# Patient Record
Sex: MALE | Race: WHITE | NOT HISPANIC OR LATINO | URBAN - METROPOLITAN AREA
[De-identification: names, ages, dates, MRNs, and addresses within clinical notes are randomized per-mention and may not be internally consistent; named-entity substitution may affect disease eponyms.]

---

## 2017-04-26 ENCOUNTER — INPATIENT (INPATIENT)
Facility: HOSPITAL | Age: 67
LOS: 6 days | Discharge: HOME HEALTH | End: 2017-05-03
Attending: HOSPITALIST | Admitting: INTERNAL MEDICINE

## 2017-06-20 ENCOUNTER — OUTPATIENT (OUTPATIENT)
Dept: OUTPATIENT SERVICES | Facility: HOSPITAL | Age: 67
LOS: 1 days | Discharge: HOME | End: 2017-06-20

## 2017-06-20 DIAGNOSIS — M54.5 LOW BACK PAIN: ICD-10-CM

## 2017-06-20 DIAGNOSIS — M53.9 DORSOPATHY, UNSPECIFIED: ICD-10-CM

## 2017-06-20 DIAGNOSIS — I10 ESSENTIAL (PRIMARY) HYPERTENSION: ICD-10-CM

## 2017-06-20 DIAGNOSIS — E11.9 TYPE 2 DIABETES MELLITUS WITHOUT COMPLICATIONS: ICD-10-CM

## 2017-06-20 DIAGNOSIS — G51.0 BELL'S PALSY: ICD-10-CM

## 2017-06-28 DIAGNOSIS — Z94.1 HEART TRANSPLANT STATUS: ICD-10-CM

## 2017-06-28 DIAGNOSIS — I25.10 ATHEROSCLEROTIC HEART DISEASE OF NATIVE CORONARY ARTERY WITHOUT ANGINA PECTORIS: ICD-10-CM

## 2017-06-28 DIAGNOSIS — Z79.899 OTHER LONG TERM (CURRENT) DRUG THERAPY: ICD-10-CM

## 2017-07-07 DIAGNOSIS — Z95.1 PRESENCE OF AORTOCORONARY BYPASS GRAFT: ICD-10-CM

## 2017-07-07 DIAGNOSIS — Z95.812 PRESENCE OF FULLY IMPLANTABLE ARTIFICIAL HEART: ICD-10-CM

## 2017-07-07 DIAGNOSIS — E11.22 TYPE 2 DIABETES MELLITUS WITH DIABETIC CHRONIC KIDNEY DISEASE: ICD-10-CM

## 2017-07-07 DIAGNOSIS — M10.9 GOUT, UNSPECIFIED: ICD-10-CM

## 2017-07-07 DIAGNOSIS — Z79.899 OTHER LONG TERM (CURRENT) DRUG THERAPY: ICD-10-CM

## 2017-07-07 DIAGNOSIS — I13.0 HYPERTENSIVE HEART AND CHRONIC KIDNEY DISEASE WITH HEART FAILURE AND STAGE 1 THROUGH STAGE 4 CHRONIC KIDNEY DISEASE, OR UNSPECIFIED CHRONIC KIDNEY DISEASE: ICD-10-CM

## 2017-07-07 DIAGNOSIS — Z87.891 PERSONAL HISTORY OF NICOTINE DEPENDENCE: ICD-10-CM

## 2017-07-07 DIAGNOSIS — E86.0 DEHYDRATION: ICD-10-CM

## 2017-07-07 DIAGNOSIS — N39.0 URINARY TRACT INFECTION, SITE NOT SPECIFIED: ICD-10-CM

## 2017-07-07 DIAGNOSIS — N40.1 BENIGN PROSTATIC HYPERPLASIA WITH LOWER URINARY TRACT SYMPTOMS: ICD-10-CM

## 2017-07-07 DIAGNOSIS — E78.5 HYPERLIPIDEMIA, UNSPECIFIED: ICD-10-CM

## 2017-07-07 DIAGNOSIS — M79.1 MYALGIA: ICD-10-CM

## 2017-07-07 DIAGNOSIS — I42.9 CARDIOMYOPATHY, UNSPECIFIED: ICD-10-CM

## 2017-07-07 DIAGNOSIS — E13.10 OTHER SPECIFIED DIABETES MELLITUS WITH KETOACIDOSIS WITHOUT COMA: ICD-10-CM

## 2017-07-07 DIAGNOSIS — Z79.4 LONG TERM (CURRENT) USE OF INSULIN: ICD-10-CM

## 2017-07-07 DIAGNOSIS — I50.22 CHRONIC SYSTOLIC (CONGESTIVE) HEART FAILURE: ICD-10-CM

## 2017-07-07 DIAGNOSIS — I69.354 HEMIPLEGIA AND HEMIPARESIS FOLLOWING CEREBRAL INFARCTION AFFECTING LEFT NON-DOMINANT SIDE: ICD-10-CM

## 2017-07-07 DIAGNOSIS — R33.8 OTHER RETENTION OF URINE: ICD-10-CM

## 2017-07-07 DIAGNOSIS — R41.82 ALTERED MENTAL STATUS, UNSPECIFIED: ICD-10-CM

## 2017-07-07 DIAGNOSIS — I25.10 ATHEROSCLEROTIC HEART DISEASE OF NATIVE CORONARY ARTERY WITHOUT ANGINA PECTORIS: ICD-10-CM

## 2017-07-07 DIAGNOSIS — B96.89 OTHER SPECIFIED BACTERIAL AGENTS AS THE CAUSE OF DISEASES CLASSIFIED ELSEWHERE: ICD-10-CM

## 2017-07-07 DIAGNOSIS — N17.9 ACUTE KIDNEY FAILURE, UNSPECIFIED: ICD-10-CM

## 2017-07-07 DIAGNOSIS — M1A.9XX0 CHRONIC GOUT, UNSPECIFIED, WITHOUT TOPHUS (TOPHI): ICD-10-CM

## 2017-07-07 DIAGNOSIS — N18.3 CHRONIC KIDNEY DISEASE, STAGE 3 (MODERATE): ICD-10-CM

## 2017-07-07 DIAGNOSIS — E83.42 HYPOMAGNESEMIA: ICD-10-CM

## 2017-07-07 DIAGNOSIS — R10.9 UNSPECIFIED ABDOMINAL PAIN: ICD-10-CM

## 2018-05-04 PROBLEM — Z00.00 ENCOUNTER FOR PREVENTIVE HEALTH EXAMINATION: Status: ACTIVE | Noted: 2018-05-04

## 2018-05-18 ENCOUNTER — APPOINTMENT (OUTPATIENT)
Dept: VASCULAR SURGERY | Facility: CLINIC | Age: 68
End: 2018-05-18
Payer: COMMERCIAL

## 2018-05-18 VITALS
BODY MASS INDEX: 31.67 KG/M2 | SYSTOLIC BLOOD PRESSURE: 130 MMHG | HEIGHT: 68 IN | WEIGHT: 209 LBS | DIASTOLIC BLOOD PRESSURE: 78 MMHG

## 2018-05-18 DIAGNOSIS — E11.9 TYPE 2 DIABETES MELLITUS W/OUT COMPLICATIONS: ICD-10-CM

## 2018-05-18 DIAGNOSIS — Z86.79 PERSONAL HISTORY OF OTHER DISEASES OF THE CIRCULATORY SYSTEM: ICD-10-CM

## 2018-05-18 DIAGNOSIS — M10.9 GOUT, UNSPECIFIED: ICD-10-CM

## 2018-05-18 DIAGNOSIS — Z94.1 HEART TRANSPLANT STATUS: ICD-10-CM

## 2018-05-18 DIAGNOSIS — I77.1 STRICTURE OF ARTERY: ICD-10-CM

## 2018-05-18 DIAGNOSIS — I10 ESSENTIAL (PRIMARY) HYPERTENSION: ICD-10-CM

## 2018-05-18 DIAGNOSIS — R42 DIZZINESS AND GIDDINESS: ICD-10-CM

## 2018-05-18 DIAGNOSIS — E78.00 PURE HYPERCHOLESTEROLEMIA, UNSPECIFIED: ICD-10-CM

## 2018-05-18 PROCEDURE — 99203 OFFICE O/P NEW LOW 30 MIN: CPT

## 2018-05-18 PROCEDURE — 93880 EXTRACRANIAL BILAT STUDY: CPT

## 2018-05-18 RX ORDER — DOCUSATE SODIUM 100 MG/1
100 CAPSULE ORAL
Refills: 0 | Status: ACTIVE | COMMUNITY

## 2018-05-18 RX ORDER — SPIRONOLACTONE 25 MG/1
25 TABLET ORAL
Refills: 0 | Status: ACTIVE | COMMUNITY

## 2018-05-18 RX ORDER — MULTIVIT-MIN/IRON/FOLIC ACID/K 18-600-40
CAPSULE ORAL
Refills: 0 | Status: ACTIVE | COMMUNITY

## 2018-05-18 RX ORDER — HUMAN INSULIN 100 [IU]/ML
(70-30) 100 INJECTION, SUSPENSION SUBCUTANEOUS
Refills: 0 | Status: ACTIVE | COMMUNITY

## 2018-05-18 RX ORDER — CALCIUM CARBONATE 260MG(650)
500 TABLET,CHEWABLE ORAL
Refills: 0 | Status: ACTIVE | COMMUNITY

## 2018-05-18 RX ORDER — ALLOPURINOL 100 MG/1
100 TABLET ORAL
Refills: 0 | Status: ACTIVE | COMMUNITY

## 2018-05-18 RX ORDER — CALCIUM CARBONATE/VITAMIN D3 500MG-5MCG
500-5 TABLET ORAL
Refills: 0 | Status: ACTIVE | COMMUNITY

## 2018-05-18 RX ORDER — VITAMIN B COMPLEX
CAPSULE ORAL
Refills: 0 | Status: ACTIVE | COMMUNITY

## 2018-05-18 RX ORDER — AMLODIPINE BESYLATE 5 MG/1
5 TABLET ORAL
Refills: 0 | Status: ACTIVE | COMMUNITY

## 2018-05-18 RX ORDER — OXYCODONE AND ACETAMINOPHEN 5; 325 MG/1; MG/1
5-325 TABLET ORAL
Refills: 0 | Status: ACTIVE | COMMUNITY

## 2018-05-18 RX ORDER — COLD-HOT PACK
EACH MISCELLANEOUS
Refills: 0 | Status: ACTIVE | COMMUNITY

## 2018-05-18 RX ORDER — QUININE SULFATE 324 MG/1
324 CAPSULE ORAL
Refills: 0 | Status: ACTIVE | COMMUNITY

## 2018-09-10 ENCOUNTER — INPATIENT (INPATIENT)
Facility: HOSPITAL | Age: 68
LOS: 10 days | Discharge: HOME | End: 2018-09-21
Attending: INTERNAL MEDICINE | Admitting: INTERNAL MEDICINE

## 2018-09-10 VITALS
RESPIRATION RATE: 18 BRPM | SYSTOLIC BLOOD PRESSURE: 175 MMHG | HEIGHT: 66 IN | TEMPERATURE: 98 F | OXYGEN SATURATION: 98 % | HEART RATE: 132 BPM | WEIGHT: 220.02 LBS | DIASTOLIC BLOOD PRESSURE: 115 MMHG

## 2018-09-10 DIAGNOSIS — Z94.1 HEART TRANSPLANT STATUS: Chronic | ICD-10-CM

## 2018-09-10 LAB
ALBUMIN SERPL ELPH-MCNC: 4.8 G/DL — SIGNIFICANT CHANGE UP (ref 3.5–5.2)
ALP SERPL-CCNC: 162 U/L — HIGH (ref 30–115)
ALT FLD-CCNC: 11 U/L — SIGNIFICANT CHANGE UP (ref 0–41)
ANION GAP SERPL CALC-SCNC: 27 MMOL/L — HIGH (ref 7–14)
APPEARANCE UR: CLEAR — SIGNIFICANT CHANGE UP
APTT BLD: 24 SEC — LOW (ref 27–39.2)
AST SERPL-CCNC: 17 U/L — SIGNIFICANT CHANGE UP (ref 0–41)
BACTERIA # UR AUTO: ABNORMAL
BASE EXCESS BLDV CALC-SCNC: -9.8 MMOL/L — LOW (ref -2–2)
BASOPHILS # BLD AUTO: 0.04 K/UL — SIGNIFICANT CHANGE UP (ref 0–0.2)
BASOPHILS NFR BLD AUTO: 0.4 % — SIGNIFICANT CHANGE UP (ref 0–1)
BILIRUB DIRECT SERPL-MCNC: <0.2 MG/DL — SIGNIFICANT CHANGE UP (ref 0–0.2)
BILIRUB INDIRECT FLD-MCNC: >0.2 MG/DL — SIGNIFICANT CHANGE UP (ref 0.2–1.2)
BILIRUB SERPL-MCNC: 0.4 MG/DL — SIGNIFICANT CHANGE UP (ref 0.2–1.2)
BILIRUB UR-MCNC: NEGATIVE — SIGNIFICANT CHANGE UP
BUN SERPL-MCNC: 39 MG/DL — HIGH (ref 10–20)
CA-I SERPL-SCNC: 1.29 MMOL/L — SIGNIFICANT CHANGE UP (ref 1.12–1.3)
CALCIUM SERPL-MCNC: 9.9 MG/DL — SIGNIFICANT CHANGE UP (ref 8.5–10.1)
CHLORIDE SERPL-SCNC: 99 MMOL/L — SIGNIFICANT CHANGE UP (ref 98–110)
CK SERPL-CCNC: 113 U/L — SIGNIFICANT CHANGE UP (ref 0–225)
CO2 SERPL-SCNC: 15 MMOL/L — LOW (ref 17–32)
COLOR SPEC: YELLOW — SIGNIFICANT CHANGE UP
CREAT SERPL-MCNC: 2.1 MG/DL — HIGH (ref 0.7–1.5)
DIFF PNL FLD: ABNORMAL
EOSINOPHIL # BLD AUTO: 0 K/UL — SIGNIFICANT CHANGE UP (ref 0–0.7)
EOSINOPHIL NFR BLD AUTO: 0 % — SIGNIFICANT CHANGE UP (ref 0–8)
EPI CELLS # UR: ABNORMAL /HPF
GAS PNL BLDV: 137 MMOL/L — SIGNIFICANT CHANGE UP (ref 136–145)
GAS PNL BLDV: SIGNIFICANT CHANGE UP
GLUCOSE SERPL-MCNC: 712 MG/DL — CRITICAL HIGH (ref 70–99)
GLUCOSE UR QL: 500 MG/DL
HCO3 BLDV-SCNC: 18 MMOL/L — LOW (ref 22–29)
HCT VFR BLD CALC: 50.3 % — SIGNIFICANT CHANGE UP (ref 42–52)
HCT VFR BLDA CALC: 51.5 % — HIGH (ref 34–44)
HGB BLD CALC-MCNC: 16.8 G/DL — SIGNIFICANT CHANGE UP (ref 14–18)
HGB BLD-MCNC: 16 G/DL — SIGNIFICANT CHANGE UP (ref 14–18)
HOROWITZ INDEX BLDV+IHG-RTO: 21 — SIGNIFICANT CHANGE UP
IMM GRANULOCYTES NFR BLD AUTO: 0.8 % — HIGH (ref 0.1–0.3)
INR BLD: 1.02 RATIO — SIGNIFICANT CHANGE UP (ref 0.65–1.3)
KETONES UR-MCNC: 80 — SIGNIFICANT CHANGE UP
LACTATE BLDV-MCNC: 2.4 MMOL/L — HIGH (ref 0.5–1.6)
LACTATE SERPL-SCNC: 2.5 MMOL/L — HIGH (ref 0.5–2.2)
LEUKOCYTE ESTERASE UR-ACNC: NEGATIVE — SIGNIFICANT CHANGE UP
LIDOCAIN IGE QN: 29 U/L — SIGNIFICANT CHANGE UP (ref 7–60)
LYMPHOCYTES # BLD AUTO: 0.54 K/UL — LOW (ref 1.2–3.4)
LYMPHOCYTES # BLD AUTO: 5.4 % — LOW (ref 20.5–51.1)
MCHC RBC-ENTMCNC: 26.7 PG — LOW (ref 27–31)
MCHC RBC-ENTMCNC: 31.8 G/DL — LOW (ref 32–37)
MCV RBC AUTO: 84 FL — SIGNIFICANT CHANGE UP (ref 80–94)
MONOCYTES # BLD AUTO: 0.26 K/UL — SIGNIFICANT CHANGE UP (ref 0.1–0.6)
MONOCYTES NFR BLD AUTO: 2.6 % — SIGNIFICANT CHANGE UP (ref 1.7–9.3)
NEUTROPHILS # BLD AUTO: 9.07 K/UL — HIGH (ref 1.4–6.5)
NEUTROPHILS NFR BLD AUTO: 90.8 % — HIGH (ref 42.2–75.2)
NITRITE UR-MCNC: NEGATIVE — SIGNIFICANT CHANGE UP
NRBC # BLD: 0 /100 WBCS — SIGNIFICANT CHANGE UP (ref 0–0)
PCO2 BLDV: 44 MMHG — SIGNIFICANT CHANGE UP (ref 41–51)
PH BLDV: 7.22 — LOW (ref 7.26–7.43)
PH UR: 5.5 — SIGNIFICANT CHANGE UP (ref 5–8)
PLATELET # BLD AUTO: 135 K/UL — SIGNIFICANT CHANGE UP (ref 130–400)
PO2 BLDV: 22 MMHG — SIGNIFICANT CHANGE UP (ref 20–40)
POTASSIUM BLDV-SCNC: 6.1 MMOL/L — HIGH (ref 3.3–5.6)
POTASSIUM SERPL-MCNC: 4.8 MMOL/L — SIGNIFICANT CHANGE UP (ref 3.5–5)
POTASSIUM SERPL-SCNC: 4.8 MMOL/L — SIGNIFICANT CHANGE UP (ref 3.5–5)
PROT SERPL-MCNC: 7.9 G/DL — SIGNIFICANT CHANGE UP (ref 6–8)
PROT UR-MCNC: 100
PROTHROM AB SERPL-ACNC: 11 SEC — SIGNIFICANT CHANGE UP (ref 9.95–12.87)
RBC # BLD: 5.99 M/UL — SIGNIFICANT CHANGE UP (ref 4.7–6.1)
RBC # FLD: 14.5 % — SIGNIFICANT CHANGE UP (ref 11.5–14.5)
RBC CASTS # UR COMP ASSIST: SIGNIFICANT CHANGE UP /HPF
SAO2 % BLDV: 33 % — SIGNIFICANT CHANGE UP
SODIUM SERPL-SCNC: 141 MMOL/L — SIGNIFICANT CHANGE UP (ref 135–146)
SP GR SPEC: 1.01 — SIGNIFICANT CHANGE UP (ref 1.01–1.03)
TROPONIN T SERPL-MCNC: <0.01 NG/ML — SIGNIFICANT CHANGE UP
UROBILINOGEN FLD QL: 0.2 — SIGNIFICANT CHANGE UP (ref 0.2–0.2)
WBC # BLD: 9.99 K/UL — SIGNIFICANT CHANGE UP (ref 4.8–10.8)
WBC # FLD AUTO: 9.99 K/UL — SIGNIFICANT CHANGE UP (ref 4.8–10.8)
WBC UR QL: ABNORMAL /HPF

## 2018-09-10 RX ORDER — CEFEPIME 1 G/1
1000 INJECTION, POWDER, FOR SOLUTION INTRAMUSCULAR; INTRAVENOUS ONCE
Qty: 0 | Refills: 0 | Status: COMPLETED | OUTPATIENT
Start: 2018-09-10 | End: 2018-09-10

## 2018-09-10 RX ORDER — CIPROFLOXACIN LACTATE 400MG/40ML
400 VIAL (ML) INTRAVENOUS ONCE
Qty: 0 | Refills: 0 | Status: COMPLETED | OUTPATIENT
Start: 2018-09-10 | End: 2018-09-10

## 2018-09-10 RX ORDER — INSULIN HUMAN 100 [IU]/ML
10 INJECTION, SOLUTION SUBCUTANEOUS ONCE
Qty: 0 | Refills: 0 | Status: COMPLETED | OUTPATIENT
Start: 2018-09-10 | End: 2018-09-10

## 2018-09-10 RX ORDER — SODIUM CHLORIDE 9 MG/ML
3 INJECTION INTRAMUSCULAR; INTRAVENOUS; SUBCUTANEOUS ONCE
Qty: 0 | Refills: 0 | Status: COMPLETED | OUTPATIENT
Start: 2018-09-10 | End: 2018-09-10

## 2018-09-10 RX ORDER — SODIUM CHLORIDE 9 MG/ML
1000 INJECTION, SOLUTION INTRAVENOUS ONCE
Qty: 0 | Refills: 0 | Status: COMPLETED | OUTPATIENT
Start: 2018-09-10 | End: 2018-09-10

## 2018-09-10 RX ORDER — INSULIN HUMAN 100 [IU]/ML
10 INJECTION, SOLUTION SUBCUTANEOUS
Qty: 100 | Refills: 0 | Status: DISCONTINUED | OUTPATIENT
Start: 2018-09-10 | End: 2018-09-11

## 2018-09-10 RX ORDER — SODIUM CHLORIDE 9 MG/ML
1000 INJECTION INTRAMUSCULAR; INTRAVENOUS; SUBCUTANEOUS ONCE
Qty: 0 | Refills: 0 | Status: COMPLETED | OUTPATIENT
Start: 2018-09-10 | End: 2018-09-10

## 2018-09-10 RX ORDER — INSULIN HUMAN 100 [IU]/ML
10 INJECTION, SOLUTION SUBCUTANEOUS ONCE
Qty: 0 | Refills: 0 | Status: DISCONTINUED | OUTPATIENT
Start: 2018-09-10 | End: 2018-09-10

## 2018-09-10 RX ORDER — LABETALOL HCL 100 MG
10 TABLET ORAL ONCE
Qty: 0 | Refills: 0 | Status: COMPLETED | OUTPATIENT
Start: 2018-09-10 | End: 2018-09-10

## 2018-09-10 RX ORDER — INSULIN HUMAN 100 [IU]/ML
0.1 INJECTION, SOLUTION SUBCUTANEOUS
Qty: 100 | Refills: 0 | Status: DISCONTINUED | OUTPATIENT
Start: 2018-09-10 | End: 2018-09-11

## 2018-09-10 RX ORDER — ACETAMINOPHEN 500 MG
975 TABLET ORAL ONCE
Qty: 0 | Refills: 0 | Status: COMPLETED | OUTPATIENT
Start: 2018-09-10 | End: 2018-09-10

## 2018-09-10 RX ADMIN — Medication 10 MILLIGRAM(S): at 22:49

## 2018-09-10 RX ADMIN — SODIUM CHLORIDE 1000 MILLILITER(S): 9 INJECTION, SOLUTION INTRAVENOUS at 23:37

## 2018-09-10 RX ADMIN — INSULIN HUMAN 0.1 UNIT(S)/HR: 100 INJECTION, SOLUTION SUBCUTANEOUS at 23:39

## 2018-09-10 RX ADMIN — INSULIN HUMAN 10 UNIT(S): 100 INJECTION, SOLUTION SUBCUTANEOUS at 20:30

## 2018-09-10 RX ADMIN — SODIUM CHLORIDE 1000 MILLILITER(S): 9 INJECTION INTRAMUSCULAR; INTRAVENOUS; SUBCUTANEOUS at 19:38

## 2018-09-10 RX ADMIN — SODIUM CHLORIDE 3 MILLILITER(S): 9 INJECTION INTRAMUSCULAR; INTRAVENOUS; SUBCUTANEOUS at 19:38

## 2018-09-10 RX ADMIN — INSULIN HUMAN 10 UNIT(S): 100 INJECTION, SOLUTION SUBCUTANEOUS at 22:43

## 2018-09-10 RX ADMIN — Medication 200 MILLIGRAM(S): at 19:38

## 2018-09-10 RX ADMIN — Medication 10 MILLIGRAM(S): at 20:39

## 2018-09-10 RX ADMIN — CEFEPIME 100 MILLIGRAM(S): 1 INJECTION, POWDER, FOR SOLUTION INTRAMUSCULAR; INTRAVENOUS at 19:38

## 2018-09-10 RX ADMIN — Medication 975 MILLIGRAM(S): at 19:38

## 2018-09-10 NOTE — ED PROVIDER NOTE - CARE PLAN
Principal Discharge DX:	DKA (diabetic ketoacidoses)  Secondary Diagnosis:	Sepsis  Secondary Diagnosis:	ICH (intracerebral hemorrhage)

## 2018-09-10 NOTE — ED PROVIDER NOTE - MEDICAL DECISION MAKING DETAILS
68yM pmhx  heart transplant in [facility], after MI  - on tacrolimus, Mycophenlyate, spironolactone lasix, IDDM, HTN CKD ( Dr dhaliwal), p/w increased FS and confusion.  Per wife at bedside  she saw patient  before she went to work , he was ok,  when she came home at 5:!5  he was sitting on recliner, slumped over,  she checked his FS and it  was " too hgih",  Fmaily mentiosn  he is on an abx she does not know name of  for UTI  given by urologist Dr Braga last week - Pt denies any chest pain sob cough  -  pt has been having  abdominal pain for > 1 month - referred to  [facility]  had  CT  which ws normal - and MRI that they do not have result of yet. There was no fall or trauma.   confusion was described by wife as  grabbing at things that weren't there. PE: alert perrla head atraumatic  neck supple  mm dry cvs tachycardic resp cta abd soft mild diffuse tenderness,  neuro alert oriented to person place - does not know time.   5/5 strength all extremities. rectal temp, 101,4 -  sepsis protocol initiated 68yM pmhx  heart transplant in [facility], after MI 10 years ago   - on tacrolimus, Mycophenlyate, spironolactone lasix, IDDM, HTN CKD ( Dr dhaliwal), p/w increased FS and confusion.  Per wife at bedside  she saw patient  before she went to work , he was ok,  when she came home at 5:!5  he was sitting on recliner, slumped over,  she checked his FS and it  was " too hgih",  Fmaily mentiosn  he is on an abx she does not know name of  for UTI  given by urologist Dr Braga last week - Pt denies any chest pain sob cough  -  pt has been having  abdominal pain for > 1 month - referred to  [facility]  had  CT  which ws normal - and MRI that they do not have result of yet. There was no fall or trauma.   confusion was described by wife as  grabbing at things that weren't there. PE: alert perrla head atraumatic  neck supple  mm dry cvs tachycardic resp cta abd soft mild diffuse tenderness,  neuro alert oriented to person place - does not know time.   5/5 strength all extremities. rectal temp, 101,4 -  sepsis protocol initiated

## 2018-09-10 NOTE — ED ADULT NURSE NOTE - NSIMPLEMENTINTERV_GEN_ALL_ED
Implemented All Fall with Harm Risk Interventions:  [place] to call system. Call bell, personal items and telephone within reach. Instruct patient to call for assistance. Room bathroom lighting operational. Non-slip footwear when patient is off stretcher. Physically safe environment: no spills, clutter or unnecessary equipment. Stretcher in lowest position, wheels locked, appropriate side rails in place. Provide visual cue, wrist band, yellow gown, etc. Monitor gait and stability. Monitor for mental status changes and reorient to person, place, and time. Review medications for side effects contributing to fall risk. Reinforce activity limits and safety measures with patient and family. Provide visual clues: red socks.

## 2018-09-10 NOTE — ED PROVIDER NOTE - PROGRESS NOTE DETAILS
Sepsis - will hold 30cc/kg bolus as patient is cardiac transplant patient on multiple diuretics - 1 liter bolus going now original BMop was hemolyzed -   2nd redrawn -   pt  had receved  to IV boluses of insulin at that  time-   2md  bmp results  pt with AG  a,d  low hco3 -   will start insulin infusion  for DKA

## 2018-09-10 NOTE — ED PROVIDER NOTE - PMH
Benign prostatic hyperplasia with urinary frequency    Bilateral hearing loss, unspecified hearing loss type    Chronic kidney disease, unspecified CKD stage    Diabetes    Heart transplant recipient    High cholesterol    HTN (hypertension)    Urinary tract infection without hematuria, site unspecified

## 2018-09-10 NOTE — ED ADULT TRIAGE NOTE - CHIEF COMPLAINT QUOTE
patient's wife states when he she got home, "he was slumped over sleeping , and his sugar was too high" patient states in the morning patient was at baseline alert and oriented.

## 2018-09-10 NOTE — ED PROVIDER NOTE - OBJECTIVE STATEMENT
68 year old male past medical history of cardiac transplant 10 years ago and Diabetes brought in by ambulance for AMS and high blood sugar as per wife they were unable to get number at home. Pt was sitting in recliner as per wife slummped over. patient with also abd pain for the last month seen by francheska who did CT and MRI both normal. spouse denies trauma.

## 2018-09-11 LAB
ACETONE SERPL-MCNC: ABNORMAL
ALBUMIN SERPL ELPH-MCNC: 4 G/DL — SIGNIFICANT CHANGE UP (ref 3.5–5.2)
ALBUMIN SERPL ELPH-MCNC: 4.6 G/DL — SIGNIFICANT CHANGE UP (ref 3.5–5.2)
ALP SERPL-CCNC: 117 U/L — HIGH (ref 30–115)
ALP SERPL-CCNC: 141 U/L — HIGH (ref 30–115)
ALT FLD-CCNC: 10 U/L — SIGNIFICANT CHANGE UP (ref 0–41)
ALT FLD-CCNC: 8 U/L — SIGNIFICANT CHANGE UP (ref 0–41)
ANION GAP SERPL CALC-SCNC: 13 MMOL/L — SIGNIFICANT CHANGE UP (ref 7–14)
ANION GAP SERPL CALC-SCNC: 15 MMOL/L — HIGH (ref 7–14)
ANION GAP SERPL CALC-SCNC: 18 MMOL/L — HIGH (ref 7–14)
ANION GAP SERPL CALC-SCNC: 19 MMOL/L — HIGH (ref 7–14)
AST SERPL-CCNC: 17 U/L — SIGNIFICANT CHANGE UP (ref 0–41)
AST SERPL-CCNC: 19 U/L — SIGNIFICANT CHANGE UP (ref 0–41)
BASOPHILS # BLD AUTO: 0.04 K/UL — SIGNIFICANT CHANGE UP (ref 0–0.2)
BASOPHILS NFR BLD AUTO: 0.3 % — SIGNIFICANT CHANGE UP (ref 0–1)
BILIRUB SERPL-MCNC: 0.5 MG/DL — SIGNIFICANT CHANGE UP (ref 0.2–1.2)
BILIRUB SERPL-MCNC: 0.7 MG/DL — SIGNIFICANT CHANGE UP (ref 0.2–1.2)
BUN SERPL-MCNC: 23 MG/DL — HIGH (ref 10–20)
BUN SERPL-MCNC: 32 MG/DL — HIGH (ref 10–20)
BUN SERPL-MCNC: 35 MG/DL — HIGH (ref 10–20)
BUN SERPL-MCNC: 37 MG/DL — HIGH (ref 10–20)
CALCIUM SERPL-MCNC: 10 MG/DL — SIGNIFICANT CHANGE UP (ref 8.5–10.1)
CALCIUM SERPL-MCNC: 10.4 MG/DL — HIGH (ref 8.5–10.1)
CALCIUM SERPL-MCNC: 6.3 MG/DL — LOW (ref 8.5–10.1)
CALCIUM SERPL-MCNC: 9.9 MG/DL — SIGNIFICANT CHANGE UP (ref 8.5–10.1)
CHLORIDE SERPL-SCNC: 105 MMOL/L — SIGNIFICANT CHANGE UP (ref 98–110)
CHLORIDE SERPL-SCNC: 107 MMOL/L — SIGNIFICANT CHANGE UP (ref 98–110)
CHLORIDE SERPL-SCNC: 107 MMOL/L — SIGNIFICANT CHANGE UP (ref 98–110)
CHLORIDE SERPL-SCNC: 68 MMOL/L — LOW (ref 98–110)
CHLORIDE UR-SCNC: <20 — SIGNIFICANT CHANGE UP
CHOLEST SERPL-MCNC: 143 MG/DL — SIGNIFICANT CHANGE UP (ref 100–200)
CO2 SERPL-SCNC: 16 MMOL/L — LOW (ref 17–32)
CO2 SERPL-SCNC: 21 MMOL/L — SIGNIFICANT CHANGE UP (ref 17–32)
CO2 SERPL-SCNC: 23 MMOL/L — SIGNIFICANT CHANGE UP (ref 17–32)
CO2 SERPL-SCNC: 25 MMOL/L — SIGNIFICANT CHANGE UP (ref 17–32)
CREAT SERPL-MCNC: 1.4 MG/DL — SIGNIFICANT CHANGE UP (ref 0.7–1.5)
CREAT SERPL-MCNC: 1.8 MG/DL — HIGH (ref 0.7–1.5)
CREAT SERPL-MCNC: 2 MG/DL — HIGH (ref 0.7–1.5)
CREAT SERPL-MCNC: 2.1 MG/DL — HIGH (ref 0.7–1.5)
EOSINOPHIL # BLD AUTO: 0.01 K/UL — SIGNIFICANT CHANGE UP (ref 0–0.7)
EOSINOPHIL NFR BLD AUTO: 0.1 % — SIGNIFICANT CHANGE UP (ref 0–8)
ESTIMATED AVERAGE GLUCOSE: 326 MG/DL — HIGH (ref 68–114)
ESTIMATED AVERAGE GLUCOSE: 338 MG/DL — HIGH (ref 68–114)
GLUCOSE BLDC GLUCOMTR-MCNC: 105 MG/DL — HIGH (ref 70–99)
GLUCOSE BLDC GLUCOMTR-MCNC: 111 MG/DL — HIGH (ref 70–99)
GLUCOSE BLDC GLUCOMTR-MCNC: 121 MG/DL — HIGH (ref 70–99)
GLUCOSE BLDC GLUCOMTR-MCNC: 141 MG/DL — HIGH (ref 70–99)
GLUCOSE BLDC GLUCOMTR-MCNC: 141 MG/DL — HIGH (ref 70–99)
GLUCOSE BLDC GLUCOMTR-MCNC: 147 MG/DL — HIGH (ref 70–99)
GLUCOSE BLDC GLUCOMTR-MCNC: 156 MG/DL — HIGH (ref 70–99)
GLUCOSE BLDC GLUCOMTR-MCNC: 162 MG/DL — HIGH (ref 70–99)
GLUCOSE BLDC GLUCOMTR-MCNC: 163 MG/DL — HIGH (ref 70–99)
GLUCOSE BLDC GLUCOMTR-MCNC: 164 MG/DL — HIGH (ref 70–99)
GLUCOSE BLDC GLUCOMTR-MCNC: 177 MG/DL — HIGH (ref 70–99)
GLUCOSE BLDC GLUCOMTR-MCNC: 178 MG/DL — HIGH (ref 70–99)
GLUCOSE BLDC GLUCOMTR-MCNC: 180 MG/DL — HIGH (ref 70–99)
GLUCOSE BLDC GLUCOMTR-MCNC: 191 MG/DL — HIGH (ref 70–99)
GLUCOSE BLDC GLUCOMTR-MCNC: 198 MG/DL — HIGH (ref 70–99)
GLUCOSE BLDC GLUCOMTR-MCNC: 199 MG/DL — HIGH (ref 70–99)
GLUCOSE BLDC GLUCOMTR-MCNC: 217 MG/DL — HIGH (ref 70–99)
GLUCOSE BLDC GLUCOMTR-MCNC: 297 MG/DL — HIGH (ref 70–99)
GLUCOSE BLDC GLUCOMTR-MCNC: 90 MG/DL — SIGNIFICANT CHANGE UP (ref 70–99)
GLUCOSE SERPL-MCNC: 1278 MG/DL — SIGNIFICANT CHANGE UP (ref 70–99)
GLUCOSE SERPL-MCNC: 139 MG/DL — HIGH (ref 70–99)
GLUCOSE SERPL-MCNC: 189 MG/DL — HIGH (ref 70–99)
GLUCOSE SERPL-MCNC: 401 MG/DL — HIGH (ref 70–99)
HBA1C BLD-MCNC: 13 % — HIGH (ref 4–5.6)
HBA1C BLD-MCNC: 13.4 % — HIGH (ref 4–5.6)
HCT VFR BLD CALC: 41.8 % — LOW (ref 42–52)
HCT VFR BLD CALC: 46 % — SIGNIFICANT CHANGE UP (ref 42–52)
HDLC SERPL-MCNC: 24 MG/DL — LOW
HGB BLD-MCNC: 13.9 G/DL — LOW (ref 14–18)
HGB BLD-MCNC: 15.3 G/DL — SIGNIFICANT CHANGE UP (ref 14–18)
IMM GRANULOCYTES NFR BLD AUTO: 0.7 % — HIGH (ref 0.1–0.3)
LIPID PNL WITH DIRECT LDL SERPL: 101 MG/DL — SIGNIFICANT CHANGE UP (ref 4–129)
LYMPHOCYTES # BLD AUTO: 1.25 K/UL — SIGNIFICANT CHANGE UP (ref 1.2–3.4)
LYMPHOCYTES # BLD AUTO: 9.4 % — LOW (ref 20.5–51.1)
MAGNESIUM SERPL-MCNC: 1.5 MG/DL — LOW (ref 1.8–2.4)
MAGNESIUM SERPL-MCNC: 1.9 MG/DL — SIGNIFICANT CHANGE UP (ref 1.8–2.4)
MAGNESIUM SERPL-MCNC: 2.1 MG/DL — SIGNIFICANT CHANGE UP (ref 1.8–2.4)
MCHC RBC-ENTMCNC: 26.3 PG — LOW (ref 27–31)
MCHC RBC-ENTMCNC: 26.7 PG — LOW (ref 27–31)
MCHC RBC-ENTMCNC: 33.3 G/DL — SIGNIFICANT CHANGE UP (ref 32–37)
MCHC RBC-ENTMCNC: 33.3 G/DL — SIGNIFICANT CHANGE UP (ref 32–37)
MCV RBC AUTO: 79.2 FL — LOW (ref 80–94)
MCV RBC AUTO: 80.1 FL — SIGNIFICANT CHANGE UP (ref 80–94)
MONOCYTES # BLD AUTO: 1.26 K/UL — HIGH (ref 0.1–0.6)
MONOCYTES NFR BLD AUTO: 9.4 % — HIGH (ref 1.7–9.3)
NEUTROPHILS # BLD AUTO: 10.71 K/UL — HIGH (ref 1.4–6.5)
NEUTROPHILS NFR BLD AUTO: 80.1 % — HIGH (ref 42.2–75.2)
NRBC # BLD: 0 /100 WBCS — SIGNIFICANT CHANGE UP (ref 0–0)
NRBC # BLD: 0 /100 WBCS — SIGNIFICANT CHANGE UP (ref 0–0)
PHOSPHATE SERPL-MCNC: 1.5 MG/DL — LOW (ref 2.1–4.9)
PHOSPHATE SERPL-MCNC: 1.8 MG/DL — LOW (ref 2.1–4.9)
PLATELET # BLD AUTO: 126 K/UL — LOW (ref 130–400)
PLATELET # BLD AUTO: 145 K/UL — SIGNIFICANT CHANGE UP (ref 130–400)
POTASSIUM SERPL-MCNC: 2.2 MMOL/L — CRITICAL LOW (ref 3.5–5)
POTASSIUM SERPL-MCNC: 3.8 MMOL/L — SIGNIFICANT CHANGE UP (ref 3.5–5)
POTASSIUM SERPL-MCNC: 4 MMOL/L — SIGNIFICANT CHANGE UP (ref 3.5–5)
POTASSIUM SERPL-MCNC: 4.1 MMOL/L — SIGNIFICANT CHANGE UP (ref 3.5–5)
POTASSIUM SERPL-SCNC: 2.2 MMOL/L — CRITICAL LOW (ref 3.5–5)
POTASSIUM SERPL-SCNC: 3.8 MMOL/L — SIGNIFICANT CHANGE UP (ref 3.5–5)
POTASSIUM SERPL-SCNC: 4 MMOL/L — SIGNIFICANT CHANGE UP (ref 3.5–5)
POTASSIUM SERPL-SCNC: 4.1 MMOL/L — SIGNIFICANT CHANGE UP (ref 3.5–5)
POTASSIUM UR-SCNC: 35 MMOL/L — SIGNIFICANT CHANGE UP
PROT SERPL-MCNC: 6.8 G/DL — SIGNIFICANT CHANGE UP (ref 6–8)
PROT SERPL-MCNC: 7.3 G/DL — SIGNIFICANT CHANGE UP (ref 6–8)
RBC # BLD: 5.28 M/UL — SIGNIFICANT CHANGE UP (ref 4.7–6.1)
RBC # BLD: 5.74 M/UL — SIGNIFICANT CHANGE UP (ref 4.7–6.1)
RBC # FLD: 13.7 % — SIGNIFICANT CHANGE UP (ref 11.5–14.5)
RBC # FLD: 13.8 % — SIGNIFICANT CHANGE UP (ref 11.5–14.5)
SODIUM SERPL-SCNC: 145 MMOL/L — SIGNIFICANT CHANGE UP (ref 135–146)
SODIUM SERPL-SCNC: 147 MMOL/L — HIGH (ref 135–146)
SODIUM SERPL-SCNC: 148 MMOL/L — HIGH (ref 135–146)
SODIUM SERPL-SCNC: 97 MMOL/L — CRITICAL LOW (ref 135–146)
SODIUM UR-SCNC: 28 MMOL/L — SIGNIFICANT CHANGE UP
TOTAL CHOLESTEROL/HDL RATIO MEASUREMENT: 6 RATIO — HIGH (ref 4–5.5)
TRIGL SERPL-MCNC: 137 MG/DL — SIGNIFICANT CHANGE UP (ref 10–149)
TROPONIN T SERPL-MCNC: <0.01 NG/ML — SIGNIFICANT CHANGE UP
WBC # BLD: 10.3 K/UL — SIGNIFICANT CHANGE UP (ref 4.8–10.8)
WBC # BLD: 13.36 K/UL — HIGH (ref 4.8–10.8)
WBC # FLD AUTO: 10.3 K/UL — SIGNIFICANT CHANGE UP (ref 4.8–10.8)
WBC # FLD AUTO: 13.36 K/UL — HIGH (ref 4.8–10.8)

## 2018-09-11 RX ORDER — SODIUM CHLORIDE 9 MG/ML
1000 INJECTION, SOLUTION INTRAVENOUS
Qty: 0 | Refills: 0 | Status: DISCONTINUED | OUTPATIENT
Start: 2018-09-11 | End: 2018-09-11

## 2018-09-11 RX ORDER — SODIUM,POTASSIUM PHOSPHATES 278-250MG
1 POWDER IN PACKET (EA) ORAL
Qty: 0 | Refills: 0 | Status: DISCONTINUED | OUTPATIENT
Start: 2018-09-11 | End: 2018-09-11

## 2018-09-11 RX ORDER — SPIRONOLACTONE 25 MG/1
1 TABLET, FILM COATED ORAL
Qty: 0 | Refills: 0 | COMMUNITY

## 2018-09-11 RX ORDER — INSULIN HUMAN 100 [IU]/ML
3 INJECTION, SOLUTION SUBCUTANEOUS
Qty: 100 | Refills: 0 | Status: DISCONTINUED | OUTPATIENT
Start: 2018-09-11 | End: 2018-09-11

## 2018-09-11 RX ORDER — ALLOPURINOL 300 MG
100 TABLET ORAL
Qty: 0 | Refills: 0 | Status: DISCONTINUED | OUTPATIENT
Start: 2018-09-11 | End: 2018-09-21

## 2018-09-11 RX ORDER — INSULIN HUMAN 100 [IU]/ML
8 INJECTION, SOLUTION SUBCUTANEOUS
Qty: 100 | Refills: 0 | Status: DISCONTINUED | OUTPATIENT
Start: 2018-09-11 | End: 2018-09-11

## 2018-09-11 RX ORDER — SODIUM CHLORIDE 9 MG/ML
1000 INJECTION, SOLUTION INTRAVENOUS ONCE
Qty: 0 | Refills: 0 | Status: DISCONTINUED | OUTPATIENT
Start: 2018-09-11 | End: 2018-09-11

## 2018-09-11 RX ORDER — MYCOPHENOLATE MOFETIL 250 MG/1
3 CAPSULE ORAL
Qty: 0 | Refills: 0 | COMMUNITY

## 2018-09-11 RX ORDER — INSULIN HUMAN 100 [IU]/ML
5 INJECTION, SOLUTION SUBCUTANEOUS
Qty: 100 | Refills: 0 | Status: DISCONTINUED | OUTPATIENT
Start: 2018-09-11 | End: 2018-09-11

## 2018-09-11 RX ORDER — ACETAMINOPHEN 500 MG
650 TABLET ORAL ONCE
Qty: 0 | Refills: 0 | Status: COMPLETED | OUTPATIENT
Start: 2018-09-11 | End: 2018-09-11

## 2018-09-11 RX ORDER — MYCOPHENOLATE MOFETIL 250 MG/1
1500 CAPSULE ORAL
Qty: 0 | Refills: 0 | Status: DISCONTINUED | OUTPATIENT
Start: 2018-09-11 | End: 2018-09-21

## 2018-09-11 RX ORDER — AMLODIPINE BESYLATE 2.5 MG/1
5 TABLET ORAL DAILY
Qty: 0 | Refills: 0 | Status: DISCONTINUED | OUTPATIENT
Start: 2018-09-11 | End: 2018-09-12

## 2018-09-11 RX ORDER — POTASSIUM CHLORIDE 20 MEQ
20 PACKET (EA) ORAL
Qty: 0 | Refills: 0 | Status: DISCONTINUED | OUTPATIENT
Start: 2018-09-11 | End: 2018-09-11

## 2018-09-11 RX ORDER — MAGNESIUM SULFATE 500 MG/ML
1 VIAL (ML) INJECTION ONCE
Qty: 0 | Refills: 0 | Status: COMPLETED | OUTPATIENT
Start: 2018-09-11 | End: 2018-09-12

## 2018-09-11 RX ORDER — TACROLIMUS 5 MG/1
1 CAPSULE ORAL EVERY 12 HOURS
Qty: 0 | Refills: 0 | Status: DISCONTINUED | OUTPATIENT
Start: 2018-09-11 | End: 2018-09-21

## 2018-09-11 RX ORDER — SODIUM,POTASSIUM PHOSPHATES 278-250MG
1 POWDER IN PACKET (EA) ORAL
Qty: 0 | Refills: 0 | Status: COMPLETED | OUTPATIENT
Start: 2018-09-11 | End: 2018-09-12

## 2018-09-11 RX ORDER — CEFEPIME 1 G/1
1000 INJECTION, POWDER, FOR SOLUTION INTRAMUSCULAR; INTRAVENOUS EVERY 12 HOURS
Qty: 0 | Refills: 0 | Status: DISCONTINUED | OUTPATIENT
Start: 2018-09-11 | End: 2018-09-18

## 2018-09-11 RX ORDER — INSULIN HUMAN 100 [IU]/ML
4 INJECTION, SOLUTION SUBCUTANEOUS
Qty: 100 | Refills: 0 | Status: DISCONTINUED | OUTPATIENT
Start: 2018-09-11 | End: 2018-09-12

## 2018-09-11 RX ORDER — INFLUENZA VIRUS VACCINE 15; 15; 15; 15 UG/.5ML; UG/.5ML; UG/.5ML; UG/.5ML
0.5 SUSPENSION INTRAMUSCULAR ONCE
Qty: 0 | Refills: 0 | Status: DISCONTINUED | OUTPATIENT
Start: 2018-09-11 | End: 2018-09-11

## 2018-09-11 RX ORDER — SODIUM CHLORIDE 9 MG/ML
1000 INJECTION, SOLUTION INTRAVENOUS
Qty: 0 | Refills: 0 | Status: DISCONTINUED | OUTPATIENT
Start: 2018-09-11 | End: 2018-09-12

## 2018-09-11 RX ORDER — HYDRALAZINE HCL 50 MG
25 TABLET ORAL ONCE
Qty: 0 | Refills: 0 | Status: COMPLETED | OUTPATIENT
Start: 2018-09-11 | End: 2018-09-11

## 2018-09-11 RX ADMIN — Medication 100 MILLIGRAM(S): at 07:51

## 2018-09-11 RX ADMIN — CEFEPIME 100 MILLIGRAM(S): 1 INJECTION, POWDER, FOR SOLUTION INTRAMUSCULAR; INTRAVENOUS at 17:19

## 2018-09-11 RX ADMIN — Medication 1 PACKET(S): at 12:06

## 2018-09-11 RX ADMIN — TACROLIMUS 1 MILLIGRAM(S): 5 CAPSULE ORAL at 17:19

## 2018-09-11 RX ADMIN — INSULIN HUMAN 8 UNIT(S)/HR: 100 INJECTION, SOLUTION SUBCUTANEOUS at 03:27

## 2018-09-11 RX ADMIN — SODIUM CHLORIDE 125 MILLILITER(S): 9 INJECTION, SOLUTION INTRAVENOUS at 03:31

## 2018-09-11 RX ADMIN — SODIUM CHLORIDE 125 MILLILITER(S): 9 INJECTION, SOLUTION INTRAVENOUS at 18:40

## 2018-09-11 RX ADMIN — Medication 975 MILLIGRAM(S): at 02:56

## 2018-09-11 RX ADMIN — CEFEPIME 100 MILLIGRAM(S): 1 INJECTION, POWDER, FOR SOLUTION INTRAMUSCULAR; INTRAVENOUS at 06:58

## 2018-09-11 RX ADMIN — Medication 650 MILLIGRAM(S): at 23:10

## 2018-09-11 RX ADMIN — SODIUM CHLORIDE 125 MILLILITER(S): 9 INJECTION, SOLUTION INTRAVENOUS at 04:19

## 2018-09-11 RX ADMIN — Medication 1 PACKET(S): at 16:34

## 2018-09-11 RX ADMIN — MYCOPHENOLATE MOFETIL 1500 MILLIGRAM(S): 250 CAPSULE ORAL at 17:21

## 2018-09-11 RX ADMIN — AMLODIPINE BESYLATE 5 MILLIGRAM(S): 2.5 TABLET ORAL at 06:58

## 2018-09-11 RX ADMIN — SODIUM CHLORIDE 125 MILLILITER(S): 9 INJECTION, SOLUTION INTRAVENOUS at 20:00

## 2018-09-11 RX ADMIN — Medication 650 MILLIGRAM(S): at 22:40

## 2018-09-11 RX ADMIN — Medication 100 MILLIGRAM(S): at 17:19

## 2018-09-11 RX ADMIN — MYCOPHENOLATE MOFETIL 1500 MILLIGRAM(S): 250 CAPSULE ORAL at 06:58

## 2018-09-11 RX ADMIN — Medication 100 MILLIGRAM(S): at 06:58

## 2018-09-11 RX ADMIN — Medication 25 MILLIGRAM(S): at 22:40

## 2018-09-11 RX ADMIN — INSULIN HUMAN 8 UNIT(S)/HR: 100 INJECTION, SOLUTION SUBCUTANEOUS at 04:17

## 2018-09-11 RX ADMIN — TACROLIMUS 1 MILLIGRAM(S): 5 CAPSULE ORAL at 06:58

## 2018-09-11 RX ADMIN — SODIUM CHLORIDE 250 MILLILITER(S): 9 INJECTION, SOLUTION INTRAVENOUS at 12:23

## 2018-09-11 RX ADMIN — SODIUM CHLORIDE 125 MILLILITER(S): 9 INJECTION, SOLUTION INTRAVENOUS at 06:59

## 2018-09-11 RX ADMIN — INSULIN HUMAN 4 UNIT(S)/HR: 100 INJECTION, SOLUTION SUBCUTANEOUS at 20:00

## 2018-09-11 RX ADMIN — INSULIN HUMAN 0.1 UNIT(S)/HR: 100 INJECTION, SOLUTION SUBCUTANEOUS at 00:05

## 2018-09-11 RX ADMIN — INSULIN HUMAN 10 UNIT(S)/HR: 100 INJECTION, SOLUTION SUBCUTANEOUS at 00:05

## 2018-09-11 NOTE — CONSULT NOTE ADULT - SUBJECTIVE AND OBJECTIVE BOX
Neurology Consultation note    Name  JULISA CONKLIN    HPI:  History obtained from patient's wife at bedside and from ER MD. Patient was well on 9/10/2018 am when his wife left him home at 7 am and gave him his breakfast. She called him from work later in the afternoon but no answer. Around 5:30 in the evening when she came home , she found him very confused and he was trying to catch things that were not there. No visible signs of trauma. She called ambulance. Patient was found to have high BP and very high blood glucose in ER. He was treated with IV Labetolol push , x 2 in ER. Also received IV Fluids , 2 litres and insulin bolus twice prior to getting insulin drip in ER. He was found to have a subcentimeter bleed in left cerebellum on CT Brain. Patient was being admitted to [facility] but there is no bed and will be admitted here in ICU. (11 Sep 2018 07:32)      NEURO:  PATIENT IS A 69 YO MAN S/P CARDIAC TRANSPLANT, dm, bph, htn, dld, ckd ADMITTED FOR AMS  PATIENT WAS FOUND TO HAVE SMALL CEREBELLAR BLEED ON ADM  PLAN WAS TO [facility] BUT NO ICU BEDS AVAILABLE SO WAS KEPT IN [facility] ICU  REPEAT CTH DONE THIS AM READS AS ? AVM AS FINDING WAS PRESENT ON PAST CT HOWEVER THERE IS SURROUNDING EDEMA AND ? RECENT BLEED  PATIENT IS DISORIENTED BUT STABLE  PATIENT HYPERTENSIVE ON ADM, NOW BETTER CONTROLLED  PATIENT NOT ON ANTIPLATELET OR A/C          Vital Signs Last 24 Hrs  T(C): 36.7 (11 Sep 2018 07:01), Max: 38.4 (10 Sep 2018 19:30)  T(F): 98 (11 Sep 2018 07:01), Max: 101.2 (10 Sep 2018 19:30)  HR: 93 (11 Sep 2018 08:50) (93 - 132)  BP: 144/87 (11 Sep 2018 08:50) (122/66 - 237/123)  BP(mean): 110 (11 Sep 2018 08:50) (88 - 119)  RR: 18 (11 Sep 2018 08:50) (16 - 25)  SpO2: 97% (11 Sep 2018 08:50) (94% - 98%)    Neurological Exam:   Mental status: Awake, alert and oriented x TO PERSON, PLACE AND SEPT 11, 2010.   Naming, repetition and comprehension intact.  Attention/concentration intact.  No dysarthria, no aphasia.  STATES HE IS HERE FOR HIS UNCONTROLLED DIABETES  Cranial nerves: Pupils equally round and reactive to light, visual fields full, no nystagmus, extraocular muscles intact, V1 through V3 intact bilaterally and symmetric, face symmetric, hearing intact to finger rub, palate elevation symmetric, tongue was midline.  Motor:  MRC grading 5/5 b/l UE/LE.   strength 5/5.  Normal tone and bulk.  No abnormal movements.    Sensation: Intact to light touch  Coordination: No dysmetria on finger-to-nose and heel-to-shin.  No dysdiadokinesia.  Reflexes: depressed x 4    Medications  allopurinol 100 milliGRAM(s) Oral two times a day  amLODIPine   Tablet 5 milliGRAM(s) Oral daily  cefepime   IVPB 1000 milliGRAM(s) IV Intermittent every 12 hours  dextrose 5%. 1000 milliLiter(s) IV Continuous <Continuous>  insulin Infusion 3 Unit(s)/Hr IV Continuous <Continuous>  insulin Infusion 5 Unit(s)/Hr IV Continuous <Continuous>  insulin Infusion 0.1 Unit(s)/Hr IV Continuous <Continuous>  mycophenolate mofetil 1500 milliGRAM(s) Oral two times a day  potassium acid phosphate/sodium acid phosphate tablet (K-PHOS No. 2) 1 Tablet(s) Oral four times a day with meals  pregabalin 100 milliGRAM(s) Oral two times a day  tacrolimus 1 milliGRAM(s) Oral every 12 hours      Lab  09-11    148<H>  |  107  |  35<H>  ----------------------------<  189<H>  3.8   |  23  |  2.0<H>    Ca    10.4<H>      11 Sep 2018 05:48  Phos  1.8     09-11  Mg     2.1     09-11    TPro  7.3  /  Alb  4.6  /  TBili  0.5  /  DBili  x   /  AST  17  /  ALT  10  /  AlkPhos  141<H>  09-11                          15.3   13.36 )-----------( 145      ( 11 Sep 2018 05:48 )             46.0     LIVER FUNCTIONS - ( 11 Sep 2018 05:48 )  Alb: 4.6 g/dL / Pro: 7.3 g/dL / ALK PHOS: 141 U/L / ALT: 10 U/L / AST: 17 U/L / GGT: x             2.1        Radiology  CT Head No Cont:   EXAM:  CT BRAIN            PROCEDURE DATE:  09/11/2018            INTERPRETATION:  Clinical History / Reason for exam: Intracranial   hemorrhage. Cerebellar bleed.    Technique: Multiple contiguous axial CT images of the head were obtained   from the base of the skull to the vertex without administration of   intravenous contrast.    Comparison: Noncontrast CT head dated 9/10/2018, 2/10/2015 and 6/11/2008.       Findings: The ventricles, basal cisterns and sulcal pattern are unchanged   when compared to previous study and again noted to be slightly prominent   consistent with parenchymal volume loss.     There is again noted a small 0.9 x 0.5 hyperdensity within the left   cerebellum with minimal surrounding edema which may represent a small   vascular malformation such as a cavernous venous malformation which may   have bled recently. This hyperdensity was seen on prior noncontrast CT   scan dated 2/10/2015. Follow-up MRI of the brain with and without   contrast may be helpful for further evaluation if clinically indicated   for confirmation.    There are again noted scattered patchy and punctate foci of hypodensities   in the bilateral frontal and parietal periventricular and subcortical   white matter which are nonspecific and without mass effect most likely   consistent with chronic small vessel ischemic changes in a patient of   this stated age. There is no acute mass effect or midline shift. No   extra-axial fluid collections are identified.    Vascular calcifications are again noted in the bilateral carotid and   vertebral arteries consistent with atherosclerotic changes.    The bones of the calvarium are intact. The paranasal sinuses, bilateral   mastoid complexes and globes and orbits are grossly unchanged.    Beam hardening artifact is noted overlying the brain stem and posterior   fossa which is inherent to CT in this location.      IMPRESSION:     1.  Stable 0.9 x 0.5 cm hyperdensity in the left cerebellum, now with   minimal surrounding edema may represent avascular malformation such as a   cavernoma which may have recently bled. Correlation with MRI of the brain   with without contrast may be helpful for further evaluation and   confirmation.    2.  Periventricular and subcortical white matter chronic small vessel   ischemic changes.    3.  No acute mass effect midline shift.        Spoke with GEMMA STANFORD MD on 9/11/2018 at 9:31 AM with read back.        PROCEDURE DATE:  09/11/2018            INTERPRETATION:  CLINICAL HISTORY/REASON FOR EXAM: Altered mental status.   UTI. Hypertension.    TECHNIQUE: Contiguous axial CT images of the head were obtained from the   base of the skull to the vertex without IV contrast. Coronal and sagittal   reformats were obtained.    COMPARISON: CT head February 10, 2015.    FINDINGS:    The cortical sulci and ventricular system are appropriate for patient's   stated age.     Left cerebellar hyperdensity measuring 0.9 x 0.4 cm (series 2, image 9)   without surrounding edema. No midline shift.    Gray-white differentiation is maintained. Patchy areas of hypodensity in   the periventricular and subcortical white matter, likely consistent with   chronic microvascular ischemic disease.     The calvarium is intact. There is no soft tissue swelling.     The visualized paranasal sinuses and mastoid air cells are well aerated.    IMPRESSION:    1.  Left cerebellar hyperdensity measuring 0.9 x 0.4 cm, new since prior   and likely represents a hemorrhagic focus and less likely calcifications.   Follow-up CT is recommended.  2.  Chronic microvascular ischemic changes.    Dr. Juan Jose Marks discussed preliminary findings with COLEMAN FELICIANO on   9/11/2018 12:40 AM with readback.            Assessment: 67 yo man with the aforementioned hx adm with AMS, hypertension and hyperglycemia  SMALL AREA OF ICH SEEN IN THE LEFT CEREBELLUM  REPEAT  CTH IS STABLE    Plan:  CONTINUE SBP <140  MRI BRAIN WWO GADO IF NO CONTRAINDICATIONS TO ELUCIDATE IF THERE IS UNDERLYING LESION/VASC MALFORMATION  TRANSFERRING TO [place]  NEUROSURGERY AND NEUROLOGY [place] MADE AWARE  PLEASE CALL WITH ANY QUESTIONS Neurology Consultation note    Name  JULISA CONKLIN    HPI:  History obtained from patient's wife at bedside and from ER MD. Patient was well on 9/10/2018 am when his wife left him home at 7 am and gave him his breakfast. She called him from work later in the afternoon but no answer. Around 5:30 in the evening when she came home , she found him very confused and he was trying to catch things that were not there. No visible signs of trauma. She called ambulance. Patient was found to have high BP and very high blood glucose in ER. He was treated with IV Labetolol push , x 2 in ER. Also received IV Fluids , 2 litres and insulin bolus twice prior to getting insulin drip in ER. He was found to have a subcentimeter bleed in left cerebellum on CT Brain. Patient was being admitted to [facility] but there is no bed and will be admitted here in ICU. (11 Sep 2018 07:32)      NEURO:  PATIENT IS A 69 YO MAN S/P CARDIAC TRANSPLANT, dm, bph, htn, dld, ckd ADMITTED FOR AMS  PATIENT WAS FOUND TO HAVE SMALL CEREBELLAR BLEED ON ADM  PLAN WAS TO [facility] BUT NO ICU BEDS AVAILABLE SO WAS KEPT IN [facility] ICU  REPEAT CTH DONE THIS AM READS AS ? AVM AS FINDING WAS PRESENT ON PAST CT HOWEVER THERE IS SURROUNDING EDEMA AND ? RECENT BLEED  PATIENT IS DISORIENTED BUT STABLE  PATIENT HYPERTENSIVE ON ADM, NOW BETTER CONTROLLED  PATIENT NOT ON ANTIPLATELET OR A/C          Vital Signs Last 24 Hrs  T(C): 36.7 (11 Sep 2018 07:01), Max: 38.4 (10 Sep 2018 19:30)  T(F): 98 (11 Sep 2018 07:01), Max: 101.2 (10 Sep 2018 19:30)  HR: 93 (11 Sep 2018 08:50) (93 - 132)  BP: 144/87 (11 Sep 2018 08:50) (122/66 - 237/123)  BP(mean): 110 (11 Sep 2018 08:50) (88 - 119)  RR: 18 (11 Sep 2018 08:50) (16 - 25)  SpO2: 97% (11 Sep 2018 08:50) (94% - 98%)    Neurological Exam:   Mental status: Awake, alert and oriented x TO PERSON, PLACE AND SEPT 11, 2010.   Naming, repetition and comprehension intact.  Attention/concentration intact.  No dysarthria, no aphasia.  STATES HE IS HERE FOR HIS UNCONTROLLED DIABETES  Cranial nerves: Pupils equally round and reactive to light, visual fields full, no nystagmus, extraocular muscles intact, V1 through V3 intact bilaterally and symmetric, face symmetric, hearing intact to finger rub, palate elevation symmetric, tongue was midline.  Motor:  MRC grading 5/5 b/l UE/LE.   strength 5/5.  Normal tone and bulk.  No abnormal movements.    Sensation: Intact to light touch  Coordination: No dysmetria on finger-to-nose and heel-to-shin.  No dysdiadokinesia.  Reflexes: depressed x 4    Medications  allopurinol 100 milliGRAM(s) Oral two times a day  amLODIPine   Tablet 5 milliGRAM(s) Oral daily  cefepime   IVPB 1000 milliGRAM(s) IV Intermittent every 12 hours  dextrose 5%. 1000 milliLiter(s) IV Continuous <Continuous>  insulin Infusion 3 Unit(s)/Hr IV Continuous <Continuous>  insulin Infusion 5 Unit(s)/Hr IV Continuous <Continuous>  insulin Infusion 0.1 Unit(s)/Hr IV Continuous <Continuous>  mycophenolate mofetil 1500 milliGRAM(s) Oral two times a day  potassium acid phosphate/sodium acid phosphate tablet (K-PHOS No. 2) 1 Tablet(s) Oral four times a day with meals  pregabalin 100 milliGRAM(s) Oral two times a day  tacrolimus 1 milliGRAM(s) Oral every 12 hours      Lab  09-11    148<H>  |  107  |  35<H>  ----------------------------<  189<H>  3.8   |  23  |  2.0<H>    Ca    10.4<H>      11 Sep 2018 05:48  Phos  1.8     09-11  Mg     2.1     09-11    TPro  7.3  /  Alb  4.6  /  TBili  0.5  /  DBili  x   /  AST  17  /  ALT  10  /  AlkPhos  141<H>  09-11                          15.3   13.36 )-----------( 145      ( 11 Sep 2018 05:48 )             46.0     LIVER FUNCTIONS - ( 11 Sep 2018 05:48 )  Alb: 4.6 g/dL / Pro: 7.3 g/dL / ALK PHOS: 141 U/L / ALT: 10 U/L / AST: 17 U/L / GGT: x             2.1        Radiology  CT Head No Cont:   EXAM:  CT BRAIN            PROCEDURE DATE:  09/11/2018            INTERPRETATION:  Clinical History / Reason for exam: Intracranial   hemorrhage. Cerebellar bleed.    Technique: Multiple contiguous axial CT images of the head were obtained   from the base of the skull to the vertex without administration of   intravenous contrast.    Comparison: Noncontrast CT head dated 9/10/2018, 2/10/2015 and 6/11/2008.       Findings: The ventricles, basal cisterns and sulcal pattern are unchanged   when compared to previous study and again noted to be slightly prominent   consistent with parenchymal volume loss.     There is again noted a small 0.9 x 0.5 hyperdensity within the left   cerebellum with minimal surrounding edema which may represent a small   vascular malformation such as a cavernous venous malformation which may   have bled recently. This hyperdensity was seen on prior noncontrast CT   scan dated 2/10/2015. Follow-up MRI of the brain with and without   contrast may be helpful for further evaluation if clinically indicated   for confirmation.    There are again noted scattered patchy and punctate foci of hypodensities   in the bilateral frontal and parietal periventricular and subcortical   white matter which are nonspecific and without mass effect most likely   consistent with chronic small vessel ischemic changes in a patient of   this stated age. There is no acute mass effect or midline shift. No   extra-axial fluid collections are identified.    Vascular calcifications are again noted in the bilateral carotid and   vertebral arteries consistent with atherosclerotic changes.    The bones of the calvarium are intact. The paranasal sinuses, bilateral   mastoid complexes and globes and orbits are grossly unchanged.    Beam hardening artifact is noted overlying the brain stem and posterior   fossa which is inherent to CT in this location.      IMPRESSION:     1.  Stable 0.9 x 0.5 cm hyperdensity in the left cerebellum, now with   minimal surrounding edema may represent avascular malformation such as a   cavernoma which may have recently bled. Correlation with MRI of the brain   with without contrast may be helpful for further evaluation and   confirmation.    2.  Periventricular and subcortical white matter chronic small vessel   ischemic changes.    3.  No acute mass effect midline shift.        Spoke with GEMMA STANFORD MD on 9/11/2018 at 9:31 AM with read back.        PROCEDURE DATE:  09/11/2018            INTERPRETATION:  CLINICAL HISTORY/REASON FOR EXAM: Altered mental status.   UTI. Hypertension.    TECHNIQUE: Contiguous axial CT images of the head were obtained from the   base of the skull to the vertex without IV contrast. Coronal and sagittal   reformats were obtained.    COMPARISON: CT head February 10, 2015.    FINDINGS:    The cortical sulci and ventricular system are appropriate for patient's   stated age.     Left cerebellar hyperdensity measuring 0.9 x 0.4 cm (series 2, image 9)   without surrounding edema. No midline shift.    Gray-white differentiation is maintained. Patchy areas of hypodensity in   the periventricular and subcortical white matter, likely consistent with   chronic microvascular ischemic disease.     The calvarium is intact. There is no soft tissue swelling.     The visualized paranasal sinuses and mastoid air cells are well aerated.    IMPRESSION:    1.  Left cerebellar hyperdensity measuring 0.9 x 0.4 cm, new since prior   and likely represents a hemorrhagic focus and less likely calcifications.   Follow-up CT is recommended.  2.  Chronic microvascular ischemic changes.    Dr. Juan Jose Marks discussed preliminary findings with COLEMAN FELICIANO on   9/11/2018 12:40 AM with readback.            Assessment: 69 yo man with the aforementioned hx adm with AMS, hypertension and hyperglycemia  SMALL AREA OF ICH SEEN IN THE LEFT CEREBELLUM  REPEAT  CTH IS STABLE    Plan:  CONTINUE SBP <140  MRI BRAIN WWO GADO IF NO CONTRAINDICATIONS TO ELUCIDATE IF THERE IS UNDERLYING LESION/VASC MALFORMATION  TRANSFERRING TO [place]  NEUROSURGERY AND NEUROLOGY [place] MADE AWARE  STROKE TEAM/DR [facility] MADE AWARE AND WILL REVIEW FILMS WITH NEUROSURGERY  PLEASE CALL WITH ANY QUESTIONS

## 2018-09-11 NOTE — CONSULT NOTE ADULT - SUBJECTIVE AND OBJECTIVE BOX
Patient is a 68y old  Male who presents with a chief complaint of altered mental condition.    HPI: History obtained from patient's wife at bedside and from ER MD. Patient was well on 9/10/2018 am when his wife left him home at 7 am and gave him his breakfast. She called him from work later in the afternoon but no answer. Around 5:30 in the evening when she came home , she found him very confused and he was trying to catch things that were not there. No visible signs of trauma. She called ambulance. Patient was found to have high BP and very high blood glucose in ER. He was treated with IV Labetolol push , x 2 in ER. Also received IV Fluids , 2 litres and insulin bolus twice prior to getting insulin drip in ER. He was found to have a subcentimeter bleed in left cerebellum on CT Brain. Patient was being admitted to [facility] but there is no bed and will be admitted here in ICU.        PAST MEDICAL & SURGICAL HISTORY:  Diabetes , for more than 20 years.  High cholesterol  HTN (hypertension)  Heart transplant recipient- 10 years ago.  H/O heart transplant    Personal history-  .  2. Ex smoker- quit 28 years ago.  3. Does not work for 10 years.        MEDICATIONS  (STANDING):  insulin Infusion 0.1 Unit(s)/Hr (0.1 mL/Hr) IV Continuous <Continuous>  insulin Infusion 10 Unit(s)/Hr (10 mL/Hr) IV Continuous <Continuous>  Patient is on Lantus insulin BID at home- 50 units each time.  Also takes Humalog prior to each meal.  Multiple medications, at home.  Allergies    codeine (Unknown)    Intolerances      Vital Signs Last 24 Hrs  T(C): 37.4 (11 Sep 2018 01:40), Max: 38.4 (10 Sep 2018 19:30)  T(F): 99.4 (11 Sep 2018 01:40), Max: 101.2 (10 Sep 2018 19:30)  HR: 103 (11 Sep 2018 02:55) (103 - 132)  BP: 129/87 (11 Sep 2018 02:55) (129/87 - 237/123)  BP(mean): --  RR: 18 (11 Sep 2018 02:55) (16 - 20)  SpO2: 96% (11 Sep 2018 02:55) (96% - 98%)  PHYSICAL EXAM:      Constitutional: fair.    Eyes: no icterus.    ENMT: dry tongue.     Neck: no mass. moves neck without any problem.    Breasts:    Back:    Respiratory: air entry normal. no wheezing or crepitations.    Cardiovascular: regular. no murmur.    Gastrointestinal: 1 plus obesity. non tender.    Genitourinary:    Rectal: deferred.    Extremities: no rash . no leg edema.    Vascular: no venous stasis.    Neurological: awake. speech normal. moves all extremities.  Gives correct age but confused to certain questions.  Pupils normal size and reacting.    Skin: no rash.    Lymph Nodes: not enlarged.    Musculoskeletal:    Psychiatric:          147<H>  |  107  |  37<H>  ----------------------------<  401<H>  4.1   |  21  |  2.1<H>    Ca    10.0      11 Sep 2018 01:50    TPro  7.9  /  Alb  4.8  /  TBili  0.4  /  DBili  <0.2  /  AST  17  /  ALT  11  /  AlkPhos  162<H>  09-10    Urinalysis Basic - ( 10 Sep 2018 19:30 )    Color: Yellow / Appearance: Clear / S.015 / pH: x  Gluc: x / Ketone: 80  / Bili: Negative / Urobili: 0.2   Blood: x / Protein: 100 / Nitrite: Negative   Leuk Esterase: Negative / RBC: 1-2 /HPF / WBC 26-50 /HPF   Sq Epi: x / Non Sq Epi: Occasional /HPF / Bacteria: Few      CBC Full  -  ( 10 Sep 2018 19:28 )  WBC Count : 9.99 K/uL  Hemoglobin : 16.0 g/dL  Hematocrit : 50.3 %  Platelet Count - Automated : 135 K/uL  Mean Cell Volume : 84.0 fL  Mean Cell Hemoglobin : 26.7 pg  Mean Cell Hemoglobin Concentration : 31.8 g/dL  Auto Neutrophil # : 9.07 K/uL  Auto Lymphocyte # : 0.54 K/uL  Auto Monocyte # : 0.26 K/uL  Auto Eosinophil # : 0.00 K/uL  Auto Basophil # : 0.04 K/uL  Auto Neutrophil % : 90.8 %  Auto Lymphocyte % : 5.4 %  Auto Monocyte % : 2.6 %  Auto Eosinophil % : 0.0 %  Auto Basophil % : 0.4 %    PT/INR - ( 10 Sep 2018 19:28 )   PT: 11.00 sec;   INR: 1.02 ratio         PTT - ( 10 Sep 2018 19:28 )  PTT:24.0 sec      EKG sinus tachycardia above 110 / mt.  minor ST depressions in V3 to V6.    Radiology: CXRAY- CARDIOMEGALY. Sternal sutures.  CT Brain- 0.9 cms left cerebellum hyperdense area compatible with a small bleed.

## 2018-09-11 NOTE — H&P ADULT - NSHPPHYSICALEXAM_GEN_ALL_CORE
ICU Vital Signs Last 24 Hrs  T(C): 36.7 (11 Sep 2018 07:01), Max: 38.4 (10 Sep 2018 19:30)  T(F): 98 (11 Sep 2018 07:01), Max: 101.2 (10 Sep 2018 19:30)  HR: 95 (11 Sep 2018 06:00) (95 - 132)  BP: 139/85 (11 Sep 2018 06:00) (122/66 - 237/123)  BP(mean): 107 (11 Sep 2018 06:00) (88 - 119)  ABP: --  ABP(mean): --  RR: 20 (11 Sep 2018 07:01) (16 - 25)  SpO2: 97% (11 Sep 2018 06:00) (94% - 98%)      PHYSICAL EXAM:  GENERAL: NAD, well-developed  HEAD:  Atraumatic, Normocephalic  EYES: EOMI, PERRLA, conjunctiva and sclera clear  NECK: Supple, No JVD  CHEST/LUNG: Clear to auscultation bilaterally; No wheeze  HEART: Regular rate and rhythm; No murmurs, rubs, or gallops  ABDOMEN: Soft, Nontender, Nondistended; Bowel sounds present  EXTREMITIES:  2+ Peripheral Pulses, No clubbing, cyanosis, or edema  PSYCH: AAOx3  NEUROLOGY: non-focal  SKIN: No rashes or lesions

## 2018-09-11 NOTE — H&P ADULT - ATTENDING COMMENTS
Pt was seen and examined at bedside independently, pt feels much better, wife by the bedside. I agree with above assessment and plan, will arrange transfer to [place] for close monitoring and neurosurgery evaluation

## 2018-09-11 NOTE — CONSULT NOTE ADULT - ASSESSMENT
1. DKA .  2. DM type 2 , on insulin.  3. ARF due to fluid depletion.  4. Left Cerebellar bleed , subcentimeter.  5. UTI.  6. H/O Heart transplant, 10 years ago.  7. HTN, uncontrolled on admission. Presently controlled.      Recommendations-  1. Since the [facility] has no bed and patient has DKA , he will be admitted at this ICU at present.  2. IV 1/2 NS at 200 ml / hr at this point.  3. Continue on Insulin drip and will decrease to 8 units / hr since he has ARF.  4. Will continue on insulin drip and adjust dose as needed, with FS Glucose every hour.  5. Once the FS Glucose is around 200 , will switch fluid to D5W and continue insulin drip until anion gap is normal.  6. BMP in 2 hours and in 4 hours.  7. IV Cefepime for UTI.  8. Neuro check up Q 1 hour.  9. Neuro consult.  10. Patient will be transferred to [facility] once he has a bed available and will be seen by neuro surgical team.

## 2018-09-11 NOTE — H&P ADULT - HISTORY OF PRESENT ILLNESS
History obtained from patient's wife at bedside and from ER MD. Patient was well on 9/10/2018 am when his wife left him home at 7 am and gave him his breakfast. She called him from work later in the afternoon but no answer. Around 5:30 in the evening when she came home , she found him very confused and he was trying to catch things that were not there. No visible signs of trauma. She called ambulance. Patient was found to have high BP and very high blood glucose in ER. He was treated with IV Labetolol push , x 2 in ER. Also received IV Fluids , 2 litres and insulin bolus twice prior to getting insulin drip in ER. He was found to have a subcentimeter bleed in left cerebellum on CT Brain. Patient was being admitted to [facility] but there is no bed and will be admitted here in ICU. 67 y/o male with a hx of Cardiac Transplant x 10 years ago due to MI x 2 and low EF on immunosuppressant , DM dx in his 60's,  HTN, DLD p/w acute confusion x 1 day    History obtained from ED note overnight. Patient was well on 9/10/2018 am when his wife left him home at 7 am and gave him his breakfast. She called him from work later in the afternoon but no answer. Around 5:30 in the evening when she came home , she found him very confused and he was trying to catch things that were not there. No visible signs of trauma. Family called EMS.     As per pt, he was afebrile at home, no cough/fevers/chills. He states he is in the hospital for DKA and he has had it before. He denied any acute complaints at this time     Upon presentation the ED he was hypertensive 173/115 s/p labetolol IV x 2, Glucose >600 with positive serum and urine ketones

## 2018-09-11 NOTE — CONSULT NOTE ADULT - SUBJECTIVE AND OBJECTIVE BOX
Patient is a 68y old  Male who presents with a chief complaint of change of mental status, weakness (11 Sep 2018 04:38)      HPI:  History obtained from patient's wife at bedside and from ER MD. Patient was well on 9/10/2018 am when his wife left him home at 7 am and gave him his breakfast. She called him from work later in the afternoon but no answer. Around 5:30 in the evening when she came home , she found him very confused and he was trying to catch things that were not there. No visible signs of trauma. She called ambulance. Patient was found to have high BP and very high blood glucose in ER. He was treated with IV Labetolol push , x 2 in ER. Also received IV Fluids , 2 litres and insulin bolus twice prior to getting insulin drip in ER. He was found to have a subcentimeter bleed in left cerebellum on CT Brain. Patient was being admitted to [facility] but there is no bed and will be admitted here in ICU. (11 Sep 2018 07:32)  in er also was in DKA , ketone in urine , AG 24   started on insulin drip     PAST MEDICAL & SURGICAL HISTORY:  Urinary tract infection without hematuria, site unspecified  Chronic kidney disease, unspecified CKD stage  Bilateral hearing loss, unspecified hearing loss type  Benign prostatic hyperplasia with urinary frequency  Diabetes  High cholesterol  HTN (hypertension)  Heart transplant recipient  H/O heart transplant    Allergies    codeine (Unknown)    Intolerances      Family history : no cardiovscular family history   Home Medications:  allopurinol 100 mg oral tablet: 1 tab(s) orally 2 times a day (11 Sep 2018 02:58)  amLODIPine 5 mg oral tablet: 1 tab(s) orally once a day (11 Sep 2018 02:58)  aspirin 81 mg oral tablet: 1 tab(s) orally once a day (11 Sep 2018 02:58)  doxazosin 8 mg oral tablet: 1 tab(s) orally once a day (11 Sep 2018 02:58)  furosemide 40 mg oral tablet: 1 tab(s) orally once a day (11 Sep 2018 02:58)  ketorolac 0.5% ophthalmic solution: 1 drop(s) to each affected eye 4 times a day (11 Sep 2018 02:58)  Lantus 100 units/mL subcutaneous solution:  (11 Sep 2018 02:58)  Lyrica 100 mg oral capsule: 1 cap(s) orally 2 times a day (11 Sep 2018 02:58)  metaxalone 800 mg oral tablet: 1 tab(s) orally 3 times a day (11 Sep 2018 02:58)  mycophenolate mofetil 500 mg oral tablet:  (11 Sep 2018 02:58)  niacin 500 mg oral tablet:  (11 Sep 2018 02:58)  NovoLOG Mix 70/30 subcutaneous suspension:  (11 Sep 2018 02:58)  Oysco 500 with D:  (11 Sep 2018 02:58)  pantoprazole 40 mg oral delayed release tablet: 1 tab(s) orally once a day (11 Sep 2018 02:58)  potassium chloride 10 mEq oral capsule, extended release: 1 cap(s) orally 2 times a day (11 Sep 2018 02:58)  pravastatin 40 mg oral tablet: 1 tab(s) orally once a day (11 Sep 2018 02:58)  quiNINE 324 mg oral capsule: 2 cap(s) orally every 8 hours (11 Sep 2018 02:58)  spironolactone 25 mg oral tablet: 1 tab(s) orally once a day (11 Sep 2018 02:58)  tacrolimus 1 mg oral capsule: cap(s) orally every 12 hours (11 Sep 2018 02:58)  terbinafine 250 mg oral tablet: 1 tab(s) orally once a day (11 Sep 2018 02:58)    Occupation:  Alochol: Denied  Smoking: ex  Drug Use: Denied  Marital Status:         ROS: as in HPI; All other systems reviewed are negative    ICU Vital Signs Last 24 Hrs  T(C): 36.7 (11 Sep 2018 07:01), Max: 38.4 (10 Sep 2018 19:30)  T(F): 98 (11 Sep 2018 07:01), Max: 101.2 (10 Sep 2018 19:30)  HR: 93 (11 Sep 2018 07:00) (93 - 132)  BP: 157/91 (11 Sep 2018 07:00) (122/66 - 237/123)  BP(mean): 118 (11 Sep 2018 07:00) (88 - 119)  ABP: --  ABP(mean): --  RR: 20 (11 Sep 2018 07:01) (16 - 25)  SpO2: 98% (11 Sep 2018 07:00) (94% - 98%)        Physical Examination:    General: No acute distress.  Alert, oriented, x2     HEENT: Pupils equal, reactive to light.  Symmetric.    PULM: Clear to auscultation bilaterally, no significant sputum production    CVS: Regular rate and rhythm, no murmurs, rubs, or gallops    ABD: Soft, nondistended, nontender, normoactive bowel sounds, no masses    EXT: No edema, nontender, no clubbing     SKIN: Warm and well perfused, no rashes noted.    Neurology : no motor or sensory deficit     Musculoskeletal : move all extremity     Lymphatic system: no Palpable node               I&O's Detail    10 Sep 2018 07:01  -  11 Sep 2018 07:00  --------------------------------------------------------  IN:    insulin Infusion: 21 mL    insulin Infusion: 3 mL    IV PiggyBack: 50 mL    sodium chloride 0.45%: 375 mL  Total IN: 449 mL    OUT:  Total OUT: 0 mL    Total NET: 449 mL      11 Sep 2018 07:01  -  11 Sep 2018 09:09  --------------------------------------------------------  IN:    dextrose 5%.: 125 mL  Total IN: 125 mL    OUT:  Total OUT: 0 mL    Total NET: 125 mL            LABS:                        15.3   13.36 )-----------( 145      ( 11 Sep 2018 05:48 )             46.0     11 Sep 2018 05:48    148    |  107    |  35     ----------------------------<  189    3.8     |  23     |  2.0      Ca    10.4       11 Sep 2018 05:48  Phos  1.8       11 Sep 2018 05:48  Mg     2.1       11 Sep 2018 05:48    TPro  7.3    /  Alb  4.6    /  TBili  0.5    /  DBili  x      /  AST  17     /  ALT  10     /  AlkPhos  141    11 Sep 2018 05:48  Amylase x     lipase x          CARDIAC MARKERS ( 10 Sep 2018 22:20 )  x     / x     / 113 U/L / x     / x      CARDIAC MARKERS ( 10 Sep 2018 19:28 )  x     / <0.01 ng/mL / x     / x     / x          CAPILLARY BLOOD GLUCOSE  400 (11 Sep 2018 01:40)      POCT Blood Glucose.: 156 mg/dL (11 Sep 2018 08:53)    PT/INR - ( 10 Sep 2018 19:28 )   PT: 11.00 sec;   INR: 1.02 ratio         PTT - ( 10 Sep 2018 19:28 )  PTT:24.0 sec  Urinalysis Basic - ( 10 Sep 2018 19:30 )    Color: Yellow / Appearance: Clear / S.015 / pH: x  Gluc: x / Ketone: 80  / Bili: Negative / Urobili: 0.2   Blood: x / Protein: 100 / Nitrite: Negative   Leuk Esterase: Negative / RBC: 1-2 /HPF / WBC 26-50 /HPF   Sq Epi: x / Non Sq Epi: Occasional /HPF / Bacteria: Few      Culture    Lactate, Blood: 2.5 mmol/L (09-10-18 @ 19:28)      MEDICATIONS  (STANDING):  allopurinol 100 milliGRAM(s) Oral two times a day  amLODIPine   Tablet 5 milliGRAM(s) Oral daily  cefepime   IVPB 1000 milliGRAM(s) IV Intermittent every 12 hours  dextrose 5%. 1000 milliLiter(s) (125 mL/Hr) IV Continuous <Continuous>  insulin Infusion 3 Unit(s)/Hr (3 mL/Hr) IV Continuous <Continuous>  insulin Infusion 5 Unit(s)/Hr (5 mL/Hr) IV Continuous <Continuous>  insulin Infusion 0.1 Unit(s)/Hr (0.1 mL/Hr) IV Continuous <Continuous>  mycophenolate mofetil 1500 milliGRAM(s) Oral two times a day  potassium acid phosphate/sodium acid phosphate tablet (K-PHOS No. 2) 1 Tablet(s) Oral four times a day with meals  pregabalin 100 milliGRAM(s) Oral two times a day  tacrolimus 1 milliGRAM(s) Oral every 12 hours    MEDICATIONS  (PRN):        RADIOLOGY: ***     CXR:  TLC:  OG:  ET tube:          ECHO:

## 2018-09-11 NOTE — CONSULT NOTE ADULT - SUBJECTIVE AND OBJECTIVE BOX
HPI:  67 y/o male with a hx of Cardiac Transplant x 10 years ago due to MI x 2 and low EF on immunosuppressant , DM dx in his 60's,  HTN, DLD p/w acute confusion x 1 day    History obtained from ED note overnight. Patient was well on 9/10/2018 am when his wife left him home at 7 am and gave him his breakfast. She called him from work later in the afternoon but no answer. Around 5:30 in the evening when she came home , she found him very confused and he was trying to catch things that were not there. No visible signs of trauma. Family called EMS.     As per pt, he was afebrile at home, no cough/fevers/chills. He states he is in the hospital for DKA and he has had it before. He denied any acute complaints at this time     Upon presentation the ED he was hypertensive 173/115 s/p labetolol IV x 2, Glucose >600 with positive serum and urine ketones (11 Sep 2018 07:32)    PTN  REFERRED TO ACUTE  REHAB  FOR  EVAL AND  TX   PAST MEDICAL & SURGICAL HISTORY:  Urinary tract infection without hematuria, site unspecified  Chronic kidney disease, unspecified CKD stage  Bilateral hearing loss, unspecified hearing loss type  Benign prostatic hyperplasia with urinary frequency  Diabetes  High cholesterol  HTN (hypertension)  Heart transplant recipient  H/O heart transplant      Hospital Course:    TODAY'S SUBJECTIVE & REVIEW OF SYMPTOMS:     Constitutional WNL   Cardio WNL   Resp WNL   GI WNL  Heme WNL  Endo WNL  Skin WNL  MSK WNL  Neuro WNL  Cognitive WNL  Psych WNL      MEDICATIONS  (STANDING):  allopurinol 100 milliGRAM(s) Oral two times a day  amLODIPine   Tablet 5 milliGRAM(s) Oral daily  cefepime   IVPB 1000 milliGRAM(s) IV Intermittent every 12 hours  dextrose 5%. 1000 milliLiter(s) (125 mL/Hr) IV Continuous <Continuous>  insulin Infusion 3 Unit(s)/Hr (3 mL/Hr) IV Continuous <Continuous>  insulin Infusion 5 Unit(s)/Hr (5 mL/Hr) IV Continuous <Continuous>  insulin Infusion 0.1 Unit(s)/Hr (0.1 mL/Hr) IV Continuous <Continuous>  lactated ringers 1000 milliLiter(s) (250 mL/Hr) IV Continuous <Continuous>  mycophenolate mofetil 1500 milliGRAM(s) Oral two times a day  potassium phosphate / sodium phosphate powder 1 Packet(s) Oral three times a day before meals  pregabalin 100 milliGRAM(s) Oral two times a day  tacrolimus 1 milliGRAM(s) Oral every 12 hours    MEDICATIONS  (PRN):      FAMILY HISTORY:  No pertinent family history in first degree relatives      Allergies    codeine (Unknown)    Intolerances        SOCIAL HISTORY:    [  ] Etoh  [  ] Smoking  [  ] Substance abuse     Home Environment:  [  ] Home Alone  [ x ] Lives with Family  [  ] Home Health Aid    Dwelling:  [  ] Apartment  [ x ] Private House  [  ] Adult Home  [  ] Skilled Nursing Facility      [  ] Short Term  [  ] Long Term  [ x] Stairs       Elevator [  ]    FUNCTIONAL STATUS PTA: (Check all that apply)  Ambulation: [  x ]Independent    [  ] Dependent     [  ] Non-Ambulatory  Assistive Device: [  ] SA Cane  [  ]  Q Cane  [  ] Walker  [  ]  Wheelchair  ADL : [ x ] Independent  [  ]  Dependent       Vital Signs Last 24 Hrs  T(C): 35.6 (11 Sep 2018 11:00), Max: 38.4 (10 Sep 2018 19:30)  T(F): 96 (11 Sep 2018 11:00), Max: 101.2 (10 Sep 2018 19:30)  HR: 93 (11 Sep 2018 08:50) (93 - 132)  BP: 144/87 (11 Sep 2018 08:50) (122/66 - 237/123)  BP(mean): 110 (11 Sep 2018 08:50) (88 - 119)  RR: 18 (11 Sep 2018 11:00) (16 - 25)  SpO2: 97% (11 Sep 2018 08:50) (94% - 98%)      PHYSICAL EXAM: Alert & Oriented X3  GENERAL: NAD, well-groomed, well-developed  HEAD:  Atraumatic, Normocephalic  EYES: EOMI, PERRLA, conjunctiva and sclera clear  NECK: Supple, No JVD, Normal thyroid  CHEST/LUNG: Clear to percussion bilaterally; No rales, rhonchi, wheezing, or rubs  HEART: Regular rate and rhythm; No murmurs, rubs, or gallops  ABDOMEN: Soft, Nontender, Nondistended; Bowel sounds present  EXTREMITIES:  2+ Peripheral Pulses, No clubbing, cyanosis, or edema    NERVOUS SYSTEM:  Cranial Nerves 2-12 intact [ x ] Abnormal  [  ]  ROM: WFL all extremities [  ]  Abnormal [x  ]  Motor Strength: WFL all extremities  [  ]  Abnormal [x  ]  Sensation: intact to light touch [  ] Abnormal [x  ]  Reflexes: Symmetric [x  ]  Abnormal [  ]    FUNCTIONAL STATUS:  Bed Mobility: Independent [  ]  Supervision [  ]  Needs Assistance [ x ]  N/A [  ]  Transfers: Independent [  ]  Supervision [  ]  Needs Assistance [x  ]  N/A [  ]   Ambulation: Independent [  ]  Supervision [  ]  Needs Assistance [ x ]  N/A [  ]  ADL: Independent [ x] Requires Assistance [  ] N/A [  ]  see  PT IE NOTES    LABS:                        15.3   13.36 )-----------( 145      ( 11 Sep 2018 05:48 )             46.0         97<LL>  |  68<L>  |  23<H>  ----------------------------<  1278  2.2<LL>   |  16<L>  |  1.4    Ca    6.3<L>      11 Sep 2018 11:00  Phos  1.5       Mg     1.5         TPro  7.3  /  Alb  4.6  /  TBili  0.5  /  DBili  x   /  AST  17  /  ALT  10  /  AlkPhos  141<H>      PT/INR - ( 10 Sep 2018 19:28 )   PT: 11.00 sec;   INR: 1.02 ratio         PTT - ( 10 Sep 2018 19:28 )  PTT:24.0 sec  Urinalysis Basic - ( 10 Sep 2018 19:30 )    Color: Yellow / Appearance: Clear / S.015 / pH: x  Gluc: x / Ketone: 80  / Bili: Negative / Urobili: 0.2   Blood: x / Protein: 100 / Nitrite: Negative   Leuk Esterase: Negative / RBC: 1-2 /HPF / WBC 26-50 /HPF   Sq Epi: x / Non Sq Epi: Occasional /HPF / Bacteria: Few        RADIOLOGY & ADDITIONAL STUDIES:< from: CT Head No Cont (18 @ 09:10) >  1.  Stable 0.9 x 0.5 cm hyperdensity in the left cerebellum, now with   minimal surrounding edema may represent avascular malformation such as a   cavernoma which may have recently bled. Correlation with MRI of the brain   with without contrast may be helpful for further evaluation and   confirmation.    2.  Periventricular and subcortical white matter chronic small vessel   ischemic changes.    3.  No acute mass effect midline shift.      < end of copied text >      Assesment:

## 2018-09-11 NOTE — CONSULT NOTE ADULT - ASSESSMENT
IMPRESSION:   Alter mental status secondary to   cerebellar bleed   and DKA   WANDY on CKD   UTI ??    PLAN:    CNS: stat ct scan done now get result   neurosurgery consult   case discussed with neurology will transfer patient to [facility] ICU or CCU for monitoring     HEENT: nick    PULMONARY: keep pox > 92 %     CARDIOVASCULAR: keep is =OS     GI: GI prophylaxis.  NPO for now     RENAL: place wise , renal US , renal consult , urine lyes   IV fluid continue for now     INFECTIOUS DISEASE: on cefepime     HEMATOLOGICAL:  DVT prophylaxis.    ENDOCRINE:  Follow up FS.  Insulin drip follow up lytes and AG Q 4 hrs     MUSCULOSKELETAL:   case discussed with ICU [facility] team       CRITICAL CARE TIME SPENT: ***

## 2018-09-11 NOTE — CONSULT NOTE ADULT - ASSESSMENT
IMPRESSION: Rehab of 69 y/o  m rehab  for  debility      PRECAUTIONS: [ xxx ] Cardiac  [  ] Respiratory  [  ] Seizures [  ] Contact Isolation  [  ] Droplet Isolation  [  fall] Other    Weight Bearing Status:     RECOMMENDATION:    Out of Bed to Chair     DVT/Decubiti Prophylaxis    REHAB PLAN:     [ x] Bedside P/T 3-5 times a week   [   ]   Bedside O/T  2-3 times a week             [   ] No Rehab Therapy Indicated                   [   ]  Speech Therapy   Conditioning/ROM                                    ADL  Bed Mobility                                               Conditioning/ROM  Transfers                                                     Bed Mobility  Sitting /Standing Balance                         Transfers                                        Gait Training                                               Sitting/Standing Balance  Stair Training [   ]Applicable                    Home equipment Eval                                                                        Splinting  [   ] Only      GOALS:   ADL   [ x ]   Independent                    Transfers  [  x ] Independent                          Ambulation  [  x ] Independent     [  x  ] With device                            [   ]  CG                                                         [   ]  CG                                                                  [   ] CG                            [    ] Min A                                                   [   ] Min A                                                              [   ] Min  A          DISCHARGE PLAN:   [   ]  Good candidate for Intensive Rehabilitation/Hospital based-4A [facility]                                             Will tolerate 3hrs Intensive Rehab Daily                                       [  xx  ]  Short Term Rehab in Skilled Nursing Facility                                       [    ]  Home with Outpatient or  services                                         [    ]  Possible Candidate for Intensive Hospital based Rehab

## 2018-09-11 NOTE — H&P ADULT - NSHPLABSRESULTS_GEN_ALL_CORE
T(F): 98  HR: 95  BP: 139/85  RR: 20  SpO2: 97%                          15.3   13.36 )-----------( 145      ( 11 Sep 2018 05:48 )             46.0           148<H>  |  107  |  35<H>  ----------------------------<  189<H>  3.8   |  23  |  2.0<H>    Ca    10.4<H>      11 Sep 2018 05:48  Phos  1.8       Mg     2.1         TPro  7.3  /  Alb  4.6  /  TBili  0.5  /  DBili  x   /  AST  17  /  ALT  10  /  AlkPhos  141<H>        LIVER FUNCTIONS - ( 11 Sep 2018 05:48 )  Alb: 4.6 g/dL / Pro: 7.3 g/dL / ALK PHOS: 141 U/L / ALT: 10 U/L / AST: 17 U/L / GGT: x             PT/INR - ( 10 Sep 2018 19:28 )   PT: 11.00 sec;   INR: 1.02 ratio         PTT - ( 10 Sep 2018 19:28 )  PTT:24.0 sec    CARDIAC MARKERS ( 10 Sep 2018 22:20 )  x     / x     / 113 U/L / x     / x      CARDIAC MARKERS ( 10 Sep 2018 19:28 )  x     / <0.01 ng/mL / x     / x     / x            Urinalysis Basic - ( 10 Sep 2018 19:30 )    Color: Yellow / Appearance: Clear / S.015 / pH: x  Gluc: x / Ketone: 80  / Bili: Negative / Urobili: 0.2   Blood: x / Protein: 100 / Nitrite: Negative   Leuk Esterase: Negative / RBC: 1-2 /HPF / WBC 26-50 /HPF   Sq Epi: x / Non Sq Epi: Occasional /HPF / Bacteria: Few    CT chest    IMPRESSION:    No evidence of acute abdominopelvic pathology on this noncontrast scan.    CTH  1.  Left cerebellar hyperdensity measuring 0.9 x 0.4 cm, new since prior   and likely represents a hemorrhagic focus and less likely calcifications.   Follow-up CT is recommended.  2.  Chronic microvascular ischemic changes. T(F): 98  HR: 95  BP: 139/85  RR: 20  SpO2: 97%                          15.3   13.36 )-----------( 145      ( 11 Sep 2018 05:48 )             46.0           148<H>  |  107  |  35<H>  ----------------------------<  189<H>  3.8   |  23  |  2.0<H>    Ca    10.4<H>      11 Sep 2018 05:48  Phos  1.8       Mg     2.1         TPro  7.3  /  Alb  4.6  /  TBili  0.5  /  DBili  x   /  AST  17  /  ALT  10  /  AlkPhos  141<H>        LIVER FUNCTIONS - ( 11 Sep 2018 05:48 )  Alb: 4.6 g/dL / Pro: 7.3 g/dL / ALK PHOS: 141 U/L / ALT: 10 U/L / AST: 17 U/L / GGT: x             PT/INR - ( 10 Sep 2018 19:28 )   PT: 11.00 sec;   INR: 1.02 ratio         PTT - ( 10 Sep 2018 19:28 )  PTT:24.0 sec    CARDIAC MARKERS ( 10 Sep 2018 22:20 )  x     / x     / 113 U/L / x     / x      CARDIAC MARKERS ( 10 Sep 2018 19:28 )  x     / <0.01 ng/mL / x     / x     / x            Urinalysis Basic - ( 10 Sep 2018 19:30 )    Color: Yellow / Appearance: Clear / S.015 / pH: x  Gluc: x / Ketone: 80  / Bili: Negative / Urobili: 0.2   Blood: x / Protein: 100 / Nitrite: Negative   Leuk Esterase: Negative / RBC: 1-2 /HPF / WBC 26-50 /HPF   Sq Epi: x / Non Sq Epi: Occasional /HPF / Bacteria: Few    CT chest      IMPRESSION:    No evidence of acute abdominopelvic pathology on this noncontrast scan.    CTH  1.  Left cerebellar hyperdensity measuring 0.9 x 0.4 cm, new since prior   and likely represents a hemorrhagic focus and less likely calcifications.   Follow-up CT is recommended.  2.  Chronic microvascular ischemic changes.      IMPRESSION:     1.  Stable 0.9 x 0.5 cm hyperdensity in the left cerebellum, now with   minimal surrounding edema may represent a vascular malformation such as a   cavernoma which may have recently bled. Correlation with MRI of the brain   with without contrast may be helpful for further evaluation and   confirmation.    2.  Periventricular and subcortical white matter chronic small vessel   ischemic changes.    3.  No acute mass effect midline shift. T(F): 98  HR: 95  BP: 139/85  RR: 20  SpO2: 97%                          13.9   10.30 )-----------( 126      ( 11 Sep 2018 14:04 )             41.8       145  |  105  |  32<H>  ----------------------------<  139<H>  4.0   |  25  |  1.8<H>    Ca    9.9      11 Sep 2018 14:04  Phos  1.5       Mg     1.9         TPro  6.8  /  Alb  4.0  /  TBili  0.7  /  DBili  x   /  AST  19  /  ALT  8   /  AlkPhos  117<H>                            15.3   13.36 )-----------( 145      ( 11 Sep 2018 05:48 )             46.0           148<H>  |  107  |  35<H>  ----------------------------<  189<H>  3.8   |  23  |  2.0<H>    Ca    10.4<H>      11 Sep 2018 05:48  Phos  1.8       Mg     2.1         TPro  7.3  /  Alb  4.6  /  TBili  0.5  /  DBili  x   /  AST  17  /  ALT  10  /  AlkPhos  141<H>  11      LIVER FUNCTIONS - ( 11 Sep 2018 05:48 )  Alb: 4.6 g/dL / Pro: 7.3 g/dL / ALK PHOS: 141 U/L / ALT: 10 U/L / AST: 17 U/L / GGT: x             PT/INR - ( 10 Sep 2018 19:28 )   PT: 11.00 sec;   INR: 1.02 ratio         PTT - ( 10 Sep 2018 19:28 )  PTT:24.0 sec    CARDIAC MARKERS ( 10 Sep 2018 22:20 )  x     / x     / 113 U/L / x     / x      CARDIAC MARKERS ( 10 Sep 2018 19:28 )  x     / <0.01 ng/mL / x     / x     / x            Urinalysis Basic - ( 10 Sep 2018 19:30 )    Color: Yellow / Appearance: Clear / S.015 / pH: x  Gluc: x / Ketone: 80  / Bili: Negative / Urobili: 0.2   Blood: x / Protein: 100 / Nitrite: Negative   Leuk Esterase: Negative / RBC: 1-2 /HPF / WBC 26-50 /HPF   Sq Epi: x / Non Sq Epi: Occasional /HPF / Bacteria: Few    CT chest      IMPRESSION:    No evidence of acute abdominopelvic pathology on this noncontrast scan.    CTH  1.  Left cerebellar hyperdensity measuring 0.9 x 0.4 cm, new since prior   and likely represents a hemorrhagic focus and less likely calcifications.   Follow-up CT is recommended.  2.  Chronic microvascular ischemic changes.      IMPRESSION:     1.  Stable 0.9 x 0.5 cm hyperdensity in the left cerebellum, now with   minimal surrounding edema may represent a vascular malformation such as a   cavernoma which may have recently bled. Correlation with MRI of the brain   with without contrast may be helpful for further evaluation and   confirmation.    2.  Periventricular and subcortical white matter chronic small vessel   ischemic changes.    3.  No acute mass effect midline shift.

## 2018-09-11 NOTE — PATIENT PROFILE ADULT. - ABILITY TO HEAR (WITH HEARING AID OR HEARING APPLIANCE IF NORMALLY USED):
"needs new hearing aids" "was suppose to get new ones yesterday, but wasn't feeling well"/Mildly to Moderately Impaired: difficulty hearing in some environments or speaker may need to increase volume or speak distinctly

## 2018-09-11 NOTE — CHART NOTE - NSCHARTNOTEFT_GEN_A_CORE
- f/u prograf level  - check BMP stat done at [facility]  - Ignore BMP @ [facility] from 11am  - If AG closed ,start feeds  - NO MRI ordered given WANDY  - f/u Renal consult  - f/u Neurosurgery consult  - signed out Dr. Hadley @ 3pm      67 y/o male with a hx of Cardiac Transplant x 10 years ago due to MI x 2 and low EF on immunosuppressant , DM dx in his 60's,  HTN, DLD p/w acute confusion x 1 day    #Left cerebellar hemorrhage  - measuring 0.9 x 0.4 cm and rpt CTH shows  0.9 x 0.5 cm hyperdensity in the left cerebellum which may represent a cavernoma with recent bleed.   - Neurology recs noted  - Maintain SBP <140  - MRI wwo NITESH recommended to elucidate the malformation- Given WANDY cannot perform now, Renal Consult pending   - Neuro sx consult @ [place] [facility]. Neuro Intervention aware pt coming to [place]   - Neuro checks Q1H    # DKA ? underlying infection  - AG- 27, Glucose 712, lactate- 2.5, pH appox. 7.25   - corrected Na- 149 for hyperglycemia   - c/w Insulin drip 4U  - Net 750cc positive  - Will given LR x 1 bolus. s/p 2L in ED   - c/w D5+ 1/2NS @ 125cc  - AG- 18, CO2- 23   - rpt labs 11am  - No source of infection identified, f/u blood, ucx   - CXR, CT A+P negative for infection   - c/w Cefepime for now until cultures are negative     # Hypernatremia  - free water deficit- 2.9 L  - Encourage free water    # WANDY on CKD likely prerenal   - Cr. 1.7 2017  - rpt labs   - Nephrology consult pending  - Renal u/s pending  - Post void 120 and pre void 160     # HTN  - 173/115 on presentation   - Improved   - Maintain SBP <140    # Hypophosphatemia  -repleted    # hx of cardiac transplant  - c/w immunosuppressants  - Prograf level pending

## 2018-09-11 NOTE — H&P ADULT - ASSESSMENT
69 y/o male with a hx of Cardiac Transplant x 10 years ago on immunosuppressant , DM, HTN, DLD p/w acute confusion x 1 day    #Left cerebellar hemorrhage  - measuring 0.9 x 0.4 cm  - [place]  - Neurology consult   - Neuro sx consult   - rpt CTH 24 hrs  - Neuro checks Q1H  - PT consult    # DKA  - AG- 27, Glucose 712, lactate- 2.5, pH appox. 7.25   - corrected Na- 149 for hyperglycemia   - c/w Insulin drip 3U  - c/w D5+ 1/2NS @ 125cc  - AG- 18, CO2- 23   - rpt labs 11am    # Hypernatremia  - free water deficit- 2.9 L  - Encourage free water    # WANDY on CKD likely prerenal   - Cr. 1.7 2017  - rpt labs     # HTN  - 173/115 on presentation   - Improved     # Hypophosphatemia  -repleted    # hx of cardiac transplant  - c/w immunosuppresants  - Prograf level pending      # DVT/GI- contraindicated/not indicated     NPO for now     Full code 69 y/o male with a hx of Cardiac Transplant x 10 years ago due to MI x 2 and low EF on immunosuppressant , DM dx in his 60's,  HTN, DLD p/w acute confusion x 1 day    #Left cerebellar hemorrhage  - measuring 0.9 x 0.4 cm and rpt CTH shows  0.9 x 0.5 cm hyperdensity in the left cerebellum which may represent a cavernoma with recent bleed.   - Neurology recs noted  - Maintain SBP <140  - MRI wwo NITESH recommended to elucidate the malformation- Given WANDY cannot perform now, Renal Consult pending   - Neuro sx consult @ [place] [facility]. Neuro Intervention aware pt coming to [place]   - Neuro checks Q1H    # DKA ? underlying infection  - AG- 27, Glucose 712, lactate- 2.5, pH appox. 7.25   - corrected Na- 149 for hyperglycemia   - c/w Insulin drip 4U  - Net 750cc positive  - Will given LR x 1 bolus. s/p 2L in ED   - c/w D5+ 1/2NS @ 125cc  - AG- 18, CO2- 23   - rpt labs 11am  - No source of infection identified, f/u blood, ucx   - CXR, CT A+P negative for infection   - c/w Cefepime for now until cultures are negative     # Hypernatremia  - free water deficit- 2.9 L  - Encourage free water    # WANDY on CKD likely prerenal   - Cr. 1.7 2017  - rpt labs   - Nephrology consult pending  - Renal u/s pending  - Post void 120 and pre void 160     # HTN  - 173/115 on presentation   - Improved   - Maintain SBP <140    # Hypophosphatemia  -repleted    # hx of cardiac transplant  - c/w immunosuppressants  - Prograf level pending    # DVT/GI- contraindicated/not indicated     NPO for now     Full code

## 2018-09-11 NOTE — PATIENT PROFILE ADULT. - REASON FOR ADMISSION
DISCHARGE ORDER IS IN PLACE. GAVE PATIENT INSTRUCTIONS ON WHEN AND WHERE TO FOLLOW UP, 
INFORMED HER THAT SHE HAS PRESCRIPTIONS. EDUCATED HER ON SIGNS AND SYMPTOMS OF INFECTION, 
DIABETIC FOOT CARE, AND WHEN TO SEEK EMERGENCY MEDICAL ATTENTION. SHE VERBALIZED 
UNDERSTANDING. PATIENT HAS NO SIGNS AND SYMPTOMS OF ACUTE DISTRESS AT THIS TIME. REMOVED IV 
FROM SITE AT THIS TIME. CATHETER INTACT. PATIENT HAS ALL HER BELONGINGS WITH HER. TAKING A 
WALKER THAT WAS BROUGHT TO HER, AND WOUND CARE SUPPLIES THAT WE PROVIDED. WILL WALK OUT WITH 
PATIENT. change of mental status, weakness

## 2018-09-12 LAB
ANION GAP SERPL CALC-SCNC: 15 MMOL/L — HIGH (ref 7–14)
ANION GAP SERPL CALC-SCNC: 17 MMOL/L — HIGH (ref 7–14)
BASOPHILS # BLD AUTO: 0.04 K/UL — SIGNIFICANT CHANGE UP (ref 0–0.2)
BASOPHILS NFR BLD AUTO: 0.5 % — SIGNIFICANT CHANGE UP (ref 0–1)
BUN SERPL-MCNC: 16 MG/DL — SIGNIFICANT CHANGE UP (ref 10–20)
BUN SERPL-MCNC: 17 MG/DL — SIGNIFICANT CHANGE UP (ref 10–20)
BUN SERPL-MCNC: 21 MG/DL — HIGH (ref 10–20)
BUN SERPL-MCNC: 24 MG/DL — HIGH (ref 10–20)
CALCIUM SERPL-MCNC: 8.3 MG/DL — LOW (ref 8.5–10.1)
CALCIUM SERPL-MCNC: 8.6 MG/DL — SIGNIFICANT CHANGE UP (ref 8.5–10.1)
CALCIUM SERPL-MCNC: 8.9 MG/DL — SIGNIFICANT CHANGE UP (ref 8.5–10.1)
CALCIUM SERPL-MCNC: 9.1 MG/DL — SIGNIFICANT CHANGE UP (ref 8.5–10.1)
CHLORIDE SERPL-SCNC: 100 MMOL/L — SIGNIFICANT CHANGE UP (ref 98–110)
CHLORIDE SERPL-SCNC: 100 MMOL/L — SIGNIFICANT CHANGE UP (ref 98–110)
CHLORIDE SERPL-SCNC: 101 MMOL/L — SIGNIFICANT CHANGE UP (ref 98–110)
CHLORIDE SERPL-SCNC: 101 MMOL/L — SIGNIFICANT CHANGE UP (ref 98–110)
CO2 SERPL-SCNC: 18 MMOL/L — SIGNIFICANT CHANGE UP (ref 17–32)
CO2 SERPL-SCNC: 20 MMOL/L — SIGNIFICANT CHANGE UP (ref 17–32)
CO2 SERPL-SCNC: 22 MMOL/L — SIGNIFICANT CHANGE UP (ref 17–32)
CO2 SERPL-SCNC: 23 MMOL/L — SIGNIFICANT CHANGE UP (ref 17–32)
CREAT SERPL-MCNC: 1.2 MG/DL — SIGNIFICANT CHANGE UP (ref 0.7–1.5)
CREAT SERPL-MCNC: 1.2 MG/DL — SIGNIFICANT CHANGE UP (ref 0.7–1.5)
CREAT SERPL-MCNC: 1.3 MG/DL — SIGNIFICANT CHANGE UP (ref 0.7–1.5)
CREAT SERPL-MCNC: 1.5 MG/DL — SIGNIFICANT CHANGE UP (ref 0.7–1.5)
EOSINOPHIL # BLD AUTO: 0.07 K/UL — SIGNIFICANT CHANGE UP (ref 0–0.7)
EOSINOPHIL NFR BLD AUTO: 0.9 % — SIGNIFICANT CHANGE UP (ref 0–8)
GLUCOSE BLDC GLUCOMTR-MCNC: 105 MG/DL — HIGH (ref 70–99)
GLUCOSE BLDC GLUCOMTR-MCNC: 124 MG/DL — HIGH (ref 70–99)
GLUCOSE BLDC GLUCOMTR-MCNC: 128 MG/DL — HIGH (ref 70–99)
GLUCOSE BLDC GLUCOMTR-MCNC: 128 MG/DL — HIGH (ref 70–99)
GLUCOSE BLDC GLUCOMTR-MCNC: 130 MG/DL — HIGH (ref 70–99)
GLUCOSE BLDC GLUCOMTR-MCNC: 141 MG/DL — HIGH (ref 70–99)
GLUCOSE BLDC GLUCOMTR-MCNC: 152 MG/DL — HIGH (ref 70–99)
GLUCOSE BLDC GLUCOMTR-MCNC: 153 MG/DL — HIGH (ref 70–99)
GLUCOSE BLDC GLUCOMTR-MCNC: 153 MG/DL — HIGH (ref 70–99)
GLUCOSE BLDC GLUCOMTR-MCNC: 219 MG/DL — HIGH (ref 70–99)
GLUCOSE BLDC GLUCOMTR-MCNC: 222 MG/DL — HIGH (ref 70–99)
GLUCOSE BLDC GLUCOMTR-MCNC: 236 MG/DL — HIGH (ref 70–99)
GLUCOSE BLDC GLUCOMTR-MCNC: 70 MG/DL — SIGNIFICANT CHANGE UP (ref 70–99)
GLUCOSE BLDC GLUCOMTR-MCNC: 71 MG/DL — SIGNIFICANT CHANGE UP (ref 70–99)
GLUCOSE BLDC GLUCOMTR-MCNC: 72 MG/DL — SIGNIFICANT CHANGE UP (ref 70–99)
GLUCOSE BLDC GLUCOMTR-MCNC: 74 MG/DL — SIGNIFICANT CHANGE UP (ref 70–99)
GLUCOSE BLDC GLUCOMTR-MCNC: 93 MG/DL — SIGNIFICANT CHANGE UP (ref 70–99)
GLUCOSE BLDC GLUCOMTR-MCNC: 97 MG/DL — SIGNIFICANT CHANGE UP (ref 70–99)
GLUCOSE SERPL-MCNC: 103 MG/DL — HIGH (ref 70–99)
GLUCOSE SERPL-MCNC: 200 MG/DL — HIGH (ref 70–99)
GLUCOSE SERPL-MCNC: 207 MG/DL — HIGH (ref 70–99)
GLUCOSE SERPL-MCNC: 98 MG/DL — SIGNIFICANT CHANGE UP (ref 70–99)
HCT VFR BLD CALC: 41 % — LOW (ref 42–52)
HGB BLD-MCNC: 13.7 G/DL — LOW (ref 14–18)
IMM GRANULOCYTES NFR BLD AUTO: 0.6 % — HIGH (ref 0.1–0.3)
LYMPHOCYTES # BLD AUTO: 1.32 K/UL — SIGNIFICANT CHANGE UP (ref 1.2–3.4)
LYMPHOCYTES # BLD AUTO: 16.5 % — LOW (ref 20.5–51.1)
MAGNESIUM SERPL-MCNC: 1.9 MG/DL — SIGNIFICANT CHANGE UP (ref 1.8–2.4)
MCHC RBC-ENTMCNC: 26.7 PG — LOW (ref 27–31)
MCHC RBC-ENTMCNC: 33.4 G/DL — SIGNIFICANT CHANGE UP (ref 32–37)
MCV RBC AUTO: 79.8 FL — LOW (ref 80–94)
MONOCYTES # BLD AUTO: 0.66 K/UL — HIGH (ref 0.1–0.6)
MONOCYTES NFR BLD AUTO: 8.3 % — SIGNIFICANT CHANGE UP (ref 1.7–9.3)
NEUTROPHILS # BLD AUTO: 5.86 K/UL — SIGNIFICANT CHANGE UP (ref 1.4–6.5)
NEUTROPHILS NFR BLD AUTO: 73.2 % — SIGNIFICANT CHANGE UP (ref 42.2–75.2)
PHOSPHATE SERPL-MCNC: 2.3 MG/DL — SIGNIFICANT CHANGE UP (ref 2.1–4.9)
PLATELET # BLD AUTO: 108 K/UL — LOW (ref 130–400)
POTASSIUM SERPL-MCNC: 3.2 MMOL/L — LOW (ref 3.5–5)
POTASSIUM SERPL-MCNC: 3.4 MMOL/L — LOW (ref 3.5–5)
POTASSIUM SERPL-MCNC: 4.4 MMOL/L — SIGNIFICANT CHANGE UP (ref 3.5–5)
POTASSIUM SERPL-MCNC: 4.4 MMOL/L — SIGNIFICANT CHANGE UP (ref 3.5–5)
POTASSIUM SERPL-SCNC: 3.2 MMOL/L — LOW (ref 3.5–5)
POTASSIUM SERPL-SCNC: 3.4 MMOL/L — LOW (ref 3.5–5)
POTASSIUM SERPL-SCNC: 4.4 MMOL/L — SIGNIFICANT CHANGE UP (ref 3.5–5)
POTASSIUM SERPL-SCNC: 4.4 MMOL/L — SIGNIFICANT CHANGE UP (ref 3.5–5)
RBC # BLD: 5.14 M/UL — SIGNIFICANT CHANGE UP (ref 4.7–6.1)
RBC # FLD: 14.4 % — SIGNIFICANT CHANGE UP (ref 11.5–14.5)
SODIUM SERPL-SCNC: 136 MMOL/L — SIGNIFICANT CHANGE UP (ref 135–146)
SODIUM SERPL-SCNC: 137 MMOL/L — SIGNIFICANT CHANGE UP (ref 135–146)
SODIUM SERPL-SCNC: 137 MMOL/L — SIGNIFICANT CHANGE UP (ref 135–146)
SODIUM SERPL-SCNC: 141 MMOL/L — SIGNIFICANT CHANGE UP (ref 135–146)
TACROLIMUS SERPL-MCNC: 7.3 NG/ML — SIGNIFICANT CHANGE UP
WBC # BLD: 8 K/UL — SIGNIFICANT CHANGE UP (ref 4.8–10.8)
WBC # FLD AUTO: 8 K/UL — SIGNIFICANT CHANGE UP (ref 4.8–10.8)

## 2018-09-12 RX ORDER — INSULIN HUMAN 100 [IU]/ML
3 INJECTION, SOLUTION SUBCUTANEOUS
Qty: 100 | Refills: 0 | Status: DISCONTINUED | OUTPATIENT
Start: 2018-09-12 | End: 2018-09-19

## 2018-09-12 RX ORDER — LABETALOL HCL 100 MG
100 TABLET ORAL
Qty: 0 | Refills: 0 | Status: DISCONTINUED | OUTPATIENT
Start: 2018-09-12 | End: 2018-09-21

## 2018-09-12 RX ORDER — DEXTROSE 50 % IN WATER 50 %
25 SYRINGE (ML) INTRAVENOUS ONCE
Qty: 0 | Refills: 0 | Status: DISCONTINUED | OUTPATIENT
Start: 2018-09-12 | End: 2018-09-21

## 2018-09-12 RX ORDER — SODIUM CHLORIDE 9 MG/ML
1000 INJECTION, SOLUTION INTRAVENOUS
Qty: 0 | Refills: 0 | Status: DISCONTINUED | OUTPATIENT
Start: 2018-09-12 | End: 2018-09-12

## 2018-09-12 RX ORDER — GLUCAGON INJECTION, SOLUTION 0.5 MG/.1ML
1 INJECTION, SOLUTION SUBCUTANEOUS ONCE
Qty: 0 | Refills: 0 | Status: DISCONTINUED | OUTPATIENT
Start: 2018-09-12 | End: 2018-09-21

## 2018-09-12 RX ORDER — DEXTROSE 50 % IN WATER 50 %
15 SYRINGE (ML) INTRAVENOUS ONCE
Qty: 0 | Refills: 0 | Status: DISCONTINUED | OUTPATIENT
Start: 2018-09-12 | End: 2018-09-21

## 2018-09-12 RX ORDER — INSULIN HUMAN 100 [IU]/ML
5 INJECTION, SOLUTION SUBCUTANEOUS
Qty: 100 | Refills: 0 | Status: DISCONTINUED | OUTPATIENT
Start: 2018-09-12 | End: 2018-09-12

## 2018-09-12 RX ORDER — INSULIN HUMAN 100 [IU]/ML
2 INJECTION, SOLUTION SUBCUTANEOUS
Qty: 100 | Refills: 0 | Status: DISCONTINUED | OUTPATIENT
Start: 2018-09-12 | End: 2018-09-13

## 2018-09-12 RX ORDER — ACETAMINOPHEN 500 MG
325 TABLET ORAL EVERY 4 HOURS
Qty: 0 | Refills: 0 | Status: DISCONTINUED | OUTPATIENT
Start: 2018-09-12 | End: 2018-09-12

## 2018-09-12 RX ORDER — SODIUM CHLORIDE 9 MG/ML
1000 INJECTION, SOLUTION INTRAVENOUS
Qty: 0 | Refills: 0 | Status: DISCONTINUED | OUTPATIENT
Start: 2018-09-12 | End: 2018-09-21

## 2018-09-12 RX ORDER — INSULIN GLARGINE 100 [IU]/ML
21 INJECTION, SOLUTION SUBCUTANEOUS AT BEDTIME
Qty: 0 | Refills: 0 | Status: DISCONTINUED | OUTPATIENT
Start: 2018-09-13 | End: 2018-09-13

## 2018-09-12 RX ORDER — ACETAMINOPHEN 500 MG
650 TABLET ORAL EVERY 8 HOURS
Qty: 0 | Refills: 0 | Status: DISCONTINUED | OUTPATIENT
Start: 2018-09-12 | End: 2018-09-12

## 2018-09-12 RX ORDER — POTASSIUM CHLORIDE 20 MEQ
20 PACKET (EA) ORAL ONCE
Qty: 0 | Refills: 0 | Status: COMPLETED | OUTPATIENT
Start: 2018-09-12 | End: 2018-09-12

## 2018-09-12 RX ORDER — DEXTROSE 50 % IN WATER 50 %
12.5 SYRINGE (ML) INTRAVENOUS ONCE
Qty: 0 | Refills: 0 | Status: DISCONTINUED | OUTPATIENT
Start: 2018-09-12 | End: 2018-09-21

## 2018-09-12 RX ORDER — INSULIN LISPRO 100/ML
VIAL (ML) SUBCUTANEOUS
Qty: 0 | Refills: 0 | Status: DISCONTINUED | OUTPATIENT
Start: 2018-09-12 | End: 2018-09-21

## 2018-09-12 RX ORDER — INSULIN LISPRO 100/ML
7 VIAL (ML) SUBCUTANEOUS
Qty: 0 | Refills: 0 | Status: DISCONTINUED | OUTPATIENT
Start: 2018-09-12 | End: 2018-09-13

## 2018-09-12 RX ORDER — INSULIN GLARGINE 100 [IU]/ML
21 INJECTION, SOLUTION SUBCUTANEOUS ONCE
Qty: 0 | Refills: 0 | Status: COMPLETED | OUTPATIENT
Start: 2018-09-12 | End: 2018-09-12

## 2018-09-12 RX ORDER — HYDRALAZINE HCL 50 MG
25 TABLET ORAL ONCE
Qty: 0 | Refills: 0 | Status: COMPLETED | OUTPATIENT
Start: 2018-09-12 | End: 2018-09-12

## 2018-09-12 RX ORDER — AMLODIPINE BESYLATE 2.5 MG/1
10 TABLET ORAL DAILY
Qty: 0 | Refills: 0 | Status: DISCONTINUED | OUTPATIENT
Start: 2018-09-12 | End: 2018-09-13

## 2018-09-12 RX ORDER — ACETAMINOPHEN 500 MG
650 TABLET ORAL EVERY 6 HOURS
Qty: 0 | Refills: 0 | Status: DISCONTINUED | OUTPATIENT
Start: 2018-09-12 | End: 2018-09-21

## 2018-09-12 RX ORDER — POTASSIUM CHLORIDE 20 MEQ
40 PACKET (EA) ORAL ONCE
Qty: 0 | Refills: 0 | Status: COMPLETED | OUTPATIENT
Start: 2018-09-12 | End: 2018-09-12

## 2018-09-12 RX ADMIN — CEFEPIME 100 MILLIGRAM(S): 1 INJECTION, POWDER, FOR SOLUTION INTRAMUSCULAR; INTRAVENOUS at 17:14

## 2018-09-12 RX ADMIN — CEFEPIME 100 MILLIGRAM(S): 1 INJECTION, POWDER, FOR SOLUTION INTRAMUSCULAR; INTRAVENOUS at 06:28

## 2018-09-12 RX ADMIN — Medication 7 UNIT(S): at 16:57

## 2018-09-12 RX ADMIN — INSULIN GLARGINE 21 UNIT(S): 100 INJECTION, SOLUTION SUBCUTANEOUS at 16:47

## 2018-09-12 RX ADMIN — Medication 100 MILLIGRAM(S): at 17:16

## 2018-09-12 RX ADMIN — TACROLIMUS 1 MILLIGRAM(S): 5 CAPSULE ORAL at 06:55

## 2018-09-12 RX ADMIN — Medication 650 MILLIGRAM(S): at 07:01

## 2018-09-12 RX ADMIN — Medication 650 MILLIGRAM(S): at 14:09

## 2018-09-12 RX ADMIN — Medication 650 MILLIGRAM(S): at 07:45

## 2018-09-12 RX ADMIN — Medication 40 MILLIEQUIVALENT(S): at 17:01

## 2018-09-12 RX ADMIN — Medication 100 MILLIGRAM(S): at 07:56

## 2018-09-12 RX ADMIN — Medication 650 MILLIGRAM(S): at 14:39

## 2018-09-12 RX ADMIN — Medication 100 GRAM(S): at 00:52

## 2018-09-12 RX ADMIN — Medication 1: at 16:56

## 2018-09-12 RX ADMIN — TACROLIMUS 1 MILLIGRAM(S): 5 CAPSULE ORAL at 17:15

## 2018-09-12 RX ADMIN — Medication 50 MILLIEQUIVALENT(S): at 14:46

## 2018-09-12 RX ADMIN — Medication 100 MILLIGRAM(S): at 06:55

## 2018-09-12 RX ADMIN — Medication 100 MILLIGRAM(S): at 17:15

## 2018-09-12 RX ADMIN — MYCOPHENOLATE MOFETIL 1500 MILLIGRAM(S): 250 CAPSULE ORAL at 17:16

## 2018-09-12 RX ADMIN — Medication 25 MILLIGRAM(S): at 11:59

## 2018-09-12 RX ADMIN — MYCOPHENOLATE MOFETIL 1500 MILLIGRAM(S): 250 CAPSULE ORAL at 06:55

## 2018-09-12 RX ADMIN — AMLODIPINE BESYLATE 10 MILLIGRAM(S): 2.5 TABLET ORAL at 05:18

## 2018-09-12 NOTE — PROGRESS NOTE ADULT - SUBJECTIVE AND OBJECTIVE BOX
JULISA CONKLIN  68y  Male      SUBJECTIVE:    c/o headache  Progress Note:      REVIEW OF SYSTEMS:    T(C): 36.8 (09-12-18 @ 08:00), Max: 36.8 (09-12-18 @ 08:00)      TPro  6.8  /  Alb  4.0  /  TBili  0.7  /  DBili  x   /  AST  19  /  ALT  8   /  AlkPhos  117<H>  09-11  HR: 88 (09-12-18 @ 08:00) (80 - 90)  BP: 149/103 (09-12-18 @ 08:00) (98/60 - 185/105)  RR: 23 (09-12-18 @ 08:00) (14 - 34)  SpO2: 99% (09-12-18 @ 08:00) (95% - 100%)  Wt(kg): --Vital Signs Last 24 Hrs  T(C): 36.8 (12 Sep 2018 08:00), Max: 36.8 (12 Sep 2018 08:00)  T(F): 98.3 (12 Sep 2018 08:00), Max: 98.3 (12 Sep 2018 08:00)  HR: 88 (12 Sep 2018 08:00) (80 - 90)  BP: 149/103 (12 Sep 2018 08:00) (98/60 - 185/105)  BP(mean): 120 (12 Sep 2018 08:00) (74 - 148)  RR: 23 (12 Sep 2018 08:00) (14 - 34)  SpO2: 99% (12 Sep 2018 08:00) (95% - 100%)    PHYSICAL EXAM:  lungs clear  cor reg nl S1 S2  ext-no calf tenderness  neuro alert.oriented  no focal signs  LABS:                        13.7   8.00  )-----------( 108      ( 12 Sep 2018 04:57 )             41.0     09-12    141  |  101  |  21<H>  ----------------------------<  103<H>  3.4<L>   |  23  |  1.3    Ca    9.1      12 Sep 2018 04:57  Phos  2.3     09-12  Mg     1.9     09-12    TPro  6.8  /  Alb  4.0  /  TBili  0.7  /  DBili  x   /  AST  19  /  ALT  8   /  AlkPhos  117<H>  09-11    RADIOLOGY:  < from: CT Head No Cont (09.12.18 @ 07:32) >  EXAM:  CT BRAIN            PROCEDURE DATE:  09/12/2018            INTERPRETATION:  Clinical History / Reason for exam: Stroke. New onset   headache and changes in vision.    TECHNIQUE: Contiguous axial CT images were obtained from the base of the   skull to the vertex without administration of intravenous contrast.   Coronal and sagittal reformatted images were constructed.    COMPARISON:CT head 9/11/2018, 2/10/2015. MRI brain 2/10/2015.      FINDINGS:    There is mild prominence of the ventricles and sulci consistent with   cortical volume loss.     Decrease in size of left cerebellar hyperdensity now measuring 5 x 4 mm   (series 3; image #9). A follow-up MRI of the brain with and without   contrast is recommended for further evaluation.    Gray-white matter differentiation is grossly well preserved. No evidence   of acute intracranial hemorrhage, mass effect or midline shift.     Moderate patchy nonspecific white matter hypodensities consistent with   chronic microvascular ischemic changes.     Carotid and vertebral artery atherosclerotic calcifications.    No depressed skull fracture. The visualized paranasal sinuses and   mastoids are well aerated.    Beam hardening artifact is noted overlying the brain stem and posterior   fossa which is inherent to CT in this location.      IMPRESSION:    Since 9/11/2018:    1.  Decrease in size of left cerebellar hyperdensity. Follow-up MRI brain   with and without contrast may be helpful for further evaluation.    2.  No evidence of new acute intracranial hemorrhage or mass effect.    3. Chronic microvascular ischemic changes.              IDALIA    < end of copied text >      IMPRESSION:  left cerebellar density,possible bleed now smaller  dka resolved  ckd  s/p heart transplant  hypokalemia  htn  dld  bph        PLAN:  analgesics  correct potassium  mri without contrast can be done  monitor sugar  I spent 30 mins. examining/evaluating patient  and coordinating care

## 2018-09-12 NOTE — PROGRESS NOTE ADULT - ASSESSMENT
69 y/o male with a hx of Cardiac Transplant x 10 years ago due to MI x 2 and low EF on immunosuppressant , DM dx in his 60's,  HTN, DLD p/w acute confusion x 1 day    #Left cerebellar hemorrhage  - measuring 0.9 x 0.4 cm and rpt CTH shows  0.9 x 0.5 cm hyperdensity in the left cerebellum which may represent a cavernoma with recent bleed.   - CTA head and neck  -MRI w/wo C   - Maintain SBP <160  -f/u with neurology  -NSGY does not want to intervene  - Neuro checks Q4h    # DKA  - AG closed at 15  -will d/c insulin 2u drip  -subq insulin lantus 15units   -d/c IVF    #UTI  -+ u/a  -f/u urine cx  -on cefepime    # WANDY on CKD likely prerenal   -resolved    # HTN  - maintain SBP<160  -start labetalol 100 BID      # hx of cardiac transplant  - c/w immunosuppressants  - Prograf level pending    DVT ppx: SCD    Diet: mechanical soft dysphagia 2     Full code 67 y/o male with a hx of Cardiac Transplant x 10 years ago due to MI x 2 and low EF on immunosuppressant , DM dx in his 60's,  HTN, DLD p/w acute confusion x 1 day    #Left cerebellar hemorrhage  - measuring 0.9 x 0.4 cm and rpt CTH shows  0.9 x 0.5 cm hyperdensity in the left cerebellum which may represent a cavernoma with recent bleed.   -MRI w/wo C; will f/u with neuro regarding imaging studies    - Maintain SBP <160  -NSGY does not want to intervene  - Neuro checks Q4h    # DKA  - AG closed at 15  -will d/c insulin 2u drip  -subq insulin lantus 15units   -d/c IVF    #UTI  -+ u/a  -f/u urine cx  -on cefepime    # WANDY on CKD likely prerenal   -resolved    # HTN  - maintain SBP<160  -start labetalol 100 BID      # hx of cardiac transplant  - c/w immunosuppressants  - Prograf level pending    DVT ppx: SCD    Diet: mechanical soft dysphagia 2     Full code

## 2018-09-12 NOTE — PROGRESS NOTE ADULT - SUBJECTIVE AND OBJECTIVE BOX
SUBJECTIVE:    Patient is a 68y old Male who presents with a chief complaint of Cerebellar bleed, DKA (12 Sep 2018 13:42)    Currently admitted to medicine with the primary diagnosis of DKA (diabetic ketoacidoses)     Today is hospital day 1d. Worsening dizziness. Transferred from [facility] for [facility]    PAST MEDICAL & SURGICAL HISTORY  Urinary tract infection without hematuria, site unspecified  Chronic kidney disease, unspecified CKD stage  Bilateral hearing loss, unspecified hearing loss type  Benign prostatic hyperplasia with urinary frequency  Diabetes  High cholesterol  HTN (hypertension)  Heart transplant recipient  H/O heart transplant    SOCIAL HISTORY:  Negative for smoking/alcohol/drug use.     ALLERGIES:  codeine (Unknown)    MEDICATIONS:  STANDING MEDICATIONS  allopurinol 100 milliGRAM(s) Oral two times a day  amLODIPine   Tablet 10 milliGRAM(s) Oral daily  cefepime   IVPB 1000 milliGRAM(s) IV Intermittent every 12 hours  dextrose 5%. 1000 milliLiter(s) IV Continuous <Continuous>  dextrose 50% Injectable 12.5 Gram(s) IV Push once  dextrose 50% Injectable 25 Gram(s) IV Push once  dextrose 50% Injectable 25 Gram(s) IV Push once  insulin glargine Injectable (LANTUS) 21 Unit(s) SubCutaneous once  insulin Infusion 3 Unit(s)/Hr IV Continuous <Continuous>  insulin Infusion 2 Unit(s)/Hr IV Continuous <Continuous>  insulin lispro (HumaLOG) corrective regimen sliding scale   SubCutaneous three times a day before meals  insulin lispro Injectable (HumaLOG) 7 Unit(s) SubCutaneous three times a day before meals  labetalol 100 milliGRAM(s) Oral two times a day  mycophenolate mofetil 1500 milliGRAM(s) Oral two times a day  potassium chloride   Powder 40 milliEquivalent(s) Oral once  potassium phosphate / sodium phosphate powder 1 Packet(s) Oral three times a day before meals  pregabalin 100 milliGRAM(s) Oral two times a day  tacrolimus 1 milliGRAM(s) Oral every 12 hours    PRN MEDICATIONS  acetaminophen   Tablet .. 650 milliGRAM(s) Oral every 6 hours PRN  dextrose 40% Gel 15 Gram(s) Oral once PRN  glucagon  Injectable 1 milliGRAM(s) IntraMuscular once PRN    VITALS:   T(F): 98.3  HR: 84  BP: 136/73  RR: 25  SpO2: 100%    LABS:                        13.7   8.00  )-----------( 108      ( 12 Sep 2018 04:57 )             41.0     09-12    137  |  100  |  17  ----------------------------<  98  3.2<L>   |  22  |  1.2    Ca    8.6      12 Sep 2018 13:04  Phos  2.3     -  Mg     1.9     -12    TPro  6.8  /  Alb  4.0  /  TBili  0.7  /  DBili  x   /  AST  19  /  ALT  8   /  AlkPhos  117<H>      PT/INR - ( 10 Sep 2018 19:28 )   PT: 11.00 sec;   INR: 1.02 ratio         PTT - ( 10 Sep 2018 19:28 )  PTT:24.0 sec  Urinalysis Basic - ( 10 Sep 2018 19:30 )    Color: Yellow / Appearance: Clear / S.015 / pH: x  Gluc: x / Ketone: 80  / Bili: Negative / Urobili: 0.2   Blood: x / Protein: 100 / Nitrite: Negative   Leuk Esterase: Negative / RBC: 1-2 /HPF / WBC 26-50 /HPF   Sq Epi: x / Non Sq Epi: Occasional /HPF / Bacteria: Few            Culture - Urine (collected 10 Sep 2018 19:30)  Source: .Urine Clean Catch (Midstream)  Preliminary Report (11 Sep 2018 22:53):    50,000 - 99,000 CFU/mL Gram Negative Rods    Culture - Blood (collected 10 Sep 2018 19:25)  Source: .Blood Blood-Peripheral  Preliminary Report (12 Sep 2018 03:01):    No growth to date.    Culture - Blood (collected 10 Sep 2018 19:25)  Source: .Blood Blood-Peripheral  Preliminary Report (12 Sep 2018 03:01):    No growth to date.      CARDIAC MARKERS ( 11 Sep 2018 11:57 )  x     / <0.01 ng/mL / x     / x     / x      CARDIAC MARKERS ( 10 Sep 2018 22:20 )  x     / x     / 113 U/L / x     / x      CARDIAC MARKERS ( 10 Sep 2018 19:28 )  x     / <0.01 ng/mL / x     / x     / x          RADIOLOGY:      EXAM:  CT BRAIN            PROCEDURE DATE:  2018            INTERPRETATION:  Clinical History / Reason for exam: Stroke. New onset   headache and changes in vision.    TECHNIQUE: Contiguous axial CT images were obtained from the base of the   skull to the vertex without administration of intravenous contrast.   Coronal and sagittal reformatted images were constructed.    COMPARISON:CT head 2018, 2/10/2015. MRI brain 2/10/2015.      FINDINGS:    There is mild prominence of the ventricles and sulci consistent with   cortical volume loss.     Decrease in size of left cerebellar hyperdensity now measuring 5 x 4 mm   (series 3; image #9). A follow-up MRI of the brain with and without   contrast is recommended for further evaluation.    Gray-white matter differentiation is grossly well preserved. No evidence   of acute intracranial hemorrhage, mass effect or midline shift.     Moderate patchy nonspecific white matter hypodensities consistent with   chronic microvascular ischemic changes.     Carotid and vertebral artery atherosclerotic calcifications.    No depressed skull fracture. The visualized paranasal sinuses and   mastoids are well aerated.    Beam hardening artifact is noted overlying the brain stem and posterior   fossa which is inherent to CT in this location.      IMPRESSION:    Since 2018:    1.  Decrease in size of left cerebellar hyperdensity. Follow-up MRI brain   with and without contrast may be helpful for further evaluation.    2.  No evidence of new acute intracranial hemorrhage or mass effect.    3. Chronic microvascular ischemic changes.              LEOBARDO MEJIA M.D., RESIDENT RADIOLOGIST  This document has been electronically signed.  MONICA BAXTER M.D., ATTENDING RADIOLOGIST  This documenthas been electronically signed. Sep 12 2018  9:35AM              PHYSICAL EXAM:  GEN: No acute distress  LUNGS: Clear to auscultation bilaterally   HEART: S1/S2 present. RRR.   ABD: Soft, non-tender, non-distended. Bowel sounds present  EXT: NC/NC/NE/2+PP/BAEZA/Skin Intact.   NEURO: AAOX3; no focal neuro deficits     Intravenous access:   NG tube:   Rouse cathter: Indwelling Urethral Catheter:     Connect To:  Straight Drainage/Riley    Indication:  Urinary Retention / Obstruction (18 @ 09:12) (not performed) [active]  Indwelling Urethral Catheter:     Connect To:  Straight Drainage/Riley    Indication:  Urinary Retention / Obstruction (18 @ 17:10) (not performed) [active]  Indwelling Urethral Catheter:     Connect To:  Straight Drainage/Gravity    Indication:  Urine Output Monitoring in Critically Ill (18 @ 12:25) (not performed) [active] SUBJECTIVE:    Patient is a 68y old Male who presents with a chief complaint of Cerebellar bleed, DKA (12 Sep 2018 13:42)    Currently admitted to medicine with the primary diagnosis of DKA (diabetic ketoacidoses)     Today is hospital day 1d. Worsening dizziness. Transferred from [facility] for [facility].    PAST MEDICAL & SURGICAL HISTORY  Urinary tract infection without hematuria, site unspecified  Chronic kidney disease, unspecified CKD stage  Bilateral hearing loss, unspecified hearing loss type  Benign prostatic hyperplasia with urinary frequency  Diabetes  High cholesterol  HTN (hypertension)  Heart transplant recipient  H/O heart transplant    SOCIAL HISTORY:  Negative for smoking/alcohol/drug use.     ALLERGIES:  codeine (Unknown)    MEDICATIONS:  STANDING MEDICATIONS  allopurinol 100 milliGRAM(s) Oral two times a day  amLODIPine   Tablet 10 milliGRAM(s) Oral daily  cefepime   IVPB 1000 milliGRAM(s) IV Intermittent every 12 hours  dextrose 5%. 1000 milliLiter(s) IV Continuous <Continuous>  dextrose 50% Injectable 12.5 Gram(s) IV Push once  dextrose 50% Injectable 25 Gram(s) IV Push once  dextrose 50% Injectable 25 Gram(s) IV Push once  insulin glargine Injectable (LANTUS) 21 Unit(s) SubCutaneous once  insulin Infusion 3 Unit(s)/Hr IV Continuous <Continuous>  insulin Infusion 2 Unit(s)/Hr IV Continuous <Continuous>  insulin lispro (HumaLOG) corrective regimen sliding scale   SubCutaneous three times a day before meals  insulin lispro Injectable (HumaLOG) 7 Unit(s) SubCutaneous three times a day before meals  labetalol 100 milliGRAM(s) Oral two times a day  mycophenolate mofetil 1500 milliGRAM(s) Oral two times a day  potassium chloride   Powder 40 milliEquivalent(s) Oral once  potassium phosphate / sodium phosphate powder 1 Packet(s) Oral three times a day before meals  pregabalin 100 milliGRAM(s) Oral two times a day  tacrolimus 1 milliGRAM(s) Oral every 12 hours    PRN MEDICATIONS  acetaminophen   Tablet .. 650 milliGRAM(s) Oral every 6 hours PRN  dextrose 40% Gel 15 Gram(s) Oral once PRN  glucagon  Injectable 1 milliGRAM(s) IntraMuscular once PRN    VITALS:   T(F): 98.3  HR: 84  BP: 136/73  RR: 25  SpO2: 100%    LABS:                        13.7   8.00  )-----------( 108      ( 12 Sep 2018 04:57 )             41.0     09-12    137  |  100  |  17  ----------------------------<  98  3.2<L>   |  22  |  1.2    Ca    8.6      12 Sep 2018 13:04  Phos  2.3     -  Mg     1.9     -12    TPro  6.8  /  Alb  4.0  /  TBili  0.7  /  DBili  x   /  AST  19  /  ALT  8   /  AlkPhos  117<H>      PT/INR - ( 10 Sep 2018 19:28 )   PT: 11.00 sec;   INR: 1.02 ratio         PTT - ( 10 Sep 2018 19:28 )  PTT:24.0 sec  Urinalysis Basic - ( 10 Sep 2018 19:30 )    Color: Yellow / Appearance: Clear / S.015 / pH: x  Gluc: x / Ketone: 80  / Bili: Negative / Urobili: 0.2   Blood: x / Protein: 100 / Nitrite: Negative   Leuk Esterase: Negative / RBC: 1-2 /HPF / WBC 26-50 /HPF   Sq Epi: x / Non Sq Epi: Occasional /HPF / Bacteria: Few            Culture - Urine (collected 10 Sep 2018 19:30)  Source: .Urine Clean Catch (Midstream)  Preliminary Report (11 Sep 2018 22:53):    50,000 - 99,000 CFU/mL Gram Negative Rods    Culture - Blood (collected 10 Sep 2018 19:25)  Source: .Blood Blood-Peripheral  Preliminary Report (12 Sep 2018 03:01):    No growth to date.    Culture - Blood (collected 10 Sep 2018 19:25)  Source: .Blood Blood-Peripheral  Preliminary Report (12 Sep 2018 03:01):    No growth to date.      CARDIAC MARKERS ( 11 Sep 2018 11:57 )  x     / <0.01 ng/mL / x     / x     / x      CARDIAC MARKERS ( 10 Sep 2018 22:20 )  x     / x     / 113 U/L / x     / x      CARDIAC MARKERS ( 10 Sep 2018 19:28 )  x     / <0.01 ng/mL / x     / x     / x          RADIOLOGY:      EXAM:  CT BRAIN            PROCEDURE DATE:  2018            INTERPRETATION:  Clinical History / Reason for exam: Stroke. New onset   headache and changes in vision.    TECHNIQUE: Contiguous axial CT images were obtained from the base of the   skull to the vertex without administration of intravenous contrast.   Coronal and sagittal reformatted images were constructed.    COMPARISON:CT head 2018, 2/10/2015. MRI brain 2/10/2015.      FINDINGS:    There is mild prominence of the ventricles and sulci consistent with   cortical volume loss.     Decrease in size of left cerebellar hyperdensity now measuring 5 x 4 mm   (series 3; image #9). A follow-up MRI of the brain with and without   contrast is recommended for further evaluation.    Gray-white matter differentiation is grossly well preserved. No evidence   of acute intracranial hemorrhage, mass effect or midline shift.     Moderate patchy nonspecific white matter hypodensities consistent with   chronic microvascular ischemic changes.     Carotid and vertebral artery atherosclerotic calcifications.    No depressed skull fracture. The visualized paranasal sinuses and   mastoids are well aerated.    Beam hardening artifact is noted overlying the brain stem and posterior   fossa which is inherent to CT in this location.      IMPRESSION:    Since 2018:    1.  Decrease in size of left cerebellar hyperdensity. Follow-up MRI brain   with and without contrast may be helpful for further evaluation.    2.  No evidence of new acute intracranial hemorrhage or mass effect.    3. Chronic microvascular ischemic changes.              LEOBARDO MEJIA M.D., RESIDENT RADIOLOGIST  This document has been electronically signed.  MONICA BAXTER M.D., ATTENDING RADIOLOGIST  This documenthas been electronically signed. Sep 12 2018  9:35AM              PHYSICAL EXAM:  GEN: No acute distress  LUNGS: Clear to auscultation bilaterally   HEART: S1/S2 present. RRR.   ABD: Soft, non-tender, non-distended. Bowel sounds present  EXT: NC/NC/NE/2+PP/BAEZA/Skin Intact.   NEURO: AAOX3; no focal neuro deficits     Intravenous access:   NG tube:   Rouse cathter: Indwelling Urethral Catheter:     Connect To:  Straight Drainage/Constantia    Indication:  Urinary Retention / Obstruction (18 @ 09:12) (not performed) [active]  Indwelling Urethral Catheter:     Connect To:  Straight Drainage/Constantia    Indication:  Urinary Retention / Obstruction (18 @ 17:10) (not performed) [active]  Indwelling Urethral Catheter:     Connect To:  Straight Drainage/Gravity    Indication:  Urine Output Monitoring in Critically Ill (18 @ 12:25) (not performed) [active]

## 2018-09-12 NOTE — PROGRESS NOTE ADULT - SUBJECTIVE AND OBJECTIVE BOX
68yMale    HPI:  68 year old male with a hx of Cardiac Transplant x 10 years ago due to MI x 2 and low EF on immunosuppressant, DM,HTN, DLD p/w acute confusion x 1 day.    He was found to have a left subcentimeter cerebellar hemorrhage. O n radiology report it is suggested there may be surrounding edema suggestive of possible underlying vascular malformation. He is currently cared for in the ICU with a mild posterior headache but no nuchal rigidity. His exam is non focal.    Urinary tract infection without hematuria, site unspecified  Chronic kidney disease, unspecified CKD stage  Bilateral hearing loss, unspecified hearing loss type  Benign prostatic hyperplasia with urinary frequency  Diabetes  High cholesterol  HTN (hypertension)  Heart transplant recipient      Tests:  < from: CT Head No Cont (18 @ 07:32) >  Since 2018:    1.  Decrease in size of left cerebellar hyperdensity. Follow-up MRI brain   with and without contrast may be helpful for further evaluation.    2.  No evidence of new acute intracranial hemorrhage or mass effect.    3. Chronic microvascular ischemic changes.      allopurinol 100 milliGRAM(s) Oral two times a day  amLODIPine   Tablet 10 milliGRAM(s) Oral daily  cefepime   IVPB 1000 milliGRAM(s) IV Intermittent every 12 hours  dextrose 5% + sodium chloride 0.9%. 1000 milliLiter(s) IV Continuous <Continuous>  insulin Infusion 3 Unit(s)/Hr IV Continuous <Continuous>  insulin Infusion 2 Unit(s)/Hr IV Continuous <Continuous>  labetalol 100 milliGRAM(s) Oral two times a day  mycophenolate mofetil 1500 milliGRAM(s) Oral two times a day  potassium phosphate / sodium phosphate powder 1 Packet(s) Oral three times a day before meals  pregabalin 100 milliGRAM(s) Oral two times a day  tacrolimus 1 milliGRAM(s) Oral every 12 hours      T(F): 98.3 (18 @ 08:00), Max: 98.3 (18 @ 08:00)  HR: 88 (18 @ 08:00) (80 - 90)  BP: 149/103 (18 @ 08:00) (98/60 - 185/105)  RR: 23 (18 @ 08:00) (14 - 34)  SpO2: 99% (09-12-18 @ 08:00) (95% - 100%)                        13.7   8.00  )-----------( 108      ( 12 Sep 2018 04:57 )             41.0         141  |  101  |  21<H>  ----------------------------<  103<H>  3.4<L>   |  23  |  1.3    Ca    9.1      12 Sep 2018 04:57  Phos  2.3       Mg     1.9         TPro  6.8  /  Alb  4.0  /  TBili  0.7  /  DBili  x   /  AST  19  /  ALT  8   /  AlkPhos  117<H>      PT/INR - ( 10 Sep 2018 19:28 )   PT: 11.00 sec;   INR: 1.02 ratio         PTT - ( 10 Sep 2018 19:28 )  PTT:24.0 sec  Urinalysis Basic - ( 10 Sep 2018 19:30 )    Color: Yellow / Appearance: Clear / S.015 / pH: x  Gluc: x / Ketone: 80  / Bili: Negative / Urobili: 0.2   Blood: x / Protein: 100 / Nitrite: Negative   Leuk Esterase: Negative / RBC: 1-2 /HPF / WBC 26-50 /HPF   Sq Epi: x / Non Sq Epi: Occasional /HPF / Bacteria: Few      LIVER FUNCTIONS - ( 11 Sep 2018 14:04 )  Alb: 4.0 g/dL / Pro: 6.8 g/dL / ALK PHOS: 117 U/L / ALT: 8 U/L / AST: 19 U/L / GGT: x             Neuro Exam:  Awake alert oriented to self place time.  PEARLA EOMI no gaze no visual cut.  No aphasia no dysarthria no facial palsy  No sensory deficit, no neglect  No drift of bilateral upper and lower extremities.    NIHSS:0    GCS:  15  ICHs:   0    Impression:  68 year old male with a hx of Cardiac Transplant x 10 years ago due to MI x 2 and low EF on immunosuppressant, DM,HTN, DLD p/w acute confusion x 1 day.    He was found to have a left subcentimeter cerebellar hemorrhage. O n radiology report it is suggested there may be surrounding edema suggestive of possible underlying vascular malformation. He is currently cared for in the ICU with a mild posterior headache but no nuchal rigidity. His exam is non focal. CTA of the head and neck is warranted to better characterize the possible underlying malformation.    Suggestions:  CTA head and neck.  Management as per MICU.     We spent more than 45 minutes to review the chart, gather necessary information, evaluate the patient and discuss the case with primary team and counseling the patient and his family to their satisfaction. Total visit time more than 60min.  Maritza De Los Santos MD.  [phone] 68yMale    HPI:  68 year old male with a hx of Cardiac Transplant x 10 years ago due to MI x 2 and low EF on immunosuppressant, DM,HTN, DLD p/w acute confusion x 1 day. Amongs his confusion work up CTH was performed which revealed a subcentimeter hyperdensity in the left cerebellar area. On radiology report it is suggested there may be surrounding edema suggestive of possible underlying vascular malformation. He is currently cared for in the ICU with a mild posterior headache but no nuchal rigidity, photo/phonophobia or nausea/vomitting. Today he is AAAOX3 and denies any history or current neurological problem with the exception of a mild non-throbbing headache on the left side of entire head.    Urinary tract infection without hematuria, site unspecified  Chronic kidney disease, unspecified CKD stage  Bilateral hearing loss, unspecified hearing loss type  Benign prostatic hyperplasia with urinary frequency  Diabetes  High cholesterol  HTN (hypertension)  Heart transplant recipient      Tests:  < from: CT Head No Cont (18 @ 07:32) >  Since 2018:    1.  Decrease in size of left cerebellar hyperdensity. Follow-up MRI brain   with and without contrast may be helpful for further evaluation.    2.  No evidence of new acute intracranial hemorrhage or mass effect.    3. Chronic microvascular ischemic changes.      allopurinol 100 milliGRAM(s) Oral two times a day  amLODIPine   Tablet 10 milliGRAM(s) Oral daily  cefepime   IVPB 1000 milliGRAM(s) IV Intermittent every 12 hours  dextrose 5% + sodium chloride 0.9%. 1000 milliLiter(s) IV Continuous <Continuous>  insulin Infusion 3 Unit(s)/Hr IV Continuous <Continuous>  insulin Infusion 2 Unit(s)/Hr IV Continuous <Continuous>  labetalol 100 milliGRAM(s) Oral two times a day  mycophenolate mofetil 1500 milliGRAM(s) Oral two times a day  potassium phosphate / sodium phosphate powder 1 Packet(s) Oral three times a day before meals  pregabalin 100 milliGRAM(s) Oral two times a day  tacrolimus 1 milliGRAM(s) Oral every 12 hours      T(F): 98.3 (18 @ 08:00), Max: 98.3 (18 @ 08:00)  HR: 88 (18 @ 08:00) (80 - 90)  BP: 149/103 (18 @ 08:00) (98/60 - 185/105)  RR: 23 (18 @ 08:00) (14 - 34)  SpO2: 99% (18 @ 08:00) (95% - 100%)                        13.7   8.00  )-----------( 108      ( 12 Sep 2018 04:57 )             41.0         141  |  101  |  21<H>  ----------------------------<  103<H>  3.4<L>   |  23  |  1.3    Ca    9.1      12 Sep 2018 04:57  Phos  2.3       Mg     1.9         TPro  6.8  /  Alb  4.0  /  TBili  0.7  /  DBili  x   /  AST  19  /  ALT  8   /  AlkPhos  117<H>      PT/INR - ( 10 Sep 2018 19:28 )   PT: 11.00 sec;   INR: 1.02 ratio         PTT - ( 10 Sep 2018 19:28 )  PTT:24.0 sec  Urinalysis Basic - ( 10 Sep 2018 19:30 )    Color: Yellow / Appearance: Clear / S.015 / pH: x  Gluc: x / Ketone: 80  / Bili: Negative / Urobili: 0.2   Blood: x / Protein: 100 / Nitrite: Negative   Leuk Esterase: Negative / RBC: 1-2 /HPF / WBC 26-50 /HPF   Sq Epi: x / Non Sq Epi: Occasional /HPF / Bacteria: Few      LIVER FUNCTIONS - ( 11 Sep 2018 14:04 )  Alb: 4.0 g/dL / Pro: 6.8 g/dL / ALK PHOS: 117 U/L / ALT: 8 U/L / AST: 19 U/L / GGT: x             Neuro Exam:  Awake alert oriented to self place time.  PEARLA EOMI no gaze no visual cut.  No aphasia no dysarthria no facial palsy  No sensory deficit, no neglect  No drift of bilateral upper and lower extremities.    NIHSS:0    GCS:  15  ICHs:   0    Impression:  68 year old male with a hx of Cardiac Transplant x 10 years ago due to MI x 2 and low EF on immunosuppressant, DM,HTN, DLD p/w acute confusion x 1 day. Amongs his confusion work up CTH was performed which revealed a subcentimeter hyperdensity in the left cerebellar area. On radiology report it is suggested there may be surrounding edema suggestive of possible underlying vascular malformation. He is currently cared for in the ICU with a mild posterior headache but no nuchal rigidity, photo/phonophobia or nausea/vomitting. Today he is AAAOX3 and denies any history or current neurological problem with the exception of a mild non-throbbing headache on the left side of entire head.    Suggestions:  CTA head and neck.  Brain MRI w/ and w/o contrast.  Avoid hypertension  Management as per MICU.     We spent more than 45 minutes to review the chart, gather necessary information, evaluate the patient and discuss the case with primary team and counseling the patient and his family to their satisfaction. Total visit time more than 60min.  Maritza De Los Santos MD.  [phone]

## 2018-09-12 NOTE — CONSULT NOTE ADULT - SUBJECTIVE AND OBJECTIVE BOX
HISTORY OF PRESENT ILLNESS:     67 y/o male with a hx of Cardiac Transplant x 10 years ago due to MI x 2 and low EF on immunosuppressant , DM dx in his 60's,  HTN, DLD p/w acute confusion x 1 day    History obtained from ED note overnight. Patient was well on 9/10/2018 am when his wife left him home at 7 am and gave him his breakfast. She called him from work later in the afternoon but no answer. Around 5:30 in the evening when she came home , she found him very confused and he was trying to catch things that were not there. No visible signs of trauma. Family called EMS.     As per pt, he was afebrile at home, no cough/fevers/chills. He states he is in the hospital for DKA and he has had it before. Upon presentation the ED he was hypertensive 173/115 s/p labetolol IV x 2, Glucose >600 with positive serum and urine ketones.    Pt c/o intermittent HA's as well as episodes of seeing "blood vessels" in his eyes. Pt found to have small ? cerebellar hemorrohage on Head CT. Transferred [place] for Neurosurgery eval.     PAST MEDICAL & SURGICAL HISTORY:  Urinary tract infection without hematuria, site unspecified  Chronic kidney disease, unspecified CKD stage  Bilateral hearing loss, unspecified hearing loss type  Benign prostatic hyperplasia with urinary frequency  Diabetes  High cholesterol  HTN (hypertension)  Heart transplant recipient  H/O heart transplant    FAMILY HISTORY:  No pertinent family history in first degree relatives    Allergies    codeine (Unknown)    MEDICATIONS:  Antibiotics:  cefepime   IVPB 1000 milliGRAM(s) IV Intermittent every 12 hours    Neuro:  acetaminophen   Tablet .. 650 milliGRAM(s) Oral every 8 hours PRN  pregabalin 100 milliGRAM(s) Oral two times a day    Anticoagulation:    OTHER:  allopurinol 100 milliGRAM(s) Oral two times a day  amLODIPine   Tablet 10 milliGRAM(s) Oral daily  hydrALAZINE 25 milliGRAM(s) Oral once  insulin Infusion 3 Unit(s)/Hr IV Continuous <Continuous>  mycophenolate mofetil 1500 milliGRAM(s) Oral two times a day  tacrolimus 1 milliGRAM(s) Oral every 12 hours    IVF:  dextrose 5%. 1000 milliLiter(s) IV Continuous <Continuous>  potassium phosphate / sodium phosphate powder 1 Packet(s) Oral three times a day before meals      Vital Signs Last 24 Hrs  T(C): 36.5 (12 Sep 2018 04:00), Max: 36.5 (12 Sep 2018 00:00)  T(F): 97.7 (12 Sep 2018 04:00), Max: 97.7 (12 Sep 2018 00:00)  HR: 88 (12 Sep 2018 07:00) (80 - 90)  BP: 165/93 (12 Sep 2018 07:00) (98/60 - 185/105)  BP(mean): 122 (12 Sep 2018 07:00) (74 - 148)  RR: 27 (12 Sep 2018 07:00) (14 - 34)  SpO2: 98% (12 Sep 2018 07:00) (94% - 100%)    PHYSICAL EXAM:  A&Ox3  PERRL  EOMI - difficulty with exam  No droop  Tongue midline  No drift  Finger to nose intact  BAEZA - good strength  Follows commands      LABS:                        13.7   8.00  )-----------( 108      ( 12 Sep 2018 04:57 )             41.0     09-12    141  |  101  |  21<H>  ----------------------------<  103<H>  3.4<L>   |  23  |  1.3    Ca    9.1      12 Sep 2018 04:57  Phos  2.3     12  Mg     1.9     12    TPro  6.8  /  Alb  4.0  /  TBili  0.7  /  DBili  x   /  AST  19  /  ALT  8   /  AlkPhos  117<H>  09-11    PT/INR - ( 10 Sep 2018 19:28 )   PT: 11.00 sec;   INR: 1.02 ratio         PTT - ( 10 Sep 2018 19:28 )  PTT:24.0 sec  Urinalysis Basic - ( 10 Sep 2018 19:30 )    Color: Yellow / Appearance: Clear / S.015 / pH: x  Gluc: x / Ketone: 80  / Bili: Negative / Urobili: 0.2   Blood: x / Protein: 100 / Nitrite: Negative   Leuk Esterase: Negative / RBC: 1-2 /HPF / WBC 26-50 /HPF   Sq Epi: x / Non Sq Epi: Occasional /HPF / Bacteria: Few      CULTURES:  Culture Results:   50,000 - 99,000 CFU/mL Gram Negative Rods (09-10 @ 19:30)  Culture Results:   No growth to date. (09-10 @ 19:25)      RADIOLOGY & ADDITIONAL STUDIES:  < from: CT Head No Cont (18 @ 09:10) >  1.  Stable 0.9 x 0.5 cm hyperdensity in the left cerebellum, now with   minimal surrounding edema may represent avascular malformation such as a   cavernoma which may have recently bled. Correlation with MRI of the brain   with without contrast may be helpful for further evaluation and   confirmation.    2.  Periventricular and subcortical white matter chronic small vessel   ischemic changes.    3.  No acute mass effect midline shift.      < end of copied text >    Assessment:  As above    Plan:  Repeat HCT  Neuro checks

## 2018-09-12 NOTE — CONSULT NOTE ADULT - ATTENDING COMMENTS
As above.  Patient will need MRI to rule out vascular malformation.  Otherwise stable and may be transferred to floor.

## 2018-09-12 NOTE — CONSULT NOTE ADULT - SUBJECTIVE AND OBJECTIVE BOX
Neurology Consult      HPI:  69 y/o male with a hx of Cardiac Transplant x 10 years ago due to MI x 2 and low EF on immunosuppressant , DM dx in his 60's,  HTN, DLD p/w acute confusion x 1 day, admitted for cerebellar bleed, DKA.     History obtained from ED note overnight. Patient was well on 9/10/2018 am when his wife left him home at 7 am and gave him his breakfast. She called him from work later in the afternoon but no answer. Around 5:30 in the evening when she came home , she found him very confused and he was trying to catch things that were not there. No visible signs of trauma. Family called EMS.   As per pt, he was afebrile at home, no cough/fevers/chills. He states he is in the hospital for DKA and he has had it before. He denied any acute complaints at this time   Upon presentation the ED he was hypertensive 173/115 s/p labetolol IV x 2, Glucose >600 with positive serum and urine ketones (11 Sep 2018 07:32)    Neuro: Pt was alert and awake, examined at bedside. Pt reports new onset Lt-sided headache, intermittent LT eye pain/blurry vision. Otherwise, no acute changes overnight. Pt does not know why he came to the hospital but knows he had a CT which shows a bleed.     PAST MEDICAL & SURGICAL HISTORY:  Urinary tract infection without hematuria, site unspecified  Chronic kidney disease, unspecified CKD stage  Bilateral hearing loss, unspecified hearing loss type  Benign prostatic hyperplasia with urinary frequency  Diabetes  High cholesterol  HTN (hypertension)  Heart transplant recipient  H/O heart transplant      FAMILY HISTORY:  No pertinent family history in first degree relatives      Social History: (-) x 3    Allergies    codeine (Unknown)    Intolerances        MEDICATIONS  (STANDING):  allopurinol 100 milliGRAM(s) Oral two times a day  amLODIPine   Tablet 10 milliGRAM(s) Oral daily  cefepime   IVPB 1000 milliGRAM(s) IV Intermittent every 12 hours  dextrose 5% + sodium chloride 0.9%. 1000 milliLiter(s) (100 mL/Hr) IV Continuous <Continuous>  insulin Infusion 3 Unit(s)/Hr (3 mL/Hr) IV Continuous <Continuous>  insulin Infusion 2 Unit(s)/Hr (2 mL/Hr) IV Continuous <Continuous>  labetalol 100 milliGRAM(s) Oral two times a day  mycophenolate mofetil 1500 milliGRAM(s) Oral two times a day  potassium phosphate / sodium phosphate powder 1 Packet(s) Oral three times a day before meals  pregabalin 100 milliGRAM(s) Oral two times a day  tacrolimus 1 milliGRAM(s) Oral every 12 hours    MEDICATIONS  (PRN):  acetaminophen   Tablet .. 650 milliGRAM(s) Oral every 8 hours PRN Moderate Pain (4 - 6)  acetaminophen   Tablet .. 325 milliGRAM(s) Oral every 4 hours PRN Temp greater or equal to 38C (100.4F), Moderate Pain (4 - 6)      Review of systems:    Constitutional: as per HPI  Eyes: (+) Lt eye pain.   HENT:  (+) Lt-sided headache. No difficulty hearing; No sinus or throat pain  Neck: No pain or stiffness  Respiratory: No cough, wheezing, chills or hemoptysis  Cardiovascular: No chest pain, palpitations, shortness of breath, dyspnea on exertion  Gastrointestinal: No abdominal pain, nausea, vomiting or hematemesis; No diarrhea or constipation.   Genitourinary: No dysuria, frequency, hematuria or incontinence  Neurological: As per HPI  Skin: No rashes or lesions   Endocrine: No heat or cold intolerance; No hair loss  Musculoskeletal: No joint pain or swelling  Psychiatric: No depression, anxiety, mood swings  Heme/Lymph: No easy bruising or bleeding gums    Vital Signs Last 24 Hrs  T(C): 36.8 (12 Sep 2018 08:00), Max: 36.8 (12 Sep 2018 08:00)  T(F): 98.3 (12 Sep 2018 08:00), Max: 98.3 (12 Sep 2018 08:00)  HR: 88 (12 Sep 2018 08:00) (80 - 90)  BP: 149/103 (12 Sep 2018 08:00) (98/60 - 185/105)  BP(mean): 120 (12 Sep 2018 08:00) (74 - 148)  RR: 23 (12 Sep 2018 08:00) (14 - 34)  SpO2: 99% (12 Sep 2018 08:00) (95% - 100%)    Examination:  General:  Appearance is consistent with chronologic age.  No abnormal facies.  Gross skin survey within normal limits.    Cognitive/Language:  The patient is AAOx3 to person, place, and time.  Recent and remote memory intact.  Language with normal repetition, comprehension and naming.  Nondysarthric.  Pt does not know why he initially came to the hospital and says it may have to do with his diabetes.   Eyes: intact VA, VFF.  EOMI w/ mild horizontal and vertical nystagmus. No reported double vision.  PERRL.  No ptosis/weakness of eyelid closure.    Face: Decreased facial sensation on Left face (V1-V3). Facial sensation on right side (V1-V3) intact. No facial asymmetry.  Ears/Nose/Throat:  Hearing grossly intact b/l.  Palate elevates midline.  Tongue and uvula midline.   Motor examination:   Normal tone, bulk and range of motion.  No tenderness, twitching, tremors or involuntary movements.  Formal Muscle Strength Testing: (MRC grade R/L) 5/5 UE; 5/5 LE.  No observable drift.  Reflexes:   1+ b/l patella and Achilles. 2+ b/l  biceps, triceps, brachioradialis.  Plantar response downgoing b/l.  Jaw jerk, Karen, clonus absent.  Sensory examination:  Decreased sensation to vibration in LLE. Intact to light touch, pain, temperature and proprioception in all extremities.   Cerebellum:   FTN/HKS intact with normal KRISTA in all limbs.  No dysmetria or dysdiadokinesia.      Labs:   CBC Full  -  ( 12 Sep 2018 04:57 )  WBC Count : 8.00 K/uL  Hemoglobin : 13.7 g/dL  Hematocrit : 41.0 %  Platelet Count - Automated : 108 K/uL  Mean Cell Volume : 79.8 fL  Mean Cell Hemoglobin : 26.7 pg  Mean Cell Hemoglobin Concentration : 33.4 g/dL  Auto Neutrophil # : 5.86 K/uL  Auto Lymphocyte # : 1.32 K/uL  Auto Monocyte # : 0.66 K/uL  Auto Eosinophil # : 0.07 K/uL  Auto Basophil # : 0.04 K/uL  Auto Neutrophil % : 73.2 %  Auto Lymphocyte % : 16.5 %  Auto Monocyte % : 8.3 %  Auto Eosinophil % : 0.9 %  Auto Basophil % : 0.5 %        141  |  101  |  21<H>  ----------------------------<  103<H>  3.4<L>   |  23  |  1.3    Ca    9.1      12 Sep 2018 04:57  Phos  2.3     09-  Mg     1.9     -    TPro  6.8  /  Alb  4.0  /  TBili  0.7  /  DBili  x   /  AST  19  /  ALT  8   /  AlkPhos  117<H>      LIVER FUNCTIONS - ( 11 Sep 2018 14:04 )  Alb: 4.0 g/dL / Pro: 6.8 g/dL / ALK PHOS: 117 U/L / ALT: 8 U/L / AST: 19 U/L / GGT: x           PT/INR - ( 10 Sep 2018 19:28 )   PT: 11.00 sec;   INR: 1.02 ratio         PTT - ( 10 Sep 2018 19:28 )  PTT:24.0 sec  Urinalysis Basic - ( 10 Sep 2018 19:30 )    Color: Yellow / Appearance: Clear / S.015 / pH: x  Gluc: x / Ketone: 80  / Bili: Negative / Urobili: 0.2   Blood: x / Protein: 100 / Nitrite: Negative   Leuk Esterase: Negative / RBC: 1-2 /HPF / WBC 26-50 /HPF   Sq Epi: x / Non Sq Epi: Occasional /HPF / Bacteria: Few      Neuroimaging:  < from: CT Head No Cont (18 @ 07:32) >    EXAM:  CT BRAIN          PROCEDURE DATE:  2018      INTERPRETATION:  Clinical History / Reason for exam: Stroke. New onset   headache and changes in vision.    TECHNIQUE: Contiguous axial CT images were obtained from the base of the   skull to the vertex without administration of intravenous contrast.   Coronal and sagittal reformatted images were constructed.    COMPARISON:CT head 2018, 2/10/2015. MRI brain 2/10/2015.      FINDINGS:    There is mild prominence of the ventricles and sulci consistent with   cortical volume loss.     Decrease in size of left cerebellar hyperdensity now measuring 5 x 4 mm   (series 3; image #9). A follow-up MRI of the brain with and without   contrast is recommended for further evaluation.    Gray-white matter differentiation is grossly well preserved. No evidence   of acute intracranial hemorrhage, mass effect or midline shift.     Moderate patchy nonspecific white matter hypodensities consistent with   chronic microvascular ischemic changes.     Carotid and vertebral artery atherosclerotic calcifications.    No depressed skull fracture. The visualized paranasal sinuses and   mastoids are well aerated.    Beam hardening artifact is noted overlying the brain stem and posterior   fossa which is inherent to CT in this location.      IMPRESSION:    Since 2018:    1.  Decrease in size of left cerebellar hyperdensity. Follow-up MRI brain   with and without contrast may be helpful for further evaluation.    2.  No evidence of new acute intracranial hemorrhage or mass effect.    3. Chronic microvascular ischemic changes.    < end of copied text > Neurology progress note    Neuro: Pt was alert and awake, examined at bedside. Pt reports new onset Lt-sided headache, intermittent LT eye pain/blurry vision. Otherwise, no acute changes overnight. Pt does not know why he came to the hospital but knows he had a CT which shows a bleed.     PAST MEDICAL & SURGICAL HISTORY:  Urinary tract infection without hematuria, site unspecified  Chronic kidney disease, unspecified CKD stage  Bilateral hearing loss, unspecified hearing loss type  Benign prostatic hyperplasia with urinary frequency  Diabetes  High cholesterol  HTN (hypertension)  Heart transplant recipient  H/O heart transplant    FAMILY HISTORY:  No pertinent family history in first degree relatives    Social History: (-) x 3    Allergies    codeine (Unknown)    Intolerances    MEDICATIONS  (STANDING):  allopurinol 100 milliGRAM(s) Oral two times a day  amLODIPine   Tablet 10 milliGRAM(s) Oral daily  cefepime   IVPB 1000 milliGRAM(s) IV Intermittent every 12 hours  dextrose 5% + sodium chloride 0.9%. 1000 milliLiter(s) (100 mL/Hr) IV Continuous <Continuous>  insulin Infusion 3 Unit(s)/Hr (3 mL/Hr) IV Continuous <Continuous>  insulin Infusion 2 Unit(s)/Hr (2 mL/Hr) IV Continuous <Continuous>  labetalol 100 milliGRAM(s) Oral two times a day  mycophenolate mofetil 1500 milliGRAM(s) Oral two times a day  potassium phosphate / sodium phosphate powder 1 Packet(s) Oral three times a day before meals  pregabalin 100 milliGRAM(s) Oral two times a day  tacrolimus 1 milliGRAM(s) Oral every 12 hours    MEDICATIONS  (PRN):  acetaminophen   Tablet .. 650 milliGRAM(s) Oral every 8 hours PRN Moderate Pain (4 - 6)  acetaminophen   Tablet .. 325 milliGRAM(s) Oral every 4 hours PRN Temp greater or equal to 38C (100.4F), Moderate Pain (4 - 6)    Review of systems:    Constitutional: as per HPI  Eyes: (+) Lt eye pain.   HENT:  (+) Lt-sided headache. No difficulty hearing; No sinus or throat pain  Neck: No pain or stiffness  Respiratory: No cough, wheezing, chills or hemoptysis  Cardiovascular: No chest pain, palpitations, shortness of breath, dyspnea on exertion  Gastrointestinal: No abdominal pain, nausea, vomiting or hematemesis; No diarrhea or constipation.   Genitourinary: No dysuria, frequency, hematuria or incontinence  Neurological: As per HPI  Skin: No rashes or lesions   Endocrine: No heat or cold intolerance; No hair loss  Musculoskeletal: No joint pain or swelling  Psychiatric: No depression, anxiety, mood swings  Heme/Lymph: No easy bruising or bleeding gums    Vital Signs Last 24 Hrs  T(C): 36.8 (12 Sep 2018 08:00), Max: 36.8 (12 Sep 2018 08:00)  T(F): 98.3 (12 Sep 2018 08:00), Max: 98.3 (12 Sep 2018 08:00)  HR: 88 (12 Sep 2018 08:00) (80 - 90)  BP: 149/103 (12 Sep 2018 08:00) (98/60 - 185/105)  BP(mean): 120 (12 Sep 2018 08:00) (74 - 148)  RR: 23 (12 Sep 2018 08:00) (14 - 34)  SpO2: 99% (12 Sep 2018 08:00) (95% - 100%)    Examination:  General:  Appearance is consistent with chronologic age.  No abnormal facies.  Gross skin survey within normal limits.    Cognitive/Language:  The patient is AAOx3 to person, place, and time.  Recent and remote memory intact.  Language with normal repetition, comprehension and naming.  Nondysarthric.  Pt does not know why he initially came to the hospital and says it may have to do with his diabetes.   Eyes: intact VA, VFF.  EOMI w/ mild horizontal and vertical nystagmus. No reported double vision.  PERRL.  No ptosis/weakness of eyelid closure.    Face: Decreased facial sensation on Left face (V1-V3). Facial sensation on right side (V1-V3) intact. No facial asymmetry.  Ears/Nose/Throat:  Hearing grossly intact b/l.  Palate elevates midline.  Tongue and uvula midline.   Motor examination:   Normal tone, bulk and range of motion.  No tenderness, twitching, tremors or involuntary movements.  Formal Muscle Strength Testing: (MRC grade R/L) 5/5 UE; 5/5 LE.  No observable drift.  Reflexes:   1+ b/l patella and Achilles. 2+ b/l  biceps, triceps, brachioradialis.  Plantar response downgoing b/l.  Jaw jerk, Karen, clonus absent.  Sensory examination:  Decreased sensation to vibration in LLE. Intact to light touch, pain, temperature and proprioception in all extremities.   Cerebellum:   FTN/HKS intact with normal KRISTA in all limbs.  No dysmetria or dysdiadokinesia.      Labs:   CBC Full  -  ( 12 Sep 2018 04:57 )  WBC Count : 8.00 K/uL  Hemoglobin : 13.7 g/dL  Hematocrit : 41.0 %  Platelet Count - Automated : 108 K/uL  Mean Cell Volume : 79.8 fL  Mean Cell Hemoglobin : 26.7 pg  Mean Cell Hemoglobin Concentration : 33.4 g/dL  Auto Neutrophil # : 5.86 K/uL  Auto Lymphocyte # : 1.32 K/uL  Auto Monocyte # : 0.66 K/uL  Auto Eosinophil # : 0.07 K/uL  Auto Basophil # : 0.04 K/uL  Auto Neutrophil % : 73.2 %  Auto Lymphocyte % : 16.5 %  Auto Monocyte % : 8.3 %  Auto Eosinophil % : 0.9 %  Auto Basophil % : 0.5 %        141  |  101  |  21<H>  ----------------------------<  103<H>  3.4<L>   |  23  |  1.3    Ca    9.1      12 Sep 2018 04:57  Phos  2.3       Mg     1.9         TPro  6.8  /  Alb  4.0  /  TBili  0.7  /  DBili  x   /  AST  19  /  ALT  8   /  AlkPhos  117<H>      LIVER FUNCTIONS - ( 11 Sep 2018 14:04 )  Alb: 4.0 g/dL / Pro: 6.8 g/dL / ALK PHOS: 117 U/L / ALT: 8 U/L / AST: 19 U/L / GGT: x           PT/INR - ( 10 Sep 2018 19:28 )   PT: 11.00 sec;   INR: 1.02 ratio         PTT - ( 10 Sep 2018 19:28 )  PTT:24.0 sec  Urinalysis Basic - ( 10 Sep 2018 19:30 )    Color: Yellow / Appearance: Clear / S.015 / pH: x  Gluc: x / Ketone: 80  / Bili: Negative / Urobili: 0.2   Blood: x / Protein: 100 / Nitrite: Negative   Leuk Esterase: Negative / RBC: 1-2 /HPF / WBC 26-50 /HPF   Sq Epi: x / Non Sq Epi: Occasional /HPF / Bacteria: Few      Neuroimaging:  < from: CT Head No Cont (18 @ 07:32) >    EXAM:  CT BRAIN          PROCEDURE DATE:  2018      INTERPRETATION:  Clinical History / Reason for exam: Stroke. New onset   headache and changes in vision.    TECHNIQUE: Contiguous axial CT images were obtained from the base of the   skull to the vertex without administration of intravenous contrast.   Coronal and sagittal reformatted images were constructed.    COMPARISON:CT head 2018, 2/10/2015. MRI brain 2/10/2015.      FINDINGS:    There is mild prominence of the ventricles and sulci consistent with   cortical volume loss.     Decrease in size of left cerebellar hyperdensity now measuring 5 x 4 mm   (series 3; image #9). A follow-up MRI of the brain with and without   contrast is recommended for further evaluation.    Gray-white matter differentiation is grossly well preserved. No evidence   of acute intracranial hemorrhage, mass effect or midline shift.     Moderate patchy nonspecific white matter hypodensities consistent with   chronic microvascular ischemic changes.     Carotid and vertebral artery atherosclerotic calcifications.    No depressed skull fracture. The visualized paranasal sinuses and   mastoids are well aerated.    Beam hardening artifact is noted overlying the brain stem and posterior   fossa which is inherent to CT in this location.      IMPRESSION:    Since 2018:    1.  Decrease in size of left cerebellar hyperdensity. Follow-up MRI brain   with and without contrast may be helpful for further evaluation.    2.  No evidence of new acute intracranial hemorrhage or mass effect.    3. Chronic microvascular ischemic changes.    < end of copied text >

## 2018-09-12 NOTE — CHART NOTE - NSCHARTNOTEFT_GEN_A_CORE
Called by RN overnight that patient was experiencing headache. Went to evaluate patient and he had a new onset 3/10 headache behind the left eye that was throbbing in nature. Headache was relieved with Tylenol. Patient's headache returned several hours later, and was again relieved with Tylenol.   This AM, patient started to complain of blurry vision in his left eye. He states this started suddenly and he describes it as "seeing the veins in his eye." No pain on movement of eye.   Discussed with my senior Dr. Chen and neurology NP, Romeo and ordered a STAT CT head.

## 2018-09-12 NOTE — CONSULT NOTE ADULT - ASSESSMENT
Assessment:   67 y/o male with a hx of Cardiac Transplant x 10 years ago due to MI x 2 and low EF on immunosuppressant, DM dx in his 60's,  HTN, DLD p/w acute confusion x 1 day, admitted for cerebellar bleed, DKA. Head CT on 9/11 shows Lt cerebellar hyperdensity, decreased in size on repeat CT 9/12.  Pt is not on anticoags/antiplatelets. Pt does not show cerebellar focal neurological deficits on physical exam.     Plan:   -MRI to rule out cavernoma/vascular malformation   -BP <160   -Neuro checks q4h 69 y/o male with a hx of Cardiac Transplant x 10 years ago due to MI x 2 and low EF on immunosuppressant, DM dx in his 60's,  HTN, DLD p/w acute confusion x 1 day, admitted for cerebellar bleed, DKA. Head CT on 9/11 shows Lt cerebellar hyperdensity, decreased in size on repeat CT 9/12.  Pt is not on anticoags/antiplatelets. Pt does not show cerebellar focal neurological deficits on physical exam.     Plan:   -MRI Brain w/w/o brennan to rule out cavernoma/vascular malformation   -Keep SBP <160   -Neuro checks q4h   -neurosurgery f/u

## 2018-09-12 NOTE — PROGRESS NOTE ADULT - SUBJECTIVE AND OBJECTIVE BOX
Patient is a 68y old  Male who presents with a chief complaint of Cerebellar bleed, DKA (12 Sep 2018 08:53)      OVER NIGHT EVENTS:  Transferred from [facility] for neurosurgery.   Worsening dizziness in AM;     PHYSICAL EXAM    ICU Vital Signs Last 24 Hrs  T(C): 36.8 (12 Sep 2018 08:00), Max: 36.8 (12 Sep 2018 08:00)  T(F): 98.3 (12 Sep 2018 08:00), Max: 98.3 (12 Sep 2018 08:00)  HR: 88 (12 Sep 2018 08:00) (80 - 90)  BP: 149/103 (12 Sep 2018 08:00) (98/60 - 185/105)  BP(mean): 120 (12 Sep 2018 08:00) (74 - 148)  RR: 23 (12 Sep 2018 08:00) (14 - 34)  SpO2: 99% (12 Sep 2018 08:00) (94% - 100%)    I&O's Detail    11 Sep 2018 07:01  -  12 Sep 2018 07:00  --------------------------------------------------------  IN:    dextrose 5%.: 2375 mL    insulin Infusion: 28 mL    insulin Infusion: 39 mL    insulin Infusion: 28 mL    IV PiggyBack: 200 mL    lactated ringers: 1000 mL    Oral Fluid: 80 mL  Total IN: 3750 mL    OUT:    Indwelling Catheter - Urethral: 815 mL    Voided: 170 mL  Total OUT: 985 mL    Total NET: 2765 mL      12 Sep 2018 07:01  -  12 Sep 2018 09:29  --------------------------------------------------------  IN:  Total IN: 0 mL    OUT:    Indwelling Catheter - Urethral: 70 mL  Total OUT: 70 mL    Total NET: -70 mL      General: Comfortable in bed  HEENT:  On NC; PERRLA  Lymph node: No palpable LN             Lungs: CTA  Cardiovascular: RRR, S1S2  Abdomen: BS+ve; soft non tender  Extremities: No LE edema  Skin:  No evident Rash  Neurological:  AAOx3; No focal deficit      LABS:                          13.7   8.00  )-----------( 108      ( 12 Sep 2018 04:57 )             41.0        141  |  101  |  21<H>  ----------------------------<  103<H>  3.4<L>   |  23  |  1.3    Ca    9.1      12 Sep 2018 04:57  Phos  2.3       Mg     1.9         AG 17    TPro  6.8  /  Alb  4.0  /  TBili  0.7  /  DBili  x   /  AST  19  /  ALT  8   /  AlkPhos  117<H>      PT/INR - ( 10 Sep 2018 19:28 )   PT: 11.00 sec;   INR: 1.02 ratio         PTT - ( 10 Sep 2018 19:28 )  PTT:24.0 sec                                           Urinalysis Basic - ( 10 Sep 2018 19:30 )  Color: Yellow / Appearance: Clear / S.015 / pH: x  Gluc: x / Ketone: 80  / Bili: Negative / Urobili: 0.2   Blood: x / Protein: 100 / Nitrite: Negative   Leuk Esterase: Negative / RBC: 1-2 /HPF / WBC 26-50 /HPF   Sq Epi: x / Non Sq Epi: Occasional /HPF / Bacteria: Few    CARDIAC MARKERS ( 11 Sep 2018 11:57 )  x     / <0.01 ng/mL / x     / x     / x      CARDIAC MARKERS ( 10 Sep 2018 22:20 )  x     / x     / 113 U/L / x     / x      CARDIAC MARKERS ( 10 Sep 2018 19:28 )  x     / <0.01 ng/mL / x     / x     / x        LIVER FUNCTIONS - ( 11 Sep 2018 14:04 )  Alb: 4.0 g/dL / Pro: 6.8 g/dL / ALK PHOS: 117 U/L / ALT: 8 U/L / AST: 19 U/L / GGT: x                        Lactate (09-10-18 @ 19:28): 2.5<H>  Lactate (09-10-18 @ 19:16): 2.4<H>    Culture - Urine (collected 10 Sep 2018 19:30)  Source: .Urine Clean Catch (Midstream)  Preliminary Report (11 Sep 2018 22:53):    50,000 - 99,000 CFU/mL Gram Negative Rods    Culture - Blood (collected 10 Sep 2018 19:25)  Source: .Blood Blood-Peripheral  Preliminary Report (12 Sep 2018 03:01):    No growth to date.    Culture - Blood (collected 10 Sep 2018 19:25)  Source: .Blood Blood-Peripheral  Preliminary Report (12 Sep 2018 03:01):    No growth to date.    MEDICATIONS  (STANDING):  allopurinol 100 milliGRAM(s) Oral two times a day  amLODIPine   Tablet 10 milliGRAM(s) Oral daily  cefepime   IVPB 1000 milliGRAM(s) IV Intermittent every 12 hours  dextrose 5%. 1000 milliLiter(s) (125 mL/Hr) IV Continuous <Continuous>  hydrALAZINE 25 milliGRAM(s) Oral once  insulin Infusion 3 Unit(s)/Hr (3 mL/Hr) IV Continuous <Continuous>  mycophenolate mofetil 1500 milliGRAM(s) Oral two times a day  potassium phosphate / sodium phosphate powder 1 Packet(s) Oral three times a day before meals  pregabalin 100 milliGRAM(s) Oral two times a day  tacrolimus 1 milliGRAM(s) Oral every 12 hours    MEDICATIONS  (PRN):  acetaminophen   Tablet .. 650 milliGRAM(s) Oral every 8 hours PRN Moderate Pain (4 - 6)    Radiology:  < from: CT Head No Cont (18 @ 09:10) >  IMPRESSION:     1.  Stable 0.9 x 0.5 cm hyperdensity in the left cerebellum, now with   minimal surrounding edema may represent avascular malformation such as a   cavernoma which may have recently bled. Correlation with MRI of the brain   with without contrast may be helpful for further evaluation and   confirmation.    2.  Periventricular and subcortical white matter chronic small vessel   ischemic changes.    3.  No acute mass effect midline shift.      < end of copied text >

## 2018-09-12 NOTE — PROGRESS NOTE ADULT - ASSESSMENT
IMPRESSION:  Alter mental status secondary to cerebellar bleed   DKA   WANDY on CKD   UTI   HO cardiac transplant     PLAN:    CNS: FU CT head read; neurosurgery consult; FU with neurology; neuro checks    HEENT: Oral care    PULMONARY: HOB at 30 degrees;     CARDIOVASCULAR: I=O;  D5 NS at 100 cc/hr;     GI: GI prophylaxis.  NPO for now until neurosurgery eval;     RENAL: FU lytes;     INFECTIOUS DISEASE: on cefepime; FU urine culture    HEMATOLOGICAL:  DVT prophylaxis--> SCD    ENDOCRINE:  Follow up FS. Keep Insulin drip for now;    MICU monitoring for now IMPRESSION:  Altered mental status secondary to cerebellar bleed   DKA   WANDY on CKD   UTI   HO cardiac transplant     PLAN:    CNS: FU CT head read; neurosurgery consult; FU with neurology; neuro checks    HEENT: Oral care    PULMONARY: HOB at 30 degrees;     CARDIOVASCULAR: I=O;  D5 NS at 100 cc/hr;     GI: GI prophylaxis.  NPO for now until neurosurgery eval;     RENAL: FU lytes;     INFECTIOUS DISEASE: on cefepime; FU urine culture    HEMATOLOGICAL:  DVT prophylaxis--> SCD    ENDOCRINE:  Follow up FS. Keep Insulin drip for now;    MICU monitoring for now

## 2018-09-13 LAB
-  CEFTAZIDIME: SIGNIFICANT CHANGE UP
-  LEVOFLOXACIN: SIGNIFICANT CHANGE UP
-  MEROPENEM: SIGNIFICANT CHANGE UP
-  TRIMETHOPRIM/SULFAMETHOXAZOLE: SIGNIFICANT CHANGE UP
ALBUMIN SERPL ELPH-MCNC: 3.5 G/DL — SIGNIFICANT CHANGE UP (ref 3.5–5.2)
ALP SERPL-CCNC: 119 U/L — HIGH (ref 30–115)
ALT FLD-CCNC: 7 U/L — SIGNIFICANT CHANGE UP (ref 0–41)
ANION GAP SERPL CALC-SCNC: 17 MMOL/L — HIGH (ref 7–14)
AST SERPL-CCNC: 15 U/L — SIGNIFICANT CHANGE UP (ref 0–41)
BASOPHILS # BLD AUTO: 0.03 K/UL — SIGNIFICANT CHANGE UP (ref 0–0.2)
BASOPHILS NFR BLD AUTO: 0.5 % — SIGNIFICANT CHANGE UP (ref 0–1)
BILIRUB SERPL-MCNC: 0.5 MG/DL — SIGNIFICANT CHANGE UP (ref 0.2–1.2)
BUN SERPL-MCNC: 16 MG/DL — SIGNIFICANT CHANGE UP (ref 10–20)
CALCIUM SERPL-MCNC: 8.8 MG/DL — SIGNIFICANT CHANGE UP (ref 8.5–10.1)
CHLORIDE SERPL-SCNC: 104 MMOL/L — SIGNIFICANT CHANGE UP (ref 98–110)
CO2 SERPL-SCNC: 17 MMOL/L — SIGNIFICANT CHANGE UP (ref 17–32)
CREAT SERPL-MCNC: 1.2 MG/DL — SIGNIFICANT CHANGE UP (ref 0.7–1.5)
CULTURE RESULTS: SIGNIFICANT CHANGE UP
EOSINOPHIL # BLD AUTO: 0.04 K/UL — SIGNIFICANT CHANGE UP (ref 0–0.7)
EOSINOPHIL NFR BLD AUTO: 0.7 % — SIGNIFICANT CHANGE UP (ref 0–8)
GLUCOSE BLDC GLUCOMTR-MCNC: 237 MG/DL — HIGH (ref 70–99)
GLUCOSE BLDC GLUCOMTR-MCNC: 249 MG/DL — HIGH (ref 70–99)
GLUCOSE BLDC GLUCOMTR-MCNC: 261 MG/DL — HIGH (ref 70–99)
GLUCOSE BLDC GLUCOMTR-MCNC: 344 MG/DL — HIGH (ref 70–99)
GLUCOSE BLDC GLUCOMTR-MCNC: 379 MG/DL — HIGH (ref 70–99)
GLUCOSE BLDC GLUCOMTR-MCNC: 392 MG/DL — HIGH (ref 70–99)
GLUCOSE BLDC GLUCOMTR-MCNC: 422 MG/DL — HIGH (ref 70–99)
GLUCOSE BLDC GLUCOMTR-MCNC: 477 MG/DL — CRITICAL HIGH (ref 70–99)
GLUCOSE SERPL-MCNC: 296 MG/DL — HIGH (ref 70–99)
HCT VFR BLD CALC: 42.5 % — SIGNIFICANT CHANGE UP (ref 42–52)
HGB BLD-MCNC: 13.8 G/DL — LOW (ref 14–18)
IMM GRANULOCYTES NFR BLD AUTO: 1.2 % — HIGH (ref 0.1–0.3)
LYMPHOCYTES # BLD AUTO: 0.98 K/UL — LOW (ref 1.2–3.4)
LYMPHOCYTES # BLD AUTO: 16.6 % — LOW (ref 20.5–51.1)
MAGNESIUM SERPL-MCNC: 1.6 MG/DL — LOW (ref 1.8–2.4)
MCHC RBC-ENTMCNC: 26.7 PG — LOW (ref 27–31)
MCHC RBC-ENTMCNC: 32.5 G/DL — SIGNIFICANT CHANGE UP (ref 32–37)
MCV RBC AUTO: 82.2 FL — SIGNIFICANT CHANGE UP (ref 80–94)
METHOD TYPE: SIGNIFICANT CHANGE UP
MONOCYTES # BLD AUTO: 0.47 K/UL — SIGNIFICANT CHANGE UP (ref 0.1–0.6)
MONOCYTES NFR BLD AUTO: 7.9 % — SIGNIFICANT CHANGE UP (ref 1.7–9.3)
NEUTROPHILS # BLD AUTO: 4.33 K/UL — SIGNIFICANT CHANGE UP (ref 1.4–6.5)
NEUTROPHILS NFR BLD AUTO: 73.1 % — SIGNIFICANT CHANGE UP (ref 42.2–75.2)
ORGANISM # SPEC MICROSCOPIC CNT: SIGNIFICANT CHANGE UP
ORGANISM # SPEC MICROSCOPIC CNT: SIGNIFICANT CHANGE UP
PHOSPHATE SERPL-MCNC: 2.5 MG/DL — SIGNIFICANT CHANGE UP (ref 2.1–4.9)
PLATELET # BLD AUTO: 87 K/UL — LOW (ref 130–400)
POTASSIUM SERPL-MCNC: 5.3 MMOL/L — HIGH (ref 3.5–5)
POTASSIUM SERPL-SCNC: 5.3 MMOL/L — HIGH (ref 3.5–5)
PROT SERPL-MCNC: 6.2 G/DL — SIGNIFICANT CHANGE UP (ref 6–8)
RBC # BLD: 5.17 M/UL — SIGNIFICANT CHANGE UP (ref 4.7–6.1)
RBC # FLD: 14.5 % — SIGNIFICANT CHANGE UP (ref 11.5–14.5)
SODIUM SERPL-SCNC: 138 MMOL/L — SIGNIFICANT CHANGE UP (ref 135–146)
SPECIMEN SOURCE: SIGNIFICANT CHANGE UP
WBC # BLD: 5.92 K/UL — SIGNIFICANT CHANGE UP (ref 4.8–10.8)
WBC # FLD AUTO: 5.92 K/UL — SIGNIFICANT CHANGE UP (ref 4.8–10.8)

## 2018-09-13 RX ORDER — HYDRALAZINE HCL 50 MG
25 TABLET ORAL ONCE
Qty: 0 | Refills: 0 | Status: COMPLETED | OUTPATIENT
Start: 2018-09-13 | End: 2018-09-13

## 2018-09-13 RX ORDER — INSULIN LISPRO 100/ML
6 VIAL (ML) SUBCUTANEOUS ONCE
Qty: 0 | Refills: 0 | Status: COMPLETED | OUTPATIENT
Start: 2018-09-13 | End: 2018-09-13

## 2018-09-13 RX ORDER — INSULIN LISPRO 100/ML
12 VIAL (ML) SUBCUTANEOUS ONCE
Qty: 0 | Refills: 0 | Status: COMPLETED | OUTPATIENT
Start: 2018-09-13 | End: 2018-09-13

## 2018-09-13 RX ORDER — INSULIN HUMAN 100 [IU]/ML
7 INJECTION, SOLUTION SUBCUTANEOUS ONCE
Qty: 0 | Refills: 0 | Status: COMPLETED | OUTPATIENT
Start: 2018-09-13 | End: 2018-09-13

## 2018-09-13 RX ORDER — INSULIN GLARGINE 100 [IU]/ML
45 INJECTION, SOLUTION SUBCUTANEOUS AT BEDTIME
Qty: 0 | Refills: 0 | Status: DISCONTINUED | OUTPATIENT
Start: 2018-09-13 | End: 2018-09-14

## 2018-09-13 RX ORDER — INSULIN GLARGINE 100 [IU]/ML
50 INJECTION, SOLUTION SUBCUTANEOUS AT BEDTIME
Qty: 0 | Refills: 0 | Status: DISCONTINUED | OUTPATIENT
Start: 2018-09-13 | End: 2018-09-13

## 2018-09-13 RX ORDER — AMLODIPINE BESYLATE 2.5 MG/1
10 TABLET ORAL DAILY
Qty: 0 | Refills: 0 | Status: DISCONTINUED | OUTPATIENT
Start: 2018-09-13 | End: 2018-09-21

## 2018-09-13 RX ORDER — HYDRALAZINE HCL 50 MG
25 TABLET ORAL ONCE
Qty: 0 | Refills: 0 | Status: DISCONTINUED | OUTPATIENT
Start: 2018-09-13 | End: 2018-09-13

## 2018-09-13 RX ORDER — MAGNESIUM SULFATE 500 MG/ML
2 VIAL (ML) INJECTION ONCE
Qty: 0 | Refills: 0 | Status: COMPLETED | OUTPATIENT
Start: 2018-09-13 | End: 2018-09-13

## 2018-09-13 RX ORDER — INSULIN LISPRO 100/ML
15 VIAL (ML) SUBCUTANEOUS
Qty: 0 | Refills: 0 | Status: DISCONTINUED | OUTPATIENT
Start: 2018-09-13 | End: 2018-09-14

## 2018-09-13 RX ORDER — INSULIN LISPRO 100/ML
8 VIAL (ML) SUBCUTANEOUS
Qty: 0 | Refills: 0 | Status: DISCONTINUED | OUTPATIENT
Start: 2018-09-13 | End: 2018-09-13

## 2018-09-13 RX ORDER — INSULIN GLARGINE 100 [IU]/ML
24 INJECTION, SOLUTION SUBCUTANEOUS AT BEDTIME
Qty: 0 | Refills: 0 | Status: DISCONTINUED | OUTPATIENT
Start: 2018-09-13 | End: 2018-09-13

## 2018-09-13 RX ADMIN — CEFEPIME 100 MILLIGRAM(S): 1 INJECTION, POWDER, FOR SOLUTION INTRAMUSCULAR; INTRAVENOUS at 18:37

## 2018-09-13 RX ADMIN — Medication 100 MILLIGRAM(S): at 18:37

## 2018-09-13 RX ADMIN — Medication 100 MILLIGRAM(S): at 06:18

## 2018-09-13 RX ADMIN — CEFEPIME 100 MILLIGRAM(S): 1 INJECTION, POWDER, FOR SOLUTION INTRAMUSCULAR; INTRAVENOUS at 06:17

## 2018-09-13 RX ADMIN — Medication 6 UNIT(S): at 15:38

## 2018-09-13 RX ADMIN — Medication 30 MILLILITER(S): at 08:56

## 2018-09-13 RX ADMIN — INSULIN HUMAN 7 UNIT(S): 100 INJECTION, SOLUTION SUBCUTANEOUS at 17:02

## 2018-09-13 RX ADMIN — TACROLIMUS 1 MILLIGRAM(S): 5 CAPSULE ORAL at 18:37

## 2018-09-13 RX ADMIN — MYCOPHENOLATE MOFETIL 1500 MILLIGRAM(S): 250 CAPSULE ORAL at 18:41

## 2018-09-13 RX ADMIN — Medication 25 MILLIGRAM(S): at 02:28

## 2018-09-13 RX ADMIN — Medication 12 UNIT(S): at 12:46

## 2018-09-13 RX ADMIN — Medication 25 MILLIGRAM(S): at 05:21

## 2018-09-13 RX ADMIN — Medication 650 MILLIGRAM(S): at 08:34

## 2018-09-13 RX ADMIN — Medication 8 UNIT(S): at 15:38

## 2018-09-13 RX ADMIN — AMLODIPINE BESYLATE 10 MILLIGRAM(S): 2.5 TABLET ORAL at 06:18

## 2018-09-13 RX ADMIN — Medication 50 GRAM(S): at 08:07

## 2018-09-13 RX ADMIN — Medication 25 GRAM(S): at 10:00

## 2018-09-13 RX ADMIN — MYCOPHENOLATE MOFETIL 1500 MILLIGRAM(S): 250 CAPSULE ORAL at 06:19

## 2018-09-13 RX ADMIN — Medication 30 MILLILITER(S): at 18:41

## 2018-09-13 RX ADMIN — TACROLIMUS 1 MILLIGRAM(S): 5 CAPSULE ORAL at 06:17

## 2018-09-13 RX ADMIN — Medication 650 MILLIGRAM(S): at 08:04

## 2018-09-13 NOTE — PROGRESS NOTE ADULT - SUBJECTIVE AND OBJECTIVE BOX
09-13-18 @ 11:10    JULISA CONKLIN  68y  Male  Seen in , sitting OOB to chair. No c/o.     INTERVAL EVENTS: None    MEDICATIONS  (STANDING):  allopurinol 100 milliGRAM(s) Oral two times a day  amLODIPine   Tablet 10 milliGRAM(s) Oral daily  cefepime   IVPB 1000 milliGRAM(s) IV Intermittent every 12 hours  dextrose 5%. 1000 milliLiter(s) (50 mL/Hr) IV Continuous <Continuous>  dextrose 50% Injectable 12.5 Gram(s) IV Push once  dextrose 50% Injectable 25 Gram(s) IV Push once  dextrose 50% Injectable 25 Gram(s) IV Push once  insulin glargine Injectable (LANTUS) 45 Unit(s) SubCutaneous at bedtime  insulin Infusion 3 Unit(s)/Hr (3 mL/Hr) IV Continuous <Continuous>  insulin lispro (HumaLOG) corrective regimen sliding scale   SubCutaneous three times a day before meals  insulin lispro Injectable (HumaLOG) 15 Unit(s) SubCutaneous three times a day before meals  labetalol 100 milliGRAM(s) Oral two times a day  mycophenolate mofetil 1500 milliGRAM(s) Oral two times a day  pregabalin 100 milliGRAM(s) Oral two times a day  tacrolimus 1 milliGRAM(s) Oral every 12 hours    MEDICATIONS  (PRN):  acetaminophen   Tablet .. 650 milliGRAM(s) Oral every 6 hours PRN Temp greater or equal to 38C (100.4F), Moderate Pain (4 - 6)  aluminum hydroxide/magnesium hydroxide/simethicone Suspension 30 milliLiter(s) Oral every 4 hours PRN Dyspepsia  dextrose 40% Gel 15 Gram(s) Oral once PRN Blood Glucose LESS THAN 70 milliGRAM(s)/deciliter  glucagon  Injectable 1 milliGRAM(s) IntraMuscular once PRN Glucose LESS THAN 70 milligrams/deciliter      T(C): 36.9 (09-13-18 @ 08:08), Max: 36.9 (09-13-18 @ 08:08)  HR: 96 (09-13-18 @ 11:08) (82 - 98)  BP: 126/84 (09-13-18 @ 10:08) (114/69 - 164/94)  RR: 26 (09-13-18 @ 11:08) (13 - 33)  SpO2: 98% (09-13-18 @ 11:08) (97% - 100%)  Wt(kg): --Vital Signs Last 24 Hrs  T(C): 36.9 (13 Sep 2018 08:08), Max: 36.9 (13 Sep 2018 08:08)  T(F): 98.4 (13 Sep 2018 08:08), Max: 98.4 (13 Sep 2018 08:08)  HR: 96 (13 Sep 2018 11:08) (82 - 98)  BP: 126/84 (13 Sep 2018 10:08) (114/69 - 164/94)  BP(mean): 101 (13 Sep 2018 10:08) (94 - 132)  RR: 26 (13 Sep 2018 11:08) (13 - 33)  SpO2: 98% (13 Sep 2018 11:08) (97% - 100%)    PHYSICAL EXAM:  GENERAL: NAD, alert, communicates legibly. Feels well.   NECK: supple  CHEST/LUNG: Good AE, no adv sounds. Healed scar  HEART: S1S2, reg  ABDOMEN: obese, benign  EXTREMITIES: stasis changes. Palp pedal pulses                          13.8   5.92  )-----------( 87       ( 13 Sep 2018 04:34 )             42.5     09-13    138  |  104  |  16  ----------------------------<  296<H>  5.3<H>   |  17  |  1.2    Ca    8.8      13 Sep 2018 04:34  Phos  2.5     09-13  Mg     1.6     09-13    TPro  6.2  /  Alb  3.5  /  TBili  0.5  /  DBili  x   /  AST  15  /  ALT  7   /  AlkPhos  119<H>  09-13      RADIOLOGY & ADDITIONAL TESTS:    CT head w/o C :  IMPRESSION:    Since 9/11/2018:    1.  Decrease in size of left cerebellar hyperdensity. Follow-up MRI brain   with and without contrast may be helpful for further evaluation.    2.  No evidence of new acute intracranial hemorrhage or mass effect.    3. Chronic microvascular ischemic changes.      LEOBARDO MEJIA M.D., RESIDENT RADIOLOGIST    Renal Sono :  Impression:    No hydronephrosis.    Left renal peripheral calcifications, largest measuring 1.3 cm.    Prostatomegaly.    Pre void volume of the urinary bladder is 164 cc.  The post void residual   volume is 122 cc.    Urinary bladder 1.6 cm diverticulum.        NIA SIM M.D., ATTENDING RADIOLOGIST  Thisdocument has been electronically signed. Sep 11 2018  1:22PM            ASSESSMENT / PLAN  :    Left cerebellar bleed : Rpt CT smaller. No new lesion. Clinically better. Cont as adv by neuro. REhab as needed. Will avoid any contrast study.   DKA resolved  CKD : known. Cr improving, suggesting prerenal. Cont to observe.   s/p heart transplant : has been stable. He is known to me prior to transplant. Does f/u w/ Dr. Finn.   Hypokalemia : improved now.   HTN : STable, cont rx.   DLD ; Cont statin.   BPH : Sono proved also. Will have voiding trial, since had Rouse.   Ins requiring DM : Cont. ins. as per Endo.

## 2018-09-13 NOTE — PROGRESS NOTE ADULT - SUBJECTIVE AND OBJECTIVE BOX
Patient is a 68y old  Male who presents with a chief complaint of Cerebellar bleed, DKA (12 Sep 2018 16:24)        Over Night Events:  Doing well.  NO HA.  Tolerating PO.  No events overnight          ROS:  See HPI    PHYSICAL EXAM    ICU Vital Signs Last 24 Hrs  T(C): 36.9 (13 Sep 2018 08:08), Max: 36.9 (13 Sep 2018 08:08)  T(F): 98.4 (13 Sep 2018 08:08), Max: 98.4 (13 Sep 2018 08:08)  HR: 96 (13 Sep 2018 08:08) (80 - 96)  BP: 151/94 (13 Sep 2018 08:08) (108/64 - 166/89)  BP(mean): 132 (13 Sep 2018 08:08) (73 - 132)  ABP: --  ABP(mean): --  RR: 33 (13 Sep 2018 08:08) (13 - 33)  SpO2: 99% (13 Sep 2018 08:08) (97% - 100%)      General: In NAD   HEENT: MARYCARMEN             Lymphatic system: No cervical LN   Lungs: Bilateral BS  Cardiovascular: Regular   Gastrointestinal: Soft, Positive BS  Extremities: No clubbing.  Moves extremities.  Full Range of motion   Skin: Warm, intact  Neurological: No motor or sensory deficit       09-12-18 @ 07:01  -  09-13-18 @ 07:00  --------------------------------------------------------  IN:    dextrose 5% + sodium chloride 0.9%: 600 mL    dextrose 5%.: 250 mL    insulin Infusion: 17 mL    IV PiggyBack: 100 mL    Oral Fluid: 240 mL  Total IN: 1207 mL    OUT:    Indwelling Catheter - Urethral: 1460 mL  Total OUT: 1460 mL    Total NET: -253 mL      09-13-18 @ 07:01  -  09-13-18 @ 09:10  --------------------------------------------------------  IN:    Oral Fluid: 240 mL  Total IN: 240 mL    OUT:    Indwelling Catheter - Urethral: 200 mL  Total OUT: 200 mL    Total NET: 40 mL          LABS:                            13.8   5.92  )-----------( 87       ( 13 Sep 2018 04:34 )             42.5                                               09-13    138  |  104  |  16  ----------------------------<  296<H>  5.3<H>   |  17  |  1.2    Ca    8.8      13 Sep 2018 04:34  Phos  2.5     09-13  Mg     1.6     09-13    TPro  6.2  /  Alb  3.5  /  TBili  0.5  /  DBili  x   /  AST  15  /  ALT  7   /  AlkPhos  119<H>  09-13                                                 CARDIAC MARKERS ( 11 Sep 2018 11:57 )  x     / <0.01 ng/mL / x     / x     / x                                                LIVER FUNCTIONS - ( 13 Sep 2018 04:34 )  Alb: 3.5 g/dL / Pro: 6.2 g/dL / ALK PHOS: 119 U/L / ALT: 7 U/L / AST: 15 U/L / GGT: x                                                  Culture - Urine (collected 10 Sep 2018 19:30)  Source: .Urine Clean Catch (Midstream)  Preliminary Report (11 Sep 2018 22:53):    50,000 - 99,000 CFU/mL Gram Negative Rods    Culture - Blood (collected 10 Sep 2018 19:25)  Source: .Blood Blood-Peripheral  Preliminary Report (12 Sep 2018 03:01):    No growth to date.    Culture - Blood (collected 10 Sep 2018 19:25)  Source: .Blood Blood-Peripheral  Preliminary Report (12 Sep 2018 03:01):    No growth to date.                                                                                           MEDICATIONS  (STANDING):  allopurinol 100 milliGRAM(s) Oral two times a day  amLODIPine   Tablet 10 milliGRAM(s) Oral daily  cefepime   IVPB 1000 milliGRAM(s) IV Intermittent every 12 hours  dextrose 5%. 1000 milliLiter(s) (50 mL/Hr) IV Continuous <Continuous>  dextrose 50% Injectable 12.5 Gram(s) IV Push once  dextrose 50% Injectable 25 Gram(s) IV Push once  dextrose 50% Injectable 25 Gram(s) IV Push once  hydrALAZINE 25 milliGRAM(s) Oral once  insulin glargine Injectable (LANTUS) 21 Unit(s) SubCutaneous at bedtime  insulin Infusion 3 Unit(s)/Hr (3 mL/Hr) IV Continuous <Continuous>  insulin Infusion 2 Unit(s)/Hr (2 mL/Hr) IV Continuous <Continuous>  insulin lispro (HumaLOG) corrective regimen sliding scale   SubCutaneous three times a day before meals  insulin lispro Injectable (HumaLOG) 7 Unit(s) SubCutaneous three times a day before meals  labetalol 100 milliGRAM(s) Oral two times a day  mycophenolate mofetil 1500 milliGRAM(s) Oral two times a day  pregabalin 100 milliGRAM(s) Oral two times a day  tacrolimus 1 milliGRAM(s) Oral every 12 hours    MEDICATIONS  (PRN):  acetaminophen   Tablet .. 650 milliGRAM(s) Oral every 6 hours PRN Temp greater or equal to 38C (100.4F), Moderate Pain (4 - 6)  aluminum hydroxide/magnesium hydroxide/simethicone Suspension 30 milliLiter(s) Oral every 4 hours PRN Dyspepsia  dextrose 40% Gel 15 Gram(s) Oral once PRN Blood Glucose LESS THAN 70 milliGRAM(s)/deciliter  glucagon  Injectable 1 milliGRAM(s) IntraMuscular once PRN Glucose LESS THAN 70 milligrams/deciliter      Xrays:                    No Infiltrate                                                                  ECHO

## 2018-09-13 NOTE — PROGRESS NOTE ADULT - SUBJECTIVE AND OBJECTIVE BOX
RUBIN JULISA    HPI:  68 year old male with a hx of Cardiac Transplant x 10 years ago due to MI x 2 and low EF on immunosuppressant, DM,HTN, DLD p/w acute confusion x 1 day. During workup for confusion a CTH was performed which revealed a subcentimeter hyperdensity in the left cerebellar area. On radiology report it is suggested there may be surrounding edema suggestive of possible underlying vascular malformation. He is currently cared for in the ICU with a mild posterior headache but no nuchal rigidity, photo/phonophobia or nausea/vomiting. Today he is awake and oriented and denies neurological complaints.    Urinary tract infection without hematuria, site unspecified  Chronic kidney disease, unspecified CKD stage  Bilateral hearing loss, unspecified hearing loss type  Benign prostatic hyperplasia with urinary frequency  Diabetes  High cholesterol  HTN (hypertension)  Heart transplant recipient      Tests:  < from: CT Head No Cont (09.12.18 @ 07:32) >  Since 9/11/2018:    1.  Decrease in size of left cerebellar hyperdensity. Follow-up MRI brain   with and without contrast may be helpful for further evaluation.    2.  No evidence of new acute intracranial hemorrhage or mass effect.    3. Chronic microvascular ischemic changes.      Home Medications:  allopurinol 100 mg oral tablet: 1 tab(s) orally 2 times a day (11 Sep 2018 02:58)  amLODIPine 5 mg oral tablet: 1 tab(s) orally once a day (11 Sep 2018 02:58)  aspirin 81 mg oral tablet: 1 tab(s) orally once a day (11 Sep 2018 02:58)  doxazosin 8 mg oral tablet: 1 tab(s) orally once a day (11 Sep 2018 02:58)  furosemide 40 mg oral tablet: 1 tab(s) orally once a day (11 Sep 2018 02:58)  ketorolac 0.5% ophthalmic solution: 1 drop(s) to each affected eye 4 times a day (11 Sep 2018 02:58)  Lantus 100 units/mL subcutaneous solution:  (11 Sep 2018 02:58)  Lyrica 100 mg oral capsule: 1 cap(s) orally 2 times a day (11 Sep 2018 02:58)  metaxalone 800 mg oral tablet: 1 tab(s) orally 3 times a day (11 Sep 2018 02:58)  mycophenolate mofetil 500 mg oral tablet:  (11 Sep 2018 02:58)  niacin 500 mg oral tablet:  (11 Sep 2018 02:58)  NovoLOG Mix 70/30 subcutaneous suspension:  (11 Sep 2018 02:58)  Oysco 500 with D:  (11 Sep 2018 02:58)  pantoprazole 40 mg oral delayed release tablet: 1 tab(s) orally once a day (11 Sep 2018 02:58)  potassium chloride 10 mEq oral capsule, extended release: 1 cap(s) orally 2 times a day (11 Sep 2018 02:58)  pravastatin 40 mg oral tablet: 1 tab(s) orally once a day (11 Sep 2018 02:58)  quiNINE 324 mg oral capsule: 2 cap(s) orally every 8 hours (11 Sep 2018 02:58)  spironolactone 25 mg oral tablet: 1 tab(s) orally once a day (11 Sep 2018 02:58)  tacrolimus 1 mg oral capsule: cap(s) orally every 12 hours (11 Sep 2018 02:58)  terbinafine 250 mg oral tablet: 1 tab(s) orally once a day (11 Sep 2018 02:58)      MEDICATIONS  (STANDING):  allopurinol 100 milliGRAM(s) Oral two times a day  amLODIPine   Tablet 10 milliGRAM(s) Oral daily  cefepime   IVPB 1000 milliGRAM(s) IV Intermittent every 12 hours  dextrose 5%. 1000 milliLiter(s) (50 mL/Hr) IV Continuous <Continuous>  dextrose 50% Injectable 12.5 Gram(s) IV Push once  dextrose 50% Injectable 25 Gram(s) IV Push once  dextrose 50% Injectable 25 Gram(s) IV Push once  insulin glargine Injectable (LANTUS) 24 Unit(s) SubCutaneous at bedtime  insulin Infusion 3 Unit(s)/Hr (3 mL/Hr) IV Continuous <Continuous>  insulin lispro (HumaLOG) corrective regimen sliding scale   SubCutaneous three times a day before meals  insulin lispro Injectable (HumaLOG) 8 Unit(s) SubCutaneous three times a day before meals  labetalol 100 milliGRAM(s) Oral two times a day  magnesium sulfate  IVPB 2 Gram(s) IV Intermittent once  mycophenolate mofetil 1500 milliGRAM(s) Oral two times a day  pregabalin 100 milliGRAM(s) Oral two times a day  tacrolimus 1 milliGRAM(s) Oral every 12 hours      PT/OT/Speech/Rehab/S&Swr: pending    Exam:    Vital Signs Last 24 Hrs  T(C): 36.9 (13 Sep 2018 08:08), Max: 36.9 (13 Sep 2018 08:08)  T(F): 98.4 (13 Sep 2018 08:08), Max: 98.4 (13 Sep 2018 08:08)  HR: 96 (13 Sep 2018 08:08) (80 - 96)  BP: 151/94 (13 Sep 2018 08:08) (108/64 - 166/89)  BP(mean): 132 (13 Sep 2018 08:08) (73 - 132)  RR: 33 (13 Sep 2018 08:08) (13 - 33)  SpO2: 99% (13 Sep 2018 08:08) (97% - 100%)    NIHSS      Awake alert oriented to self place time.  PEARLA EOMI no gaze no visual cut.  No aphasia no dysarthria no facial palsy  No sensory deficit, no neglect  No drift of bilateral upper and lower extremities.    NIHSS:0    GCS:  15  ICHs:   0    Impression:  68 year old male with a hx of Cardiac Transplant x 10 years ago due to MI x 2 and low EF on immunosuppressant, DM,HTN, DLD p/w acute confusion x 1 day. Amongs his confusion work up CTH was performed which revealed a subcentimeter hyperdensity in the left cerebellar area. On radiology report it is suggested there may be surrounding edema suggestive of possible underlying vascular malformation. He is currently cared for in the ICU with resolution of previous mild posterior headache and without nuchal rigidity, photo/phonophobia or nausea/vomitting. He is waiting for MRI brain and cerebrovascular studies.      Suggestions:  CTA head and neck. or MRA head and neck with contrast.  Brain MRI w/ and w/o contrast.  Avoid hypertension.  Management as per MICU.     We spent more than 45 minutes to review the chart, gather necessary information, evaluate the patient and discuss the case with primary team and counseling the patient and his family to their satisfaction. Total visit time more than 60min.  Maritza De Los Santos MD.  [phone]

## 2018-09-13 NOTE — PROGRESS NOTE ADULT - SUBJECTIVE AND OBJECTIVE BOX
SUBJECTIVE:    Patient is a 68y old Male who presents with a chief complaint of Cerebellar bleed, DKA (13 Sep 2018 10:13)    Currently admitted to medicine with the primary diagnosis of DKA (diabetic ketoacidoses)     Today is hospital day 2d. Still complains of headache and intermittent blurry vision.     PAST MEDICAL & SURGICAL HISTORY  Urinary tract infection without hematuria, site unspecified  Chronic kidney disease, unspecified CKD stage  Bilateral hearing loss, unspecified hearing loss type  Benign prostatic hyperplasia with urinary frequency  Diabetes  High cholesterol  HTN (hypertension)  Heart transplant recipient  H/O heart transplant    SOCIAL HISTORY:  Negative for smoking/alcohol/drug use.     ALLERGIES:  codeine (Unknown)    MEDICATIONS:  STANDING MEDICATIONS  allopurinol 100 milliGRAM(s) Oral two times a day  amLODIPine   Tablet 10 milliGRAM(s) Oral daily  cefepime   IVPB 1000 milliGRAM(s) IV Intermittent every 12 hours  dextrose 5%. 1000 milliLiter(s) IV Continuous <Continuous>  dextrose 50% Injectable 12.5 Gram(s) IV Push once  dextrose 50% Injectable 25 Gram(s) IV Push once  dextrose 50% Injectable 25 Gram(s) IV Push once  insulin glargine Injectable (LANTUS) 24 Unit(s) SubCutaneous at bedtime  insulin Infusion 3 Unit(s)/Hr IV Continuous <Continuous>  insulin lispro (HumaLOG) corrective regimen sliding scale   SubCutaneous three times a day before meals  insulin lispro Injectable (HumaLOG) 8 Unit(s) SubCutaneous three times a day before meals  labetalol 100 milliGRAM(s) Oral two times a day  magnesium sulfate  IVPB 2 Gram(s) IV Intermittent once  mycophenolate mofetil 1500 milliGRAM(s) Oral two times a day  pregabalin 100 milliGRAM(s) Oral two times a day  tacrolimus 1 milliGRAM(s) Oral every 12 hours    PRN MEDICATIONS  acetaminophen   Tablet .. 650 milliGRAM(s) Oral every 6 hours PRN  aluminum hydroxide/magnesium hydroxide/simethicone Suspension 30 milliLiter(s) Oral every 4 hours PRN  dextrose 40% Gel 15 Gram(s) Oral once PRN  glucagon  Injectable 1 milliGRAM(s) IntraMuscular once PRN    VITALS:   T(F): 98.4  HR: 96  BP: 126/84  RR: 26  SpO2: 98%    LABS:                        13.8   5.92  )-----------( 87       ( 13 Sep 2018 04:34 )             42.5     09-13    138  |  104  |  16  ----------------------------<  296<H>  5.3<H>   |  17  |  1.2    Ca    8.8      13 Sep 2018 04:34  Phos  2.5     09-13  Mg     1.6     09-13    TPro  6.2  /  Alb  3.5  /  TBili  0.5  /  DBili  x   /  AST  15  /  ALT  7   /  AlkPhos  119<H>  09-13              Culture - Urine (collected 10 Sep 2018 19:30)  Source: .Urine Clean Catch (Midstream)  Preliminary Report (11 Sep 2018 22:53):    50,000 - 99,000 CFU/mL Gram Negative Rods    Culture - Blood (collected 10 Sep 2018 19:25)  Source: .Blood Blood-Peripheral  Preliminary Report (12 Sep 2018 03:01):    No growth to date.    Culture - Blood (collected 10 Sep 2018 19:25)  Source: .Blood Blood-Peripheral  Preliminary Report (12 Sep 2018 03:01):    No growth to date.          RADIOLOGY:    EXAM:  CT BRAIN            PROCEDURE DATE:  09/12/2018            INTERPRETATION:  Clinical History / Reason for exam: Stroke. New onset   headache and changes in vision.    TECHNIQUE: Contiguous axial CT images were obtained from the base of the   skull to the vertex without administration of intravenous contrast.   Coronal and sagittal reformatted images were constructed.    COMPARISON:CT head 9/11/2018, 2/10/2015. MRI brain 2/10/2015.      FINDINGS:    There is mild prominence of the ventricles and sulci consistent with   cortical volume loss.     Decrease in size of left cerebellar hyperdensity now measuring 5 x 4 mm   (series 3; image #9). A follow-up MRI of the brain with and without   contrast is recommended for further evaluation.    Gray-white matter differentiation is grossly well preserved. No evidence   of acute intracranial hemorrhage, mass effect or midline shift.     Moderate patchy nonspecific white matter hypodensities consistent with   chronic microvascular ischemic changes.     Carotid and vertebral artery atherosclerotic calcifications.    No depressed skull fracture. The visualized paranasal sinuses and   mastoids are well aerated.    Beam hardening artifact is noted overlying the brain stem and posterior   fossa which is inherent to CT in this location.      IMPRESSION:    Since 9/11/2018:    1.  Decrease in size of left cerebellar hyperdensity. Follow-up MRI brain   with and without contrast may be helpful for further evaluation.    2.  No evidence of new acute intracranial hemorrhage or mass effect.    3. Chronic microvascular ischemic changes.        EXAM:  US KIDNEYS AND BLADDER            PROCEDURE DATE:  09/11/2018            INTERPRETATION:  Clinical History / Reason for exam: Acute kidney injury.    Technique: A retroperitoneal ultrasound was performed.    Comparison: 4/28/17.    Findings:    The right kidney measures 11.5 x 6.8 x 7.0 cm (length by transverse by AP   diameters).  The left kidney measures 9.5 x 6.1 x 5.8 cm.      Right mid pole cyst measures 2.4 x 2.0 x 2.1 cm.    Left renal peripheral calcifications, largest measuring 1.3 cm.    There is no solid renal mass, hydronephrosis, or perinephric fluid   collection.    There is no urinary bladder wall thickening or mass. Right ureteral jet   is visualized. Nonspecific nonvisualization of left ureteral jet. Urinary   bladder 1.6 cm diverticulum.    Pre void volume of the urinary bladder is 164 cc.  The post void residual   volume is 122 cc.    Prostate measures 7.2 x 5.6 x 6.0 cm, volume 127 cc.    Impression:    No hydronephrosis.    Left renal peripheral calcifications, largest measuring 1.3 cm.    Prostatomegaly.    Pre void volume of the urinary bladder is 164 cc.  The post void residual   volume is 122 cc.    Urinary bladder 1.6 cm diverticulum.                  NIA SIM M.D., ATTENDING RADIOLOGIST  Thisdocument has been electronically signed. Sep 11 2018  1:22PM          EXAM:  XR CHEST PORTABLE ROUTINE 1V            PROCEDURE DATE:  09/13/2018            INTERPRETATION:  Clinical History / Reason for exam: Cough    Comparison : Chest radiograph 9/11/2018.    Technique/Positioning: Single image.    Findings:    Support devices: None.    Cardiac/mediastinum/hilum: Indeterminate    Lung parenchyma/Pleura: Within normal limits.    Skeleton/soft tissues: Unremarkable.    Impression:      No radiographic evidence of acute cardiopulmonary disease.                      POLLY MUHAMMAD M.D., ATTENDING RADIOLOGIST  This document has been electronically signed. Sep 13 2018  9:24AM                    PHYSICAL EXAM:  GEN: No acute distress  LUNGS: Clear to auscultation bilaterally   HEART: S1/S2 present. RRR.   ABD: Soft, non-tender, non-distended. Bowel sounds present  EXT: NC/NC/NE/2+PP/BAEZA/Skin Intact.   NEURO: AAOX3; no focal neuro deficits    Intravenous access:   NG tube:   [place] cathter: Indwelling Urethral Catheter:     Connect To:  Straight Drainage/[place]    Indication:  Urinary Retention / Obstruction (09-11-18 @ 09:12) (not performed) [active]  Indwelling Urethral Catheter:     Connect To:  Straight Drainage/[place]    Indication:  Urinary Retention / Obstruction (09-11-18 @ 17:10) (not performed) [active]  Indwelling Urethral Catheter:     Connect To:  Straight Drainage/[place]    Indication:  Urine Output Monitoring in Critically Ill (09-12-18 @ 12:25) (not performed) [active]

## 2018-09-13 NOTE — CHART NOTE - NSCHARTNOTEFT_GEN_A_CORE
67 y/o male with a hx of Cardiac Transplant x 10 years ago (done at [facility]) due to MI x 2 and low EF on immunosuppressant , DM dx in his 60's,  HTN, DLD p/w acute confusion x 1 day.  Patient was well on 9/10/2018 am when his wife left him home at 7 am and gave him his breakfast. She called him from work later in the afternoon but no answer. Around 5:30 in the evening when she came home , she found him very confused and he was trying to catch things that were not there. No visible signs of trauma. Family called EMS.   As per pt, he was afebrile at home, no cough/fevers/chills. He states he is in the hospital for DKA and he has had it before. He denied any acute complaints at this time.  Upon presentation the ED he was hypertensive 173/115 s/p labetalol IV x 2, Glucose >600 with positive serum and urine ketones. CT head revealed 0.9 x 0.5 cm hyperdensity in the left cerebellum which may represent a cavernoma with recent bleed.     Patient was admitted to MICU for DKA and intracranial bleed. DKA was managed with insulin drip and patient is now transitioned to subq insulin. NSGY does not recommend surgical intervention. Neurology recommends CTA head and neck w/ C and MRI w/w/o C. Due to patient's low GFR (60), CTA 67 y/o male with a hx of Cardiac Transplant x 10 years ago (done at [facility]) due to MI x 2 and low EF on immunosuppressant , DM dx in his 60's,  HTN, DLD p/w acute confusion x 1 day.  Patient was well on 9/10/2018 am when his wife left him home at 7 am and gave him his breakfast. She called him from work later in the afternoon but no answer. Around 5:30 in the evening when she came home , she found him very confused and he was trying to catch things that were not there. No visible signs of trauma. Family called EMS.   As per pt, he was afebrile at home, no cough/fevers/chills. He states he is in the hospital for DKA and he has had it before. He denied any acute complaints at this time.  Upon presentation the ED he was hypertensive 173/115 s/p labetalol IV x 2, Glucose >600 with positive serum and urine ketones. CT head revealed 0.9 x 0.5 cm hyperdensity in the left cerebellum which may represent a cavernoma with recent bleed. U/A was positive for gram neg rods (follow up sensitivities ). Patient is on cefepime 1g BID.     Patient was admitted to MICU for DKA and intracranial bleed. DKA was managed with insulin drip and patient is now transitioned to subq insulin. NSGY does not recommend surgical intervention. Neurology recommends CTA head and neck w/ C and MRI w/w/o C. Due to patient's low GFR , CTA can be done with kidney fcn improves.      Plan:    #Left cerebellar hemorrhage  - measuring 0.9 x 0.4 cm and rpt CTH shows  0.9 x 0.5 cm hyperdensity in the left cerebellum which may represent a cavernoma with recent bleed.   - repeat CT head shows decrease in size of bleed  -MRI w/wo C - f/u  - can obtain CTA when kidney function improves  - f/u with neuro  - Maintain SBP <160  -NSGY does not want to intervene  - Neuro checks Q4h  -admit to stroke unit.  -PT eval    # DKA - resolving  - AG at 17 this morning  -subq insulin lantus 21 units    #UTI  -+ u/a  -f/u urine cx sensitivities   -c/w cefepime 1gBID    # WANDY on CKD likely prerenal   -resolved    # HTN  - maintain SBP<160  -c/w labetalol 100 BID      # hx of cardiac transplant  - c/w immunosuppressants  - Prograf level - f/u    DVT ppx: SCD    Diet: mechanical soft dysphagia 2     Full code   Activity: OOB to chair 67 y/o male with a hx of Cardiac Transplant x 10 years ago (done at [facility]) due to MI x 2 and low EF on immunosuppressant , DM dx in his 60's,  HTN, DLD p/w acute confusion x 1 day.  Patient was well on 9/10/2018 am when his wife left him home at 7 am and gave him his breakfast. She called him from work later in the afternoon but no answer. Around 5:30 in the evening when she came home , she found him very confused and he was trying to catch things that were not there. No visible signs of trauma. Family called EMS.   As per pt, he was afebrile at home, no cough/fevers/chills. He states he is in the hospital for DKA and he has had it before. He denied any acute complaints at this time.  Upon presentation the ED he was hypertensive 173/115 s/p labetalol IV x 2, Glucose >600 with positive serum and urine ketones. CT head revealed 0.9 x 0.5 cm hyperdensity in the left cerebellum which may represent a cavernoma with recent bleed. U/A was positive for gram neg rods (follow up sensitivities ). Patient is on cefepime 1g BID.     Patient was admitted to MICU for DKA and intracranial bleed. DKA was managed with insulin drip and patient is now transitioned to subq insulin. NSGY does not recommend surgical intervention. Neurology recommends CTA head and neck w/ C and MRI w/w/o C. Due to patient's low GFR , CTA can be done with kidney fcn improves. No focal neurological deficits.       Plan:    #Left cerebellar hemorrhage  - measuring 0.9 x 0.4 cm and rpt CTH shows  0.9 x 0.5 cm hyperdensity in the left cerebellum which may represent a cavernoma with recent bleed.   - repeat CT head shows decrease in size of bleed  -MRI w/wo C - f/u  - can obtain CTA when kidney function improves  - f/u with neuro  - Maintain SBP <160  -NSGY does not want to intervene  - Neuro checks Q4h  -admit to stroke unit.  -PT eval    # DKA - resolving  - AG at 17 this morning  -subq insulin lantus 21 units    #UTI  -+ u/a  -f/u urine cx sensitivities   -c/w cefepime 1gBID    # WANDY on CKD likely prerenal   -resolved    # HTN  - maintain SBP<160  -c/w labetalol 100 BID      # hx of cardiac transplant  - c/w immunosuppressants  - Prograf level - f/u    DVT ppx: SCD    Diet: mechanical soft dysphagia 2     Full code   Activity: OOB to chair 69 y/o male with a hx of Cardiac Transplant x 10 years ago (done at [facility]) due to MI x 2 and low EF on immunosuppressant , DM dx in his 60's,  HTN, DLD p/w acute confusion x 1 day.  Patient was well on 9/10/2018 am when his wife left him home at 7 am and gave him his breakfast. She called him from work later in the afternoon but no answer. Around 5:30 in the evening when she came home , she found him very confused and he was trying to catch things that were not there. No visible signs of trauma. Family called EMS.   As per pt, he was afebrile at home, no cough/fevers/chills. He states he is in the hospital for DKA and he has had it before. He denied any acute complaints at this time.  Upon presentation the ED he was hypertensive 173/115 s/p labetalol IV x 2, Glucose >600 with positive serum and urine ketones. CT head revealed 0.9 x 0.5 cm hyperdensity in the left cerebellum which may represent a cavernoma with recent bleed. U/A was positive for gram neg rods (follow up sensitivities ). Patient is on cefepime 1g BID.     Patient was admitted to MICU for DKA and intracranial bleed. DKA was managed with insulin drip and patient is now transitioned to subq insulin. NSGY does not recommend surgical intervention. Neurology recommends CTA head and neck w/ C and MRI w/w/o C. Due to patient's low GFR , CTA can be done with kidney fcn improves. No focal neurological deficits.       Plan:    #Left cerebellar hemorrhage  - measuring 0.9 x 0.4 cm and rpt CTH shows  0.9 x 0.5 cm hyperdensity in the left cerebellum which may represent a cavernoma with recent bleed.   - repeat CT head shows decrease in size of bleed  -MRI w/wo C - f/u  - can obtain CTA when kidney function improves  - f/u with neuro  - Maintain SBP <160  -NSGY does not want to intervene  - Neuro checks Q4h  -admit to stroke unit.  -PT eval    # DKA - resolving  - AG at 17 this morning  -subq insulin lantus 21 units    #UTI  -+ u/a  -f/u urine cx sensitivities   -c/w cefepime 1gBID    # WANDY on CKD likely prerenal   -resolved    # HTN  - maintain SBP<160  -c/w labetalol 100 BID      # hx of cardiac transplant  - c/w immunosuppressants  - Prograf level - f/u  - may call [facility] to update patient's status    DVT ppx: SCD    Diet: mechanical soft dysphagia 2     Full code   Activity: OOB to chair 67 y/o male with a hx of Cardiac Transplant x 10 years ago (done at [facility] [facility]) due to MI x 2 and low EF on immunosuppressant , DM dx in his 60's,  HTN, DLD p/w acute confusion x 1 day.  Patient was well on 9/10/2018 am when his wife left him home at 7 am and gave him his breakfast. She called him from work later in the afternoon but no answer. Around 5:30 in the evening when she came home , she found him very confused and he was trying to catch things that were not there. No visible signs of trauma. Family called EMS.   As per pt, he was afebrile at home, no cough/fevers/chills.     Upon presentation the ED he was hypertensive 173/115 s/p labetalol IV x 2, Glucose >600 with positive serum and urine ketones. CT head revealed 0.9 x 0.5 cm hyperdensity in the left cerebellum which may represent a cavernoma with recent bleed. U/A was positive for gram neg rods (follow up sensitivities ). Patient is on cefepime 1g BID.     Patient was admitted to MICU for DKA and intracranial bleed. DKA was managed with insulin drip and patient is now transitioned to subq insulin. NSGY does not recommend surgical intervention. Neurology recommends CTA head and neck w/ C and MRI w/w/o C. Due to patient's low GFR , CTA can be done with kidney fcn improves. No focal neurological deficits.       Plan:    #Left cerebellar hemorrhage  - measuring 0.9 x 0.4 cm and rpt CTH shows  0.9 x 0.5 cm hyperdensity in the left cerebellum which may represent a cavernoma with recent bleed.   - repeat CT head shows decrease in size of bleed  -MRI w/wo C - f/u  - can obtain CTA when kidney function improves  - f/u with neuro  - Maintain SBP <160  -NSGY does not want to intervene  - Neuro checks Q4h  -admit to stroke unit.  -PT eval    # DKA - resolving  - AG at 17 this morning  -subq insulin lantus 45 units; humalog - 15u  -monitor AG    #UTI  -+ u/a  -f/u urine cx sensitivities   -c/w cefepime 1gBID    # WANDY on CKD likely prerenal   -resolved    # HTN  - maintain SBP<160  -c/w labetalol 100 BID      # hx of cardiac transplant  - c/w immunosuppressants  - Prograf level - f/u  - may call [facility] to update patient's status    DVT ppx: SCD    Diet: mechanical soft dysphagia 2     Full code   Activity: OOB to chair

## 2018-09-13 NOTE — CONSULT NOTE ADULT - ASSESSMENT

## 2018-09-13 NOTE — PROGRESS NOTE ADULT - ASSESSMENT
IMPRESSION:    Small Cerebellar bleed improving in size   DKA improved   WANDY improving  UTI G- rods   HO cardiac transplant     PLAN:    CNS:  FU with neurology; neuro checks    HEENT: Oral care    PULMONARY: HOB at 30 degrees;     CARDIOVASCULAR: I=O;       GI: GI prophylaxis.  Feeding per speech and swallow     RENAL: FU lytes;     INFECTIOUS DISEASE: on cefepime; FU urine culture    HEMATOLOGICAL:  DVT prophylaxis--> SCD    ENDOCRINE:  Follow up FS.     OOB to chair with fall precaution     transfer top stoke Unit

## 2018-09-13 NOTE — CONSULT NOTE ADULT - SUBJECTIVE AND OBJECTIVE BOX
HPI:  69 y/o male with a hx of Cardiac Transplant x 10 years ago due to MI x 2 and low EF on immunosuppressant , DM dx in his 60's,  HTN, DLD p/w acute confusion x 1 day    History obtained from ED note overnight. Patient was well on 9/10/2018 am when his wife left him home at 7 am and gave him his breakfast. She called him from work later in the afternoon but no answer. Around 5:30 in the evening when she came home , she found him very confused and he was trying to catch things that were not there. No visible signs of trauma. Family called EMS.     As per pt, he was afebrile at home, no cough/fevers/chills. He states he is in the hospital for DKA and he has had it before. He denied any acute complaints at this time     Upon presentation the ED he was hypertensive 173/115 s/p labetolol IV x 2, Glucose >600 with positive serum and urine ketones (11 Sep 2018 07:32)      PAST MEDICAL & SURGICAL HISTORY:  Urinary tract infection without hematuria, site unspecified  Chronic kidney disease, unspecified CKD stage  Bilateral hearing loss, unspecified hearing loss type  Benign prostatic hyperplasia with urinary frequency  Diabetes  High cholesterol  HTN (hypertension)  Heart transplant recipient  H/O heart transplant      Hospital Course:    TODAY'S SUBJECTIVE & REVIEW OF SYMPTOMS:     Constitutional WNL   Cardio WNL   Resp WNL   GI WNL  Heme WNL  Endo WNL  Skin WNL  MSK weakness  Neuro WNL  Cognitive WNL  Psych WNL      MEDICATIONS  (STANDING):  allopurinol 100 milliGRAM(s) Oral two times a day  amLODIPine   Tablet 10 milliGRAM(s) Oral daily  cefepime   IVPB 1000 milliGRAM(s) IV Intermittent every 12 hours  dextrose 5%. 1000 milliLiter(s) (50 mL/Hr) IV Continuous <Continuous>  dextrose 50% Injectable 12.5 Gram(s) IV Push once  dextrose 50% Injectable 25 Gram(s) IV Push once  dextrose 50% Injectable 25 Gram(s) IV Push once  insulin glargine Injectable (LANTUS) 24 Unit(s) SubCutaneous at bedtime  insulin Infusion 3 Unit(s)/Hr (3 mL/Hr) IV Continuous <Continuous>  insulin lispro (HumaLOG) corrective regimen sliding scale   SubCutaneous three times a day before meals  insulin lispro Injectable (HumaLOG) 8 Unit(s) SubCutaneous three times a day before meals  labetalol 100 milliGRAM(s) Oral two times a day  mycophenolate mofetil 1500 milliGRAM(s) Oral two times a day  pregabalin 100 milliGRAM(s) Oral two times a day  tacrolimus 1 milliGRAM(s) Oral every 12 hours    MEDICATIONS  (PRN):  acetaminophen   Tablet .. 650 milliGRAM(s) Oral every 6 hours PRN Temp greater or equal to 38C (100.4F), Moderate Pain (4 - 6)  aluminum hydroxide/magnesium hydroxide/simethicone Suspension 30 milliLiter(s) Oral every 4 hours PRN Dyspepsia  dextrose 40% Gel 15 Gram(s) Oral once PRN Blood Glucose LESS THAN 70 milliGRAM(s)/deciliter  glucagon  Injectable 1 milliGRAM(s) IntraMuscular once PRN Glucose LESS THAN 70 milligrams/deciliter      FAMILY HISTORY:  No pertinent family history in first degree relatives      Allergies    codeine (Unknown)    Intolerances        SOCIAL HISTORY:    [  ] Etoh  [  ] Smoking  [  ] Substance abuse     Home Environment:  [  ] Home Alone  [x  ] Lives with Family  [  ] Home Health Aid    Dwelling:  [  ] Apartment  [x  ] Private House  [  ] Adult Home  [  ] Skilled Nursing Facility      [  ] Short Term  [  ] Long Term  [x  ] Stairs       Elevator [  ]    FUNCTIONAL STATUS PTA: (Check all that apply)  Ambulation: [ x  ]Independent    [  ] Dependent     [  ] Non-Ambulatory  Assistive Device: [  ] SA Cane  [  ]  Q Cane  [  ] Walker  [  ]  Wheelchair  ADL : [x  ] Independent  [  ]  Dependent       Vital Signs Last 24 Hrs  T(C): 36.9 (13 Sep 2018 08:08), Max: 36.9 (13 Sep 2018 08:08)  T(F): 98.4 (13 Sep 2018 08:08), Max: 98.4 (13 Sep 2018 08:08)  HR: 96 (13 Sep 2018 11:08) (82 - 98)  BP: 126/84 (13 Sep 2018 10:08) (108/64 - 164/94)  BP(mean): 101 (13 Sep 2018 10:08) (73 - 132)  RR: 26 (13 Sep 2018 11:08) (13 - 33)  SpO2: 98% (13 Sep 2018 11:08) (97% - 100%)      PHYSICAL EXAM: Alert & Oriented X3  GENERAL: NAD, well-groomed, well-developed  HEAD:  Atraumatic, Normocephalic  CHEST/LUNG: Clear   HEART: S1S2+  ABDOMEN: Soft, Nontender  EXTREMITIES:  no calf tenderness    NERVOUS SYSTEM:  Cranial Nerves 2-12 intact [  ] Abnormal  [  ]  ROM: WFL all extremities [ x ]  Abnormal [  ]  Motor Strength: WFL all extremities  [ x ]  Abnormal [  ]  Sensation: intact to light touch [  ] Abnormal [  ]  Reflexes: Symmetric [  ]  Abnormal [  ]    FUNCTIONAL STATUS:  Bed Mobility: Independent [  ]  Supervision [  ]  Needs Assistance [x  ]  N/A [  ]  Transfers: Independent [  ]  Supervision [  ]  Needs Assistance [ x ]  N/A [  ]   Ambulation: Independent [  ]  Supervision [  ]  Needs Assistance [  ]  N/A [  ]  ADL: Independent [  ] Requires Assistance [  ] N/A [  ]      LABS:                        13.8   5.92  )-----------( 87       ( 13 Sep 2018 04:34 )             42.5     09-13    138  |  104  |  16  ----------------------------<  296<H>  5.3<H>   |  17  |  1.2    Ca    8.8      13 Sep 2018 04:34  Phos  2.5     09-13  Mg     1.6     09-13    TPro  6.2  /  Alb  3.5  /  TBili  0.5  /  DBili  x   /  AST  15  /  ALT  7   /  AlkPhos  119<H>  09-13          RADIOLOGY & ADDITIONAL STUDIES:    Assesment:

## 2018-09-14 LAB
ALBUMIN SERPL ELPH-MCNC: 4.1 G/DL — SIGNIFICANT CHANGE UP (ref 3.5–5.2)
ALP SERPL-CCNC: 136 U/L — HIGH (ref 30–115)
ALT FLD-CCNC: 7 U/L — SIGNIFICANT CHANGE UP (ref 0–41)
ANION GAP SERPL CALC-SCNC: 21 MMOL/L — HIGH (ref 7–14)
AST SERPL-CCNC: 13 U/L — SIGNIFICANT CHANGE UP (ref 0–41)
BASOPHILS # BLD AUTO: 0.03 K/UL — SIGNIFICANT CHANGE UP (ref 0–0.2)
BASOPHILS NFR BLD AUTO: 0.5 % — SIGNIFICANT CHANGE UP (ref 0–1)
BILIRUB SERPL-MCNC: 0.7 MG/DL — SIGNIFICANT CHANGE UP (ref 0.2–1.2)
BUN SERPL-MCNC: 22 MG/DL — HIGH (ref 10–20)
CALCIUM SERPL-MCNC: 9.1 MG/DL — SIGNIFICANT CHANGE UP (ref 8.5–10.1)
CHLORIDE SERPL-SCNC: 94 MMOL/L — LOW (ref 98–110)
CO2 SERPL-SCNC: 19 MMOL/L — SIGNIFICANT CHANGE UP (ref 17–32)
CREAT SERPL-MCNC: 1.3 MG/DL — SIGNIFICANT CHANGE UP (ref 0.7–1.5)
EOSINOPHIL # BLD AUTO: 0.03 K/UL — SIGNIFICANT CHANGE UP (ref 0–0.7)
EOSINOPHIL NFR BLD AUTO: 0.5 % — SIGNIFICANT CHANGE UP (ref 0–8)
GLUCOSE BLDC GLUCOMTR-MCNC: 218 MG/DL — HIGH (ref 70–99)
GLUCOSE BLDC GLUCOMTR-MCNC: 318 MG/DL — HIGH (ref 70–99)
GLUCOSE BLDC GLUCOMTR-MCNC: 341 MG/DL — HIGH (ref 70–99)
GLUCOSE BLDC GLUCOMTR-MCNC: 399 MG/DL — HIGH (ref 70–99)
GLUCOSE BLDC GLUCOMTR-MCNC: 444 MG/DL — HIGH (ref 70–99)
GLUCOSE SERPL-MCNC: 422 MG/DL — HIGH (ref 70–99)
HCT VFR BLD CALC: 42 % — SIGNIFICANT CHANGE UP (ref 42–52)
HGB BLD-MCNC: 13.8 G/DL — LOW (ref 14–18)
IMM GRANULOCYTES NFR BLD AUTO: 0.9 % — HIGH (ref 0.1–0.3)
LYMPHOCYTES # BLD AUTO: 0.87 K/UL — LOW (ref 1.2–3.4)
LYMPHOCYTES # BLD AUTO: 13.3 % — LOW (ref 20.5–51.1)
MAGNESIUM SERPL-MCNC: 2.1 MG/DL — SIGNIFICANT CHANGE UP (ref 1.8–2.4)
MCHC RBC-ENTMCNC: 26.6 PG — LOW (ref 27–31)
MCHC RBC-ENTMCNC: 32.9 G/DL — SIGNIFICANT CHANGE UP (ref 32–37)
MCV RBC AUTO: 80.9 FL — SIGNIFICANT CHANGE UP (ref 80–94)
MONOCYTES # BLD AUTO: 0.64 K/UL — HIGH (ref 0.1–0.6)
MONOCYTES NFR BLD AUTO: 9.8 % — HIGH (ref 1.7–9.3)
NEUTROPHILS # BLD AUTO: 4.9 K/UL — SIGNIFICANT CHANGE UP (ref 1.4–6.5)
NEUTROPHILS NFR BLD AUTO: 75 % — SIGNIFICANT CHANGE UP (ref 42.2–75.2)
PLATELET # BLD AUTO: 92 K/UL — LOW (ref 130–400)
POTASSIUM SERPL-MCNC: 5.1 MMOL/L — HIGH (ref 3.5–5)
POTASSIUM SERPL-SCNC: 5.1 MMOL/L — HIGH (ref 3.5–5)
PROT SERPL-MCNC: 6.8 G/DL — SIGNIFICANT CHANGE UP (ref 6–8)
RBC # BLD: 5.19 M/UL — SIGNIFICANT CHANGE UP (ref 4.7–6.1)
RBC # FLD: 14.5 % — SIGNIFICANT CHANGE UP (ref 11.5–14.5)
SODIUM SERPL-SCNC: 134 MMOL/L — LOW (ref 135–146)
TACROLIMUS SERPL-MCNC: 21.5 NG/ML — SIGNIFICANT CHANGE UP
WBC # BLD: 6.53 K/UL — SIGNIFICANT CHANGE UP (ref 4.8–10.8)
WBC # FLD AUTO: 6.53 K/UL — SIGNIFICANT CHANGE UP (ref 4.8–10.8)

## 2018-09-14 RX ORDER — SODIUM CHLORIDE 9 MG/ML
1000 INJECTION INTRAMUSCULAR; INTRAVENOUS; SUBCUTANEOUS
Qty: 0 | Refills: 0 | Status: DISCONTINUED | OUTPATIENT
Start: 2018-09-14 | End: 2018-09-15

## 2018-09-14 RX ORDER — INSULIN GLARGINE 100 [IU]/ML
50 INJECTION, SOLUTION SUBCUTANEOUS AT BEDTIME
Qty: 0 | Refills: 0 | Status: DISCONTINUED | OUTPATIENT
Start: 2018-09-14 | End: 2018-09-15

## 2018-09-14 RX ORDER — INSULIN LISPRO 100/ML
16 VIAL (ML) SUBCUTANEOUS
Qty: 0 | Refills: 0 | Status: DISCONTINUED | OUTPATIENT
Start: 2018-09-14 | End: 2018-09-15

## 2018-09-14 RX ADMIN — Medication 100 MILLIGRAM(S): at 05:25

## 2018-09-14 RX ADMIN — Medication 6: at 08:24

## 2018-09-14 RX ADMIN — AMLODIPINE BESYLATE 10 MILLIGRAM(S): 2.5 TABLET ORAL at 05:25

## 2018-09-14 RX ADMIN — MYCOPHENOLATE MOFETIL 1500 MILLIGRAM(S): 250 CAPSULE ORAL at 17:12

## 2018-09-14 RX ADMIN — Medication 100 MILLIGRAM(S): at 17:13

## 2018-09-14 RX ADMIN — Medication 100 MILLIGRAM(S): at 17:11

## 2018-09-14 RX ADMIN — TACROLIMUS 1 MILLIGRAM(S): 5 CAPSULE ORAL at 17:10

## 2018-09-14 RX ADMIN — INSULIN GLARGINE 50 UNIT(S): 100 INJECTION, SOLUTION SUBCUTANEOUS at 21:41

## 2018-09-14 RX ADMIN — Medication 100 MILLIGRAM(S): at 17:22

## 2018-09-14 RX ADMIN — Medication 15 UNIT(S): at 16:48

## 2018-09-14 RX ADMIN — CEFEPIME 100 MILLIGRAM(S): 1 INJECTION, POWDER, FOR SOLUTION INTRAMUSCULAR; INTRAVENOUS at 17:11

## 2018-09-14 RX ADMIN — CEFEPIME 100 MILLIGRAM(S): 1 INJECTION, POWDER, FOR SOLUTION INTRAMUSCULAR; INTRAVENOUS at 06:24

## 2018-09-14 RX ADMIN — SODIUM CHLORIDE 75 MILLILITER(S): 9 INJECTION INTRAMUSCULAR; INTRAVENOUS; SUBCUTANEOUS at 10:28

## 2018-09-14 RX ADMIN — MYCOPHENOLATE MOFETIL 1500 MILLIGRAM(S): 250 CAPSULE ORAL at 05:26

## 2018-09-14 RX ADMIN — Medication 15 UNIT(S): at 08:27

## 2018-09-14 RX ADMIN — TACROLIMUS 1 MILLIGRAM(S): 5 CAPSULE ORAL at 05:25

## 2018-09-14 RX ADMIN — Medication 4: at 16:49

## 2018-09-14 NOTE — PROGRESS NOTE ADULT - ASSESSMENT
The patient in the AM on 9/14 was in NAD, of pleasant affect and conversant. He was ambulating with assistance and reported no trouble with urination this AM. He is pending an MRI Brain and CT-Angiogram Head/Neck.    #Left cerebellar hemorrhage  - measuring 0.9 x 0.4 cm and rpt CTH shows  0.9 x 0.5 cm hyperdensity in the left cerebellum which may represent a cavernoma with recent bleed.   - repeat CT head 9/12 shows decrease in size of bleed  - F/u MRI Brain w/ and w/o contrast  - F/u CT Angiogram Head/Neck  - F/u with Neuro  - Maintain SBP <160  - NSGY does not want to intervene  - Neuro checks Q4h  -admit to stroke unit.  -PT eval    # DKA - resolving  - AG at 17 this morning  -subq insulin lantus 45 units; humalog - 15u  -monitor AG    #UTI  -+ u/a  -f/u urine cx sensitivities   -c/w cefepime 1gBID    # WANDY on CKD likely prerenal   -resolved    # HTN  - maintain SBP<160  -c/w labetalol 100 BID      # hx of cardiac transplant  - c/w immunosuppressants  - Prograf level - f/u  - may call [facility] to update patient's status    DVT ppx: SCD    Diet: mechanical soft dysphagia 2     Full code   Activity: OOB to chair. The patient in the AM on 9/14 was in NAD, of pleasant affect and conversant. He was ambulating with assistance and reported no trouble with urination this AM. He is pending an MRI Brain and CT-Angiogram Head/Neck.     #Left cerebellar hemorrhage  - measuring 0.9 x 0.4 cm and rpt CTH shows  0.9 x 0.5 cm hyperdensity in the left cerebellum which may represent a cavernoma with recent bleed.   - repeat CT head 9/12 shows decrease in size of bleed  - F/u MRI Brain w/ and w/o contrast  - F/u CT Angiogram Head/Neck  - F/u with Neuro  - Maintain SBP <160  - NSGY does not want to intervene  - Neuro checks Q4h  - admit to stroke unit.  - PT eval    # DKA - resolving  - Anion gap 21 <-- 17 <-- 17  - subq insulin lantus 45 units; humalog - 15u  - Start NS 75 cc/hr  - monitor anion gap    #UTI  -+ u/a  -f/u urine cx sensitivities   -c/w cefepime 1g BID    # WANDY on CKD likely prerenal   -resolved    # HTN  - maintain SBP<160  - c/w labetalol 100 BID    # hx of cardiac transplant  - c/w immunosuppressants  - Prograf level - f/u  - may call [facility] to update patient's status  - If no echo from [facility] site records, order new echo    DVT ppx: SCD    Diet: mechanical soft dysphagia 2     Full code   Activity: OOB to chair.

## 2018-09-14 NOTE — PROGRESS NOTE ADULT - SUBJECTIVE AND OBJECTIVE BOX
RUBINJULISA    HPI:  67 y/o male with a hx of Cardiac Transplant x 10 years ago due to MI x 2 and low EF on immunosuppressant , DM dx in his 60's,  HTN, DLD p/w acute confusion x 1 day    History obtained from ED note overnight. Patient was well on 9/10/2018 am when his wife left him home at 7 am and gave him his breakfast. She called him from work later in the afternoon but no answer. Around 5:30 in the evening when she came home , she found him very confused and he was trying to catch things that were not there. No visible signs of trauma. Family called EMS.     As per pt, he was afebrile at home, no cough/fevers/chills. He states he is in the hospital for DKA and he has had it before. He denied any acute complaints at this time     Upon presentation the ED he was hypertensive 173/115 s/p labetolol IV x 2, Glucose >600 with positive serum and urine ketones (11 Sep 2018 07:32)    PMH  Urinary tract infection without hematuria, site unspecified  Chronic kidney disease, unspecified CKD stage  Bilateral hearing loss, unspecified hearing loss type  Benign prostatic hyperplasia with urinary frequency  Diabetes  High cholesterol  HTN (hypertension)  Heart transplant recipient      Pertinent Medical History/Social History/Family History/other:     Social History: []  Drug Use: []   Alcohol Use: []   Tobacco Use:  [] Other:      Cerebrovascular Risk Factors:[] prior ischemic stroke  [] Afib  []CAD  [X]HTN  []DLD  [X]DM []PVD      Stroke Workup:    Cardiac Rhythm(Tele/Holter) & Duration:   4 DAYS                Events: none    Cardiac Structure:(TTE/HOSEA +/-):       Home Medications:  allopurinol 100 mg oral tablet: 1 tab(s) orally 2 times a day (11 Sep 2018 02:58)  amLODIPine 5 mg oral tablet: 1 tab(s) orally once a day (11 Sep 2018 02:58)  aspirin 81 mg oral tablet: 1 tab(s) orally once a day (11 Sep 2018 02:58)  doxazosin 8 mg oral tablet: 1 tab(s) orally once a day (11 Sep 2018 02:58)  furosemide 40 mg oral tablet: 1 tab(s) orally once a day (11 Sep 2018 02:58)  ketorolac 0.5% ophthalmic solution: 1 drop(s) to each affected eye 4 times a day (11 Sep 2018 02:58)  Lantus 100 units/mL subcutaneous solution:  (11 Sep 2018 02:58)  Lyrica 100 mg oral capsule: 1 cap(s) orally 2 times a day (11 Sep 2018 02:58)  metaxalone 800 mg oral tablet: 1 tab(s) orally 3 times a day (11 Sep 2018 02:58)  mycophenolate mofetil 500 mg oral tablet:  (11 Sep 2018 02:58)  niacin 500 mg oral tablet:  (11 Sep 2018 02:58)  NovoLOG Mix 70/30 subcutaneous suspension:  (11 Sep 2018 02:58)  Oysco 500 with D:  (11 Sep 2018 02:58)  pantoprazole 40 mg oral delayed release tablet: 1 tab(s) orally once a day (11 Sep 2018 02:58)  potassium chloride 10 mEq oral capsule, extended release: 1 cap(s) orally 2 times a day (11 Sep 2018 02:58)  pravastatin 40 mg oral tablet: 1 tab(s) orally once a day (11 Sep 2018 02:58)  quiNINE 324 mg oral capsule: 2 cap(s) orally every 8 hours (11 Sep 2018 02:58)  spironolactone 25 mg oral tablet: 1 tab(s) orally once a day (11 Sep 2018 02:58)  tacrolimus 1 mg oral capsule: cap(s) orally every 12 hours (11 Sep 2018 02:58)  terbinafine 250 mg oral tablet: 1 tab(s) orally once a day (11 Sep 2018 02:58)      MEDICATIONS  (STANDING):  allopurinol 100 milliGRAM(s) Oral two times a day  amLODIPine   Tablet 10 milliGRAM(s) Oral daily  cefepime   IVPB 1000 milliGRAM(s) IV Intermittent every 12 hours  dextrose 5%. 1000 milliLiter(s) (50 mL/Hr) IV Continuous <Continuous>  dextrose 50% Injectable 12.5 Gram(s) IV Push once  dextrose 50% Injectable 25 Gram(s) IV Push once  dextrose 50% Injectable 25 Gram(s) IV Push once  insulin glargine Injectable (LANTUS) 50 Unit(s) SubCutaneous at bedtime  insulin Infusion 3 Unit(s)/Hr (3 mL/Hr) IV Continuous <Continuous>  insulin lispro (HumaLOG) corrective regimen sliding scale   SubCutaneous three times a day before meals  insulin lispro Injectable (HumaLOG) 16 Unit(s) SubCutaneous three times a day before meals  labetalol 100 milliGRAM(s) Oral two times a day  mycophenolate mofetil 1500 milliGRAM(s) Oral two times a day  pregabalin 100 milliGRAM(s) Oral two times a day  sodium chloride 0.9%. 1000 milliLiter(s) (75 mL/Hr) IV Continuous <Continuous>  tacrolimus 1 milliGRAM(s) Oral every 12 hours      Last 24 hour events: none    Physical Exam:  Patient is Awake alert oriented to self place time.  PEARLA EOMI no gaze no visual cut face symmetric , facial sensation intact ,No aphasia , dysarthria   No sensory deficit  No neglect  Past pointing on the left  No drift of bilateral upper and lower extremities.  GCS:  15  ICHs:   0      Vital Signs Last 24 Hrs  T(C): 36.2 (14 Sep 2018 19:39), Max: 37.1 (14 Sep 2018 07:36)  T(F): 97.1 (14 Sep 2018 19:39), Max: 98.7 (14 Sep 2018 07:36)  HR: 92 (14 Sep 2018 19:39) (92 - 101)  BP: 102/73 (14 Sep 2018 19:39) (102/73 - 156/89)  BP(mean): --  RR: 18 (14 Sep 2018 19:39) (18 - 18)  SpO2: -- Neurology follow up note  Patient seen and examined at bedside, reports mild intermittent left sided headache and neck pain . No acute events         PMH  Urinary tract infection without hematuria, site unspecified  Chronic kidney disease, unspecified CKD stage  Bilateral hearing loss, unspecified hearing loss type  Benign prostatic hyperplasia with urinary frequency  Diabetes  High cholesterol  HTN (hypertension)  Heart transplant recipient      Pertinent Medical History/Social History/Family History/other:     Social History: []  Drug Use: []   Alcohol Use: []   Tobacco Use:  [] Other:      Cerebrovascular Risk Factors:[] prior ischemic stroke  [] Afib  []CAD  [X]HTN  []DLD  [X]DM []PVD      Stroke Workup:    Cardiac Rhythm(Tele/Holter) & Duration:   4 DAYS                Events: none    Cardiac Structure:(TTE/HOSEA +/-):       Home Medications:  allopurinol 100 mg oral tablet: 1 tab(s) orally 2 times a day (11 Sep 2018 02:58)  amLODIPine 5 mg oral tablet: 1 tab(s) orally once a day (11 Sep 2018 02:58)  aspirin 81 mg oral tablet: 1 tab(s) orally once a day (11 Sep 2018 02:58)  doxazosin 8 mg oral tablet: 1 tab(s) orally once a day (11 Sep 2018 02:58)  furosemide 40 mg oral tablet: 1 tab(s) orally once a day (11 Sep 2018 02:58)  ketorolac 0.5% ophthalmic solution: 1 drop(s) to each affected eye 4 times a day (11 Sep 2018 02:58)  Lantus 100 units/mL subcutaneous solution:  (11 Sep 2018 02:58)  Lyrica 100 mg oral capsule: 1 cap(s) orally 2 times a day (11 Sep 2018 02:58)  metaxalone 800 mg oral tablet: 1 tab(s) orally 3 times a day (11 Sep 2018 02:58)  mycophenolate mofetil 500 mg oral tablet:  (11 Sep 2018 02:58)  niacin 500 mg oral tablet:  (11 Sep 2018 02:58)  NovoLOG Mix 70/30 subcutaneous suspension:  (11 Sep 2018 02:58)  Oysco 500 with D:  (11 Sep 2018 02:58)  pantoprazole 40 mg oral delayed release tablet: 1 tab(s) orally once a day (11 Sep 2018 02:58)  potassium chloride 10 mEq oral capsule, extended release: 1 cap(s) orally 2 times a day (11 Sep 2018 02:58)  pravastatin 40 mg oral tablet: 1 tab(s) orally once a day (11 Sep 2018 02:58)  quiNINE 324 mg oral capsule: 2 cap(s) orally every 8 hours (11 Sep 2018 02:58)  spironolactone 25 mg oral tablet: 1 tab(s) orally once a day (11 Sep 2018 02:58)  tacrolimus 1 mg oral capsule: cap(s) orally every 12 hours (11 Sep 2018 02:58)  terbinafine 250 mg oral tablet: 1 tab(s) orally once a day (11 Sep 2018 02:58)      MEDICATIONS  (STANDING):  allopurinol 100 milliGRAM(s) Oral two times a day  amLODIPine   Tablet 10 milliGRAM(s) Oral daily  cefepime   IVPB 1000 milliGRAM(s) IV Intermittent every 12 hours  dextrose 5%. 1000 milliLiter(s) (50 mL/Hr) IV Continuous <Continuous>  dextrose 50% Injectable 12.5 Gram(s) IV Push once  dextrose 50% Injectable 25 Gram(s) IV Push once  dextrose 50% Injectable 25 Gram(s) IV Push once  insulin glargine Injectable (LANTUS) 50 Unit(s) SubCutaneous at bedtime  insulin Infusion 3 Unit(s)/Hr (3 mL/Hr) IV Continuous <Continuous>  insulin lispro (HumaLOG) corrective regimen sliding scale   SubCutaneous three times a day before meals  insulin lispro Injectable (HumaLOG) 16 Unit(s) SubCutaneous three times a day before meals  labetalol 100 milliGRAM(s) Oral two times a day  mycophenolate mofetil 1500 milliGRAM(s) Oral two times a day  pregabalin 100 milliGRAM(s) Oral two times a day  sodium chloride 0.9%. 1000 milliLiter(s) (75 mL/Hr) IV Continuous <Continuous>  tacrolimus 1 milliGRAM(s) Oral every 12 hours      Last 24 hour events: none    Physical Exam:  Patient is Awake alert oriented to self place time.  PEARLA EOMI no gaze no visual cut face symmetric , facial sensation intact ,No aphasia , dysarthria   No sensory deficit  No neglect  Past pointing on the left  No drift of bilateral upper and lower extremities.  GCS:  15  ICHs:   0      Vital Signs Last 24 Hrs  T(C): 36.2 (14 Sep 2018 19:39), Max: 37.1 (14 Sep 2018 07:36)  T(F): 97.1 (14 Sep 2018 19:39), Max: 98.7 (14 Sep 2018 07:36)  HR: 92 (14 Sep 2018 19:39) (92 - 101)  BP: 102/73 (14 Sep 2018 19:39) (102/73 - 156/89)  BP(mean): --  RR: 18 (14 Sep 2018 19:39) (18 - 18)  SpO2: -- Neurology follow up note  Patient seen and examined at bedside, reports mild intermittent left sided headache and neck pain . No acute events.  Has chronic L marion's palsy.    PMH  Urinary tract infection without hematuria, site unspecified  Chronic kidney disease, unspecified CKD stage  Bilateral hearing loss, unspecified hearing loss type  Benign prostatic hyperplasia with urinary frequency  Diabetes  High cholesterol  HTN (hypertension)  Heart transplant recipient    Pertinent Medical History/Social History/Family History/other:     Social History: []  Drug Use: []   Alcohol Use: []   Tobacco Use:  [] Other:      Cerebrovascular Risk Factors:[] prior ischemic stroke  [] Afib  []CAD  [X]HTN  []DLD  [X]DM []PVD    Stroke Workup:    Cardiac Rhythm(Tele/Holter) & Duration:   4 DAYS                Events: none    Cardiac Structure:(TTE/HOSEA +/-):     Home Medications:  allopurinol 100 mg oral tablet: 1 tab(s) orally 2 times a day (11 Sep 2018 02:58)  amLODIPine 5 mg oral tablet: 1 tab(s) orally once a day (11 Sep 2018 02:58)  aspirin 81 mg oral tablet: 1 tab(s) orally once a day (11 Sep 2018 02:58)  doxazosin 8 mg oral tablet: 1 tab(s) orally once a day (11 Sep 2018 02:58)  furosemide 40 mg oral tablet: 1 tab(s) orally once a day (11 Sep 2018 02:58)  ketorolac 0.5% ophthalmic solution: 1 drop(s) to each affected eye 4 times a day (11 Sep 2018 02:58)  Lantus 100 units/mL subcutaneous solution:  (11 Sep 2018 02:58)  Lyrica 100 mg oral capsule: 1 cap(s) orally 2 times a day (11 Sep 2018 02:58)  metaxalone 800 mg oral tablet: 1 tab(s) orally 3 times a day (11 Sep 2018 02:58)  mycophenolate mofetil 500 mg oral tablet:  (11 Sep 2018 02:58)  niacin 500 mg oral tablet:  (11 Sep 2018 02:58)  NovoLOG Mix 70/30 subcutaneous suspension:  (11 Sep 2018 02:58)  Oysco 500 with D:  (11 Sep 2018 02:58)  pantoprazole 40 mg oral delayed release tablet: 1 tab(s) orally once a day (11 Sep 2018 02:58)  potassium chloride 10 mEq oral capsule, extended release: 1 cap(s) orally 2 times a day (11 Sep 2018 02:58)  pravastatin 40 mg oral tablet: 1 tab(s) orally once a day (11 Sep 2018 02:58)  quiNINE 324 mg oral capsule: 2 cap(s) orally every 8 hours (11 Sep 2018 02:58)  spironolactone 25 mg oral tablet: 1 tab(s) orally once a day (11 Sep 2018 02:58)  tacrolimus 1 mg oral capsule: cap(s) orally every 12 hours (11 Sep 2018 02:58)  terbinafine 250 mg oral tablet: 1 tab(s) orally once a day (11 Sep 2018 02:58)    MEDICATIONS  (STANDING):  allopurinol 100 milliGRAM(s) Oral two times a day  amLODIPine   Tablet 10 milliGRAM(s) Oral daily  cefepime   IVPB 1000 milliGRAM(s) IV Intermittent every 12 hours  dextrose 5%. 1000 milliLiter(s) (50 mL/Hr) IV Continuous <Continuous>  dextrose 50% Injectable 12.5 Gram(s) IV Push once  dextrose 50% Injectable 25 Gram(s) IV Push once  dextrose 50% Injectable 25 Gram(s) IV Push once  insulin glargine Injectable (LANTUS) 50 Unit(s) SubCutaneous at bedtime  insulin Infusion 3 Unit(s)/Hr (3 mL/Hr) IV Continuous <Continuous>  insulin lispro (HumaLOG) corrective regimen sliding scale   SubCutaneous three times a day before meals  insulin lispro Injectable (HumaLOG) 16 Unit(s) SubCutaneous three times a day before meals  labetalol 100 milliGRAM(s) Oral two times a day  mycophenolate mofetil 1500 milliGRAM(s) Oral two times a day  pregabalin 100 milliGRAM(s) Oral two times a day  sodium chloride 0.9%. 1000 milliLiter(s) (75 mL/Hr) IV Continuous <Continuous>  tacrolimus 1 milliGRAM(s) Oral every 12 hours    Last 24 hour events: none    Physical Exam:  Patient is Awake alert oriented to self place time.  PEARLA EOMI no gaze no visual cut face symmetric , facial sensation intact ,No aphasia , dysarthria   No sensory deficit  No neglect  Past pointing on the left  No drift of bilateral upper and lower extremities.  GCS:  15  ICHs:   0      Vital Signs Last 24 Hrs  T(C): 36.2 (14 Sep 2018 19:39), Max: 37.1 (14 Sep 2018 07:36)  T(F): 97.1 (14 Sep 2018 19:39), Max: 98.7 (14 Sep 2018 07:36)  HR: 92 (14 Sep 2018 19:39) (92 - 101)  BP: 102/73 (14 Sep 2018 19:39) (102/73 - 156/89)  BP(mean): --  RR: 18 (14 Sep 2018 19:39) (18 - 18)  SpO2: --    < from: MR Head w/wo IV Cont (09.14.18 @ 16:00) >  IMPRESSION:    1.  Two punctate acute infarcts in the right frontal lobe. Possible   additional small subacute infarct in the left frontal lobe.    2.  Left cerebellar signal abnormality (in the region of hyperdensity on   CT) most compatible with tiny hemorrhage with mild surrounding edema.    3.  Numerous foci of susceptibility artifact at the brain, most of which   are new since the 2015 MRI, compatible with prior micro- and small   hemorrhages which can be seen in the setting of hypertension or amyloid   angiography.    4.  Moderate chronic microvascular changes and multiple chronic lacunar   infarcts (many of which are new since 2015).    CECI MATHUR M.D., ATTENDING RADIOLOGIST  This document has been electronically signed. Sep 14 2018  5:29PM    < end of copied text >    < from: CT Angio Neck w/ IV Cont (09.14.18 @ 15:22) >  IMPRESSION:    1.  No evidence of aneurysm or vascular malformation.    2.  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 Partial   reconstitution of the cervical segments but additional occlusion of the   proximal V4 segment. Retrograde filling of the distal left vertebral   artery and left PICA, with a moderate stenosis of the proximal left PICA   noted.    3.  Calcific plaque at the carotid bifurcations with mild (<50%) stenosis   of the right ICA origin.    CECI MATHRU M.D., ATTENDING RADIOLOGIST  This document has been electronically signed. Sep 14 2018  4:33PM    < end of copied text >

## 2018-09-14 NOTE — PROGRESS NOTE ADULT - SUBJECTIVE AND OBJECTIVE BOX
JULISA CONKLIN  68y  Male      SUBJECTIVE:  no neurological complaints denies headaches dizziness     PAST MEDICAL & SURGICAL HISTORY:  Urinary tract infection without hematuria, site unspecified  Chronic kidney disease, unspecified CKD stage  Bilateral hearing loss, unspecified hearing loss type  Benign prostatic hyperplasia with urinary frequency  Diabetes  High cholesterol  HTN (hypertension)  Heart transplant recipient  H/O heart transplant    68y    REVIEW OF SYSTEMS:    T(C): 35.6 (09-14-18 @ 15:27), Max: 37.1 (09-14-18 @ 07:36)  HR: 101 (09-14-18 @ 15:27) (94 - 104)  BP: 137/80 (09-14-18 @ 15:27) (126/78 - 164/88)  RR: 18 (09-14-18 @ 15:27) (18 - 30)  SpO2: 99% (09-13-18 @ 21:25) (98% - 99%)  Wt(kg): --Vital Signs Last 24 Hrs  T(C): 35.6 (14 Sep 2018 15:27), Max: 37.1 (14 Sep 2018 07:36)  T(F): 96.1 (14 Sep 2018 15:27), Max: 98.7 (14 Sep 2018 07:36)  HR: 101 (14 Sep 2018 15:27) (94 - 104)  BP: 137/80 (14 Sep 2018 15:27) (126/78 - 164/88)  BP(mean): 108 (13 Sep 2018 19:00) (108 - 108)  RR: 18 (14 Sep 2018 15:27) (18 - 30)  SpO2: 99% (13 Sep 2018 21:25) (98% - 99%)    MEDICATION:  acetaminophen   Tablet .. 650 milliGRAM(s) Oral every 6 hours PRN  allopurinol 100 milliGRAM(s) Oral two times a day  aluminum hydroxide/magnesium hydroxide/simethicone Suspension 30 milliLiter(s) Oral every 4 hours PRN  amLODIPine   Tablet 10 milliGRAM(s) Oral daily  cefepime   IVPB 1000 milliGRAM(s) IV Intermittent every 12 hours  dextrose 40% Gel 15 Gram(s) Oral once PRN  dextrose 5%. 1000 milliLiter(s) IV Continuous <Continuous>  dextrose 50% Injectable 12.5 Gram(s) IV Push once  dextrose 50% Injectable 25 Gram(s) IV Push once  dextrose 50% Injectable 25 Gram(s) IV Push once  glucagon  Injectable 1 milliGRAM(s) IntraMuscular once PRN  insulin Infusion 3 Unit(s)/Hr IV Continuous <Continuous>  insulin lispro (HumaLOG) corrective regimen sliding scale   SubCutaneous three times a day before meals  labetalol 100 milliGRAM(s) Oral two times a day  mycophenolate mofetil 1500 milliGRAM(s) Oral two times a day  pregabalin 100 milliGRAM(s) Oral two times a day  sodium chloride 0.9%. 1000 milliLiter(s) IV Continuous <Continuous>  tacrolimus 1 milliGRAM(s) Oral every 12 hours      LABS:  Urine Microscopic-Add On (NC) (09.10.18 @ 19:30)    Bacteria: Few    Epithelial Cells: Occasional /HPF    Red Blood Cell - Urine: 1-2 /HPF    White Blood Cell - Urine: 26-50 /HPF    Culture - Urine (09.10.18 @ 19:30)    -  Levofloxacin: R >4    -  Meropenem: S 2    -  Trimethoprim/Sulfamethoxazole: S <=2/38    -  Ceftazidime: S 4    Specimen Source: .Urine Clean Catch (Midstream)    Culture Results:   50,000 - 99,000 CFU/mL Burkholderia cepacia complex    Organism Identification: Burkholderia cepacia    Organism: Burkholderia cepacia    Method Type: LUTHER                      13.8   6.53  )-----------( 92       ( 14 Sep 2018 07:20 )             42.0     09-14    134<L>  |  94<L>  |  22<H>  ----------------------------<  422<H>  5.1<H>   |  19  |  1.3    Ca    9.1      14 Sep 2018 07:20  Phos  2.5     09-13  Mg     2.1     09-14    TPro  6.8  /  Alb  4.1  /  TBili  0.7  /  DBili  x   /  AST  13  /  ALT  7   /  AlkPhos  136<H>  09-14    RADIOLOGY:  < from: US Kidney and Bladder (09.11.18 @ 11:30) >  Impression:    No hydronephrosis.    Left renal peripheral calcifications, largest measuring 1.3 cm.    Prostatomegaly.    Pre void volume of the urinary bladder is 164 cc.  The post void residual   volume is 122 cc.    Urinary bladder 1.6 cm diverticulum.    < end of copied text >    < from: CT Abdomen and Pelvis No Cont (09.11.18 @ 00:06) >  IMPRESSION:    No evidence of acute abdominopelvic pathology on this noncontrast scan.    < end of copied text >    < from: Xray Chest 1 View- PORTABLE-Routine (09.13.18 @ 03:50) >  Impression:      No radiographic evidence of acute cardiopulmonary disease.    < end of copied text >    PHYSICAL EXAM:   alert orientedx3  heart rsr s1 s2 +  lungs clear  abdomen soft non tender bs+  extr - no edema  cns no focal deficits    IMPRESSION:  cerebellar bleed  T2DM s/p DKA  hypertension  HEART TRANSPLANT 10 YRS AGO  CKDIII bun/cr 19/1.3  pyuria - uti urine culture noted  BPH  bilateral hearing loss    PLAN:  f/u CT ANGIOGRAM  F/U MRI head  endocrine consult - Glucose levels still high  increase lantus and sliding scale coverage  continue iv antibiotics  repeat U/A AND C&S  continue ivf NS

## 2018-09-14 NOTE — PROGRESS NOTE ADULT - ASSESSMENT
68 year old male with a hx of Cardiac Transplant x 10 years ago due to MI x 2 and low EF on immunosuppressant, DM,HTN, DLD p/w acute confusion x 1 day. During his confusion work up CTH was performed which revealed a subcentimeter hyper density in the left cerebellar area. On radiology report it is suggested there may be surrounding edema suggestive of possible underlying vascular malformation. He is currently cared for in the stroke unit  does report mild left sided headache occurring intermittently.     Plan  Follow up pending results of MRI brain and CTA head/ neck  Avoid hypertension  Continue stroke unit monitoring with Q 4 hr neuro checks   call for acute change in neuro status  Neuro attending note will follow 68 year old male with a hx of Cardiac Transplant x 10 years ago due to MI x 2 and low EF on immunosuppressant, DM,HTN, DLD p/w acute confusion x 1 day. During his confusion work up CTH was performed which revealed a subcentimeter hyper density in the left cerebellar area. On radiology report it is suggested there may be surrounding edema suggestive of possible underlying vascular malformation. He is currently cared for in the stroke unit  does report mild left sided headache occurring intermittently.     Plan  Follow up pending results of MRI brain and CTA head/ neck  keep sbp<140  Continue stroke unit monitoring with Q 4 hr neuro checks   call for acute change in neuro status  Neuro attending note will follow

## 2018-09-14 NOTE — PROGRESS NOTE ADULT - SUBJECTIVE AND OBJECTIVE BOX
JULISA CONKLIN 68y Male  MRN#: [id number]       SUBJECTIVE  Patient is a 68y old Male who presents with a chief complaint of Cerebellar bleed, DKA (14 Sep 2018 11:23)  Currently admitted to medicine with the primary diagnosis of DKA (diabetic ketoacidoses).      OBJECTIVE  PAST MEDICAL & SURGICAL HISTORY  Urinary tract infection without hematuria, site unspecified  Chronic kidney disease, unspecified CKD stage  Bilateral hearing loss, unspecified hearing loss type  Benign prostatic hyperplasia with urinary frequency  Diabetes  High cholesterol  HTN (hypertension)  Heart transplant recipient  H/O heart transplant    ALLERGIES:  codeine (Unknown)    MEDICATIONS:  STANDING MEDICATIONS  allopurinol 100 milliGRAM(s) Oral two times a day  amLODIPine   Tablet 10 milliGRAM(s) Oral daily  cefepime   IVPB 1000 milliGRAM(s) IV Intermittent every 12 hours  dextrose 5%. 1000 milliLiter(s) IV Continuous <Continuous>  dextrose 50% Injectable 12.5 Gram(s) IV Push once  dextrose 50% Injectable 25 Gram(s) IV Push once  dextrose 50% Injectable 25 Gram(s) IV Push once  insulin glargine Injectable (LANTUS) 45 Unit(s) SubCutaneous at bedtime  insulin Infusion 3 Unit(s)/Hr IV Continuous <Continuous>  insulin lispro (HumaLOG) corrective regimen sliding scale   SubCutaneous three times a day before meals  insulin lispro Injectable (HumaLOG) 15 Unit(s) SubCutaneous three times a day before meals  labetalol 100 milliGRAM(s) Oral two times a day  mycophenolate mofetil 1500 milliGRAM(s) Oral two times a day  pregabalin 100 milliGRAM(s) Oral two times a day  sodium chloride 0.9%. 1000 milliLiter(s) IV Continuous <Continuous>  tacrolimus 1 milliGRAM(s) Oral every 12 hours    PRN MEDICATIONS  acetaminophen   Tablet .. 650 milliGRAM(s) Oral every 6 hours PRN  aluminum hydroxide/magnesium hydroxide/simethicone Suspension 30 milliLiter(s) Oral every 4 hours PRN  dextrose 40% Gel 15 Gram(s) Oral once PRN  glucagon  Injectable 1 milliGRAM(s) IntraMuscular once PRN      VITAL SIGNS: Last 24 Hours  T(C): 37.1 (14 Sep 2018 07:36), Max: 37.1 (14 Sep 2018 07:36)  T(F): 98.7 (14 Sep 2018 07:36), Max: 98.7 (14 Sep 2018 07:36)  HR: 99 (14 Sep 2018 07:36) (92 - 104)  BP: 140/78 (14 Sep 2018 07:36) (126/78 - 164/88)  BP(mean): 108 (13 Sep 2018 19:00) (93 - 108)  RR: 18 (14 Sep 2018 07:36) (18 - 30)  SpO2: 99% (13 Sep 2018 21:25) (97% - 99%)    LABS:                        13.8   6.53  )-----------( 92       ( 14 Sep 2018 07:20 )             42.0     09-14    134<L>  |  94<L>  |  22<H>  ----------------------------<  422<H>  5.1<H>   |  19  |  1.3    Ca    9.1      14 Sep 2018 07:20  Phos  2.5     09-13  Mg     2.1     09-14    TPro  6.8  /  Alb  4.1  /  TBili  0.7  /  DBili  x   /  AST  13  /  ALT  7   /  AlkPhos  136<H>  09-14      RADIOLOGY:      PHYSICAL EXAM:    GENERAL: NAD, well-developed, AAOx3  HEENT:  Atraumatic, Normocephalic. EOMI, PERRLA, conjunctiva and sclera clear, No JVD  PULMONARY: Clear to auscultation bilaterally; No wheeze  CARDIOVASCULAR: Regular rate and rhythm; No murmurs, rubs, or gallops  GASTROINTESTINAL: Soft, Nontender, Nondistended  MUSCULOSKELETAL:  No clubbing, cyanosis, or edema  NEUROLOGY: non-focal  SKIN: No rashes or lesions

## 2018-09-14 NOTE — PROGRESS NOTE ADULT - SUBJECTIVE AND OBJECTIVE BOX
68yMale    HPI:    HPI:  68 year old male with a hx of Cardiac Transplant x 10 years ago due to MI x 2 and low EF on immunosuppressant, DM,HTN, DLD p/w acute confusion x 1 day. During workup for confusion a CTH was performed which revealed a subcentimeter hyperdensity in the left cerebellar area. On radiology report it is suggested there may be surrounding edema suggestive of possible underlying vascular malformation. He is currently cared for in the stroke unit without a headache, nuchal rigidity, photo/phonophobia or nausea/vomiting. Today he is awake and oriented and denies neurological complaints. He is waiting for MRI and cerebrovascular imaging.    Urinary tract infection without hematuria, site unspecified  Chronic kidney disease, unspecified CKD stage  Bilateral hearing loss, unspecified hearing loss type  Benign prostatic hyperplasia with urinary frequency  Diabetes  High cholesterol  HTN (hypertension)  Heart transplant recipient    Tests:  < from: CT Head No Cont (09.12.18 @ 07:32) >  Since 9/11/2018:    1.  Decrease in size of left cerebellar hyperdensity. Follow-up MRI brain   with and without contrast may be helpful for further evaluation.    2.  No evidence of new acute intracranial hemorrhage or mass effect.    3. Chronic microvascular ischemic changes.      Acute Issues:    Previous Plan/Suggestions:    Overnight Events: None    allopurinol 100 milliGRAM(s) Oral two times a day  amLODIPine   Tablet 10 milliGRAM(s) Oral daily  cefepime   IVPB 1000 milliGRAM(s) IV Intermittent every 12 hours  dextrose 5%. 1000 milliLiter(s) IV Continuous <Continuous>  dextrose 50% Injectable 12.5 Gram(s) IV Push once  dextrose 50% Injectable 25 Gram(s) IV Push once  dextrose 50% Injectable 25 Gram(s) IV Push once  insulin glargine Injectable (LANTUS) 45 Unit(s) SubCutaneous at bedtime  insulin Infusion 3 Unit(s)/Hr IV Continuous <Continuous>  insulin lispro (HumaLOG) corrective regimen sliding scale   SubCutaneous three times a day before meals  insulin lispro Injectable (HumaLOG) 15 Unit(s) SubCutaneous three times a day before meals  labetalol 100 milliGRAM(s) Oral two times a day  mycophenolate mofetil 1500 milliGRAM(s) Oral two times a day  pregabalin 100 milliGRAM(s) Oral two times a day  sodium chloride 0.9%. 1000 milliLiter(s) IV Continuous <Continuous>  tacrolimus 1 milliGRAM(s) Oral every 12 hours      T(F): 98.7 (09-14-18 @ 07:36), Max: 98.7 (09-14-18 @ 07:36)  HR: 99 (09-14-18 @ 07:36) (92 - 104)  BP: 140/78 (09-14-18 @ 07:36) (121/75 - 164/88)  RR: 18 (09-14-18 @ 07:36) (18 - 30)  SpO2: 99% (09-13-18 @ 21:25) (96% - 99%)                        13.8   6.53  )-----------( 92       ( 14 Sep 2018 07:20 )             42.0     09-14    134<L>  |  94<L>  |  22<H>  ----------------------------<  422<H>  5.1<H>   |  19  |  1.3    Ca    9.1      14 Sep 2018 07:20  Phos  2.5     09-13  Mg     2.1     09-14    TPro  6.8  /  Alb  4.1  /  TBili  0.7  /  DBili  x   /  AST  13  /  ALT  7   /  AlkPhos  136<H>  09-14        LIVER FUNCTIONS - ( 14 Sep 2018 07:20 )  Alb: 4.1 g/dL / Pro: 6.8 g/dL / ALK PHOS: 136 U/L / ALT: 7 U/L / AST: 13 U/L / GGT: x             Neuro Exam:  Awake alert oriented to self place time.  PEARLA EOMI no gaze no visual cut.  No aphasia no dysarthria no facial palsy  No sensory deficit, no neglect  No drift of bilateral upper and lower extremities.      GCS:  15  ICHs:   0    Impression:  68 year old male with a hx of Cardiac Transplant x 10 years ago due to MI x 2 and low EF on immunosuppressant, DM,HTN, DLD p/w acute confusion x 1 day. Amongs his confusion work up CTH was performed which revealed a subcentimeter hyperdensity in the left cerebellar area. On radiology report it is suggested there may be surrounding edema suggestive of possible underlying vascular malformation. He is currently cared for in the stroke unit without headache, without, photo/phonophobia or nausea/vomitting. He is waiting for MRI brain and cerebrovascular studies.      Suggestions:  CTA head and neck. or MRA head and neck with contrast.  Brain MRI w/ and w/o contrast.  Avoid hypertension.  Management as per MICU.     We spent more than 45 minutes to review the chart, gather necessary information, evaluate the patient and discuss the case with primary team and counseling the patient and his family to their satisfaction. Total visit time more than 60min.  Maritza De Los Santos MD.  [phone]

## 2018-09-14 NOTE — PHYSICAL THERAPY INITIAL EVALUATION ADULT - GENERAL OBSERVATIONS, REHAB EVAL
Pt was rec ambulating in hallway with PCA. 9:40-10:15 Pt was rec ambulating in hallway with PCA with RW

## 2018-09-14 NOTE — PHYSICAL THERAPY INITIAL EVALUATION ADULT - LIVES WITH, PROFILE
Pt lives with wife in pvt home with 1 FAY. Pt reports approximately 13 steps indoors. spouse/Pt lives with wife in pvt home with 1 FAY. Pt reports approximately 13 steps indoors.

## 2018-09-15 LAB
ACETONE, GCMS SCREEN URN: 66 MG/DL
ANION GAP SERPL CALC-SCNC: 17 MMOL/L — HIGH (ref 7–14)
APPEARANCE UR: CLEAR — SIGNIFICANT CHANGE UP
BACTERIA # UR AUTO: ABNORMAL /HPF
BASOPHILS # BLD AUTO: 0.01 K/UL — SIGNIFICANT CHANGE UP (ref 0–0.2)
BASOPHILS # BLD AUTO: 0.02 K/UL — SIGNIFICANT CHANGE UP (ref 0–0.2)
BASOPHILS NFR BLD AUTO: 0.2 % — SIGNIFICANT CHANGE UP (ref 0–1)
BASOPHILS NFR BLD AUTO: 0.4 % — SIGNIFICANT CHANGE UP (ref 0–1)
BILIRUB UR-MCNC: NEGATIVE — SIGNIFICANT CHANGE UP
BUN SERPL-MCNC: 26 MG/DL — HIGH (ref 10–20)
CALCIUM SERPL-MCNC: 9.1 MG/DL — SIGNIFICANT CHANGE UP (ref 8.5–10.1)
CHLORIDE SERPL-SCNC: 100 MMOL/L — SIGNIFICANT CHANGE UP (ref 98–110)
CK MB CFR SERPL CALC: 2.8 NG/ML — SIGNIFICANT CHANGE UP (ref 0.6–6.3)
CK MB CFR SERPL CALC: 2.8 NG/ML — SIGNIFICANT CHANGE UP (ref 0.6–6.3)
CK SERPL-CCNC: 67 U/L — SIGNIFICANT CHANGE UP (ref 0–225)
CO2 SERPL-SCNC: 19 MMOL/L — SIGNIFICANT CHANGE UP (ref 17–32)
COLOR SPEC: YELLOW — SIGNIFICANT CHANGE UP
CREAT SERPL-MCNC: 1.5 MG/DL — SIGNIFICANT CHANGE UP (ref 0.7–1.5)
DIFF PNL FLD: NEGATIVE — SIGNIFICANT CHANGE UP
EOSINOPHIL # BLD AUTO: 0.08 K/UL — SIGNIFICANT CHANGE UP (ref 0–0.7)
EOSINOPHIL # BLD AUTO: 0.08 K/UL — SIGNIFICANT CHANGE UP (ref 0–0.7)
EOSINOPHIL NFR BLD AUTO: 1.2 % — SIGNIFICANT CHANGE UP (ref 0–8)
EOSINOPHIL NFR BLD AUTO: 1.4 % — SIGNIFICANT CHANGE UP (ref 0–8)
GLUCOSE BLDC GLUCOMTR-MCNC: 119 MG/DL — HIGH (ref 70–99)
GLUCOSE BLDC GLUCOMTR-MCNC: 173 MG/DL — HIGH (ref 70–99)
GLUCOSE BLDC GLUCOMTR-MCNC: 348 MG/DL — HIGH (ref 70–99)
GLUCOSE BLDC GLUCOMTR-MCNC: 361 MG/DL — HIGH (ref 70–99)
GLUCOSE BLDC GLUCOMTR-MCNC: 418 MG/DL — HIGH (ref 70–99)
GLUCOSE SERPL-MCNC: 371 MG/DL — HIGH (ref 70–99)
GLUCOSE UR QL: 500 MG/DL
HCT VFR BLD CALC: 38.3 % — LOW (ref 42–52)
HGB BLD-MCNC: 12.5 G/DL — LOW (ref 14–18)
IMM GRANULOCYTES NFR BLD AUTO: 0.5 % — HIGH (ref 0.1–0.3)
IMM GRANULOCYTES NFR BLD AUTO: 0.8 % — HIGH (ref 0.1–0.3)
ISOPROPANOL GCMS: SIGNIFICANT CHANGE UP MG/DL
KETONES UR-MCNC: NEGATIVE — SIGNIFICANT CHANGE UP
LEUKOCYTE ESTERASE UR-ACNC: ABNORMAL
LYMPHOCYTES # BLD AUTO: 0.9 K/UL — LOW (ref 1.2–3.4)
LYMPHOCYTES # BLD AUTO: 0.92 K/UL — LOW (ref 1.2–3.4)
LYMPHOCYTES # BLD AUTO: 14.1 % — LOW (ref 20.5–51.1)
LYMPHOCYTES # BLD AUTO: 15.8 % — LOW (ref 20.5–51.1)
MAGNESIUM SERPL-MCNC: 1.9 MG/DL — SIGNIFICANT CHANGE UP (ref 1.8–2.4)
MCHC RBC-ENTMCNC: 26.4 PG — LOW (ref 27–31)
MCHC RBC-ENTMCNC: 32.6 G/DL — SIGNIFICANT CHANGE UP (ref 32–37)
MCV RBC AUTO: 80.8 FL — SIGNIFICANT CHANGE UP (ref 80–94)
MONOCYTES # BLD AUTO: 0.57 K/UL — SIGNIFICANT CHANGE UP (ref 0.1–0.6)
MONOCYTES # BLD AUTO: 0.58 K/UL — SIGNIFICANT CHANGE UP (ref 0.1–0.6)
MONOCYTES NFR BLD AUTO: 10.2 % — HIGH (ref 1.7–9.3)
MONOCYTES NFR BLD AUTO: 8.7 % — SIGNIFICANT CHANGE UP (ref 1.7–9.3)
NEUTROPHILS # BLD AUTO: 4.1 K/UL — SIGNIFICANT CHANGE UP (ref 1.4–6.5)
NEUTROPHILS # BLD AUTO: 4.91 K/UL — SIGNIFICANT CHANGE UP (ref 1.4–6.5)
NEUTROPHILS NFR BLD AUTO: 71.7 % — SIGNIFICANT CHANGE UP (ref 42.2–75.2)
NEUTROPHILS NFR BLD AUTO: 75 % — SIGNIFICANT CHANGE UP (ref 42.2–75.2)
NITRITE UR-MCNC: NEGATIVE — SIGNIFICANT CHANGE UP
NRBC # BLD: 0 /100 WBCS — SIGNIFICANT CHANGE UP (ref 0–0)
PH UR: 6 — SIGNIFICANT CHANGE UP (ref 5–8)
PLATELET # BLD AUTO: 95 K/UL — LOW (ref 130–400)
POTASSIUM SERPL-MCNC: 4.7 MMOL/L — SIGNIFICANT CHANGE UP (ref 3.5–5)
POTASSIUM SERPL-SCNC: 4.7 MMOL/L — SIGNIFICANT CHANGE UP (ref 3.5–5)
PROT UR-MCNC: 30
RBC # BLD: 4.74 M/UL — SIGNIFICANT CHANGE UP (ref 4.7–6.1)
RBC # FLD: 14.5 % — SIGNIFICANT CHANGE UP (ref 11.5–14.5)
SODIUM SERPL-SCNC: 136 MMOL/L — SIGNIFICANT CHANGE UP (ref 135–146)
SP GR SPEC: >=1.03 — SIGNIFICANT CHANGE UP (ref 1.01–1.03)
TACROLIMUS SERPL-MCNC: 9.1 NG/ML — SIGNIFICANT CHANGE UP
TROPONIN T SERPL-MCNC: <0.01 NG/ML — SIGNIFICANT CHANGE UP
TROPONIN T SERPL-MCNC: <0.01 NG/ML — SIGNIFICANT CHANGE UP
UROBILINOGEN FLD QL: 0.2 — SIGNIFICANT CHANGE UP (ref 0.2–0.2)
WBC # BLD: 6.54 K/UL — SIGNIFICANT CHANGE UP (ref 4.8–10.8)
WBC # FLD AUTO: 6.54 K/UL — SIGNIFICANT CHANGE UP (ref 4.8–10.8)
WBC UR QL: ABNORMAL /HPF

## 2018-09-15 RX ORDER — INSULIN GLARGINE 100 [IU]/ML
52 INJECTION, SOLUTION SUBCUTANEOUS AT BEDTIME
Qty: 0 | Refills: 0 | Status: DISCONTINUED | OUTPATIENT
Start: 2018-09-15 | End: 2018-09-17

## 2018-09-15 RX ORDER — INSULIN LISPRO 100/ML
17 VIAL (ML) SUBCUTANEOUS
Qty: 0 | Refills: 0 | Status: DISCONTINUED | OUTPATIENT
Start: 2018-09-15 | End: 2018-09-16

## 2018-09-15 RX ADMIN — Medication 100 MILLIGRAM(S): at 17:09

## 2018-09-15 RX ADMIN — Medication 30 MILLILITER(S): at 19:31

## 2018-09-15 RX ADMIN — TACROLIMUS 1 MILLIGRAM(S): 5 CAPSULE ORAL at 17:10

## 2018-09-15 RX ADMIN — INSULIN GLARGINE 52 UNIT(S): 100 INJECTION, SOLUTION SUBCUTANEOUS at 21:31

## 2018-09-15 RX ADMIN — Medication 5: at 10:57

## 2018-09-15 RX ADMIN — Medication 17 UNIT(S): at 16:49

## 2018-09-15 RX ADMIN — Medication 100 MILLIGRAM(S): at 05:47

## 2018-09-15 RX ADMIN — Medication 100 MILLIGRAM(S): at 06:00

## 2018-09-15 RX ADMIN — Medication 6: at 08:23

## 2018-09-15 RX ADMIN — Medication 100 MILLIGRAM(S): at 17:13

## 2018-09-15 RX ADMIN — Medication 16 UNIT(S): at 08:24

## 2018-09-15 RX ADMIN — CEFEPIME 100 MILLIGRAM(S): 1 INJECTION, POWDER, FOR SOLUTION INTRAMUSCULAR; INTRAVENOUS at 17:11

## 2018-09-15 RX ADMIN — Medication 16 UNIT(S): at 10:57

## 2018-09-15 RX ADMIN — Medication 30 MILLILITER(S): at 05:55

## 2018-09-15 RX ADMIN — Medication 100 MILLIGRAM(S): at 05:48

## 2018-09-15 RX ADMIN — MYCOPHENOLATE MOFETIL 1500 MILLIGRAM(S): 250 CAPSULE ORAL at 05:47

## 2018-09-15 RX ADMIN — CEFEPIME 100 MILLIGRAM(S): 1 INJECTION, POWDER, FOR SOLUTION INTRAMUSCULAR; INTRAVENOUS at 05:50

## 2018-09-15 RX ADMIN — SODIUM CHLORIDE 75 MILLILITER(S): 9 INJECTION INTRAMUSCULAR; INTRAVENOUS; SUBCUTANEOUS at 01:59

## 2018-09-15 RX ADMIN — TACROLIMUS 1 MILLIGRAM(S): 5 CAPSULE ORAL at 05:47

## 2018-09-15 RX ADMIN — MYCOPHENOLATE MOFETIL 1500 MILLIGRAM(S): 250 CAPSULE ORAL at 17:09

## 2018-09-15 RX ADMIN — AMLODIPINE BESYLATE 5 MILLIGRAM(S): 2.5 TABLET ORAL at 05:49

## 2018-09-15 RX ADMIN — Medication 1: at 16:49

## 2018-09-15 NOTE — CONSULT NOTE ADULT - SUBJECTIVE AND OBJECTIVE BOX
JULISA CONKLIN  68y  Male      SUBJECTIVE:  c/o wants to go home. c/o walked yesterday but was not steady    Progress Note:      REVIEW OF SYSTEMS:    T(C): 36.8 (09-15-18 @ 02:48), Max: 37.1 (09-14-18 @ 07:36)  HR: 98 (09-15-18 @ 05:50) (92 - 101)  BP: 142/70 (09-15-18 @ 05:50) (102/73 - 142/70)  RR: 18 (09-15-18 @ 02:48) (18 - 18)  SpO2: 98% (09-15-18 @ 02:48) (98% - 98%)  Wt(kg): --Vital Signs Last 24 Hrs  T(C): 36.8 (15 Sep 2018 02:48), Max: 37.1 (14 Sep 2018 07:36)  T(F): 98.2 (15 Sep 2018 02:48), Max: 98.7 (14 Sep 2018 07:36)  HR: 98 (15 Sep 2018 05:50) (92 - 101)  BP: 142/70 (15 Sep 2018 05:50) (102/73 - 142/70)  BP(mean): --  RR: 18 (15 Sep 2018 02:48) (18 - 18)  SpO2: 98% (15 Sep 2018 02:48) (98% - 98%)    PHYSICAL EXAM:  lungs clear  cor reg nl S1 S2  ext-no calf tenderness  neuro alert.oriented  no focal signs  LABS:                        12.5   6.54  )-----------( 95       ( 15 Sep 2018 05:05 )             38.3   09-15    136  |  100  |  26<H>  ----------------------------<  371<H>  4.7   |  19  |  1.5    Ca    9.1      15 Sep 2018 05:05  Mg     1.9     09-15    TPro  6.8  /  Alb  4.1  /  TBili  0.7  /  DBili  x   /  AST  13  /  ALT  7   /  AlkPhos  136<H>  09-14      RADIOLOGY:    < from: MR Head w/wo IV Cont (09.14.18 @ 16:00) >    EXAM:  MR BRAIN WAW IC            PROCEDURE DATE:  09/14/2018            INTERPRETATION:  Clinical History / Reason for exam: Cerebellar   hyperdensity on CT.    TECHNIQUE: MRI brain with and without contrast. Multiplanar   multisequential MRI of the brain was performed before and following the   intravenous administration of 7.5 cc Gadavist (0 cc discarded) on the 3   Anita magnet.     COMPARISON: Brain MRI 2/10/2015, multiple prior CT scans of the head   including 2/10/2015 and 9/12/2018.    FINDINGS:     There is a new focus of susceptibility artifact within the lateral aspect   of the left cerebellar hemisphere corresponding to the hyperdensity seen   on CT. There is mild surrounding edema. There is no evidence of   associated contrast enhancement. There is apparent DWI signal   hyperintensity centrally, likely susceptibility artifact.    Additional focus of susceptibility artifact within the left cerebellum on   series 7 image 7 was also present on the MRI from 2015.    There are numerous additional foci of susceptibility artifact and spot   signal abnormality throughout the brain, most of which are new since the   prior exam. There is evidence for prior subcortical hemorrhage at the   right vertex on series 7 image 28.    Ventricles and cortical sulci demonstrate moderate atrophic changes.     There is confluent and patchy T2/FLAIR signal abnormality the cerebral   hemispheric white matter consistent with chronic microvascular change.   There are scattered chronic interspersed chronic lacunar infarcts.    There are a small foci of reticulation within the right frontal lobe on   series 3 images 21 and 22 compatible with acute infarct. Possible   acute/subacute infarct in the left frontal lobe on series 3 image 24.    IMPRESSION:    1.  Two punctate acute infarcts in the right frontal lobe. Possible   additional small subacute infarct in the left frontal lobe.    2.  Left cerebellar signal abnormality (in the region of hyperdensity on   CT) most compatible with tiny hemorrhage with mild surrounding edema.    3.  Numerous foci of susceptibility artifact at the brain, most of which   are new since the 2015 MRI, compatible with prior micro- and small   hemorrhages which can be seen in the setting of hypertension or amyloid   angiography.    4.  Moderate chronic microvascular changes and multiple chronic lacunar   infarcts (many of which are new since 2015).                  CECI MATHUR M.D., ATTENDING RADIOLOGIST  This document has been electronically signed. Sep 14 2018  5:29PM        < end of copied text >  < from: CT Angio Neck w/ IV Cont (09.14.18 @ 15:22) >    EXAM:  CT ANGIO NECK (W)AW IC        EXAM:  CT ANGIO BRAIN (W)AW IC            PROCEDURE DATE:  09/14/2018            INTERPRETATION:  Clinical History / Reason for exam: Left cerebellar   hyperdensity.    Technique: CTA neck and head. Contiguous CT axial images of the neck and   head were obtained following the intravenous administration of 100 cc of   Optiray with coronal, sagittal and multiple 3-D MIP and volume rendered   reformats.    Correlation is made with CT scans of the head 9/11/2018and 9/12/2018;   MRI brain 2/10/2015 and carotid Doppler exam 2/10/2015.    Findings:    CTA neck-  The visualized aortic arch and great vessel origins demonstrate small   calcific plaque without significant stenosis.    The right common external carotid arteries are patent. There is   circumferential atherosclerotic plaque at the right ICA origin with mild   (less than 50%) stenosis. The remaining distal cervical segments of the   right ICA are patent.      The left common carotid artery is patent. There is calcific plaque at the   left carotid bifurcation without significant stenosis.    Right vertebral artery demonstrates a moderate calcific stenosis at its   origin but is otherwise widely patent. The left vertebral artery is   occluded atits origin. There is minimal filling within the   minimal-distal cervical segments. There is occlusion at the   craniocervical junction.. There is retrograde filling of the distal left   vertebral artery and the left PICA. Moderate left PICA stenosisis noted.    CTA head-  The distal segments of the internal carotid arteries are patent. There is   calcific plaque of the carotid siphons without significant stenosis. The   anterior and middle cerebral arteries are patent.    The basilar artery andthe posterior cerebral arteries are patent.    There is no evidence of aneurysm or vascular malformation.    Multilevel cervical spine degenerative changes.    IMPRESSION:    1.  No evidence of aneurysm or vascular malformation.    2.  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 Partial   reconstitution of the cervical segments but additional occlusion of the   proximal V4 segment. Retrograde filling of the distal left vertebral   artery and left PICA, with a moderate stenosis of the proximal left PICA   noted.    3.  Calcific plaque at the carotid bifurcations with mild (<50%) stenosis   of the right ICA origin.                  CECI MATHUR M.D., ATTENDING RADIOLOGIST  This document has been electronically signed. Sep 14 2018  4:33PM       < end of copied text >    IMPRESSION:  left cerebellar density tiny hemorrhage  acute infarcts right temporal lobe  dka resolved  dm needs better control  ckd  s/p heart transplant  htn  dld  bph        PLAN:  insulin adjusted yesterday  daily pt,may need  inpatient rehab  neuro f/u  monitor sugar  urine culture pending  I spent 30 mins. examining/evaluating patient  and coordinating care

## 2018-09-15 NOTE — CHART NOTE - NSCHARTNOTEFT_GEN_A_CORE
MRI and CTA reviewed. There is no AVM or vascular malformation.  Patient has a completely occluded Left VA at the origin and at the V4 segment.  VA flow reconstituted to the PICA from the right VA.  Findings in cerebellum consistent with embolic infarcts.  No role for neurosurgery given these findings.      There is no role for endovascular rx for total vessel occlusion unless patient has persistent emboli despite anticoagulation or recanalization develops.  Patient should begin anticoagulation and neurology eval/follow up.  Reconsult as necessary.

## 2018-09-15 NOTE — PROGRESS NOTE ADULT - SUBJECTIVE AND OBJECTIVE BOX
JULISA CONKLIN 68y Male  MRN#: [id number]       SUBJECTIVE  Patient is a 68y old Male who presents with a chief complaint of Cerebellar bleed, DKA (15 Sep 2018 06:44)  Currently admitted to medicine with the primary diagnosis of DKA (diabetic ketoacidoses).      OBJECTIVE  PAST MEDICAL & SURGICAL HISTORY  Urinary tract infection without hematuria, site unspecified  Chronic kidney disease, unspecified CKD stage  Bilateral hearing loss, unspecified hearing loss type  Benign prostatic hyperplasia with urinary frequency  Diabetes  High cholesterol  HTN (hypertension)  Heart transplant recipient  H/O heart transplant    ALLERGIES:  codeine (Unknown)    MEDICATIONS:  STANDING MEDICATIONS  allopurinol 100 milliGRAM(s) Oral two times a day  amLODIPine   Tablet 10 milliGRAM(s) Oral daily  cefepime   IVPB 1000 milliGRAM(s) IV Intermittent every 12 hours  dextrose 5%. 1000 milliLiter(s) IV Continuous <Continuous>  dextrose 50% Injectable 12.5 Gram(s) IV Push once  dextrose 50% Injectable 25 Gram(s) IV Push once  dextrose 50% Injectable 25 Gram(s) IV Push once  insulin glargine Injectable (LANTUS) 50 Unit(s) SubCutaneous at bedtime  insulin Infusion 3 Unit(s)/Hr IV Continuous <Continuous>  insulin lispro (HumaLOG) corrective regimen sliding scale   SubCutaneous three times a day before meals  insulin lispro Injectable (HumaLOG) 16 Unit(s) SubCutaneous three times a day before meals  labetalol 100 milliGRAM(s) Oral two times a day  mycophenolate mofetil 1500 milliGRAM(s) Oral two times a day  pregabalin 100 milliGRAM(s) Oral two times a day  sodium chloride 0.9%. 1000 milliLiter(s) IV Continuous <Continuous>  tacrolimus 1 milliGRAM(s) Oral every 12 hours    PRN MEDICATIONS  acetaminophen   Tablet .. 650 milliGRAM(s) Oral every 6 hours PRN  aluminum hydroxide/magnesium hydroxide/simethicone Suspension 30 milliLiter(s) Oral every 4 hours PRN  dextrose 40% Gel 15 Gram(s) Oral once PRN  glucagon  Injectable 1 milliGRAM(s) IntraMuscular once PRN      VITAL SIGNS: Last 24 Hours  T(C): 36.8 (15 Sep 2018 07:26), Max: 36.8 (15 Sep 2018 02:48)  T(F): 98.2 (15 Sep 2018 07:26), Max: 98.2 (15 Sep 2018 02:48)  HR: 98 (15 Sep 2018 07:26) (92 - 101)  BP: 178/96 (15 Sep 2018 07:26) (102/73 - 178/96)  BP(mean): --  RR: 18 (15 Sep 2018 07:26) (18 - 18)  SpO2: 98% (15 Sep 2018 02:48) (98% - 98%)    LABS:                        12.5   6.54  )-----------( 95       ( 15 Sep 2018 05:05 )             38.3     09-15    136  |  100  |  26<H>  ----------------------------<  371<H>  4.7   |  19  |  1.5    Ca    9.1      15 Sep 2018 05:05  Mg     1.9     09-15    TPro  6.8  /  Alb  4.1  /  TBili  0.7  /  DBili  x   /  AST  13  /  ALT  7   /  AlkPhos  136<H>  09-14      Urinalysis Basic - ( 15 Sep 2018 01:23 )    Color: Yellow / Appearance: Clear / SG: >=1.030 / pH: x  Gluc: x / Ketone: Negative  / Bili: Negative / Urobili: 0.2   Blood: x / Protein: 30 / Nitrite: Negative   Leuk Esterase: Moderate / RBC: x / WBC 26-50 /HPF   Sq Epi: x / Non Sq Epi: x / Bacteria: Moderate /HPF        Troponin T, Serum: <0.01 ng/mL (09-15-18 @ 05:05)      CARDIAC MARKERS ( 15 Sep 2018 05:05 )  x     / <0.01 ng/mL / x     / x     / 2.8 ng/mL      RADIOLOGY:    < from: CT Head No Cont (09.15.18 @ 10:34) >  ******PRELIMINARY REPORT******    ******PRELIMINARY REPORT******          EXAM:  CT BRAIN            PROCEDURE DATE:  09/15/2018        ******PRELIMINARY REPORT******    ******PRELIMINARY REPORT******          INTERPRETATION:  Clinical History / Reason for exam: Left facial droop.   Evaluate for intracranial hemorrhage.    TECHNIQUE: Contiguous axial CT images of the head were obtained from the   base of the skull to the vertex without IV contrast. Sagittal and coronal   reformats were obtained.     COMPARISON: Noncontrast head CT dated 9/12/2018.     CORRELATION: MRI brain dated 9/14/2018.    FINDINGS:    The cortical sulci and ventricular system demonstrate parenchymal volume   loss.     There is periventricular and hemispheric white matter hypoattenuation   compatible with chronic microvascular ischemic changes.    No acute intracranial hemorrhage, mass effect, midline shift, or   extra-axial fluid collection.    Gray-white differentiation is maintained.     Unchanged left cerebellar focus of hyperdensity, measuring 5 x 4 mm.    Beam hardening artifact is noted overlying the brain stem and posterior   fossa which is inherent to CT in this location.    The paranasal sinuses and mastoid air cells are well aerated.      IMPRESSION:    No CT evidence of acute intracranial pathology.    Unchanged left cerebellar focus of hyperdensity, measuring 5 x 4 mm.    Chronic microvascular ischemic changes.        Dr. Ceci Velez discussed preliminary findings with PRETTY SHERWOOD on   9/15/2018 10:44 AM with readback.        ******PRELIMINARY REPORT******    ******PRELIMINARY REPORT******          CECI VELEZ M.D., RESIDENT RADIOLOGIST    -------------------------------------------    < from: MR Head w/wo IV Cont (09.14.18 @ 16:00) >    EXAM:  MR BRAIN WAW IC            PROCEDURE DATE:  09/14/2018            INTERPRETATION:  Clinical History / Reason for exam: Cerebellar   hyperdensity on CT.    TECHNIQUE: MRI brain with and without contrast. Multiplanar   multisequential MRI of the brain was performed before and following the   intravenous administration of 7.5 cc Gadavist (0 cc discarded) on the 3   Anita magnet.     COMPARISON: Brain MRI 2/10/2015, multiple prior CT scans of the head   including 2/10/2015 and 9/12/2018.    FINDINGS:     There is a new focus of susceptibility artifact within the lateral aspect   of the left cerebellar hemisphere corresponding to the hyperdensity seen   on CT. There is mild surrounding edema. There is no evidence of   associated contrast enhancement. There is apparent DWI signal   hyperintensity centrally, likely susceptibility artifact.    Additional focus of susceptibility artifact within the left cerebellum on   series 7 image 7 was also present on the MRI from 2015.    There are numerous additional foci of susceptibility artifact and spot   signal abnormality throughout the brain, most of which are new since the   prior exam. There is evidence for prior subcortical hemorrhage at the   right vertex on series 7 image 28.    Ventricles and cortical sulci demonstrate moderate atrophic changes.     There is confluent and patchy T2/FLAIR signal abnormality the cerebral   hemispheric white matter consistent with chronic microvascular change.   There are scattered chronic interspersed chronic lacunar infarcts.    There are a small foci of reticulation within the right frontal lobe on   series 3 images 21 and 22 compatible with acute infarct. Possible   acute/subacute infarct in the left frontal lobe on series 3 image 24.    IMPRESSION:    1.  Two punctate acute infarcts in the right frontal lobe. Possible   additional small subacute infarct in the left frontal lobe.    2.  Left cerebellar signal abnormality (in the region of hyperdensity on   CT) most compatible with tiny hemorrhage with mild surrounding edema.    3.  Numerous foci of susceptibility artifact at the brain, most of which   are new since the 2015 MRI, compatible with prior micro- and small   hemorrhages which can be seen in the setting of hypertension or amyloid   angiography.    4.  Moderate chronic microvascular changes and multiple chronic lacunar   infarcts (many of which are new since 2015).                  CECI MATHUR M.D., ATTENDING RADIOLOGIST  This document has been electronically signed. Sep 14 2018  5:29PM    ---------------------------------    < from: CT Angio Head w/ IV Cont (09.14.18 @ 15:16) >    EXAM:  CT ANGIO NECK (W)AW IC        EXAM:  CT ANGIO BRAIN (W)AW IC            PROCEDURE DATE:  09/14/2018            INTERPRETATION:  Clinical History / Reason for exam: Left cerebellar   hyperdensity.    Technique: CTA neck and head. Contiguous CT axial images of the neck and   head were obtained following the intravenous administration of 100 cc of   Optiray with coronal, sagittal and multiple 3-D MIP and volume rendered   reformats.    Correlation is made with CT scans of the head 9/11/2018and 9/12/2018;   MRI brain 2/10/2015 and carotid Doppler exam 2/10/2015.    Findings:    CTA neck-  The visualized aortic arch and great vessel origins demonstrate small   calcific plaque without significant stenosis.    The right common external carotid arteries are patent. There is   circumferential atherosclerotic plaque at the right ICA origin with mild   (less than 50%) stenosis. The remaining distal cervical segments of the   right ICA are patent.      The left common carotid artery is patent. There is calcific plaque at the   left carotid bifurcation without significant stenosis.    Right vertebral artery demonstrates a moderate calcific stenosis at its   origin but is otherwise widely patent. The left vertebral artery is   occluded atits origin. There is minimal filling within the   minimal-distal cervical segments. There is occlusion at the   craniocervical junction.. There is retrograde filling of the distal left   vertebral artery and the left PICA. Moderate left PICA stenosisis noted.    CTA head-  The distal segments of the internal carotid arteries are patent. There is   calcific plaque of the carotid siphons without significant stenosis. The   anterior and middle cerebral arteries are patent.    The basilar artery andthe posterior cerebral arteries are patent.    There is no evidence of aneurysm or vascular malformation.    Multilevel cervical spine degenerative changes.    IMPRESSION:    1.  No evidence of aneurysm or vascular malformation.    2.  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 Partial   reconstitution of the cervical segments but additional occlusion of the   proximal V4 segment. Retrograde filling of the distal left vertebral   artery and left PICA, with a moderate stenosis of the proximal left PICA   noted.    3.  Calcific plaque at the carotid bifurcations with mild (<50%) stenosis   of the right ICA origin.      CECI MATHUR M.D., ATTENDING RADIOLOGIST  This document has been electronically signed. Sep 14 2018  4:33PM      PHYSICAL EXAM:    GENERAL: NAD, well-developed, AAOx3  HEENT:  Atraumatic, Normocephalic. EOMI, conjunctiva and sclera clear, No JVD  PULMONARY: Clear to auscultation bilaterally; No wheeze  CARDIOVASCULAR: Regular rate and rhythm; No murmurs, rubs, or gallops  GASTROINTESTINAL: Soft, Nontender, Nondistended  MUSCULOSKELETAL: No clubbing, cyanosis, or edema  NEUROLOGY: slight left-sided facial droop  SKIN: No rashes or lesions

## 2018-09-15 NOTE — PROGRESS NOTE ADULT - ASSESSMENT
The patient in the AM on 9/15 was noted by Nursing to have a left-sided facial droop possibly worsened from the day prior. I found it to be subtle but possibly worsened; CT Head STAT was performed, and noted to be unchanged from previous CT Head. The patient was of pleasant affect and conversant; able to stand and walk with assistance but was slightly hesitant.    #Left cerebellar hemorrhage  - measuring 0.9 x 0.4 cm and rpt CTH shows  0.9 x 0.5 cm hyperdensity in the left cerebellum which may represent a cavernoma with recent bleed.   - Repeat CT head 9/12 shows decrease in size of bleed  - MRI Brain w/ and w/o contrast 9/14: acute right frontal infarcts; tiny hemorrhage w/ surrounding edema in left cerebellum  - CT Angiogram Head/Neck 9/14: Occlusion of the left vertebral artery from its origin. Partial reconstitution of the cervical segments but additional occlusion of the proximal V4 segment. Retrograde filling of the distal left vertebral artery and left PICA, with a moderate stenosis of the proximal left PICA noted.  - CT Head 9/15 unchanged from prior CT Head  - F/u with Neuro  - Maintain SBP <160  - NSGY does not want to intervene  - Neuro checks Q4h  - admit to stroke unit.  - PT eval    # Chest discomfort in the AM 9/15  -  F/u EKG  - Trops neg x 1  - F/u repeat trops 11 AM    # DKA - resolving  - Anion gap 17 <-- 21 <-- 17  - insulin Lantus incr to 52 U; lispro incr to 17 U  - C/w NS 75 cc/hr  - monitor anion gap    #UTI  -+ u/a  -f/u urine cx sensitivities   -c/w cefepime 1g BID    # WANDY on CKD likely prerenal   -resolved    # HTN  - maintain SBP<160  - c/w labetalol 100 BID    # hx of cardiac transplant  - c/w immunosuppressants  - Prograf level - f/u  - may call [facility] to update patient's status  - If no echo from [facility] site records, order new echo    DVT ppx: SCD    Diet: mechanical soft dysphagia 2     Full code   Activity: OOB to chair. The patient in the AM on 9/15 was noted by Nursing to have a left-sided facial droop possibly worsened from the day prior. I found it to be subtle but possibly worsened; CT Head STAT was performed, and noted to be unchanged from previous CT Head. The patient was of pleasant affect and conversant; able to stand and walk with assistance but was slightly hesitant.    #Left cerebellar hemorrhage  - measuring 0.9 x 0.4 cm and rpt CTH shows  0.9 x 0.5 cm hyperdensity in the left cerebellum which may represent a cavernoma with recent bleed.   - Repeat CT head 9/12 shows decrease in size of bleed  - MRI Brain w/ and w/o contrast 9/14: acute right frontal infarcts; tiny hemorrhage w/ surrounding edema in left cerebellum  - CT Angiogram Head/Neck 9/14: Occlusion of the left vertebral artery from its origin. Partial reconstitution of the cervical segments but additional occlusion of the proximal V4 segment. Retrograde filling of the distal left vertebral artery and left PICA, with a moderate stenosis of the proximal left PICA noted.  - CT Head 9/15 unchanged from prior CT Head  - F/u w/ Cardio for [facility]  - F/u with Neuro  - Maintain SBP <160  - NSGY does not want to intervene  - Neuro checks Q4h  - admit to stroke unit.  - PT eval    # Chest discomfort in the AM 9/15  -  F/u EKG  - Trops neg x 1  - F/u repeat trops 11 AM    # DKA - resolving  - Anion gap 17 <-- 21 <-- 17  - insulin Lantus incr to 52 U; lispro incr to 17 U  - C/w NS 75 cc/hr  - monitor anion gap    #UTI  -+ u/a  -f/u urine cx sensitivities   -c/w cefepime 1g BID    # WANDY on CKD likely prerenal   -resolved    # HTN  - maintain SBP<160  - c/w labetalol 100 BID    # hx of cardiac transplant  - c/w immunosuppressants  - Prograf level - f/u  - may call [facility] to update patient's status  - If no echo from [facility] site records, order new echo    DVT ppx: SCD    Diet: mechanical soft dysphagia 2     Full code   Activity: OOB to chair.

## 2018-09-16 LAB
ANION GAP SERPL CALC-SCNC: 19 MMOL/L — HIGH (ref 7–14)
BUN SERPL-MCNC: 24 MG/DL — HIGH (ref 10–20)
CALCIUM SERPL-MCNC: 9.4 MG/DL — SIGNIFICANT CHANGE UP (ref 8.5–10.1)
CHLORIDE SERPL-SCNC: 103 MMOL/L — SIGNIFICANT CHANGE UP (ref 98–110)
CO2 SERPL-SCNC: 19 MMOL/L — SIGNIFICANT CHANGE UP (ref 17–32)
CREAT SERPL-MCNC: 1.4 MG/DL — SIGNIFICANT CHANGE UP (ref 0.7–1.5)
CULTURE RESULTS: SIGNIFICANT CHANGE UP
CULTURE RESULTS: SIGNIFICANT CHANGE UP
GLUCOSE BLDC GLUCOMTR-MCNC: 116 MG/DL — HIGH (ref 70–99)
GLUCOSE BLDC GLUCOMTR-MCNC: 141 MG/DL — HIGH (ref 70–99)
GLUCOSE BLDC GLUCOMTR-MCNC: 244 MG/DL — HIGH (ref 70–99)
GLUCOSE BLDC GLUCOMTR-MCNC: 280 MG/DL — HIGH (ref 70–99)
GLUCOSE BLDC GLUCOMTR-MCNC: 63 MG/DL — LOW (ref 70–99)
GLUCOSE BLDC GLUCOMTR-MCNC: 74 MG/DL — SIGNIFICANT CHANGE UP (ref 70–99)
GLUCOSE SERPL-MCNC: 91 MG/DL — SIGNIFICANT CHANGE UP (ref 70–99)
MAGNESIUM SERPL-MCNC: 1.8 MG/DL — SIGNIFICANT CHANGE UP (ref 1.8–2.4)
POTASSIUM SERPL-MCNC: 4.6 MMOL/L — SIGNIFICANT CHANGE UP (ref 3.5–5)
POTASSIUM SERPL-SCNC: 4.6 MMOL/L — SIGNIFICANT CHANGE UP (ref 3.5–5)
SODIUM SERPL-SCNC: 141 MMOL/L — SIGNIFICANT CHANGE UP (ref 135–146)
SPECIMEN SOURCE: SIGNIFICANT CHANGE UP
SPECIMEN SOURCE: SIGNIFICANT CHANGE UP

## 2018-09-16 RX ORDER — INSULIN LISPRO 100/ML
10 VIAL (ML) SUBCUTANEOUS
Qty: 0 | Refills: 0 | Status: DISCONTINUED | OUTPATIENT
Start: 2018-09-16 | End: 2018-09-20

## 2018-09-16 RX ADMIN — Medication 100 MILLIGRAM(S): at 17:17

## 2018-09-16 RX ADMIN — INSULIN GLARGINE 52 UNIT(S): 100 INJECTION, SOLUTION SUBCUTANEOUS at 21:21

## 2018-09-16 RX ADMIN — Medication 3: at 17:14

## 2018-09-16 RX ADMIN — MYCOPHENOLATE MOFETIL 1500 MILLIGRAM(S): 250 CAPSULE ORAL at 17:55

## 2018-09-16 RX ADMIN — TACROLIMUS 1 MILLIGRAM(S): 5 CAPSULE ORAL at 05:41

## 2018-09-16 RX ADMIN — Medication 100 MILLIGRAM(S): at 17:14

## 2018-09-16 RX ADMIN — AMLODIPINE BESYLATE 10 MILLIGRAM(S): 2.5 TABLET ORAL at 05:40

## 2018-09-16 RX ADMIN — Medication 10 UNIT(S): at 17:14

## 2018-09-16 RX ADMIN — TACROLIMUS 1 MILLIGRAM(S): 5 CAPSULE ORAL at 17:15

## 2018-09-16 RX ADMIN — Medication 100 MILLIGRAM(S): at 05:43

## 2018-09-16 RX ADMIN — MYCOPHENOLATE MOFETIL 1500 MILLIGRAM(S): 250 CAPSULE ORAL at 05:41

## 2018-09-16 RX ADMIN — Medication 17 UNIT(S): at 07:26

## 2018-09-16 RX ADMIN — Medication 100 MILLIGRAM(S): at 05:42

## 2018-09-16 RX ADMIN — Medication 100 MILLIGRAM(S): at 05:40

## 2018-09-16 RX ADMIN — CEFEPIME 100 MILLIGRAM(S): 1 INJECTION, POWDER, FOR SOLUTION INTRAMUSCULAR; INTRAVENOUS at 05:38

## 2018-09-16 RX ADMIN — CEFEPIME 100 MILLIGRAM(S): 1 INJECTION, POWDER, FOR SOLUTION INTRAMUSCULAR; INTRAVENOUS at 17:29

## 2018-09-16 NOTE — PROGRESS NOTE ADULT - SUBJECTIVE AND OBJECTIVE BOX
JULISA CONKLIN  68y  Male      SUBJECTIVE:  slept poorly last night    Progress Note:      REVIEW OF SYSTEMS:    T(C): 36.6 (09-16-18 @ 07:26), Max: 37.7 (09-15-18 @ 15:38)  HR: 104 (09-16-18 @ 07:26) (97 - 104)  BP: 184/92 (09-16-18 @ 07:26) (126/76 - 184/92)  RR: 18 (09-16-18 @ 07:26) (16 - 18)  SpO2: 99% (09-15-18 @ 19:00) (96% - 99%)  Wt(kg): --Vital Signs Last 24 Hrs  T(C): 36.6 (16 Sep 2018 07:26), Max: 37.7 (15 Sep 2018 15:38)  T(F): 97.9 (16 Sep 2018 07:26), Max: 99.8 (15 Sep 2018 15:38)  HR: 104 (16 Sep 2018 07:26) (97 - 104)  BP: 184/92 (16 Sep 2018 07:26) (126/76 - 184/92)  BP(mean): --  RR: 18 (16 Sep 2018 07:26) (16 - 18)  SpO2: 99% (15 Sep 2018 19:00) (96% - 99%)    PHYSICAL EXAM:  lungs clear  cor reg nl S1 S2  ext-no calf tenderness  neuro alert.oriented  no focal signs  LABS:                          12.5   6.54  )-----------( 95       ( 15 Sep 2018 05:05 )             38.3   09-15    136  |  100  |  26<H>  ----------------------------<  371<H>  4.7   |  19  |  1.5    Ca    9.1      15 Sep 2018 05:05  Mg     1.9     09-15    TPro  6.8  /  Alb  4.1  /  TBili  0.7  /  DBili  x   /  AST  13  /  ALT  7   /  AlkPhos  136<H>  09-14    RADIOLOGY:  < from: CT Head No Cont (09.15.18 @ 10:34) >  EXAM:  CT BRAIN            PROCEDURE DATE:  09/15/2018            INTERPRETATION:  Clinical History / Reason for exam: Left facial droop.   Evaluate for intracranial hemorrhage.    TECHNIQUE: Contiguous axial CT images of the head were obtained from the   base of the skull to the vertex without IV contrast. Sagittal and coronal   reformats were obtained.     COMPARISON: Noncontrast head CT dated 9/12/2018.     CORRELATION: MRI brain dated 9/14/2018.      FINDINGS:    The cortical sulci and ventricular system demonstrate parenchymal volume   loss.     There is periventricular and hemispheric white matter hypoattenuation   compatible with chronic microvascular ischemic changes.    Unchanged left cerebellar focus of hyperdensity, measuring 5 x4 mm.    No acute intracranial hemorrhage, mass effect, midline shift, or   extra-axial fluid collection.    Gray-white differentiation is grossly well maintained.     Beam hardening artifact is noted overlying the brain stem and posterior   fossa which is inherent to CT in this location.    The paranasal sinuses and mastoid air cells are well aerated.      IMPRESSION:    1.  Unchanged left cerebellar focus of hyperdensity, measuring 5 x 4 mm.    2.  Chronic microvascular ischemic changes.    3.No CT evidence of acute intracranial pathology.      Dr. Ceci Velez discussed preliminary findings with PRETTY SHERWOOD on   9/15/2018 at 10:44 AM with read back.              CECI VELEZ M.D., RESIDENT RADIOLOGIST  This document has been electronically signed.  MONICA BAXTER M.D., ATTENDING RADIOLOGIST  This document has been electronically signed. Sep 15 2018  4:26PM        < end of copied text >      IMPRESSION:    left cerebellar density tiny hemorrhage  acute infarcts right temporal lobe ,possibly embolic as per neurosurgery  dka resolved  dm needs better control  ckd  s/p heart transplant  htn needs better control  dld  bph  uti      PLAN:  adjust insulin as per endo consult  daily  phys. therapy; may need  inpatient rehab  neuro f/u regarding possible anticoagulation  monitor sugar  urine culture pending  adjust bp meds  I spent 30 mins. examining/evaluating/counseling patient  and coordinating care with resident

## 2018-09-16 NOTE — CONSULT NOTE ADULT - SUBJECTIVE AND OBJECTIVE BOX
Mr genevieve who sees me in office has been well controlled with his diabetes on basal bolus insulin.. Blood sugars fluctuate 361 mg, 418 mg 348 mg 119 mg and 173 mg  S/P cardiac transplant  Pt admitted for CVA  Euthyroid heart regular , chest clear  Pt needs better control of his diabetes.  Plan Increase Lantus tro 60 u sc  HS and premeal   humalog to be to be 10 u AC. Will benefit with nutritional conssult    CBC, HBAIC.  TSH, Monitor blood sugars.

## 2018-09-16 NOTE — CONSULT NOTE ADULT - ASSESSMENT
69 y/o male with a hx of Cardiac Transplant x 10 years ago due to MI x 2 and low EF on immunosuppressant , DM HTN, DLD p/w acute confusion x 1 day   found to have left cerebellar acute/subacute infarct with hemorrhagic component.      know CAD with ischemic CM s/p Heart transplant  DM ,DL ,HTN  acute cerebellar stroke with hemorrhagic component    continue with telemetry  check 2d echo for now   r/o other causes such as vasculopathy , check ESR , CRP , MYKE ,ANCA  get records from transplant center , c/w tacrolimus and mycophenolate  will follow 69 y/o male with a hx of Cardiac Transplant x 10 years ago due to MI x 2 and low EF on immunosuppressant , DM HTN, DLD p/w acute confusion x 1 day   found to have left cerebellar acute/subacute infarct with hemorrhagic component.      know CAD with ischemic CM s/p Heart transplant  DM ,DL ,HTN  acute cerebellar stroke with hemorrhagic component    continue with telemetry  check 2D echo (TTE with bubble study).   r/o other causes such as vasculopathy , check ESR , CRP , MYKE ,ANCA  get records from transplant center , c/w tacrolimus and mycophenolate  If patient is a candidate for anticoagulation, and TTE is non-diagnostic, will proceed with [facility].

## 2018-09-16 NOTE — CONSULT NOTE ADULT - SUBJECTIVE AND OBJECTIVE BOX
HPI:  69 y/o male with a hx of Cardiac Transplant x 10 years ago due to MI x 2 and low EF on immunosuppressant , DM HTN, DLD p/w acute confusion x 1 day   found to have left cerebellar acute/subacute infarct with hemorrhagic component , along with DKA and UTI. pt was treated in ICU , then downgraded to stroke unit .   cardiology called for evaluation of cardiac embolic source.  pt denied any Hx of Afib , palpitations , chest pain , sob , or previous stroke.      PAST MEDICAL & SURGICAL HISTORY  Urinary tract infection without hematuria, site unspecified  Chronic kidney disease, unspecified CKD stage  Bilateral hearing loss, unspecified hearing loss type  Benign prostatic hyperplasia with urinary frequency  Diabetes  High cholesterol  HTN (hypertension)  Heart transplant recipient  H/O heart transplant      FAMILY HISTORY:  FAMILY HISTORY:  No pertinent family history in first degree relatives      SOCIAL HISTORY:  []smoker  []Alcohol  []Drug    ALLERGIES:  codeine (Unknown)      MEDICATIONS:  MEDICATIONS  (STANDING):  allopurinol 100 milliGRAM(s) Oral two times a day  amLODIPine   Tablet 10 milliGRAM(s) Oral daily  cefepime   IVPB 1000 milliGRAM(s) IV Intermittent every 12 hours  dextrose 5%. 1000 milliLiter(s) (50 mL/Hr) IV Continuous <Continuous>  dextrose 50% Injectable 12.5 Gram(s) IV Push once  dextrose 50% Injectable 25 Gram(s) IV Push once  dextrose 50% Injectable 25 Gram(s) IV Push once  insulin glargine Injectable (LANTUS) 52 Unit(s) SubCutaneous at bedtime  insulin Infusion 3 Unit(s)/Hr (3 mL/Hr) IV Continuous <Continuous>  insulin lispro (HumaLOG) corrective regimen sliding scale   SubCutaneous three times a day before meals  insulin lispro Injectable (HumaLOG) 10 Unit(s) SubCutaneous three times a day before meals  labetalol 100 milliGRAM(s) Oral two times a day  mycophenolate mofetil 1500 milliGRAM(s) Oral two times a day  pregabalin 100 milliGRAM(s) Oral two times a day  tacrolimus 1 milliGRAM(s) Oral every 12 hours    MEDICATIONS  (PRN):  acetaminophen   Tablet .. 650 milliGRAM(s) Oral every 6 hours PRN Temp greater or equal to 38C (100.4F), Moderate Pain (4 - 6)  aluminum hydroxide/magnesium hydroxide/simethicone Suspension 30 milliLiter(s) Oral every 4 hours PRN Dyspepsia  dextrose 40% Gel 15 Gram(s) Oral once PRN Blood Glucose LESS THAN 70 milliGRAM(s)/deciliter  glucagon  Injectable 1 milliGRAM(s) IntraMuscular once PRN Glucose LESS THAN 70 milligrams/deciliter      HOME MEDICATIONS:  Home Medications:  allopurinol 100 mg oral tablet: 1 tab(s) orally 2 times a day (11 Sep 2018 02:58)  amLODIPine 5 mg oral tablet: 1 tab(s) orally once a day (11 Sep 2018 02:58)  aspirin 81 mg oral tablet: 1 tab(s) orally once a day (11 Sep 2018 02:58)  doxazosin 8 mg oral tablet: 1 tab(s) orally once a day (11 Sep 2018 02:58)  furosemide 40 mg oral tablet: 1 tab(s) orally once a day (11 Sep 2018 02:58)  ketorolac 0.5% ophthalmic solution: 1 drop(s) to each affected eye 4 times a day (11 Sep 2018 02:58)  Lantus 100 units/mL subcutaneous solution:  (11 Sep 2018 02:58)  Lyrica 100 mg oral capsule: 1 cap(s) orally 2 times a day (11 Sep 2018 02:58)  metaxalone 800 mg oral tablet: 1 tab(s) orally 3 times a day (11 Sep 2018 02:58)  mycophenolate mofetil 500 mg oral tablet:  (11 Sep 2018 02:58)  niacin 500 mg oral tablet:  (11 Sep 2018 02:58)  NovoLOG Mix 70/30 subcutaneous suspension:  (11 Sep 2018 02:58)  Oysco 500 with D:  (11 Sep 2018 02:58)  pantoprazole 40 mg oral delayed release tablet: 1 tab(s) orally once a day (11 Sep 2018 02:58)  potassium chloride 10 mEq oral capsule, extended release: 1 cap(s) orally 2 times a day (11 Sep 2018 02:58)  pravastatin 40 mg oral tablet: 1 tab(s) orally once a day (11 Sep 2018 02:58)  quiNINE 324 mg oral capsule: 2 cap(s) orally every 8 hours (11 Sep 2018 02:58)  spironolactone 25 mg oral tablet: 1 tab(s) orally once a day (11 Sep 2018 02:58)  tacrolimus 1 mg oral capsule: cap(s) orally every 12 hours (11 Sep 2018 02:58)  terbinafine 250 mg oral tablet: 1 tab(s) orally once a day (11 Sep 2018 02:58)      VITALS:   T(F): 98.4 (09-16 @ 16:08), Max: 99.8 (09-15 @ 15:38)  HR: 100 (09-16 @ 16:08) (92 - 104)  BP: 161/86 (09-16 @ 16:08) (102/73 - 184/92)  BP(mean): 108 (09-13 @ 19:00) (93 - 108)  RR: 18 (09-16 @ 16:08) (16 - 30)  SpO2: 100% (09-16 @ 16:08) (96% - 100%)    I&O's Summary    15 Sep 2018 07:01  -  16 Sep 2018 07:00  --------------------------------------------------------  IN: 50 mL / OUT: 900 mL / NET: -850 mL    16 Sep 2018 07:01  -  16 Sep 2018 16:12  --------------------------------------------------------  IN: 0 mL / OUT: 700 mL / NET: -700 mL        REVIEW OF SYSTEMS:  CONSTITUTIONAL: No weakness, fevers or chills  EYES/ENT: No visual changes;  + dizziness  RESPIRATORY: No cough, wheezing, hemoptysis; No shortness of breath  CARDIOVASCULAR: No chest pain or palpitations  GASTROINTESTINAL: No abdominal or epigastric pain. No nausea, vomiting, or hematemesis; No diarrhea or constipation. No melena or hematochezia.  NEUROLOGICAL: No numbness or weakness      PHYSICAL EXAM:  NEURO: patient is awake , alert and oriented  GEN: Not in acute distress  NECK: no thyroid enlargement, no JVD  LUNGS: Clear to auscultation bilaterally   CARDIOVASCULAR: S1/S2 present, RRR , no murmurs or rubs  ABD: Soft, non-tender, non-distended, +BS  EXT: No DEVON    LABS:                        12.5   6.54  )-----------( 95       ( 15 Sep 2018 05:05 )             38.3     09-16    141  |  103  |  24<H>  ----------------------------<  91  4.6   |  19  |  1.4    Ca    9.4      16 Sep 2018 07:47  Mg     1.8     09-16      CARDIAC MARKERS ( 15 Sep 2018 10:46 )  x     / <0.01 ng/mL / 67 U/L / x     / 2.8 ng/mL  CARDIAC MARKERS ( 15 Sep 2018 05:05 )  x     / <0.01 ng/mL / x     / x     / 2.8 ng/mL      09-11 Chol 143  HDL 24<L> Trig 137      RADIOLOGY:  -CXR: v< from: Xray Chest 1 View- PORTABLE-Routine (09.13.18 @ 03:50) >  Impression:      No radiographic evidence of acute cardiopulmonary disease.    < end of copied text >    CTA :< from: CT Angio Neck w/ IV Cont (09.14.18 @ 15:22) >  1.  No evidence of aneurysm or vascular malformation.    2.  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 Partial   reconstitution of the cervical segments but additional occlusion of the   proximal V4 segment. Retrograde filling of the distal left vertebral   artery and left PICA, with a moderate stenosis of the proximal left PICA   noted.    3.  Calcific plaque at the carotid bifurcations with mild (<50%) stenosis   of the right ICA origin.    < end of copied text >      ECG:  sinus rythm  TELEMETRY EVENTS:  no afib HPI:  67 y/o male with a hx of Cardiac Transplant x 10 years ago due to MI x 2 and low EF on immunosuppressant , DM HTN, DLD p/w acute confusion x 1 day   found to have left cerebellar acute/subacute infarct with hemorrhagic component , along with DKA and UTI. pt was treated in ICU , then downgraded to stroke unit .   cardiology called for evaluation of cardio-embolic source.  pt denied any Hx of Afib , palpitations , chest pain , sob , or previous stroke.      PAST MEDICAL & SURGICAL HISTORY  Urinary tract infection without hematuria, site unspecified  Chronic kidney disease, unspecified CKD stage  Bilateral hearing loss, unspecified hearing loss type  Benign prostatic hyperplasia with urinary frequency  Diabetes  High cholesterol  HTN (hypertension)  Heart transplant recipient  H/O heart transplant      FAMILY HISTORY:  FAMILY HISTORY:  No pertinent cad family history in first degree relatives      SOCIAL HISTORY:  []smoker  []Alcohol  []Drug    ALLERGIES:  codeine (Unknown)      MEDICATIONS:  MEDICATIONS  (STANDING):  allopurinol 100 milliGRAM(s) Oral two times a day  amLODIPine   Tablet 10 milliGRAM(s) Oral daily  cefepime   IVPB 1000 milliGRAM(s) IV Intermittent every 12 hours  dextrose 5%. 1000 milliLiter(s) (50 mL/Hr) IV Continuous <Continuous>  dextrose 50% Injectable 12.5 Gram(s) IV Push once  dextrose 50% Injectable 25 Gram(s) IV Push once  dextrose 50% Injectable 25 Gram(s) IV Push once  insulin glargine Injectable (LANTUS) 52 Unit(s) SubCutaneous at bedtime  insulin Infusion 3 Unit(s)/Hr (3 mL/Hr) IV Continuous <Continuous>  insulin lispro (HumaLOG) corrective regimen sliding scale   SubCutaneous three times a day before meals  insulin lispro Injectable (HumaLOG) 10 Unit(s) SubCutaneous three times a day before meals  labetalol 100 milliGRAM(s) Oral two times a day  mycophenolate mofetil 1500 milliGRAM(s) Oral two times a day  pregabalin 100 milliGRAM(s) Oral two times a day  tacrolimus 1 milliGRAM(s) Oral every 12 hours    MEDICATIONS  (PRN):  acetaminophen   Tablet .. 650 milliGRAM(s) Oral every 6 hours PRN Temp greater or equal to 38C (100.4F), Moderate Pain (4 - 6)  aluminum hydroxide/magnesium hydroxide/simethicone Suspension 30 milliLiter(s) Oral every 4 hours PRN Dyspepsia  dextrose 40% Gel 15 Gram(s) Oral once PRN Blood Glucose LESS THAN 70 milliGRAM(s)/deciliter  glucagon  Injectable 1 milliGRAM(s) IntraMuscular once PRN Glucose LESS THAN 70 milligrams/deciliter      HOME MEDICATIONS:  Home Medications:  allopurinol 100 mg oral tablet: 1 tab(s) orally 2 times a day (11 Sep 2018 02:58)  amLODIPine 5 mg oral tablet: 1 tab(s) orally once a day (11 Sep 2018 02:58)  aspirin 81 mg oral tablet: 1 tab(s) orally once a day (11 Sep 2018 02:58)  doxazosin 8 mg oral tablet: 1 tab(s) orally once a day (11 Sep 2018 02:58)  furosemide 40 mg oral tablet: 1 tab(s) orally once a day (11 Sep 2018 02:58)  ketorolac 0.5% ophthalmic solution: 1 drop(s) to each affected eye 4 times a day (11 Sep 2018 02:58)  Lantus 100 units/mL subcutaneous solution:  (11 Sep 2018 02:58)  Lyrica 100 mg oral capsule: 1 cap(s) orally 2 times a day (11 Sep 2018 02:58)  metaxalone 800 mg oral tablet: 1 tab(s) orally 3 times a day (11 Sep 2018 02:58)  mycophenolate mofetil 500 mg oral tablet:  (11 Sep 2018 02:58)  niacin 500 mg oral tablet:  (11 Sep 2018 02:58)  NovoLOG Mix 70/30 subcutaneous suspension:  (11 Sep 2018 02:58)  Oysco 500 with D:  (11 Sep 2018 02:58)  pantoprazole 40 mg oral delayed release tablet: 1 tab(s) orally once a day (11 Sep 2018 02:58)  potassium chloride 10 mEq oral capsule, extended release: 1 cap(s) orally 2 times a day (11 Sep 2018 02:58)  pravastatin 40 mg oral tablet: 1 tab(s) orally once a day (11 Sep 2018 02:58)  quiNINE 324 mg oral capsule: 2 cap(s) orally every 8 hours (11 Sep 2018 02:58)  spironolactone 25 mg oral tablet: 1 tab(s) orally once a day (11 Sep 2018 02:58)  tacrolimus 1 mg oral capsule: cap(s) orally every 12 hours (11 Sep 2018 02:58)  terbinafine 250 mg oral tablet: 1 tab(s) orally once a day (11 Sep 2018 02:58)      VITALS:   T(F): 98.4 (09-16 @ 16:08), Max: 99.8 (09-15 @ 15:38)  HR: 100 (09-16 @ 16:08) (92 - 104)  BP: 161/86 (09-16 @ 16:08) (102/73 - 184/92)  BP(mean): 108 (09-13 @ 19:00) (93 - 108)  RR: 18 (09-16 @ 16:08) (16 - 30)  SpO2: 100% (09-16 @ 16:08) (96% - 100%)    I&O's Summary    15 Sep 2018 07:01  -  16 Sep 2018 07:00  --------------------------------------------------------  IN: 50 mL / OUT: 900 mL / NET: -850 mL    16 Sep 2018 07:01  -  16 Sep 2018 16:12  --------------------------------------------------------  IN: 0 mL / OUT: 700 mL / NET: -700 mL        REVIEW OF SYSTEMS:  CONSTITUTIONAL: No weakness, fevers or chills  EYES/ENT: No visual changes;  + dizziness  RESPIRATORY: No cough, wheezing, hemoptysis; No shortness of breath  CARDIOVASCULAR: No chest pain or palpitations  GASTROINTESTINAL: No abdominal or epigastric pain. No nausea, vomiting, or hematemesis; No diarrhea or constipation. No melena or hematochezia.  NEUROLOGICAL: see HPI  otherwise negative        PHYSICAL EXAM:  NEURO: patient is awake , alert and oriented  GEN: Not in acute distress  HEENT: NC/AT  NECK: no thyroid enlargement, no JVD  LUNGS: Clear to auscultation bilaterally   CARDIOVASCULAR: S1/S2 present, RRR , no murmurs or rubs  ABD: Soft, non-tender, non-distended, +BS  MSK: No DEVON, moves all 4 ext  Skin: intact  Psych: AAO x 3    LABS:                        12.5   6.54  )-----------( 95       ( 15 Sep 2018 05:05 )             38.3     09-16    141  |  103  |  24<H>  ----------------------------<  91  4.6   |  19  |  1.4    Ca    9.4      16 Sep 2018 07:47  Mg     1.8     09-16      CARDIAC MARKERS ( 15 Sep 2018 10:46 )  x     / <0.01 ng/mL / 67 U/L / x     / 2.8 ng/mL  CARDIAC MARKERS ( 15 Sep 2018 05:05 )  x     / <0.01 ng/mL / x     / x     / 2.8 ng/mL      09-11 Chol 143  HDL 24<L> Trig 137      RADIOLOGY:  -CXR: v< from: Xray Chest 1 View- PORTABLE-Routine (09.13.18 @ 03:50) >  Impression:      No radiographic evidence of acute cardiopulmonary disease.    < end of copied text >    CTA :< from: CT Angio Neck w/ IV Cont (09.14.18 @ 15:22) >  1.  No evidence of aneurysm or vascular malformation.    2.  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 Partial   reconstitution of the cervical segments but additional occlusion of the   proximal V4 segment. Retrograde filling of the distal left vertebral   artery and left PICA, with a moderate stenosis of the proximal left PICA   noted.    3.  Calcific plaque at the carotid bifurcations with mild (<50%) stenosis   of the right ICA origin.    < end of copied text >      ECG:  sinus rhythm  TELEMETRY EVENTS:  no afib

## 2018-09-17 LAB
ANION GAP SERPL CALC-SCNC: 19 MMOL/L — HIGH (ref 7–14)
ANION GAP SERPL CALC-SCNC: 20 MMOL/L — HIGH (ref 7–14)
BASOPHILS # BLD AUTO: 0.03 K/UL — SIGNIFICANT CHANGE UP (ref 0–0.2)
BASOPHILS NFR BLD AUTO: 0.4 % — SIGNIFICANT CHANGE UP (ref 0–1)
BUN SERPL-MCNC: 23 MG/DL — HIGH (ref 10–20)
BUN SERPL-MCNC: 23 MG/DL — HIGH (ref 10–20)
CALCIUM SERPL-MCNC: 8.7 MG/DL — SIGNIFICANT CHANGE UP (ref 8.5–10.1)
CALCIUM SERPL-MCNC: 9.3 MG/DL — SIGNIFICANT CHANGE UP (ref 8.5–10.1)
CHLORIDE SERPL-SCNC: 100 MMOL/L — SIGNIFICANT CHANGE UP (ref 98–110)
CHLORIDE SERPL-SCNC: 102 MMOL/L — SIGNIFICANT CHANGE UP (ref 98–110)
CO2 SERPL-SCNC: 19 MMOL/L — SIGNIFICANT CHANGE UP (ref 17–32)
CO2 SERPL-SCNC: 19 MMOL/L — SIGNIFICANT CHANGE UP (ref 17–32)
CREAT SERPL-MCNC: 1.3 MG/DL — SIGNIFICANT CHANGE UP (ref 0.7–1.5)
CREAT SERPL-MCNC: 1.3 MG/DL — SIGNIFICANT CHANGE UP (ref 0.7–1.5)
EOSINOPHIL # BLD AUTO: 0.09 K/UL — SIGNIFICANT CHANGE UP (ref 0–0.7)
EOSINOPHIL NFR BLD AUTO: 1.2 % — SIGNIFICANT CHANGE UP (ref 0–8)
GLUCOSE BLDC GLUCOMTR-MCNC: 105 MG/DL — HIGH (ref 70–99)
GLUCOSE BLDC GLUCOMTR-MCNC: 167 MG/DL — HIGH (ref 70–99)
GLUCOSE BLDC GLUCOMTR-MCNC: 177 MG/DL — HIGH (ref 70–99)
GLUCOSE BLDC GLUCOMTR-MCNC: 180 MG/DL — HIGH (ref 70–99)
GLUCOSE SERPL-MCNC: 172 MG/DL — HIGH (ref 70–99)
GLUCOSE SERPL-MCNC: 178 MG/DL — HIGH (ref 70–99)
HCT VFR BLD CALC: 38.4 % — LOW (ref 42–52)
HGB BLD-MCNC: 12.4 G/DL — LOW (ref 14–18)
IMM GRANULOCYTES NFR BLD AUTO: 0.5 % — HIGH (ref 0.1–0.3)
LYMPHOCYTES # BLD AUTO: 0.95 K/UL — LOW (ref 1.2–3.4)
LYMPHOCYTES # BLD AUTO: 12.8 % — LOW (ref 20.5–51.1)
MAGNESIUM SERPL-MCNC: 1.6 MG/DL — LOW (ref 1.8–2.4)
MCHC RBC-ENTMCNC: 26.3 PG — LOW (ref 27–31)
MCHC RBC-ENTMCNC: 32.3 G/DL — SIGNIFICANT CHANGE UP (ref 32–37)
MCV RBC AUTO: 81.5 FL — SIGNIFICANT CHANGE UP (ref 80–94)
MONOCYTES # BLD AUTO: 0.77 K/UL — HIGH (ref 0.1–0.6)
MONOCYTES NFR BLD AUTO: 10.4 % — HIGH (ref 1.7–9.3)
NEUTROPHILS # BLD AUTO: 5.53 K/UL — SIGNIFICANT CHANGE UP (ref 1.4–6.5)
NEUTROPHILS NFR BLD AUTO: 74.7 % — SIGNIFICANT CHANGE UP (ref 42.2–75.2)
NRBC # BLD: 0 /100 WBCS — SIGNIFICANT CHANGE UP (ref 0–0)
PLATELET # BLD AUTO: 113 K/UL — LOW (ref 130–400)
POTASSIUM SERPL-MCNC: 4.1 MMOL/L — SIGNIFICANT CHANGE UP (ref 3.5–5)
POTASSIUM SERPL-MCNC: 4.1 MMOL/L — SIGNIFICANT CHANGE UP (ref 3.5–5)
POTASSIUM SERPL-SCNC: 4.1 MMOL/L — SIGNIFICANT CHANGE UP (ref 3.5–5)
POTASSIUM SERPL-SCNC: 4.1 MMOL/L — SIGNIFICANT CHANGE UP (ref 3.5–5)
RBC # BLD: 4.71 M/UL — SIGNIFICANT CHANGE UP (ref 4.7–6.1)
RBC # FLD: 14.4 % — SIGNIFICANT CHANGE UP (ref 11.5–14.5)
SODIUM SERPL-SCNC: 138 MMOL/L — SIGNIFICANT CHANGE UP (ref 135–146)
SODIUM SERPL-SCNC: 141 MMOL/L — SIGNIFICANT CHANGE UP (ref 135–146)
TACROLIMUS SERPL-MCNC: 16.8 NG/ML — SIGNIFICANT CHANGE UP
WBC # BLD: 7.41 K/UL — SIGNIFICANT CHANGE UP (ref 4.8–10.8)
WBC # FLD AUTO: 7.41 K/UL — SIGNIFICANT CHANGE UP (ref 4.8–10.8)

## 2018-09-17 RX ORDER — MAGNESIUM SULFATE 500 MG/ML
2 VIAL (ML) INJECTION ONCE
Qty: 0 | Refills: 0 | Status: COMPLETED | OUTPATIENT
Start: 2018-09-17 | End: 2018-09-17

## 2018-09-17 RX ORDER — DOXAZOSIN MESYLATE 4 MG
8 TABLET ORAL AT BEDTIME
Qty: 0 | Refills: 0 | Status: DISCONTINUED | OUTPATIENT
Start: 2018-09-17 | End: 2018-09-21

## 2018-09-17 RX ORDER — ATORVASTATIN CALCIUM 80 MG/1
10 TABLET, FILM COATED ORAL AT BEDTIME
Qty: 0 | Refills: 0 | Status: DISCONTINUED | OUTPATIENT
Start: 2018-09-17 | End: 2018-09-21

## 2018-09-17 RX ORDER — ASPIRIN/CALCIUM CARB/MAGNESIUM 324 MG
81 TABLET ORAL DAILY
Qty: 0 | Refills: 0 | Status: DISCONTINUED | OUTPATIENT
Start: 2018-09-17 | End: 2018-09-21

## 2018-09-17 RX ORDER — INSULIN GLARGINE 100 [IU]/ML
55 INJECTION, SOLUTION SUBCUTANEOUS AT BEDTIME
Qty: 0 | Refills: 0 | Status: DISCONTINUED | OUTPATIENT
Start: 2018-09-17 | End: 2018-09-20

## 2018-09-17 RX ORDER — MAGNESIUM SULFATE 500 MG/ML
2 VIAL (ML) INJECTION ONCE
Qty: 0 | Refills: 0 | Status: DISCONTINUED | OUTPATIENT
Start: 2018-09-17 | End: 2018-09-17

## 2018-09-17 RX ADMIN — Medication 100 MILLIGRAM(S): at 17:04

## 2018-09-17 RX ADMIN — MYCOPHENOLATE MOFETIL 1500 MILLIGRAM(S): 250 CAPSULE ORAL at 17:04

## 2018-09-17 RX ADMIN — INSULIN GLARGINE 55 UNIT(S): 100 INJECTION, SOLUTION SUBCUTANEOUS at 22:28

## 2018-09-17 RX ADMIN — TACROLIMUS 1 MILLIGRAM(S): 5 CAPSULE ORAL at 05:31

## 2018-09-17 RX ADMIN — Medication 10 UNIT(S): at 16:58

## 2018-09-17 RX ADMIN — Medication 1: at 08:01

## 2018-09-17 RX ADMIN — CEFEPIME 100 MILLIGRAM(S): 1 INJECTION, POWDER, FOR SOLUTION INTRAMUSCULAR; INTRAVENOUS at 17:03

## 2018-09-17 RX ADMIN — TACROLIMUS 1 MILLIGRAM(S): 5 CAPSULE ORAL at 17:04

## 2018-09-17 RX ADMIN — Medication 10 UNIT(S): at 08:02

## 2018-09-17 RX ADMIN — Medication 1: at 11:57

## 2018-09-17 RX ADMIN — Medication 81 MILLIGRAM(S): at 11:12

## 2018-09-17 RX ADMIN — Medication 100 MILLIGRAM(S): at 17:05

## 2018-09-17 RX ADMIN — MYCOPHENOLATE MOFETIL 1500 MILLIGRAM(S): 250 CAPSULE ORAL at 05:30

## 2018-09-17 RX ADMIN — Medication 100 MILLIGRAM(S): at 05:30

## 2018-09-17 RX ADMIN — Medication 100 MILLIGRAM(S): at 05:29

## 2018-09-17 RX ADMIN — Medication 50 GRAM(S): at 10:44

## 2018-09-17 RX ADMIN — Medication 8 MILLIGRAM(S): at 21:24

## 2018-09-17 RX ADMIN — CEFEPIME 100 MILLIGRAM(S): 1 INJECTION, POWDER, FOR SOLUTION INTRAMUSCULAR; INTRAVENOUS at 05:30

## 2018-09-17 RX ADMIN — AMLODIPINE BESYLATE 10 MILLIGRAM(S): 2.5 TABLET ORAL at 05:30

## 2018-09-17 RX ADMIN — Medication 10 UNIT(S): at 11:57

## 2018-09-17 RX ADMIN — Medication 100 MILLIGRAM(S): at 05:31

## 2018-09-17 RX ADMIN — ATORVASTATIN CALCIUM 10 MILLIGRAM(S): 80 TABLET, FILM COATED ORAL at 21:24

## 2018-09-17 NOTE — SPEECH LANGUAGE PATHOLOGY EVALUATION - SLP PERTINENT HISTORY OF CURRENT PROBLEM
Pt was brought in by EMS for noted confusion at home. CTH 9/12 no ecidence of acute hemorrhage or mass affect. Microvascular ischemic changes noted. Pt was brought in by EMS for noted confusion at home. CTH 9/12 no evidence of acute hemorrhage or mass affect. Microvascular ischemic changes noted.

## 2018-09-17 NOTE — SPEECH LANGUAGE PATHOLOGY EVALUATION - SLP DIAGNOSIS
Pt O x4. Bilateral hearing loss negatively impacts receptive language at times. Pt p/w difficulties in verbal sequencing, STM, and difficulty answering abstract questions. Pt p/w mild -mod receptrive/expressive language deficits and STM cognitive linguistic deficits Pt O x4. Bilateral hearing loss negatively impacts receptive language at times. Pt p/w difficulties in verbal sequencing, STM, and difficulty answering abstract questions. Pt p/w mild -mod receptive/expressive language deficits and STM cognitive linguistic deficits

## 2018-09-17 NOTE — PROGRESS NOTE ADULT - ASSESSMENT
The patient in the AM on 9/17 was of pleasant disposition, in NAD. He states he was having trouble sleeping due to frequent urge to urinate overnight.     #Left cerebellar hemorrhage  - measuring 0.9 x 0.4 cm and rpt CTH shows  0.9 x 0.5 cm hyperdensity in the left cerebellum which may represent a cavernoma with recent bleed.   - Repeat CT head 9/12 shows decrease in size of bleed  - MRI Brain w/ and w/o contrast 9/14: acute right frontal infarcts; tiny hemorrhage w/ surrounding edema in left cerebellum  - CT Angiogram Head/Neck 9/14: Occlusion of the left vertebral artery from its origin. Partial reconstitution of the cervical segments but additional occlusion of the proximal V4 segment. Retrograde filling of the distal left vertebral artery and left PICA, with a moderate stenosis of the proximal left PICA noted.  - CT Head 9/15 unchanged from prior CT Head  - F/u w/ Cardio for HOSEA  - F/u with Neuro  - Maintain SBP <160  - NSGY does not want to intervene  - Neuro checks Q4h  - PT eval  - Start aspirin 81 mg PO qD    # Urinary incontinence  - Start doxazosin 8 mg PO qHS    # Chest discomfort in the AM 9/15- resolved  -  F/u EKG  - Trops neg x 2    # DKA - resolving  - Anion gap 20 <-- 19 <-- 17 <-- 21  - C/w Lantus 52 U; lispro 17 U  - IVF d/c'd  - monitor anion gap    #UTI  -+ u/a  -f/u urine cx sensitivities   -c/w cefepime 1g BID    # WANDY on CKD likely prerenal- resolved  -f/u BMP    # HTN  - maintain SBP<160  - c/w labetalol 100 BID    # hx of cardiac transplant  - c/w immunosuppressants  - Prograf level - f/u  - may call [facility] to update patient's status  - TTE w/ bubble study pending    DVT ppx: SCD  Diet: mechanical soft dysphagia 2  Full code   Activity: OOB to chair. The patient in the AM on 9/17 was of pleasant disposition, in NAD. He states he was having trouble sleeping due to frequent urge to urinate overnight. Per Pharmacy, no medications that the patient was taking at home or that is taking here would have contributed to hemorrhagic conversion of the stroke he sustained.    #Left cerebellar hemorrhage  - measuring 0.9 x 0.4 cm and rpt CTH shows  0.9 x 0.5 cm hyperdensity in the left cerebellum which may represent a cavernoma with recent bleed.   - Repeat CT head 9/12 shows decrease in size of bleed  - MRI Brain w/ and w/o contrast 9/14: acute right frontal infarcts; tiny hemorrhage w/ surrounding edema in left cerebellum  - CT Angiogram Head/Neck 9/14: Occlusion of the left vertebral artery from its origin. Partial reconstitution of the cervical segments but additional occlusion of the proximal V4 segment. Retrograde filling of the distal left vertebral artery and left PICA, with a moderate stenosis of the proximal left PICA noted.  - CT Head 9/15 unchanged from prior CT Head  - F/u w/ Cardio for HOSEA  - F/u with Neuro  - Maintain SBP <160  - NSGY does not want to intervene  - Neuro checks Q4h  - PT eval  - Start aspirin 81 mg PO qD    # Urinary incontinence  - Start doxazosin 8 mg PO qHS    # Chest discomfort in the AM 9/15- resolved  -  F/u EKG  - Trops neg x 2    # DKA - resolving  - Anion gap 20 <-- 19 <-- 17 <-- 21  - C/w Lantus 52 U; lispro 17 U  - IVF d/c'd  - monitor anion gap    #UTI  -+ u/a  -f/u urine cx sensitivities   -c/w cefepime 1g BID    # WANDY on CKD likely prerenal- resolved  -f/u BMP    # HTN  - maintain SBP<160  - c/w labetalol 100 BID    # hx of cardiac transplant  - c/w immunosuppressants  - Prograf level - f/u  - may call [facility] to update patient's status  - TTE w/ bubble study pending    DVT ppx: SCD  Diet: mechanical soft dysphagia 2  Full code   Activity: OOB to chair.

## 2018-09-17 NOTE — PROGRESS NOTE ADULT - SUBJECTIVE AND OBJECTIVE BOX
JULISA CONKLIN 68y Male  MRN#: [id number]       SUBJECTIVE  Patient is a 68y old Male who presents with a chief complaint of Cerebellar bleed, DKA (17 Sep 2018 11:31)  Currently admitted to medicine with the primary diagnosis of DKA (diabetic ketoacidoses).    OBJECTIVE  PAST MEDICAL & SURGICAL HISTORY  Urinary tract infection without hematuria, site unspecified  Chronic kidney disease, unspecified CKD stage  Bilateral hearing loss, unspecified hearing loss type  Benign prostatic hyperplasia with urinary frequency  Diabetes  High cholesterol  HTN (hypertension)  Heart transplant recipient  H/O heart transplant    ALLERGIES:  codeine (Unknown)    MEDICATIONS:  STANDING MEDICATIONS  allopurinol 100 milliGRAM(s) Oral two times a day  amLODIPine   Tablet 10 milliGRAM(s) Oral daily  aspirin  chewable 81 milliGRAM(s) Oral daily  atorvastatin 10 milliGRAM(s) Oral at bedtime  cefepime   IVPB 1000 milliGRAM(s) IV Intermittent every 12 hours  dextrose 5%. 1000 milliLiter(s) IV Continuous <Continuous>  dextrose 50% Injectable 12.5 Gram(s) IV Push once  dextrose 50% Injectable 25 Gram(s) IV Push once  dextrose 50% Injectable 25 Gram(s) IV Push once  doxazosin 8 milliGRAM(s) Oral at bedtime  insulin glargine Injectable (LANTUS) 52 Unit(s) SubCutaneous at bedtime  insulin Infusion 3 Unit(s)/Hr IV Continuous <Continuous>  insulin lispro (HumaLOG) corrective regimen sliding scale   SubCutaneous three times a day before meals  insulin lispro Injectable (HumaLOG) 10 Unit(s) SubCutaneous three times a day before meals  labetalol 100 milliGRAM(s) Oral two times a day  mycophenolate mofetil 1500 milliGRAM(s) Oral two times a day  pregabalin 100 milliGRAM(s) Oral two times a day  tacrolimus 1 milliGRAM(s) Oral every 12 hours    PRN MEDICATIONS  acetaminophen   Tablet .. 650 milliGRAM(s) Oral every 6 hours PRN  aluminum hydroxide/magnesium hydroxide/simethicone Suspension 30 milliLiter(s) Oral every 4 hours PRN  dextrose 40% Gel 15 Gram(s) Oral once PRN  glucagon  Injectable 1 milliGRAM(s) IntraMuscular once PRN      VITAL SIGNS: Last 24 Hours  T(C): 37.1 (17 Sep 2018 11:09), Max: 37.3 (17 Sep 2018 07:05)  T(F): 98.7 (17 Sep 2018 11:09), Max: 99.2 (17 Sep 2018 07:05)  HR: 95 (17 Sep 2018 11:09) (95 - 101)  BP: 137/74 (17 Sep 2018 11:09) (126/83 - 161/86)  BP(mean): --  RR: 16 (17 Sep 2018 11:09) (16 - 18)  SpO2: 98% (17 Sep 2018 11:09) (98% - 100%)    LABS:                        12.4   7.41  )-----------( 113      ( 17 Sep 2018 05:50 )             38.4     09-17    141  |  102  |  23<H>  ----------------------------<  178<H>  4.1   |  19  |  1.3    Ca    9.3      17 Sep 2018 05:50  Mg     1.6     09-17      PHYSICAL EXAM:    GENERAL: NAD, well-developed, AAOx3  HEENT:  Atraumatic, Normocephalic. Conjunctiva and sclera clear, No JVD  PULMONARY: Clear to auscultation bilaterally; No wheeze  CARDIOVASCULAR: Regular rate and rhythm; No murmurs, rubs, or gallops  GASTROINTESTINAL: Soft, Nontender, Nondistended  MUSCULOSKELETAL:  2+ Peripheral Pulses, No clubbing, cyanosis, or edema  NEUROLOGY: non-focal  SKIN: No rashes or lesions

## 2018-09-17 NOTE — PROGRESS NOTE ADULT - SUBJECTIVE AND OBJECTIVE BOX
JULISA CONKLIN    Chief Complaint: Altered mental status.    Right Handed    HPI:  68 year old male with a hx of Cardiac Transplant x 10 years ago due to MI x 2 and low EF on immunosuppressant, DM,HTN, DLD p/w acute confusion x 1 day. During workup for confusion a CTH was performed which revealed a subcentimeter hyperdensity in the left cerebellar area. On radiology report it is suggested there may be surrounding edema suggestive of possible underlying vascular malformation. MRI brain with and without contrast failed to reveal an underlying lesion but did reveal ischemic strokes in the right frontal lobe and possible additional small subacute infarct in the left frontal lobe. Also noted was microhemorrhages which can be seen in hypertension or amyloid angiopathy. 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 He is currently cared for in the stroke unit without a headache, nuchal rigidity, photo/phonophobia or nausea/vomiting. Today he is awake and oriented and denies neurological complaints. He is waiting for an echocardiogram.  Relevant PMH:  [] Prior ischemic stroke/TIA  [] Afib  []CAD  []HTN  []DLD  []DM []PVD []Obesity [] Sedintary lifestyle []CHF  []MICHELE  []Cancer Hx     Social History: [] Smoking []  Drug Use: []   Alcohol Use:   [] Other:      Possible Location of Stroke:   Two punctate acute infarcts in the right frontal lobe. Possible   additional small subacute infarct in the left frontal lobe.  Possible Cause of Stroke:  Etiology of multiple ischemic stroke unknown but may be due to hypovolemia or hypotension.   Relevant Cerebral Imaging:  < from: MR Head w/wo IV Cont (09.14.18 @ 16:00) >  IMPRESSION:    1.  Two punctate acute infarcts in the right frontal lobe. Possible   additional small subacute infarct in the left frontal lobe.    2.  Left cerebellar signal abnormality (in the region of hyperdensity on   CT) most compatible with tiny hemorrhage with mild surrounding edema.    3.  Numerous foci of susceptibility artifact at the brain, most of which   are new since the 2015 MRI, compatible with prior micro- and small   hemorrhages which can be seen in the setting of hypertension or amyloid   angiography.    4.  Moderate chronic microvascular changes and multiple chronic lacunar   infarcts (many of which are new since 2015).    Relevant Cervicocerebral Imaging:  CT Angio Neck w/ IV Cont:   EXAM:  CT ANGIO NECK (W)AW IC        EXAM:  CT ANGIO BRAIN (W)AW IC          IMPRESSION:    1.  No evidence of aneurysm or vascular malformation.    2.  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 Partial   reconstitution of the cervical segments but additional occlusion of the   proximal V4 segment. Retrograde filling of the distal left vertebral   artery and left PICA, with a moderate stenosis of the proximal left PICA   noted.    3.  Calcific plaque at the carotid bifurcations with mild (<50%) stenosis   of the right ICA origin.      Relevant blood tests:  Direct LDL: 101 mg/dL [4 - 129] (09-11-18 @ 11:00)  Hemoglobin A1C, Whole Blood: 13.4 % (09-11-18 @ 05:48)    Relevant cardiac rhythm monitoring:  On telemetry for >5 days and no reported events.  Relevant Cardiac Structure:(TTE +/-):[]No intracardiac thrombus/[] no vegetation/[]no akynesia/EF:      Home Medications:  allopurinol 100 mg oral tablet: 1 tab(s) orally 2 times a day (11 Sep 2018 02:58)  amLODIPine 5 mg oral tablet: 1 tab(s) orally once a day (11 Sep 2018 02:58)  aspirin 81 mg oral tablet: 1 tab(s) orally once a day (11 Sep 2018 02:58)  doxazosin 8 mg oral tablet: 1 tab(s) orally once a day (11 Sep 2018 02:58)  furosemide 40 mg oral tablet: 1 tab(s) orally once a day (11 Sep 2018 02:58)  ketorolac 0.5% ophthalmic solution: 1 drop(s) to each affected eye 4 times a day (11 Sep 2018 02:58)  Lantus 100 units/mL subcutaneous solution:  (11 Sep 2018 02:58)  Lyrica 100 mg oral capsule: 1 cap(s) orally 2 times a day (11 Sep 2018 02:58)  metaxalone 800 mg oral tablet: 1 tab(s) orally 3 times a day (11 Sep 2018 02:58)  mycophenolate mofetil 500 mg oral tablet:  (11 Sep 2018 02:58)  niacin 500 mg oral tablet:  (11 Sep 2018 02:58)  NovoLOG Mix 70/30 subcutaneous suspension:  (11 Sep 2018 02:58)  Oysco 500 with D:  (11 Sep 2018 02:58)  pantoprazole 40 mg oral delayed release tablet: 1 tab(s) orally once a day (11 Sep 2018 02:58)  potassium chloride 10 mEq oral capsule, extended release: 1 cap(s) orally 2 times a day (11 Sep 2018 02:58)  pravastatin 40 mg oral tablet: 1 tab(s) orally once a day (11 Sep 2018 02:58)  quiNINE 324 mg oral capsule: 2 cap(s) orally every 8 hours (11 Sep 2018 02:58)  spironolactone 25 mg oral tablet: 1 tab(s) orally once a day (11 Sep 2018 02:58)  tacrolimus 1 mg oral capsule: cap(s) orally every 12 hours (11 Sep 2018 02:58)  terbinafine 250 mg oral tablet: 1 tab(s) orally once a day (11 Sep 2018 02:58)      MEDICATIONS  (STANDING):  allopurinol 100 milliGRAM(s) Oral two times a day  amLODIPine   Tablet 10 milliGRAM(s) Oral daily  aspirin  chewable 81 milliGRAM(s) Oral daily  atorvastatin 10 milliGRAM(s) Oral at bedtime  cefepime   IVPB 1000 milliGRAM(s) IV Intermittent every 12 hours  dextrose 5%. 1000 milliLiter(s) (50 mL/Hr) IV Continuous <Continuous>  dextrose 50% Injectable 12.5 Gram(s) IV Push once  dextrose 50% Injectable 25 Gram(s) IV Push once  dextrose 50% Injectable 25 Gram(s) IV Push once  doxazosin 8 milliGRAM(s) Oral at bedtime  insulin glargine Injectable (LANTUS) 52 Unit(s) SubCutaneous at bedtime  insulin Infusion 3 Unit(s)/Hr (3 mL/Hr) IV Continuous <Continuous>  insulin lispro (HumaLOG) corrective regimen sliding scale   SubCutaneous three times a day before meals  insulin lispro Injectable (HumaLOG) 10 Unit(s) SubCutaneous three times a day before meals  labetalol 100 milliGRAM(s) Oral two times a day  mycophenolate mofetil 1500 milliGRAM(s) Oral two times a day  pregabalin 100 milliGRAM(s) Oral two times a day  tacrolimus 1 milliGRAM(s) Oral every 12 hours      PT/OT/Speech/Rehab/S&Swr:    Exam:    Vital Signs Last 24 Hrs  T(C): 37.1 (17 Sep 2018 11:09), Max: 37.3 (17 Sep 2018 07:05)  T(F): 98.7 (17 Sep 2018 11:09), Max: 99.2 (17 Sep 2018 07:05)  HR: 95 (17 Sep 2018 11:09) (95 - 101)  BP: 137/74 (17 Sep 2018 11:09) (126/83 - 161/86)  BP(mean): --  RR: 16 (17 Sep 2018 11:09) (16 - 18)  SpO2: 98% (17 Sep 2018 11:09) (98% - 100%)    NIHSS      LOC:       1a:     1b(Questions):           1c(Instructions):             Best Gaze:  Visual:  Motor:                 RUE:     RLE:     LUE:     LLE:     FACE:     Limb Ataxia:  Sensory:       Language:       Dysarthria:          Extinction and Inattention:    NIHSS on admission:          NIHSS yesterday:          NIHSS today:             m-RS:    Impression:      Suggestion:  Routine stroke workup including:    Disposition: JULISA CONKLIN    Chief Complaint: Altered mental status.    Right Handed    HPI:  68 year old male with a hx of Cardiac Transplant x 10 years ago due to MI x 2 and low EF on immunosuppressant, DM,HTN, DLD p/w acute confusion x 1 day. During workup for confusion a CTH was performed which revealed a subcentimeter hyperdensity in the left cerebellar area. On radiology report it is suggested there may be surrounding edema suggestive of possible underlying vascular malformation. MRI brain with and without contrast failed to reveal an underlying lesion but did reveal ischemic strokes in the right frontal lobe and possible additional small subacute infarct in the left frontal lobe. Also noted was microhemorrhages which can be seen in hypertension or amyloid angiopathy. 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 He is currently cared for in the stroke unit without a headache, nuchal rigidity, photo/phonophobia or nausea/vomiting. Today he is awake and oriented and denies neurological complaints. He is waiting for an echocardiogram.  Relevant PMH:  [] Prior ischemic stroke/TIA  [] Afib  []CAD  []HTN  []DLD  []DM []PVD []Obesity [] Sedintary lifestyle []CHF  []MICHELE  []Cancer Hx     Social History: [] Smoking []  Drug Use: []   Alcohol Use:   [] Other:      Possible Location of Stroke:   Two punctate acute infarcts in the right frontal lobe. Possible   additional small subacute infarct in the left frontal lobe.  Possible Cause of Stroke:  Etiology of multiple ischemic stroke unknown but may be due to hypovolemia or hypotension.   Relevant Cerebral Imaging:  < from: MR Head w/wo IV Cont (09.14.18 @ 16:00) >  IMPRESSION:    1.  Two punctate acute infarcts in the right frontal lobe. Possible   additional small subacute infarct in the left frontal lobe.    2.  Left cerebellar signal abnormality (in the region of hyperdensity on   CT) most compatible with tiny hemorrhage with mild surrounding edema.    3.  Numerous foci of susceptibility artifact at the brain, most of which   are new since the 2015 MRI, compatible with prior micro- and small   hemorrhages which can be seen in the setting of hypertension or amyloid   angiography.    4.  Moderate chronic microvascular changes and multiple chronic lacunar   infarcts (many of which are new since 2015).    Relevant Cervicocerebral Imaging:  CT Angio Neck w/ IV Cont:   EXAM:  CT ANGIO NECK (W)AW IC        EXAM:  CT ANGIO BRAIN (W)AW IC          IMPRESSION:    1.  No evidence of aneurysm or vascular malformation.    2.  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 Partial   reconstitution of the cervical segments but additional occlusion of the   proximal V4 segment. Retrograde filling of the distal left vertebral   artery and left PICA, with a moderate stenosis of the proximal left PICA   noted.    3.  Calcific plaque at the carotid bifurcations with mild (<50%) stenosis   of the right ICA origin.      Relevant blood tests:  Direct LDL: 101 mg/dL [4 - 129] (09-11-18 @ 11:00)  Hemoglobin A1C, Whole Blood: 13.4 % (09-11-18 @ 05:48)    Relevant cardiac rhythm monitoring:  On telemetry for >5 days and no reported events.  Relevant Cardiac Structure:(TTE +/-):[]No intracardiac thrombus/[] no vegetation/[]no akynesia/EF:  Pending    Home Medications:  allopurinol 100 mg oral tablet: 1 tab(s) orally 2 times a day (11 Sep 2018 02:58)  amLODIPine 5 mg oral tablet: 1 tab(s) orally once a day (11 Sep 2018 02:58)  aspirin 81 mg oral tablet: 1 tab(s) orally once a day (11 Sep 2018 02:58)  doxazosin 8 mg oral tablet: 1 tab(s) orally once a day (11 Sep 2018 02:58)  furosemide 40 mg oral tablet: 1 tab(s) orally once a day (11 Sep 2018 02:58)  ketorolac 0.5% ophthalmic solution: 1 drop(s) to each affected eye 4 times a day (11 Sep 2018 02:58)  Lantus 100 units/mL subcutaneous solution:  (11 Sep 2018 02:58)  Lyrica 100 mg oral capsule: 1 cap(s) orally 2 times a day (11 Sep 2018 02:58)  metaxalone 800 mg oral tablet: 1 tab(s) orally 3 times a day (11 Sep 2018 02:58)  mycophenolate mofetil 500 mg oral tablet:  (11 Sep 2018 02:58)  niacin 500 mg oral tablet:  (11 Sep 2018 02:58)  NovoLOG Mix 70/30 subcutaneous suspension:  (11 Sep 2018 02:58)  Oysco 500 with D:  (11 Sep 2018 02:58)  pantoprazole 40 mg oral delayed release tablet: 1 tab(s) orally once a day (11 Sep 2018 02:58)  potassium chloride 10 mEq oral capsule, extended release: 1 cap(s) orally 2 times a day (11 Sep 2018 02:58)  pravastatin 40 mg oral tablet: 1 tab(s) orally once a day (11 Sep 2018 02:58)  quiNINE 324 mg oral capsule: 2 cap(s) orally every 8 hours (11 Sep 2018 02:58)  spironolactone 25 mg oral tablet: 1 tab(s) orally once a day (11 Sep 2018 02:58)  tacrolimus 1 mg oral capsule: cap(s) orally every 12 hours (11 Sep 2018 02:58)  terbinafine 250 mg oral tablet: 1 tab(s) orally once a day (11 Sep 2018 02:58)      MEDICATIONS  (STANDING):  allopurinol 100 milliGRAM(s) Oral two times a day  amLODIPine   Tablet 10 milliGRAM(s) Oral daily  aspirin  chewable 81 milliGRAM(s) Oral daily  atorvastatin 10 milliGRAM(s) Oral at bedtime  cefepime   IVPB 1000 milliGRAM(s) IV Intermittent every 12 hours  dextrose 5%. 1000 milliLiter(s) (50 mL/Hr) IV Continuous <Continuous>  dextrose 50% Injectable 12.5 Gram(s) IV Push once  dextrose 50% Injectable 25 Gram(s) IV Push once  dextrose 50% Injectable 25 Gram(s) IV Push once  doxazosin 8 milliGRAM(s) Oral at bedtime  insulin glargine Injectable (LANTUS) 52 Unit(s) SubCutaneous at bedtime  insulin Infusion 3 Unit(s)/Hr (3 mL/Hr) IV Continuous <Continuous>  insulin lispro (HumaLOG) corrective regimen sliding scale   SubCutaneous three times a day before meals  insulin lispro Injectable (HumaLOG) 10 Unit(s) SubCutaneous three times a day before meals  labetalol 100 milliGRAM(s) Oral two times a day  mycophenolate mofetil 1500 milliGRAM(s) Oral two times a day  pregabalin 100 milliGRAM(s) Oral two times a day  tacrolimus 1 milliGRAM(s) Oral every 12 hours      PT/OT/Speech/Rehab/S&Swr: pending    Exam:    Vital Signs Last 24 Hrs  T(C): 37.1 (17 Sep 2018 11:09), Max: 37.3 (17 Sep 2018 07:05)  T(F): 98.7 (17 Sep 2018 11:09), Max: 99.2 (17 Sep 2018 07:05)  HR: 95 (17 Sep 2018 11:09) (95 - 101)  BP: 137/74 (17 Sep 2018 11:09) (126/83 - 161/86)  BP(mean): --  RR: 16 (17 Sep 2018 11:09) (16 - 18)  SpO2: 98% (17 Sep 2018 11:09) (98% - 100%)    NIHSS      LOC:       1a:   0  1b(Questions):0           1c(Instructions):    0         Best Gaze:0  Visual:0  Motor:                 RUE:  0   RLE:   0  LUE: 0    LLE:  0   FACE: 0    Limb Ataxia:0  Sensory:     0  Language:     0  Dysarthria:        0  Extinction and Inattention:0    NIHSS on admission:   0       NIHSS today:     0        m-RS:0    Impression:  68 year old male with a hx of Cardiac Transplant x 10 years ago due to MI x 2 and low EF on immunosuppressant, DM,HTN, DLD p/w acute confusion x 1 day. During workup for confusion a CTH was performed which revealed a subcentimeter hyperdensity in the left cerebellar area. On radiology report it is suggested there may be surrounding edema suggestive of possible underlying vascular malformation. MRI brain with and without contrast failed to reveal an underlying lesion but did reveal ischemic strokes in the right frontal lobe and possible additional small subacute infarct in the left frontal lobe. 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 He is now in the stroke unit without a headache, nuchal rigidity, photo/phonophobia or nausea/vomiting. Today he is awake and oriented and denies neurological complaints. MRI/MRA complete, he is waiting for an echocardiogram. Etiology of strokes unknown but likely due to hypoperfusion. Was in DKA on arrival which could be a contributing factor. His hemoglobin A1C was >13 on arrival which could be a contributing factor to the cerebellar hemorrhage.      Suggestion:  TTE with contrast to rule out right to left shunt, akinesia, intracardiac thrombus or vegetation.    May start ASA 81 mg per day.  May start low dose statin.  Maintain hydration.  Lifestyle modifications and risk factor management.      Disposition:  May come out of the stroke unit.    Kd Alicia NP  [phone] JULISA CONKLIN    Chief Complaint: Altered mental status.    Right Handed    HPI:  68 year old male with a hx of Cardiac Transplant x 10 years ago due to MI x 2 and low EF on immunosuppressant, DM,HTN, DLD p/w acute confusion x 1 day. During workup for confusion a CTH was performed which revealed a subcentimeter hyperdensity in the left cerebellar area. On radiology report it is suggested there may be surrounding edema suggestive of possible underlying vascular malformation. MRI brain with and without contrast failed to reveal an underlying lesion but did reveal ischemic strokes in the right frontal lobe and possible additional small subacute infarct in the left frontal lobe. Also noted was microhemorrhages which can be seen in hypertension or amyloid angiopathy. 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 He is currently cared for in the stroke unit without a headache, nuchal rigidity, photo/phonophobia or nausea/vomiting. Today he is awake and oriented and denies neurological complaints. He is waiting for an echocardiogram.  Relevant PMH:  [] Prior ischemic stroke/TIA  [] Afib  []CAD  []HTN  []DLD  []DM []PVD []Obesity [] Sedintary lifestyle []CHF  []MICHELE  []Cancer Hx     Social History: [] Smoking []  Drug Use: []   Alcohol Use:   [] Other:      Possible Location of Stroke:   Two punctate acute infarcts in the right frontal lobe. Possible   additional small subacute infarct in the left frontal lobe.  Possible Cause of Stroke:  Etiology of multiple ischemic stroke unknown but may be due to hypovolemia or hypotension.   Relevant Cerebral Imaging:  < from: MR Head w/wo IV Cont (09.14.18 @ 16:00) >  IMPRESSION:    1.  Two punctate acute infarcts in the right frontal lobe. Possible   additional small subacute infarct in the left frontal lobe.    2.  Left cerebellar signal abnormality (in the region of hyperdensity on   CT) most compatible with tiny hemorrhage with mild surrounding edema.    3.  Numerous foci of susceptibility artifact at the brain, most of which   are new since the 2015 MRI, compatible with prior micro- and small   hemorrhages which can be seen in the setting of hypertension or amyloid   angiography.    4.  Moderate chronic microvascular changes and multiple chronic lacunar   infarcts (many of which are new since 2015).    Relevant Cervicocerebral Imaging:  CT Angio Neck w/ IV Cont:   EXAM:  CT ANGIO NECK (W)AW IC        EXAM:  CT ANGIO BRAIN (W)AW IC          IMPRESSION:    1.  No evidence of aneurysm or vascular malformation.    2.  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 Partial   reconstitution of the cervical segments but additional occlusion of the   proximal V4 segment. Retrograde filling of the distal left vertebral   artery and left PICA, with a moderate stenosis of the proximal left PICA   noted.    3.  Calcific plaque at the carotid bifurcations with mild (<50%) stenosis   of the right ICA origin.      Relevant blood tests:  Direct LDL: 101 mg/dL [4 - 129] (09-11-18 @ 11:00)  Hemoglobin A1C, Whole Blood: 13.4 % (09-11-18 @ 05:48)    Relevant cardiac rhythm monitoring:  On telemetry for >5 days and no reported events.  Relevant Cardiac Structure:(TTE +/-):[]No intracardiac thrombus/[] no vegetation/[]no akynesia/EF:  Pending    Home Medications:  allopurinol 100 mg oral tablet: 1 tab(s) orally 2 times a day (11 Sep 2018 02:58)  amLODIPine 5 mg oral tablet: 1 tab(s) orally once a day (11 Sep 2018 02:58)  aspirin 81 mg oral tablet: 1 tab(s) orally once a day (11 Sep 2018 02:58)  doxazosin 8 mg oral tablet: 1 tab(s) orally once a day (11 Sep 2018 02:58)  furosemide 40 mg oral tablet: 1 tab(s) orally once a day (11 Sep 2018 02:58)  ketorolac 0.5% ophthalmic solution: 1 drop(s) to each affected eye 4 times a day (11 Sep 2018 02:58)  Lantus 100 units/mL subcutaneous solution:  (11 Sep 2018 02:58)  Lyrica 100 mg oral capsule: 1 cap(s) orally 2 times a day (11 Sep 2018 02:58)  metaxalone 800 mg oral tablet: 1 tab(s) orally 3 times a day (11 Sep 2018 02:58)  mycophenolate mofetil 500 mg oral tablet:  (11 Sep 2018 02:58)  niacin 500 mg oral tablet:  (11 Sep 2018 02:58)  NovoLOG Mix 70/30 subcutaneous suspension:  (11 Sep 2018 02:58)  Oysco 500 with D:  (11 Sep 2018 02:58)  pantoprazole 40 mg oral delayed release tablet: 1 tab(s) orally once a day (11 Sep 2018 02:58)  potassium chloride 10 mEq oral capsule, extended release: 1 cap(s) orally 2 times a day (11 Sep 2018 02:58)  pravastatin 40 mg oral tablet: 1 tab(s) orally once a day (11 Sep 2018 02:58)  quiNINE 324 mg oral capsule: 2 cap(s) orally every 8 hours (11 Sep 2018 02:58)  spironolactone 25 mg oral tablet: 1 tab(s) orally once a day (11 Sep 2018 02:58)  tacrolimus 1 mg oral capsule: cap(s) orally every 12 hours (11 Sep 2018 02:58)  terbinafine 250 mg oral tablet: 1 tab(s) orally once a day (11 Sep 2018 02:58)      MEDICATIONS  (STANDING):  allopurinol 100 milliGRAM(s) Oral two times a day  amLODIPine   Tablet 10 milliGRAM(s) Oral daily  aspirin  chewable 81 milliGRAM(s) Oral daily  atorvastatin 10 milliGRAM(s) Oral at bedtime  cefepime   IVPB 1000 milliGRAM(s) IV Intermittent every 12 hours  dextrose 5%. 1000 milliLiter(s) (50 mL/Hr) IV Continuous <Continuous>  dextrose 50% Injectable 12.5 Gram(s) IV Push once  dextrose 50% Injectable 25 Gram(s) IV Push once  dextrose 50% Injectable 25 Gram(s) IV Push once  doxazosin 8 milliGRAM(s) Oral at bedtime  insulin glargine Injectable (LANTUS) 52 Unit(s) SubCutaneous at bedtime  insulin Infusion 3 Unit(s)/Hr (3 mL/Hr) IV Continuous <Continuous>  insulin lispro (HumaLOG) corrective regimen sliding scale   SubCutaneous three times a day before meals  insulin lispro Injectable (HumaLOG) 10 Unit(s) SubCutaneous three times a day before meals  labetalol 100 milliGRAM(s) Oral two times a day  mycophenolate mofetil 1500 milliGRAM(s) Oral two times a day  pregabalin 100 milliGRAM(s) Oral two times a day  tacrolimus 1 milliGRAM(s) Oral every 12 hours      PT/OT/Speech/Rehab/S&Swr: pending    Exam:    Vital Signs Last 24 Hrs  T(C): 37.1 (17 Sep 2018 11:09), Max: 37.3 (17 Sep 2018 07:05)  T(F): 98.7 (17 Sep 2018 11:09), Max: 99.2 (17 Sep 2018 07:05)  HR: 95 (17 Sep 2018 11:09) (95 - 101)  BP: 137/74 (17 Sep 2018 11:09) (126/83 - 161/86)  BP(mean): --  RR: 16 (17 Sep 2018 11:09) (16 - 18)  SpO2: 98% (17 Sep 2018 11:09) (98% - 100%)    NIHSS      LOC:       1a:   0  1b(Questions):0           1c(Instructions):    0         Best Gaze:0  Visual:0  Motor:                 RUE:  0   RLE:   0  LUE: 0    LLE:  0   FACE: 0    Limb Ataxia:0  Sensory:     0  Language:     0  Dysarthria:        0  Extinction and Inattention:0    NIHSS on admission:   0       NIHSS today:     0        m-RS:0    Impression:  68 year old male with a hx of Cardiac Transplant x 10 years ago due to MI x 2 and low EF on immunosuppressant, DM,HTN, DLD p/w acute confusion x 1 day. During workup for confusion a CTH was performed which revealed a subcentimeter hyperdensity in the left cerebellar area. On radiology report it is suggested there may be surrounding edema suggestive of possible underlying vascular malformation. MRI brain with and without contrast failed to reveal an underlying lesion but did reveal ischemic strokes in the right frontal lobe and possible additional small subacute infarct in the left frontal lobe. 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 He is now in the stroke unit without a headache, nuchal rigidity, photo/phonophobia or nausea/vomiting. Today he is awake and oriented and denies neurological complaints. MRI/MRA complete, he is waiting for an echocardiogram. Etiology of strokes unknown but likely due to hypoperfusion. He was in DKA on arrival which could be a contributing factor for ischemic strokes in the territory of already stenotic vessels and /or distal vessels. His hemoglobin A1C was >13 on arrival which could be a contributing factor to the hemorrhagic conversion of ischemic strokes.      Suggestion:  TTE with contrast to rule out right to left shunt, akinesia, intracardiac thrombus or vegetation.    May start ASA 81 mg per day.  May start low dose statin.  Maintain hydration.  Lifestyle modifications and risk factor management.      Disposition:  May come out of the stroke unit.    -We spent more than 45 minutes to review the chart, gather necessary information, evaluate the patient and discuss the case with primary team and counseling the patient and his family to their satisfaction. Total visit time more than 60min.  Maritza De Los Santos MD.  [phone]

## 2018-09-17 NOTE — PROGRESS NOTE ADULT - SUBJECTIVE AND OBJECTIVE BOX
JULISA CONKLIN 68y Male  MRN#: [id number]       SUBJECTIVE  Patient is a 68y old Male who presents with a chief complaint of Cerebellar bleed, DKA (17 Sep 2018 11:31)  Currently admitted to medicine with the primary diagnosis of DKA (diabetic ketoacidoses).    OBJECTIVE  PAST MEDICAL & SURGICAL HISTORY  Urinary tract infection without hematuria, site unspecified  Chronic kidney disease, unspecified CKD stage  Bilateral hearing loss, unspecified hearing loss type  Benign prostatic hyperplasia with urinary frequency  Diabetes  High cholesterol  HTN (hypertension)  Heart transplant recipient  H/O heart transplant    ALLERGIES:  codeine (Unknown)    MEDICATIONS:  STANDING MEDICATIONS  allopurinol 100 milliGRAM(s) Oral two times a day  amLODIPine   Tablet 10 milliGRAM(s) Oral daily  aspirin  chewable 81 milliGRAM(s) Oral daily  atorvastatin 10 milliGRAM(s) Oral at bedtime  cefepime   IVPB 1000 milliGRAM(s) IV Intermittent every 12 hours  dextrose 5%. 1000 milliLiter(s) IV Continuous <Continuous>  dextrose 50% Injectable 12.5 Gram(s) IV Push once  dextrose 50% Injectable 25 Gram(s) IV Push once  dextrose 50% Injectable 25 Gram(s) IV Push once  doxazosin 8 milliGRAM(s) Oral at bedtime  insulin glargine Injectable (LANTUS) 52 Unit(s) SubCutaneous at bedtime  insulin Infusion 3 Unit(s)/Hr IV Continuous <Continuous>  insulin lispro (HumaLOG) corrective regimen sliding scale   SubCutaneous three times a day before meals  insulin lispro Injectable (HumaLOG) 10 Unit(s) SubCutaneous three times a day before meals  labetalol 100 milliGRAM(s) Oral two times a day  mycophenolate mofetil 1500 milliGRAM(s) Oral two times a day  pregabalin 100 milliGRAM(s) Oral two times a day  tacrolimus 1 milliGRAM(s) Oral every 12 hours    PRN MEDICATIONS  acetaminophen   Tablet .. 650 milliGRAM(s) Oral every 6 hours PRN  aluminum hydroxide/magnesium hydroxide/simethicone Suspension 30 milliLiter(s) Oral every 4 hours PRN  dextrose 40% Gel 15 Gram(s) Oral once PRN  glucagon  Injectable 1 milliGRAM(s) IntraMuscular once PRN      VITAL SIGNS: Last 24 Hours  T(C): 37.1 (17 Sep 2018 11:09), Max: 37.3 (17 Sep 2018 07:05)  T(F): 98.7 (17 Sep 2018 11:09), Max: 99.2 (17 Sep 2018 07:05)  HR: 95 (17 Sep 2018 11:09) (95 - 101)  BP: 137/74 (17 Sep 2018 11:09) (126/83 - 161/86)  BP(mean): --  RR: 16 (17 Sep 2018 11:09) (16 - 18)  SpO2: 98% (17 Sep 2018 11:09) (98% - 100%)    LABS:                        12.4   7.41  )-----------( 113      ( 17 Sep 2018 05:50 )             38.4     09-17    141  |  102  |  23<H>  ----------------------------<  178<H>  4.1   |  19  |  1.3    Ca    9.3      17 Sep 2018 05:50  Mg     1.6     09-17      PHYSICAL EXAM:    GENERAL: NAD, well-developed, AAOx3  HEENT:  Atraumatic, Normocephalic. Conjunctiva and sclera clear, No JVD  PULMONARY: Clear to auscultation bilaterally; No wheeze  CARDIOVASCULAR: Regular rate and rhythm; No murmurs, rubs, or gallops  GASTROINTESTINAL: Soft, Nontender, Nondistended  MUSCULOSKELETAL:  2+ Peripheral Pulses, No clubbing, cyanosis, or edema  NEUROLOGY: non-focal  SKIN: No rashes or lesions

## 2018-09-18 ENCOUNTER — TRANSCRIPTION ENCOUNTER (OUTPATIENT)
Age: 68
End: 2018-09-18

## 2018-09-18 LAB
-  CEFTAZIDIME: SIGNIFICANT CHANGE UP
-  LEVOFLOXACIN: SIGNIFICANT CHANGE UP
-  MEROPENEM: SIGNIFICANT CHANGE UP
-  TRIMETHOPRIM/SULFAMETHOXAZOLE: SIGNIFICANT CHANGE UP
ANION GAP SERPL CALC-SCNC: 15 MMOL/L — HIGH (ref 7–14)
BASOPHILS # BLD AUTO: 0.02 K/UL — SIGNIFICANT CHANGE UP (ref 0–0.2)
BASOPHILS NFR BLD AUTO: 0.4 % — SIGNIFICANT CHANGE UP (ref 0–1)
BUN SERPL-MCNC: 22 MG/DL — HIGH (ref 10–20)
CALCIUM SERPL-MCNC: 8.4 MG/DL — LOW (ref 8.5–10.1)
CHLORIDE SERPL-SCNC: 102 MMOL/L — SIGNIFICANT CHANGE UP (ref 98–110)
CO2 SERPL-SCNC: 22 MMOL/L — SIGNIFICANT CHANGE UP (ref 17–32)
CREAT SERPL-MCNC: 1.3 MG/DL — SIGNIFICANT CHANGE UP (ref 0.7–1.5)
CULTURE RESULTS: SIGNIFICANT CHANGE UP
EOSINOPHIL # BLD AUTO: 0.07 K/UL — SIGNIFICANT CHANGE UP (ref 0–0.7)
EOSINOPHIL NFR BLD AUTO: 1.3 % — SIGNIFICANT CHANGE UP (ref 0–8)
GLUCOSE BLDC GLUCOMTR-MCNC: 109 MG/DL — HIGH (ref 70–99)
GLUCOSE BLDC GLUCOMTR-MCNC: 147 MG/DL — HIGH (ref 70–99)
GLUCOSE BLDC GLUCOMTR-MCNC: 157 MG/DL — HIGH (ref 70–99)
GLUCOSE BLDC GLUCOMTR-MCNC: 232 MG/DL — HIGH (ref 70–99)
GLUCOSE BLDC GLUCOMTR-MCNC: 67 MG/DL — LOW (ref 70–99)
GLUCOSE SERPL-MCNC: 161 MG/DL — HIGH (ref 70–99)
HCT VFR BLD CALC: 35.6 % — LOW (ref 42–52)
HGB BLD-MCNC: 11.3 G/DL — LOW (ref 14–18)
IMM GRANULOCYTES NFR BLD AUTO: 0.5 % — HIGH (ref 0.1–0.3)
LYMPHOCYTES # BLD AUTO: 1 K/UL — LOW (ref 1.2–3.4)
LYMPHOCYTES # BLD AUTO: 18.3 % — LOW (ref 20.5–51.1)
MAGNESIUM SERPL-MCNC: 1.9 MG/DL — SIGNIFICANT CHANGE UP (ref 1.8–2.4)
MCHC RBC-ENTMCNC: 26 PG — LOW (ref 27–31)
MCHC RBC-ENTMCNC: 31.7 G/DL — LOW (ref 32–37)
MCV RBC AUTO: 82 FL — SIGNIFICANT CHANGE UP (ref 80–94)
METHOD TYPE: SIGNIFICANT CHANGE UP
MONOCYTES # BLD AUTO: 0.7 K/UL — HIGH (ref 0.1–0.6)
MONOCYTES NFR BLD AUTO: 12.8 % — HIGH (ref 1.7–9.3)
NEUTROPHILS # BLD AUTO: 3.64 K/UL — SIGNIFICANT CHANGE UP (ref 1.4–6.5)
NEUTROPHILS NFR BLD AUTO: 66.7 % — SIGNIFICANT CHANGE UP (ref 42.2–75.2)
NRBC # BLD: 0 /100 WBCS — SIGNIFICANT CHANGE UP (ref 0–0)
ORGANISM # SPEC MICROSCOPIC CNT: SIGNIFICANT CHANGE UP
ORGANISM # SPEC MICROSCOPIC CNT: SIGNIFICANT CHANGE UP
PLATELET # BLD AUTO: 116 K/UL — LOW (ref 130–400)
POTASSIUM SERPL-MCNC: 4.3 MMOL/L — SIGNIFICANT CHANGE UP (ref 3.5–5)
POTASSIUM SERPL-SCNC: 4.3 MMOL/L — SIGNIFICANT CHANGE UP (ref 3.5–5)
RBC # BLD: 4.34 M/UL — LOW (ref 4.7–6.1)
RBC # FLD: 14.5 % — SIGNIFICANT CHANGE UP (ref 11.5–14.5)
SODIUM SERPL-SCNC: 139 MMOL/L — SIGNIFICANT CHANGE UP (ref 135–146)
SPECIMEN SOURCE: SIGNIFICANT CHANGE UP
TACROLIMUS SERPL-MCNC: 8.9 NG/ML — SIGNIFICANT CHANGE UP
TACROLIMUS SERPL-MCNC: 9.5 NG/ML — SIGNIFICANT CHANGE UP
WBC # BLD: 5.46 K/UL — SIGNIFICANT CHANGE UP (ref 4.8–10.8)
WBC # FLD AUTO: 5.46 K/UL — SIGNIFICANT CHANGE UP (ref 4.8–10.8)

## 2018-09-18 RX ORDER — DOCUSATE SODIUM 100 MG
100 CAPSULE ORAL
Qty: 0 | Refills: 0 | Status: DISCONTINUED | OUTPATIENT
Start: 2018-09-18 | End: 2018-09-21

## 2018-09-18 RX ORDER — SENNA PLUS 8.6 MG/1
2 TABLET ORAL AT BEDTIME
Qty: 0 | Refills: 0 | Status: DISCONTINUED | OUTPATIENT
Start: 2018-09-18 | End: 2018-09-21

## 2018-09-18 RX ADMIN — Medication 1: at 08:15

## 2018-09-18 RX ADMIN — Medication 100 MILLIGRAM(S): at 17:30

## 2018-09-18 RX ADMIN — TACROLIMUS 1 MILLIGRAM(S): 5 CAPSULE ORAL at 17:30

## 2018-09-18 RX ADMIN — AMLODIPINE BESYLATE 10 MILLIGRAM(S): 2.5 TABLET ORAL at 05:56

## 2018-09-18 RX ADMIN — SENNA PLUS 2 TABLET(S): 8.6 TABLET ORAL at 22:05

## 2018-09-18 RX ADMIN — Medication 100 MILLIGRAM(S): at 22:05

## 2018-09-18 RX ADMIN — Medication 100 MILLIGRAM(S): at 05:56

## 2018-09-18 RX ADMIN — Medication 8 MILLIGRAM(S): at 22:05

## 2018-09-18 RX ADMIN — MYCOPHENOLATE MOFETIL 1500 MILLIGRAM(S): 250 CAPSULE ORAL at 06:00

## 2018-09-18 RX ADMIN — Medication 10 UNIT(S): at 08:15

## 2018-09-18 RX ADMIN — MYCOPHENOLATE MOFETIL 1500 MILLIGRAM(S): 250 CAPSULE ORAL at 17:35

## 2018-09-18 RX ADMIN — ATORVASTATIN CALCIUM 10 MILLIGRAM(S): 80 TABLET, FILM COATED ORAL at 22:05

## 2018-09-18 RX ADMIN — Medication 100 MILLIGRAM(S): at 05:50

## 2018-09-18 RX ADMIN — TACROLIMUS 1 MILLIGRAM(S): 5 CAPSULE ORAL at 05:50

## 2018-09-18 RX ADMIN — Medication 81 MILLIGRAM(S): at 11:05

## 2018-09-18 RX ADMIN — Medication 100 MILLIGRAM(S): at 05:59

## 2018-09-18 RX ADMIN — CEFEPIME 100 MILLIGRAM(S): 1 INJECTION, POWDER, FOR SOLUTION INTRAMUSCULAR; INTRAVENOUS at 05:56

## 2018-09-18 RX ADMIN — INSULIN GLARGINE 55 UNIT(S): 100 INJECTION, SOLUTION SUBCUTANEOUS at 22:05

## 2018-09-18 RX ADMIN — Medication 10 UNIT(S): at 12:17

## 2018-09-18 NOTE — DISCHARGE NOTE ADULT - CARE PROVIDERS DIRECT ADDRESSES
,DirectAddress_Unknown,[email or web address],DirectAddress_Unknown

## 2018-09-18 NOTE — DISCHARGE NOTE ADULT - MEDICATION SUMMARY - MEDICATIONS TO TAKE
I will START or STAY ON the medications listed below when I get home from the hospital:    finasteride 5 mg oral tablet  -- 1 tab(s) by mouth once a day MDD:one tab per day  -- Indication: For Benign prostatic hyperplasia with urinary frequency    spironolactone 25 mg oral tablet  -- 1 tab(s) by mouth once a day  -- Indication: For HTN (hypertension)    aspirin 81 mg oral tablet  -- 1 tab(s) by mouth once a day  -- Indication: For Cardiac ppx    doxazosin 8 mg oral tablet  -- 1 tab(s) by mouth once a day  -- Indication: For Benign prostatic hyperplasia with urinary frequency    Lyrica 100 mg oral capsule  -- 1 cap(s) by mouth 2 times a day  -- Indication: For Neuropathy    Lantus 100 units/mL subcutaneous solution  -- 20 unit(s) subcutaneous once a day every morning  -- Indication: For Diabetes    HumaLOG 100 units/mL injectable solution  -- 5 unit(s) injectable 3 times a day (before meals)   -- Indication: For Diabetes    terbinafine 250 mg oral tablet  -- 1 tab(s) by mouth once a day  -- Indication: For Infection    allopurinol 100 mg oral tablet  -- 1 tab(s) by mouth 2 times a day  -- Indication: For Gout    niacin 500 mg oral tablet  -- Indication: For High cholesterol    atorvastatin 10 mg oral tablet  -- 1 tab(s) by mouth once a day (at bedtime)  -- Indication: For High cholesterol    quiNINE 324 mg oral capsule  -- 2 cap(s) by mouth every 8 hours  -- Indication: For Leg cramps    labetalol 100 mg oral tablet  -- 1 tab(s) by mouth 2 times a day MDD:2 tabs per day  -- Indication: For HTN (hypertension)    amLODIPine 5 mg oral tablet  -- 1 tab(s) by mouth once a day  -- Indication: For HTN (hypertension)    furosemide 40 mg oral tablet  -- 1 tab(s) by mouth once a day  -- Indication: For HTN (hypertension)    mycophenolate mofetil 500 mg oral tablet  -- Indication: For Heart transplant recipient    tacrolimus 1 mg oral capsule  -- cap(s) by mouth every 12 hours  -- Indication: For Heart transplant recipient    docusate sodium 100 mg oral capsule  -- 1 cap(s) by mouth 2 times a day  -- Indication: For Constipation    Innerclean oral tablet  -- 2 tab(s) by mouth once a day (at bedtime)  -- Indication: For Constipation    magnesium oxide 400 mg (241.3 mg elemental magnesium) oral tablet  -- 1 tab(s) by mouth 3 times a day (with meals) MDD:3 tabs  -- Indication: For Hypomagnesemia    potassium chloride 10 mEq oral capsule, extended release  -- 1 cap(s) by mouth 2 times a day  -- Indication: For Supplement w/ diuretic    metaxalone 800 mg oral tablet  -- 1 tab(s) by mouth 3 times a day  -- Indication: For Leg cramps    ketorolac 0.5% ophthalmic solution  -- 1 drop(s) to each affected eye 4 times a day  -- Indication: For Eye drops    pantoprazole 40 mg oral delayed release tablet  -- 1 tab(s) by mouth once a day  -- Indication: For Dyspepsia    sulfamethoxazole-trimethoprim 800 mg-160 mg oral tablet  -- 1 tab(s) by mouth 2 times a day MDD:2 tabs  -- Indication: For Urinary tract infection without hematuria, site unspecified    Oysco 500 with D  -- Indication: For Supplement

## 2018-09-18 NOTE — DISCHARGE NOTE ADULT - SECONDARY DIAGNOSIS.
Diabetes Chronic kidney disease, unspecified CKD stage H/O heart transplant Urinary tract infection without hematuria, site unspecified Bilateral hearing loss, unspecified hearing loss type

## 2018-09-18 NOTE — DISCHARGE NOTE ADULT - PLAN OF CARE
Prevent recurrence, complications, and symptoms Please follow up with your neurologist and primary outpatient physician upon discharge, and take your medications as prescribed. If you develop sudden headache, dizziness, loss of vision or other senses, lightheadedness, please seek immediate medical attention. Prevent complications and symptoms Please follow up with your endocrinologist and take your medications as prescribed. If you develop blurry vision, sudden shakiness, dizziness, or rapid heartbeat, please seek immediate medical attention. Prevent progression of disease Please follow up with your primary outpatient physician and endocrinologist upon discharge. Please adhere to the recommended dietary instructions and take your medications as prescribed. Prevent transplant rejection and other complications Please follow up with your outpatient cardiologist and primary care physician. Please take your immunosuppressant medications as prescribed. Prevent recurrence Please take your medications as prescribed and follow up with your primary outpatient physician. Prevent worsening of symptoms; ascertain potential cause(s) Please follow up with your primary outpatient physician for possible fitting of assistive hearing devices, and/or further testing to ascertain the cause of your hearing loss.

## 2018-09-18 NOTE — PROGRESS NOTE ADULT - SUBJECTIVE AND OBJECTIVE BOX
CONKLINJULISA  68y  Male      SUBJECTIVE:  C/o wants to go home    Progress Note:      REVIEW OF SYSTEMS:    T(C): 36.2 (09-18-18 @ 03:05), Max: 37.6 (09-17-18 @ 15:47)  HR: 93 (09-18-18 @ 06:05) (91 - 101)  BP: 168/64 (09-18-18 @ 06:05) (123/66 - 173/98)  RR: 18 (09-18-18 @ 03:05) (16 - 18)  SpO2: 98% (09-18-18 @ 03:05) (97% - 98%)  Wt(kg): --Vital Signs Last 24 Hrs  T(C): 36.2 (18 Sep 2018 03:05), Max: 37.6 (17 Sep 2018 15:47)  T(F): 97.1 (18 Sep 2018 03:05), Max: 99.6 (17 Sep 2018 15:47)  HR: 93 (18 Sep 2018 06:05) (91 - 101)  BP: 168/64 (18 Sep 2018 06:05) (123/66 - 173/98)  BP(mean): --  RR: 18 (18 Sep 2018 03:05) (16 - 18)  SpO2: 98% (18 Sep 2018 03:05) (97% - 98%)    PHYSICAL EXAM:  lungs clear  cor reg nl S1 S2  ext-no calf tenderness  neuro alert.oriented  no focal signs  LABS:                        11.3   5.46  )-----------( 116      ( 18 Sep 2018 05:43 )             35.6   09-17    138  |  100  |  23<H>  ----------------------------<  172<H>  4.1   |  19  |  1.3    Ca    8.7      17 Sep 2018 18:28  Mg     1.6     09-17        RADIOLOGY:      IMPRESSION:      left cerebellar density tiny hemorrhage  acute infarcts right temporal lobe ,possibly embolic as per neurosurgery  dka resolved  dm sugars better   ckd stable  s/p heart transplant  htn  better   dld  bph  uti        PLAN:  adjust insulin as per endo consult  await urine culture sensitivity  daily  phys. therapy  rakan to rule out shunt  monitor sugar  if ok will arrange for home care    I spent 30 mins. examining/evaluating/counseling patient  and coordinating care

## 2018-09-18 NOTE — PROGRESS NOTE ADULT - ASSESSMENT
The patient in the AM on 9/18 was in NAD; conversant w/out dysarthria. He reports persistent straining on urination.     #Left cerebellar hemorrhage  - measuring 0.9 x 0.4 cm and rpt CTH shows  0.9 x 0.5 cm hyperdensity in the left cerebellum which may represent a cavernoma with recent bleed.   - Repeat CT head 9/12 shows decrease in size of bleed  - MRI Brain w/ and w/o contrast 9/14: acute right frontal infarcts; tiny hemorrhage w/ surrounding edema in left cerebellum  - CT Angiogram Head/Neck 9/14: Occlusion of the left vertebral artery from its origin. Partial reconstitution of the cervical segments but additional occlusion of the proximal V4 segment. Retrograde filling of the distal left vertebral artery and left PICA, with a moderate stenosis of the proximal left PICA noted.  - CT Head 9/15 unchanged from prior CT Head  - F/u w/ Cardio for HOSEA  - F/u with Neuro  - Maintain SBP <160  - NSGY does not want to intervene  - Neuro checks Q4h  - PT eval  - C/w aspirin 81 mg PO qD    # Urinary incontinence  - C/w doxazosin 8 mg PO qHS    # Chest discomfort in the AM 9/15- resolved  -  F/u EKG  - Trops neg x 2    # DKA - resolving  - Anion gap 15 <-- 20 <-- 19 <-- 17  - C/w Lantus 52 U; lispro 17 U  - IVF d/c'd  - monitor anion gap    #UTI  - + u/a  - f/u urine cx sensitivities   - d/c'd cefepime 1g BID    # WANDY on CKD likely prerenal- resolved  -f/u BMP    # HTN  - maintain SBP<160  - c/w labetalol 100 BID    # hx of cardiac transplant  - c/w immunosuppressants  - Prograf level - f/u  - may call [place] to update patient's status  - TTE w/ bubble study pending    DVT ppx: SCD  Diet: mechanical soft dysphagia 2  Full code   Activity: OOB to chair.  Discharge planning- Home w/ VNS, dietician? The patient in the AM on 9/18 was in NAD; conversant w/out dysarthria. He reports persistent straining on urination.     #Left cerebellar hemorrhage  - measuring 0.9 x 0.4 cm and rpt CTH shows  0.9 x 0.5 cm hyperdensity in the left cerebellum which may represent a cavernoma with recent bleed.   - Repeat CT head 9/12 shows decrease in size of bleed  - MRI Brain w/ and w/o contrast 9/14: acute right frontal infarcts; tiny hemorrhage w/ surrounding edema in left cerebellum  - CT Angiogram Head/Neck 9/14: Occlusion of the left vertebral artery from its origin. Partial reconstitution of the cervical segments but additional occlusion of the proximal V4 segment. Retrograde filling of the distal left vertebral artery and left PICA, with a moderate stenosis of the proximal left PICA noted.  - CT Head 9/15 unchanged from prior CT Head  - F/u w/ Cardio for HOSEA  - F/u with Neuro  - Maintain SBP <160  - NSGY does not want to intervene  - Neuro checks Q4h  - PT eval  - C/w aspirin 81 mg PO qD  -F/u w/ Neuro regarding AC besides ASA upon d/c (per Dr. Abdalla)    # Urinary incontinence  - C/w doxazosin 8 mg PO qHS  - F/u w/ ID    # Chest discomfort in the AM 9/15- resolved  -  F/u EKG  - Trops neg x 2    # DKA - resolving  - Anion gap 15 <-- 20 <-- 19 <-- 17  - C/w Lantus 52 U; lispro 17 U  - IVF d/c'd  - monitor anion gap    #UTI  - + u/a  - f/u urine cx sensitivities   - d/c'd cefepime 1g BID    # WANDY on CKD likely prerenal- resolved  -f/u BMP    # HTN  - maintain SBP<160  - c/w labetalol 100 BID    # hx of cardiac transplant  - c/w immunosuppressants  - Prograf level - f/u  - may call [place] to update patient's status  - TTE w/ bubble study pending    DVT ppx: SCD  Diet: mechanical soft dysphagia 2  Full code   Activity: OOB to chair.  Discharge planning- Home w/ VNS  Include referral to Outpatient Diabetes Management Program ([phone]) upon d/c The patient in the AM on 9/18 was in NAD; conversant w/out dysarthria. He reports persistent straining on urination.     #Left cerebellar hemorrhage  - measuring 0.9 x 0.4 cm and rpt CTH shows  0.9 x 0.5 cm hyperdensity in the left cerebellum which may represent a cavernoma with recent bleed.   - Repeat CT head 9/12 shows decrease in size of bleed  - MRI Brain w/ and w/o contrast 9/14: acute right frontal infarcts; tiny hemorrhage w/ surrounding edema in left cerebellum  - CT Angiogram Head/Neck 9/14: Occlusion of the left vertebral artery from its origin. Partial reconstitution of the cervical segments but additional occlusion of the proximal V4 segment. Retrograde filling of the distal left vertebral artery and left PICA, with a moderate stenosis of the proximal left PICA noted.  - CT Head 9/15 unchanged from prior CT Head  - F/u w/ Cardio for HOSEA  - F/u with Neuro  - Maintain SBP <160  - NSGY does not want to intervene  - Neuro checks Q4h  - PT eval  - C/w aspirin 81 mg PO qD  -F/u w/ Neuro regarding AC besides ASA upon d/c (per Dr. 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  - C/w doxazosin 8 mg PO qHS  - F/u w/ ID abx rec    # Chest discomfort in the AM 9/15- resolved  -  F/u EKG  - Trops neg x 2    # DKA - resolving  - Anion gap 15 <-- 20 <-- 19 <-- 17  - C/w Lantus 52 U; lispro 17 U  - IVF d/c'd  - monitor anion gap    #UTI  - + u/a  - f/u urine cx sensitivities   - d/c'd cefepime 1g BID    # WANDY on CKD likely prerenal- resolved  -f/u BMP    # HTN  - maintain SBP<160  - c/w labetalol 100 BID    # hx of cardiac transplant  - c/w immunosuppressants  - Prograf level - f/u  - may call [place] to update patient's status  - TTE w/ bubble study pending    DVT ppx: SCD  Diet: mechanical soft dysphagia 2  Full code   Activity: OOB to chair.  Discharge planning- Home w/ VNS  Include referral to Outpatient Diabetes Management Program ([phone]) upon d/c

## 2018-09-18 NOTE — DIETITIAN INITIAL EVALUATION ADULT. - ADHERENCE
fair/reports tries to follow diabetic diet at home but knows he needs to improve and is motivated to make changes

## 2018-09-18 NOTE — DIETITIAN INITIAL EVALUATION ADULT. - OTHER INFO
RD assessment as LOS. Pt was brought in by EMS for noted confusion at home. CTH 9/12 no evidence of acute hemorrhage or mass affect. Microvascular ischemic changes noted. Pt reports good irving/PO intake PTA and currently, consuming % of documented meals. Denies any food allergies. Provided diet ed on carb consistent diet and pt motivated to make changes/follow up with diabetes management program as outpatient. Will discuss w/ LIP to include in d/c instructions.

## 2018-09-18 NOTE — DISCHARGE NOTE ADULT - CARE PROVIDER_API CALL
Ced Montez), EndocrinologyMetabDiabetes; Internal Medicine  [address] [place] [address]  Phone: [phone]  Fax: [phone]    Chris Bess), Neurology; Neuromuscular Medicine  [address] [place] [id number]  Phone: [phone]  Fax: [phone]    Cami Paulino), Internal Medicine  [address] [place] [id number]  Phone: [phone]  Fax: [phone] Ced Montez), EndocrinologyMetabDiabetes; Internal Medicine  [address] [place] [address]  Phone: [phone]  Fax: [phone]    Chris Bess), Neurology; Neuromuscular Medicine  [address] [place] [id number]  Phone: [phone]  Fax: [phone]    Cami Paulino), Internal Medicine  [address] [place] [id number]  Phone: [phone]  Fax: [phone]

## 2018-09-18 NOTE — PROGRESS NOTE ADULT - SUBJECTIVE AND OBJECTIVE BOX
JULISA CONKLIN    Chief Complaint: Altered mental status.    Right Handed    HPI:  68 year old male with a hx of Cardiac Transplant x 10 years ago due to MI x 2 and low EF on immunosuppressant, DM,HTN, DLD p/w acute confusion x 1 day. During workup for confusion a CTH was performed which revealed a subcentimeter hyperdensity in the left cerebellar area. On radiology report it is suggested there may be surrounding edema suggestive of possible underlying vascular malformation. MRI brain with and without contrast failed to reveal an underlying lesion but did reveal ischemic strokes in the right frontal lobe and possible additional small subacute infarct in the left frontal lobe. Also noted was microhemorrhages which can be seen in hypertension or amyloid angiopathy. 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 He is currently cared for in the stroke unit without a headache, nuchal rigidity, photo/phonophobia or nausea/vomiting. Today he remains awake and oriented and without neurological complaints.    Relevant PMH:  [] Prior ischemic stroke/TIA  [] Afib  [x]CAD  []HTN  [x]DLD  [x]DM []PVD []Obesity [] Sedintary lifestyle []CHF  []MICHELE []Cancer Hx     Social History: [] Smoking []  Drug Use: []   Alcohol Use:   [] Other:      Possible Location of Stroke:   Two punctate acute infarcts in the right frontal lobe. Possible   additional small subacute infarct in the left frontal lobe.  Possible Cause of Stroke:  Etiology of multiple ischemic stroke unknown but may be due hypoperfusion related to hypovolemia or hypotension.   Relevant Cerebral Imaging:  < from: MR Head w/wo IV Cont (09.14.18 @ 16:00) >  IMPRESSION:    1.  Two punctate acute infarcts in the right frontal lobe. Possible   additional small subacute infarct in the left frontal lobe.    2.  Left cerebellar signal abnormality (in the region of hyperdensity on   CT) most compatible with tiny hemorrhage with mild surrounding edema.    3.  Numerous foci of susceptibility artifact at the brain, most of which   are new since the 2015 MRI, compatible with prior micro- and small   hemorrhages which can be seen in the setting of hypertension or amyloid   angiography.    4.  Moderate chronic microvascular changes and multiple chronic lacunar   infarcts (many of which are new since 2015).    Relevant Cervicocerebral Imaging:  CT Angio Neck w/ IV Cont:   EXAM:  CT ANGIO NECK (W)AW IC        EXAM:  CT ANGIO BRAIN (W)AW IC          IMPRESSION:    1.  No evidence of aneurysm or vascular malformation.    2.  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 Partial   reconstitution of the cervical segments but additional occlusion of the   proximal V4 segment. Retrograde filling of the distal left vertebral   artery and left PICA, with a moderate stenosis of the proximal left PICA   noted.    3.  Calcific plaque at the carotid bifurcations with mild (<50%) stenosis   of the right ICA origin.      Relevant blood tests:  Direct LDL: 101 mg/dL [4 - 129] (09-11-18 @ 11:00)  Hemoglobin A1C, Whole Blood: 13.4 % (09-11-18 @ 05:48)    Relevant cardiac rhythm monitoring:  On telemetry for >5 days and no reported events.  Relevant Cardiac Structure:(TTE +/-):[x]No intracardiac thrombus/[x] no vegetation/[x]no akynesia/EF:  Normal LV function      Home Medications:  allopurinol 100 mg oral tablet: 1 tab(s) orally 2 times a day (11 Sep 2018 02:58)  amLODIPine 5 mg oral tablet: 1 tab(s) orally once a day (11 Sep 2018 02:58)  aspirin 81 mg oral tablet: 1 tab(s) orally once a day (11 Sep 2018 02:58)  doxazosin 8 mg oral tablet: 1 tab(s) orally once a day (11 Sep 2018 02:58)  furosemide 40 mg oral tablet: 1 tab(s) orally once a day (11 Sep 2018 02:58)  ketorolac 0.5% ophthalmic solution: 1 drop(s) to each affected eye 4 times a day (11 Sep 2018 02:58)  Lantus 100 units/mL subcutaneous solution:  (11 Sep 2018 02:58)  Lyrica 100 mg oral capsule: 1 cap(s) orally 2 times a day (11 Sep 2018 02:58)  metaxalone 800 mg oral tablet: 1 tab(s) orally 3 times a day (11 Sep 2018 02:58)  mycophenolate mofetil 500 mg oral tablet:  (11 Sep 2018 02:58)  niacin 500 mg oral tablet:  (11 Sep 2018 02:58)  NovoLOG Mix 70/30 subcutaneous suspension:  (11 Sep 2018 02:58)  Oysco 500 with D:  (11 Sep 2018 02:58)  pantoprazole 40 mg oral delayed release tablet: 1 tab(s) orally once a day (11 Sep 2018 02:58)  potassium chloride 10 mEq oral capsule, extended release: 1 cap(s) orally 2 times a day (11 Sep 2018 02:58)  pravastatin 40 mg oral tablet: 1 tab(s) orally once a day (11 Sep 2018 02:58)  quiNINE 324 mg oral capsule: 2 cap(s) orally every 8 hours (11 Sep 2018 02:58)  spironolactone 25 mg oral tablet: 1 tab(s) orally once a day (11 Sep 2018 02:58)  tacrolimus 1 mg oral capsule: cap(s) orally every 12 hours (11 Sep 2018 02:58)  terbinafine 250 mg oral tablet: 1 tab(s) orally once a day (11 Sep 2018 02:58)      MEDICATIONS  (STANDING):  allopurinol 100 milliGRAM(s) Oral two times a day  amLODIPine   Tablet 10 milliGRAM(s) Oral daily  aspirin  chewable 81 milliGRAM(s) Oral daily  atorvastatin 10 milliGRAM(s) Oral at bedtime  cefepime   IVPB 1000 milliGRAM(s) IV Intermittent every 12 hours  dextrose 5%. 1000 milliLiter(s) (50 mL/Hr) IV Continuous <Continuous>  dextrose 50% Injectable 12.5 Gram(s) IV Push once  dextrose 50% Injectable 25 Gram(s) IV Push once  dextrose 50% Injectable 25 Gram(s) IV Push once  doxazosin 8 milliGRAM(s) Oral at bedtime  insulin glargine Injectable (LANTUS) 55 Unit(s) SubCutaneous at bedtime  insulin Infusion 3 Unit(s)/Hr (3 mL/Hr) IV Continuous <Continuous>  insulin lispro (HumaLOG) corrective regimen sliding scale   SubCutaneous three times a day before meals  insulin lispro Injectable (HumaLOG) 10 Unit(s) SubCutaneous three times a day before meals  labetalol 100 milliGRAM(s) Oral two times a day  mycophenolate mofetil 1500 milliGRAM(s) Oral two times a day  pregabalin 100 milliGRAM(s) Oral two times a day  tacrolimus 1 milliGRAM(s) Oral every 12 hours      PT/OT/Speech/Rehab/S&Swr:Pending    Exam:    Vital Signs Last 24 Hrs  T(C): 36.3 (18 Sep 2018 07:49), Max: 37.6 (17 Sep 2018 15:47)  T(F): 97.4 (18 Sep 2018 07:49), Max: 99.6 (17 Sep 2018 15:47)  HR: 92 (18 Sep 2018 07:49) (91 - 101)  BP: 136/77 (18 Sep 2018 07:49) (123/66 - 173/98)  BP(mean): --  RR: 18 (18 Sep 2018 07:49) (16 - 18)  SpO2: 98% (18 Sep 2018 03:05) (97% - 98%)    NIHSS      LOC:       1a:   0  1b(Questions):0           1c(Instructions):    0         Best Gaze:0  Visual:0  Motor:                 RUE:  0   RLE:   0  LUE: 0    LLE:  0   FACE: 0    Limb Ataxia:0  Sensory:     0  Language:     0  Dysarthria:        0  Extinction and Inattention:0    NIHSS on admission:   0       NIHSS today:     0        m-RS:0    Impression:  68 year old male with a hx of Cardiac Transplant x 10 years ago due to MI x 2 and low EF on immunosuppressant, DM,HTN, DLD p/w acute confusion x 1 day. During workup for confusion a CTH was performed which revealed a subcentimeter hyperdensity in the left cerebellar area. On radiology report it is suggested there may be surrounding edema suggestive of possible underlying vascular malformation. MRI brain with and without contrast failed to reveal an underlying lesion but did reveal ischemic strokes in the right frontal lobe and possible additional small subacute infarct in the left frontal lobe. 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 He is now in the stroke unit without a headache, nuchal rigidity, photo/phonophobia or nausea/vomiting. Today he is awake and oriented and denies neurological complaints. MRI/MRA complete,TTE failed to reveal significant abnormalities. Etiology of strokes unknown but likely due to hypoperfusion. He was in DKA on arrival which could be a contributing factor for ischemic strokes in the territory of already stenotic vessels and /or distal vessels. His hemoglobin A1C was >13 on arrival which could be a contributing factor to the hemorrhagic conversion of ischemic strokes.      Suggestion:  Continue ASA 81 mg per day.  Continue low dose statin.  Maintain hydration.  Lifestyle modifications and risk factor management.    Disposition:  May come out of the stroke unit. Follow up with the neuro-endovascular clinic within 2-4 weeks.    -We spent more than 45 minutes to review the chart, gather necessary information, evaluate the patient and discuss the case with primary team and counseling the patient and his family to their satisfaction. Total visit time more than 60min.  Maritza De Los Santos MD.  [phone]

## 2018-09-18 NOTE — DISCHARGE NOTE ADULT - CARE PLAN
Principal Discharge DX:	ICH (intracerebral hemorrhage)  Goal:	Prevent recurrence, complications, and symptoms  Assessment and plan of treatment:	Please follow up with your neurologist and primary outpatient physician upon discharge, and take your medications as prescribed. If you develop sudden headache, dizziness, loss of vision or other senses, lightheadedness, please seek immediate medical attention.  Secondary Diagnosis:	Diabetes  Goal:	Prevent complications and symptoms  Assessment and plan of treatment:	Please follow up with your endocrinologist and take your medications as prescribed. If you develop blurry vision, sudden shakiness, dizziness, or rapid heartbeat, please seek immediate medical attention.  Secondary Diagnosis:	Chronic kidney disease, unspecified CKD stage  Goal:	Prevent progression of disease  Assessment and plan of treatment:	Please follow up with your primary outpatient physician and endocrinologist upon discharge. Please adhere to the recommended dietary instructions and take your medications as prescribed.  Secondary Diagnosis:	H/O heart transplant  Goal:	Prevent transplant rejection and other complications  Assessment and plan of treatment:	Please follow up with your outpatient cardiologist and primary care physician. Please take your immunosuppressant medications as prescribed.  Secondary Diagnosis:	Urinary tract infection without hematuria, site unspecified  Goal:	Prevent recurrence  Assessment and plan of treatment:	Please take your medications as prescribed and follow up with your primary outpatient physician.  Secondary Diagnosis:	Bilateral hearing loss, unspecified hearing loss type  Goal:	Prevent worsening of symptoms; ascertain potential cause(s)  Assessment and plan of treatment:	Please follow up with your primary outpatient physician for possible fitting of assistive hearing devices, and/or further testing to ascertain the cause of your hearing loss.

## 2018-09-18 NOTE — DIETITIAN INITIAL EVALUATION ADULT. - ENERGY NEEDS
Calories: 2928-3984 kcals/day (MSJ x 1.1-1.2)  Protein: 65-77 g/day (1-1.2 g/kg IBW)  Fluids: 1ml/kcal

## 2018-09-18 NOTE — DISCHARGE NOTE ADULT - PATIENT PORTAL LINK FT
You can access the [email or web address], offered by [facility], by registering with the following website: [email or web address]

## 2018-09-18 NOTE — DISCHARGE NOTE ADULT - MEDICATION SUMMARY - MEDICATIONS TO STOP TAKING
I will STOP taking the medications listed below when I get home from the hospital:    pravastatin 40 mg oral tablet  -- 1 tab(s) by mouth once a day    NovoLOG Mix 70/30 subcutaneous suspension

## 2018-09-18 NOTE — DISCHARGE NOTE ADULT - ADDITIONAL INSTRUCTIONS
The patient is to follow up with an outpatient diabetes management program ([phone]) and with his PMD, endocrinologist, neurologist and cardiologist, upon discharge from hospital. The patient is to follow up with an outpatient diabetes management program ([phone]) and with his PMD, endocrinologist, neurologist and cardiologist, upon discharge from hospital. He is also to follow up outpatient with his urologist, Dr. Argueta. He is also to receive at home visits from Physical Therapy/Rehabilitation upon discharge from hospital.

## 2018-09-18 NOTE — PROGRESS NOTE ADULT - SUBJECTIVE AND OBJECTIVE BOX
JULISA CONKLIN 68y Male  MRN#: [id number]       SUBJECTIVE  Patient is a 68y old Male who presents with a chief complaint of Cerebellar bleed, DKA (18 Sep 2018 09:39)  Currently admitted to medicine with the primary diagnosis of DKA (diabetic ketoacidoses).    OBJECTIVE  PAST MEDICAL & SURGICAL HISTORY  Urinary tract infection without hematuria, site unspecified  Chronic kidney disease, unspecified CKD stage  Bilateral hearing loss, unspecified hearing loss type  Benign prostatic hyperplasia with urinary frequency  Diabetes  High cholesterol  HTN (hypertension)  Heart transplant recipient  H/O heart transplant    ALLERGIES:  codeine (Unknown)    MEDICATIONS:  STANDING MEDICATIONS  allopurinol 100 milliGRAM(s) Oral two times a day  amLODIPine   Tablet 10 milliGRAM(s) Oral daily  aspirin  chewable 81 milliGRAM(s) Oral daily  atorvastatin 10 milliGRAM(s) Oral at bedtime  dextrose 5%. 1000 milliLiter(s) IV Continuous <Continuous>  dextrose 50% Injectable 12.5 Gram(s) IV Push once  dextrose 50% Injectable 25 Gram(s) IV Push once  dextrose 50% Injectable 25 Gram(s) IV Push once  doxazosin 8 milliGRAM(s) Oral at bedtime  insulin glargine Injectable (LANTUS) 55 Unit(s) SubCutaneous at bedtime  insulin Infusion 3 Unit(s)/Hr IV Continuous <Continuous>  insulin lispro (HumaLOG) corrective regimen sliding scale   SubCutaneous three times a day before meals  insulin lispro Injectable (HumaLOG) 10 Unit(s) SubCutaneous three times a day before meals  labetalol 100 milliGRAM(s) Oral two times a day  mycophenolate mofetil 1500 milliGRAM(s) Oral two times a day  pregabalin 100 milliGRAM(s) Oral two times a day  tacrolimus 1 milliGRAM(s) Oral every 12 hours    PRN MEDICATIONS  acetaminophen   Tablet .. 650 milliGRAM(s) Oral every 6 hours PRN  aluminum hydroxide/magnesium hydroxide/simethicone Suspension 30 milliLiter(s) Oral every 4 hours PRN  dextrose 40% Gel 15 Gram(s) Oral once PRN  glucagon  Injectable 1 milliGRAM(s) IntraMuscular once PRN      VITAL SIGNS: Last 24 Hours  T(C): 36.3 (18 Sep 2018 07:49), Max: 37.6 (17 Sep 2018 15:47)  T(F): 97.4 (18 Sep 2018 07:49), Max: 99.6 (17 Sep 2018 15:47)  HR: 92 (18 Sep 2018 07:49) (91 - 101)  BP: 136/77 (18 Sep 2018 07:49) (123/66 - 173/98)  BP(mean): --  RR: 18 (18 Sep 2018 07:49) (18 - 18)  SpO2: 98% (18 Sep 2018 03:05) (97% - 98%)    LABS:                        11.3   5.46  )-----------( 116      ( 18 Sep 2018 05:43 )             35.6     09-18    139  |  102  |  22<H>  ----------------------------<  161<H>  4.3   |  22  |  1.3    Ca    8.4<L>      18 Sep 2018 05:43  Mg     1.9     09-18      Culture - Urine (collected 16 Sep 2018 11:11)  Source: .Urine Clean Catch (Midstream)  Preliminary Report (17 Sep 2018 18:01):    >100,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      RADIOLOGY:    < from: Transthoracic Echocardiogram (09.17.18 @ 12:42) >   EXAM:  2-D ECHO (TTE) COMPLETE        PROCEDURE DATE:  09/17/2018      INTERPRETATION:  REPORT:    TRANSTHORACIC ECHOCARDIOGRAM REPORT         Patient Name:   JULISA CONKLIN Accession #: [id number]  Medical Rec #:  [id number]    Height:      68.0 in 172.7 cm  YOB: 1950   Weight:      195.0 lb 88.45 kg  Patient Age:    68 years    BSA:         2.02 m²  Patient Gender: M           BP:          137/74 mmHg       Date of Exam:        9/17/2018 12:42:00 PM  Referring Physician: [id number] ALEKSANDAR DUNBAR  Sonographer:         Lisa Sandhu  Reading Physician:   Melissa Gordon M.D.    Procedure:   2D Echo/Doppler/Color Doppler Complete.  Indications: I50.9 - Heart Failure, unspecified  Diagnosis:   Agitated saline study r/o shunt         Summary:   1. Normal left ventricular size and wall thicknesses, with normal   systolic function.   2. Structurally normal mitral valve, with normal leaflet excursion.   3. Normal trileaflet aortic valve with normal opening.   4. Color flow doppler andintravenous injection of agitated saline   demonstrates the presence of an intact intra atrial septum.   5. LA volume Index is 47.4 ml/m² ml/m2.   6. Suboptimal bubble study with no shunt seen.    PHYSICIAN INTERPRETATION:  Left Ventricle: Normal left ventricular size and wall thicknesses, with   normal systolic function. There is no left ventricular hypertrophy.   Spectral Doppler shows normal pattern of LV diastolic filling. Normal LV   filling pressures.  Right Ventricle: Normal right ventricular size and function.  Left Atrium: Mildly enlarged left atrium. Color flow doppler and   intravenous injection of agitated saline demonstrates the presence of an   intact intra atrial septum. Suboptimal bubble study with no shunt seen.   LA volume Index is 47.4 ml/m² ml/m2.  Right Atrium: Normal right atrial size.  Pericardium: There is no evidence of pericardial effusion.  Mitral Valve: Structurally normal mitral valve, with normal leaflet   excursion. No evidence of mitral valve regurgitation is seen.  Tricuspid Valve: Structurally normal tricuspid valve, with normal leaflet   excursion. No tricuspid regurgitation is visualized. Adequate TR velocity   was not obtained to accurately assess RVSP.  Aortic Valve: Normal trileaflet aortic valvewith normal opening. No   evidence of aortic valve regurgitation is seen.  Pulmonic Valve: Structurally normal pulmonic valve, with normal leaflet   excursion. No indication of pulmonic valve regurgitation.  Aorta: The aortic root and ascending aortaare structurally normal, with   no evidence of dilitation.  Pulmonary Artery: The main pulmonary artery is normal in size.  Shunts: Agitated saline contrast was given intravenously to evaluate for   intracardiac shunting.     2D AND M-MODE MEASUREMENTS (normal ranges within parentheses):  Left                 Normal    Aorta/Left           Normal  Ventricle:                     Atrium:  IVSd (2D):  1.42 cm  (0.7-1.1) AoV Cusp      2.31   (1.5-2.6)  LVPWd (2D): 1.51 cm  (0.7-1.1) Separation:   cm  LVIDd (2D): 3.18 cm  (3.4-5.7) Left Atrium   3.65   (1.9-4.0)  LVIDs (2D): 2.59 cm            (Mmode):      cm  LV FS (2D): 18.4 %   (>25%)    LA Volume     47.3  IVSd        1.22 cm  (0.7-1.1) Index         ml/m²  (Mmode):                       Right  LVPWd       1.09 cm  (0.7-1.1) Ventricle:  (Mmode):                       RVd (2D):      2.23 cm  LVIDd       4.77 cm  (3.4-5.7)  (Mmode):  LVIDs       3.63 cm  (Mmode):  LV FS       23.9 %   (>25%)  (Mmode):  Relative    0.95     (<0.42)  Wall  Thickness  Rel. Wall   0.46     (<0.42)  Thickness  Mm  LV Mass     102.1  Index:      g/m²  Mmode    SPECTRAL DOPPLER ANALYSIS:  LV DIASTOLIC FUNCTION:  MV Peak E: 1.04 m/s Decel Time: 175 msec  MV Peak A: 0.19 m/s  E/A Ratio: 5.62    Aortic Valve:  AoV VMax:    0.96 m/s AoV Area, Vmax: 3.18 cm² Vmax Indx: 1.57 cm²/m²  AoV Pk Grad: 3.7 mmHg    LVOT Vmax: 0.77 m/s  LVOT VTI:  0.17 m  LVOT Diam: 2.25 cm    Mitral Valve:  MV P1/2 Time: 50.71 msec  MV Area, PHT: 4.34 cm²       [id number] Melissa Gordon M.D., Electronically signed on 9/17/2018 at 7:39:37   PM       *** Final ***    MELISSA GORDON M.D., RESIDENT RADIOLOGIST  This document has been electronically signed.  MELISSA GORDON M.D., RESIDENT RADIOLOGIST  This document has beenelectronically signed. Sep 17 2018 12:42PM    PHYSICAL EXAM:    GENERAL: NAD, well-developed, AAOx3  HEENT:  Atraumatic, Normocephalic. EOMI, PERRLA, conjunctiva and sclera clear, No JVD  PULMONARY: Clear to auscultation bilaterally; No wheeze  CARDIOVASCULAR: Regular rate and rhythm; No murmurs, rubs, or gallops  GASTROINTESTINAL: Soft, Nontender, Nondistended  MUSCULOSKELETAL:  2+ Peripheral Pulses, No clubbing, cyanosis, or edema  NEUROLOGY: non-focal  SKIN: No rashes or lesions JULISA CONKLIN 68y Male  MRN#: [id number]       SUBJECTIVE  Patient is a 68y old Male who presents with a chief complaint of Cerebellar bleed, DKA (18 Sep 2018 09:39)  Currently admitted to medicine with the primary diagnosis of DKA (diabetic ketoacidoses).    OBJECTIVE  PAST MEDICAL & SURGICAL HISTORY  Urinary tract infection without hematuria, site unspecified  Chronic kidney disease, unspecified CKD stage  Bilateral hearing loss, unspecified hearing loss type  Benign prostatic hyperplasia with urinary frequency  Diabetes  High cholesterol  HTN (hypertension)  Heart transplant recipient  H/O heart transplant    ALLERGIES:  codeine (Unknown)    MEDICATIONS:  STANDING MEDICATIONS  allopurinol 100 milliGRAM(s) Oral two times a day  amLODIPine   Tablet 10 milliGRAM(s) Oral daily  aspirin  chewable 81 milliGRAM(s) Oral daily  atorvastatin 10 milliGRAM(s) Oral at bedtime  doxazosin 8 milliGRAM(s) Oral at bedtime  insulin glargine Injectable (LANTUS) 55 Unit(s) SubCutaneous at bedtime  insulin lispro (HumaLOG) corrective regimen sliding scale   SubCutaneous three times a day before meals  insulin lispro Injectable (HumaLOG) 10 Unit(s) SubCutaneous three times a day before meals  labetalol 100 milliGRAM(s) Oral two times a day  mycophenolate mofetil 1500 milliGRAM(s) Oral two times a day  pregabalin 100 milliGRAM(s) Oral two times a day  tacrolimus 1 milliGRAM(s) Oral every 12 hours    PRN MEDICATIONS  acetaminophen   Tablet .. 650 milliGRAM(s) Oral every 6 hours PRN  aluminum hydroxide/magnesium hydroxide/simethicone Suspension 30 milliLiter(s) Oral every 4 hours PRN  dextrose 40% Gel 15 Gram(s) Oral once PRN  glucagon  Injectable 1 milliGRAM(s) IntraMuscular once PRN      VITAL SIGNS: Last 24 Hours  T(C): 36.3 (18 Sep 2018 07:49), Max: 37.6 (17 Sep 2018 15:47)  T(F): 97.4 (18 Sep 2018 07:49), Max: 99.6 (17 Sep 2018 15:47)  HR: 92 (18 Sep 2018 07:49) (91 - 101)  BP: 136/77 (18 Sep 2018 07:49) (123/66 - 173/98)  BP(mean): --  RR: 18 (18 Sep 2018 07:49) (18 - 18)  SpO2: 98% (18 Sep 2018 03:05) (97% - 98%)    LABS:                        11.3   5.46  )-----------( 116      ( 18 Sep 2018 05:43 )             35.6     09-18    139  |  102  |  22<H>  ----------------------------<  161<H>  4.3   |  22  |  1.3    Ca    8.4<L>      18 Sep 2018 05:43  Mg     1.9     09-18      Culture - Urine (collected 16 Sep 2018 11:11)  Source: .Urine Clean Catch (Midstream)  Preliminary Report (17 Sep 2018 18:01):    >100,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      RADIOLOGY:    < from: Transthoracic Echocardiogram (09.17.18 @ 12:42) >   EXAM:  2-D ECHO (TTE) COMPLETE        PROCEDURE DATE:  09/17/2018      INTERPRETATION:  REPORT:    TRANSTHORACIC ECHOCARDIOGRAM REPORT         Patient Name:   JULISA CONKLIN Accession #: [id number]  [facility] Rec #:  [id number]    Height:      68.0 in 172.7 cm  YOB: 1950   Weight:      195.0 lb 88.45 kg  Patient Age:    68 years    BSA:         2.02 m²  Patient Gender: M           BP:          137/74 mmHg       Date of Exam:        9/17/2018 12:42:00 PM  Referring Physician: [id number] ALEKSANDAR DUNBAR  Sonographer:         Lisa Sandhu  Reading Physician:   Melissa Gordon M.D.    Procedure:   2D Echo/Doppler/Color Doppler Complete.  Indications: I50.9 - Heart Failure, unspecified  Diagnosis:   Agitated saline study r/o shunt         Summary:   1. Normal left ventricular size and wall thicknesses, with normal   systolic function.   2. Structurally normal mitral valve, with normal leaflet excursion.   3. Normal trileaflet aortic valve with normal opening.   4. Color flow doppler andintravenous injection of agitated saline   demonstrates the presence of an intact intra atrial septum.   5. LA volume Index is 47.4 ml/m² ml/m2.   6. Suboptimal bubble study with no shunt seen.    PHYSICIAN INTERPRETATION:  Left Ventricle: Normal left ventricular size and wall thicknesses, with   normal systolic function. There is no left ventricular hypertrophy.   Spectral Doppler shows normal pattern of LV diastolic filling. Normal LV   filling pressures.  Right Ventricle: Normal right ventricular size and function.  Left Atrium: Mildly enlarged left atrium. Color flow doppler and   intravenous injection of agitated saline demonstrates the presence of an   intact intra atrial septum. Suboptimal bubble study with no shunt seen.   LA volume Index is 47.4 ml/m² ml/m2.  Right Atrium: Normal right atrial size.  Pericardium: There is no evidence of pericardial effusion.  Mitral Valve: Structurally normal mitral valve, with normal leaflet   excursion. No evidence of mitral valve regurgitation is seen.  Tricuspid Valve: Structurally normal tricuspid valve, with normal leaflet   excursion. No tricuspid regurgitation is visualized. Adequate TR velocity   was not obtained to accurately assess RVSP.  Aortic Valve: Normal trileaflet aortic valvewith normal opening. No   evidence of aortic valve regurgitation is seen.  Pulmonic Valve: Structurally normal pulmonic valve, with normal leaflet   excursion. No indication of pulmonic valve regurgitation.  Aorta: The aortic root and ascending aortaare structurally normal, with   no evidence of dilitation.  Pulmonary Artery: The main pulmonary artery is normal in size.  Shunts: Agitated saline contrast was given intravenously to evaluate for   intracardiac shunting.     2D AND M-MODE MEASUREMENTS (normal ranges within parentheses):  Left                 Normal    Aorta/Left           Normal  Ventricle:                     Atrium:  IVSd (2D):  1.42 cm  (0.7-1.1) AoV Cusp      2.31   (1.5-2.6)  LVPWd (2D): 1.51 cm  (0.7-1.1) Separation:   cm  LVIDd (2D): 3.18 cm  (3.4-5.7) Left Atrium   3.65   (1.9-4.0)  LVIDs (2D): 2.59 cm            (Mmode):      cm  LV FS (2D): 18.4 %   (>25%)    LA Volume     47.3  IVSd        1.22 cm  (0.7-1.1) Index         ml/m²  (Mmode):                       Right  LVPWd       1.09 cm  (0.7-1.1) Ventricle:  (Mmode):                       RVd (2D):      2.23 cm  LVIDd       4.77 cm  (3.4-5.7)  (Mmode):  LVIDs       3.63 cm  (Mmode):  LV FS       23.9 %   (>25%)  (Mmode):  Relative    0.95     (<0.42)  Wall  Thickness  Rel. Wall   0.46     (<0.42)  Thickness  Mm  LV Mass     102.1  Index:      g/m²  Mmode    SPECTRAL DOPPLER ANALYSIS:  LV DIASTOLIC FUNCTION:  MV Peak E: 1.04 m/s Decel Time: 175 msec  MV Peak A: 0.19 m/s  E/A Ratio: 5.62    Aortic Valve:  AoV VMax:    0.96 m/s AoV Area, Vmax: 3.18 cm² Vmax Indx: 1.57 cm²/m²  AoV Pk Grad: 3.7 mmHg    LVOT Vmax: 0.77 m/s  LVOT VTI:  0.17 m  LVOT Diam: 2.25 cm    Mitral Valve:  MV P1/2 Time: 50.71 msec  MV Area, PHT: 4.34 cm²       [id number] Melissa Gordon M.D., Electronically signed on 9/17/2018 at 7:39:37   PM       *** Final ***    MELISSA GORDON M.D., RESIDENT RADIOLOGIST  This document has been electronically signed.  MELISSA GORDON M.D., RESIDENT RADIOLOGIST  This document has beenelectronically signed. Sep 17 2018 12:42PM    PHYSICAL EXAM:    GENERAL: NAD, well-developed, AAOx3  HEENT:  Atraumatic, Normocephalic. EOMI, PERRLA, conjunctiva and sclera clear, No JVD  PULMONARY: Clear to auscultation bilaterally; No wheeze  CARDIOVASCULAR: Regular rate and rhythm; No murmurs, rubs, or gallops  GASTROINTESTINAL: Soft, Nontender, Nondistended  MUSCULOSKELETAL:  2+ Peripheral Pulses, No clubbing, cyanosis, or edema  NEUROLOGY: non-focal  SKIN: No rashes or lesions

## 2018-09-19 LAB
ANION GAP SERPL CALC-SCNC: 15 MMOL/L — HIGH (ref 7–14)
BASOPHILS # BLD AUTO: 0.02 K/UL — SIGNIFICANT CHANGE UP (ref 0–0.2)
BASOPHILS NFR BLD AUTO: 0.4 % — SIGNIFICANT CHANGE UP (ref 0–1)
BUN SERPL-MCNC: 23 MG/DL — HIGH (ref 10–20)
CALCIUM SERPL-MCNC: 9.3 MG/DL — SIGNIFICANT CHANGE UP (ref 8.5–10.1)
CHLORIDE SERPL-SCNC: 108 MMOL/L — SIGNIFICANT CHANGE UP (ref 98–110)
CO2 SERPL-SCNC: 23 MMOL/L — SIGNIFICANT CHANGE UP (ref 17–32)
CREAT SERPL-MCNC: 1.3 MG/DL — SIGNIFICANT CHANGE UP (ref 0.7–1.5)
CRP SERPL-MCNC: 3.68 MG/DL — HIGH (ref 0–0.4)
EOSINOPHIL # BLD AUTO: 0.07 K/UL — SIGNIFICANT CHANGE UP (ref 0–0.7)
EOSINOPHIL NFR BLD AUTO: 1.5 % — SIGNIFICANT CHANGE UP (ref 0–8)
ERYTHROCYTE [SEDIMENTATION RATE] IN BLOOD: 67 MM/HR — HIGH (ref 0–10)
GLUCOSE BLDC GLUCOMTR-MCNC: 109 MG/DL — HIGH (ref 70–99)
GLUCOSE BLDC GLUCOMTR-MCNC: 333 MG/DL — HIGH (ref 70–99)
GLUCOSE BLDC GLUCOMTR-MCNC: 48 MG/DL — LOW (ref 70–99)
GLUCOSE BLDC GLUCOMTR-MCNC: 64 MG/DL — LOW (ref 70–99)
GLUCOSE BLDC GLUCOMTR-MCNC: 95 MG/DL — SIGNIFICANT CHANGE UP (ref 70–99)
GLUCOSE SERPL-MCNC: 55 MG/DL — LOW (ref 70–99)
HCT VFR BLD CALC: 35.5 % — LOW (ref 42–52)
HGB BLD-MCNC: 11.4 G/DL — LOW (ref 14–18)
IMM GRANULOCYTES NFR BLD AUTO: 0.6 % — HIGH (ref 0.1–0.3)
LYMPHOCYTES # BLD AUTO: 0.86 K/UL — LOW (ref 1.2–3.4)
LYMPHOCYTES # BLD AUTO: 18.6 % — LOW (ref 20.5–51.1)
MAGNESIUM SERPL-MCNC: 1.8 MG/DL — SIGNIFICANT CHANGE UP (ref 1.8–2.4)
MCHC RBC-ENTMCNC: 26.3 PG — LOW (ref 27–31)
MCHC RBC-ENTMCNC: 32.1 G/DL — SIGNIFICANT CHANGE UP (ref 32–37)
MCV RBC AUTO: 82 FL — SIGNIFICANT CHANGE UP (ref 80–94)
MONOCYTES # BLD AUTO: 0.63 K/UL — HIGH (ref 0.1–0.6)
MONOCYTES NFR BLD AUTO: 13.6 % — HIGH (ref 1.7–9.3)
NEUTROPHILS # BLD AUTO: 3.01 K/UL — SIGNIFICANT CHANGE UP (ref 1.4–6.5)
NEUTROPHILS NFR BLD AUTO: 65.3 % — SIGNIFICANT CHANGE UP (ref 42.2–75.2)
NRBC # BLD: 0 /100 WBCS — SIGNIFICANT CHANGE UP (ref 0–0)
PLATELET # BLD AUTO: 117 K/UL — LOW (ref 130–400)
POTASSIUM SERPL-MCNC: 4.7 MMOL/L — SIGNIFICANT CHANGE UP (ref 3.5–5)
POTASSIUM SERPL-SCNC: 4.7 MMOL/L — SIGNIFICANT CHANGE UP (ref 3.5–5)
RBC # BLD: 4.33 M/UL — LOW (ref 4.7–6.1)
RBC # FLD: 14.3 % — SIGNIFICANT CHANGE UP (ref 11.5–14.5)
SODIUM SERPL-SCNC: 146 MMOL/L — SIGNIFICANT CHANGE UP (ref 135–146)
TACROLIMUS SERPL-MCNC: 8.1 NG/ML — SIGNIFICANT CHANGE UP
WBC # BLD: 4.62 K/UL — LOW (ref 4.8–10.8)
WBC # FLD AUTO: 4.62 K/UL — LOW (ref 4.8–10.8)

## 2018-09-19 RX ORDER — PANTOPRAZOLE SODIUM 20 MG/1
40 TABLET, DELAYED RELEASE ORAL
Qty: 0 | Refills: 0 | Status: DISCONTINUED | OUTPATIENT
Start: 2018-09-19 | End: 2018-09-21

## 2018-09-19 RX ADMIN — Medication 1 TABLET(S): at 18:26

## 2018-09-19 RX ADMIN — TACROLIMUS 1 MILLIGRAM(S): 5 CAPSULE ORAL at 06:09

## 2018-09-19 RX ADMIN — Medication 100 MILLIGRAM(S): at 17:27

## 2018-09-19 RX ADMIN — Medication 100 MILLIGRAM(S): at 06:10

## 2018-09-19 RX ADMIN — ATORVASTATIN CALCIUM 10 MILLIGRAM(S): 80 TABLET, FILM COATED ORAL at 22:02

## 2018-09-19 RX ADMIN — Medication 8 MILLIGRAM(S): at 22:02

## 2018-09-19 RX ADMIN — MYCOPHENOLATE MOFETIL 1500 MILLIGRAM(S): 250 CAPSULE ORAL at 06:10

## 2018-09-19 RX ADMIN — MYCOPHENOLATE MOFETIL 1500 MILLIGRAM(S): 250 CAPSULE ORAL at 19:04

## 2018-09-19 RX ADMIN — Medication 81 MILLIGRAM(S): at 12:46

## 2018-09-19 RX ADMIN — Medication 10 UNIT(S): at 17:22

## 2018-09-19 RX ADMIN — Medication 1: at 12:47

## 2018-09-19 RX ADMIN — Medication 100 MILLIGRAM(S): at 19:04

## 2018-09-19 RX ADMIN — SENNA PLUS 2 TABLET(S): 8.6 TABLET ORAL at 22:02

## 2018-09-19 RX ADMIN — TACROLIMUS 1 MILLIGRAM(S): 5 CAPSULE ORAL at 17:27

## 2018-09-19 RX ADMIN — AMLODIPINE BESYLATE 10 MILLIGRAM(S): 2.5 TABLET ORAL at 06:09

## 2018-09-19 RX ADMIN — Medication 10 UNIT(S): at 12:48

## 2018-09-19 NOTE — PROGRESS NOTE ADULT - ASSESSMENT
JULISA CONKLIN 68y Male  MRN#: [id number]   CODE STATUS: FULL     SUBJECTIVE  Patient is a 68y old Male who presents with a chief complaint of Cerebellar bleed, DKA (19 Sep 2018 14:28)    Currently admitted to medicine with the primary diagnosis of ICH (intracerebral hemorrhage).     Hospital course has been uncomplicated so far.   Today is hospital day 8d, and this morning he is laying in bed comfortably and reports no overnight events.  He has no complaints today except mild lower abdominal pain and pain with urination.      Present Today:           Rouse Catheter ()No/ (x)Yes? Indwelling Urethral Catheter    Indication:             Central Line (x)No/ ()Yes?   Indication:          IV Fluids (x)No/ ()Yes? Type:  Rate:  Indication:    OBJECTIVE  PAST MEDICAL & SURGICAL HISTORY  Urinary tract infection without hematuria, site unspecified  Chronic kidney disease, unspecified CKD stage  Bilateral hearing loss, unspecified hearing loss type  Benign prostatic hyperplasia with urinary frequency  Diabetes  High cholesterol  HTN (hypertension)  Heart transplant recipient  H/O heart transplant    ALLERGIES:  codeine (Unknown)    MEDICATIONS:  STANDING MEDICATIONS  allopurinol 100 milliGRAM(s) Oral two times a day  amLODIPine   Tablet 10 milliGRAM(s) Oral daily  aspirin  chewable 81 milliGRAM(s) Oral daily  atorvastatin 10 milliGRAM(s) Oral at bedtime  dextrose 5%. 1000 milliLiter(s) (50 mL/Hr) IV Continuous <Continuous>  dextrose 50% Injectable 12.5 Gram(s) IV Push once  dextrose 50% Injectable 25 Gram(s) IV Push once  dextrose 50% Injectable 25 Gram(s) IV Push once  docusate sodium 100 milliGRAM(s) Oral two times a day  doxazosin 8 milliGRAM(s) Oral at bedtime  insulin glargine Injectable (LANTUS) 55 Unit(s) SubCutaneous at bedtime  insulin lispro (HumaLOG) corrective regimen sliding scale   SubCutaneous three times a day before meals  insulin lispro Injectable (HumaLOG) 10 Unit(s) SubCutaneous three times a day before meals  labetalol 100 milliGRAM(s) Oral two times a day  mycophenolate mofetil 1500 milliGRAM(s) Oral two times a day  senna 2 Tablet(s) Oral at bedtime  tacrolimus 1 milliGRAM(s) Oral every 12 hours  trimethoprim  160 mG/sulfamethoxazole 800 mG 1 Tablet(s) Oral two times a day    PRN MEDICATIONS  acetaminophen   Tablet .. 650 milliGRAM(s) Oral every 6 hours PRN  aluminum hydroxide/magnesium hydroxide/simethicone Suspension 30 milliLiter(s) Oral every 4 hours PRN  dextrose 40% Gel 15 Gram(s) Oral once PRN  glucagon  Injectable 1 milliGRAM(s) IntraMuscular once PRN      VITAL SIGNS: Last 24 Hours  T(C): 35.9 (19 Sep 2018 07:30), Max: 36.2 (18 Sep 2018 23:32)  T(F): 96.7 (19 Sep 2018 07:30), Max: 97.2 (18 Sep 2018 23:32)  HR: 94 (19 Sep 2018 07:30) (94 - 104)  BP: 119/82 (19 Sep 2018 07:30) (119/82 - 152/89)  BP(mean): --  RR: 18 (19 Sep 2018 07:30) (18 - 18)  SpO2: --    LABS:                        11.4   4.62  )-----------( 117      ( 19 Sep 2018 05:25 )             35.5     09-19    146  |  108  |  23<H>  ----------------------------<  55<L>  4.7   |  23  |  1.3    Ca    9.3      19 Sep 2018 05:25  Mg     1.8     09-19        Sedimentation Rate, Erythrocyte: 67 mm/Hr <H> (09-19-18 @ 05:25)      RADIOLOGY:  No new imaging     PHYSICAL EXAM:    GENERAL: NAD, well-developed, AAOx3  HEENT:  Atraumatic, Normocephalic. EOMI, PERRLA, conjunctiva and sclera clear, No JVD  PULMONARY: Clear to auscultation bilaterally; No wheeze  CARDIOVASCULAR: Regular rate and rhythm; No murmurs, rubs, or gallops  GASTROINTESTINAL: Soft, + LQ tenderness, Nondistended; Bowel sounds present  MUSCULOSKELETAL:  2+ Peripheral Pulses, No clubbing, cyanosis, or edema  NEUROLOGY: non-focal  SKIN: No rashes or lesions    ADMISSION SUMMARY  HPI:  67 y/o male with a hx of Cardiac Transplant x 10 years ago due to MI x 2 and low EF on immunosuppressant , DM dx in his 60's,  HTN, DLD p/w acute confusion x 1 day    History obtained from ED note overnight. Patient was well on 9/10/2018 am when his wife left him home at 7 am and gave him his breakfast. She called him from work later in the afternoon but no answer. Around 5:30 in the evening when she came home , she found him very confused and he was trying to catch things that were not there. No visible signs of trauma. Family called EMS.     As per pt, he was afebrile at home, no cough/fevers/chills. He states he is in the hospital for DKA and he has had it before. He denied any acute complaints at this time     Upon presentation the ED he was hypertensive 173/115 s/p labetolol IV x 2, Glucose >600 with positive serum and urine ketones (11 Sep 2018 07:32)      ASSESSMENT & PLAN  #Left cerebellar hemorrhage  - measuring 0.9 x 0.4 cm and rpt CTH shows  0.9 x 0.5 cm hyperdensity in the left cerebellum which may represent a cavernoma with recent bleed.   - Repeat CT head 9/12 shows decrease in size of bleed  - MRI Brain w/ and w/o contrast 9/14: acute right frontal infarcts; tiny hemorrhage w/ surrounding edema in left cerebellum  - CT Angiogram Head/Neck 9/14: Occlusion of the left vertebral artery from its origin. Partial reconstitution of the cervical segments but additional occlusion of the proximal V4 segment. Retrograde filling of the distal left vertebral artery and left PICA, with a moderate stenosis of the proximal left PICA noted.  - CT Head 9/15 unchanged from prior CT Head  - F/u w/ Cardio for HOSEA  - F/u with Neuro  - Maintain SBP <160  - NSGY does not want to intervene  - Neuro checks Q4h  - PT eval  - C/w aspirin 81 mg PO qD  -F/u w/ Neuro regarding AC besides ASA upon d/c (per Dr. Abdalla)-attempted to reach at 2405 3 times, no response.  Follow up.    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  - C/w doxazosin 8 mg PO qHS      # Chest discomfort in the AM 9/15- resolved  -  F/u EKG  - Trops neg x 2    # DKA - resolving  - Anion gap 15 <-- 20 <-- 19 <-- 17  - C/w Lantus 52 U; lispro 17 U  - IVF d/c'd  - monitor anion gap  -follow FS closely, patient's BS labile     #UTI  - + u/a  - f/u urine cx sensitivities   - 8 day cefepime 1g BID course completed   - ID:  Bactrim DS BID for 7 days     # WANDY on CKD likely prerenal- resolved  -f/u BMP    # HTN  - maintain SBP<160  - c/w labetalol 100 BID    # hx of cardiac transplant  - c/w immunosuppressants  - Prograf level - f/u  - may call [facility] to update patient's status  - TTE w/ bubble study pending    DVT ppx: SCD  Diet: mechanical soft dysphagia 2  Full code   Activity: OOB to chair.  Discharge planning- Home w/ VNS  Include referral to Outpatient Diabetes Management Program ([phone]) upon d/c

## 2018-09-19 NOTE — CONSULT NOTE ADULT - PROVIDER SPECIALTY LIST ADULT
Cardiology
Critical Care
Endocrinology
Infectious Disease
Internal Medicine
Neurology
Neurology
Neurosurgery
Physiatry
Pulmonology
Physiatry

## 2018-09-19 NOTE — CONSULT NOTE ADULT - REASON FOR ADMISSION
Cerebellar bleed, DKA
DKA
Cerebellar bleed, DKA

## 2018-09-19 NOTE — CONSULT NOTE ADULT - SUBJECTIVE AND OBJECTIVE BOX
JULISA CONKLIN  68y, Male  Allergy: codeine (Unknown)      HPI:  67 y/o male with a hx of Cardiac Transplant x 10 years ago due to MI x 2 and low EF on immunosuppressant , DM dx in his 60's,  HTN, DLD p/w acute confusion x 1 day    History obtained from ED note overnight. Patient was well on 9/10/2018 am when his wife left him home at 7 am and gave him his breakfast. She called him from work later in the afternoon but no answer. Around 5:30 in the evening when she came home , she found him very confused and he was trying to catch things that were not there. No visible signs of trauma. Family called EMS.     As per pt, he was afebrile at home, no cough/fevers/chills. He states he is in the hospital for DKA and he has had it before. He denied any acute complaints at this time     Upon presentation the ED he was hypertensive 173/115 s/p labetolol IV x 2, Glucose >600 with positive serum and urine ketones (11 Sep 2018 07:32)    FAMILY HISTORY:  No pertinent family history in first degree relatives    PAST MEDICAL & SURGICAL HISTORY:  Urinary tract infection without hematuria, site unspecified  Chronic kidney disease, unspecified CKD stage  Bilateral hearing loss, unspecified hearing loss type  Benign prostatic hyperplasia with urinary frequency  Diabetes  High cholesterol  HTN (hypertension)  Heart transplant recipient  H/O heart transplant      ROS negative except as per HPI    VITALS:  T(F): 96.7, Max: 97.2 (09-18-18 @ 23:32)  HR: 94  BP: 119/82  RR: 18Vital Signs Last 24 Hrs  T(C): 35.9 (19 Sep 2018 07:30), Max: 36.2 (18 Sep 2018 23:32)  T(F): 96.7 (19 Sep 2018 07:30), Max: 97.2 (18 Sep 2018 23:32)  HR: 94 (19 Sep 2018 07:30) (94 - 104)  BP: 119/82 (19 Sep 2018 07:30) (119/82 - 152/89)  BP(mean): --  RR: 18 (19 Sep 2018 07:30) (18 - 18)  SpO2: --    PHYSICAL EXAM:  Gen: Awake and alert, non-toxic appearing, NAD  HEENT: NCAT. EOMI. MMM.   CV: RRR, no murmurs  Lungs: CTAB, no w/r/r  Abd: Soft. 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  Organism: Burkholderia cepacia (09-18-18 @ 15:31)  Organism: Burkholderia cepacia (09-18-18 @ 15:31)      -  Ceftazidime: S 8      -  Levofloxacin: R >4      -  Meropenem: S 2      -  Trimethoprim/Sulfamethoxazole: S 1/19      Method Type: LUTHER            RADIOLOGY & ADDITIONAL TESTS:    ANTIBIOTICS:  cefepime   IVPB   100 mL/Hr IV Intermittent (09-10-18 @ 19:38)    cefepime   IVPB   100 mL/Hr IV Intermittent (09-18-18 @ 05:56)   100 mL/Hr IV Intermittent (09-17-18 @ 17:03)   100 mL/Hr IV Intermittent (09-17-18 @ 05:30)   100 mL/Hr IV Intermittent (09-16-18 @ 17:29)   100 mL/Hr IV Intermittent (09-16-18 @ 05:38)   100 mL/Hr IV Intermittent (09-15-18 @ 17:11)   100 mL/Hr IV Intermittent (09-15-18 @ 05:50)   100 mL/Hr IV Intermittent (09-14-18 @ 17:11)   100 mL/Hr IV Intermittent (09-14-18 @ 06:24)   100 mL/Hr IV Intermittent (09-13-18 @ 18:37)   100 mL/Hr IV Intermittent (09-13-18 @ 06:17)   100 mL/Hr IV Intermittent (09-12-18 @ 17:14)   100 mL/Hr IV Intermittent (09-12-18 @ 06:28)   100 mL/Hr IV Intermittent (09-11-18 @ 17:19)   100 mL/Hr IV Intermittent (09-11-18 @ 06:58)    ciprofloxacin   IVPB   200 mL/Hr IV Intermittent (09-10-18 @ 19:38)

## 2018-09-19 NOTE — CONSULT NOTE ADULT - ASSESSMENT
68M  Hx Heart transplant (on MMF, [place])    Admitted with DKA and cerebellar bleed  Sepsis ruled out on admission  Ucx with Burkholderia s/p 8 days cefepime with continued symptoms and + repeat cultures    Given symptoms favor prolonging treatment course to complete 14 days    - bactrim po 1 DS tab BID for another 7 days (CrCl 65)  - monitor K and Cr  - CT with prostatomegaly- Urology OP followup    Spectra [id number]

## 2018-09-19 NOTE — PROGRESS NOTE ADULT - SUBJECTIVE AND OBJECTIVE BOX
JULISA CONKLIN 68y Male  MRN#: [id number]       SUBJECTIVE  Patient is a 68y old Male who presents with a chief complaint of Cerebellar bleed, DKA (18 Sep 2018 09:39)  Currently admitted to medicine with the primary diagnosis of DKA (diabetic ketoacidoses).    OBJECTIVE  PAST MEDICAL & SURGICAL HISTORY  Urinary tract infection without hematuria, site unspecified  Chronic kidney disease, unspecified CKD stage  Bilateral hearing loss, unspecified hearing loss type  Benign prostatic hyperplasia with urinary frequency  Diabetes  High cholesterol  HTN (hypertension)  Heart transplant recipient  H/O heart transplant    ALLERGIES:  codeine (Unknown)    MEDICATIONS:  STANDING MEDICATIONS  allopurinol 100 milliGRAM(s) Oral two times a day  amLODIPine   Tablet 10 milliGRAM(s) Oral daily  aspirin  chewable 81 milliGRAM(s) Oral daily  atorvastatin 10 milliGRAM(s) Oral at bedtime  doxazosin 8 milliGRAM(s) Oral at bedtime  insulin glargine Injectable (LANTUS) 55 Unit(s) SubCutaneous at bedtime  insulin lispro (HumaLOG) corrective regimen sliding scale   SubCutaneous three times a day before meals  insulin lispro Injectable (HumaLOG) 10 Unit(s) SubCutaneous three times a day before meals  labetalol 100 milliGRAM(s) Oral two times a day  mycophenolate mofetil 1500 milliGRAM(s) Oral two times a day  pregabalin 100 milliGRAM(s) Oral two times a day  tacrolimus 1 milliGRAM(s) Oral every 12 hours    PRN MEDICATIONS  acetaminophen   Tablet .. 650 milliGRAM(s) Oral every 6 hours PRN  aluminum hydroxide/magnesium hydroxide/simethicone Suspension 30 milliLiter(s) Oral every 4 hours PRN  dextrose 40% Gel 15 Gram(s) Oral once PRN  glucagon  Injectable 1 milliGRAM(s) IntraMuscular once PRN      VITAL SIGNS: Last 24 Hours  T(C): 36.3 (18 Sep 2018 07:49), Max: 37.6 (17 Sep 2018 15:47)  T(F): 97.4 (18 Sep 2018 07:49), Max: 99.6 (17 Sep 2018 15:47)  HR: 92 (18 Sep 2018 07:49) (91 - 101)  BP: 136/77 (18 Sep 2018 07:49) (123/66 - 173/98)  BP(mean): --  RR: 18 (18 Sep 2018 07:49) (18 - 18)  SpO2: 98% (18 Sep 2018 03:05) (97% - 98%)    LABS:                        11.3   5.46  )-----------( 116      ( 18 Sep 2018 05:43 )             35.6     09-18    139  |  102  |  22<H>  ----------------------------<  161<H>  4.3   |  22  |  1.3    Ca    8.4<L>      18 Sep 2018 05:43  Mg     1.9     09-18      Culture - Urine (collected 16 Sep 2018 11:11)  Source: .Urine Clean Catch (Midstream)  Preliminary Report (17 Sep 2018 18:01):    >100,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 Rec #:  [id number]    Height:      68.0 in 172.7 cm  YOB: 1950   Weight:      195.0 lb 88.45 kg  Patient Age:    68 years    BSA:         2.02 m²  Patient Gender: M           BP:          137/74 mmHg       Date of Exam:        9/17/2018 12:42:00 PM  Referring Physician: [id number] ALEKSANDAR DUNBAR  Sonographer:         Lisa Sandhu  Reading Physician:   Melissa Gordon M.D.    Procedure:   2D Echo/Doppler/Color Doppler Complete.  Indications: I50.9 - Heart Failure, unspecified  Diagnosis:   Agitated saline study r/o shunt         Summary:   1. Normal left ventricular size and wall thicknesses, with normal   systolic function.   2. Structurally normal mitral valve, with normal leaflet excursion.   3. Normal trileaflet aortic valve with normal opening.   4. Color flow doppler andintravenous injection of agitated saline   demonstrates the presence of an intact intra atrial septum.   5. LA volume Index is 47.4 ml/m² ml/m2.   6. Suboptimal bubble study with no shunt seen.    PHYSICIAN INTERPRETATION:  Left Ventricle: Normal left ventricular size and wall thicknesses, with   normal systolic function. There is no left ventricular hypertrophy.   Spectral Doppler shows normal pattern of LV diastolic filling. Normal LV   filling pressures.  Right Ventricle: Normal right ventricular size and function.  Left Atrium: Mildly enlarged left atrium. Color flow doppler and   intravenous injection of agitated saline demonstrates the presence of an   intact intra atrial septum. Suboptimal bubble study with no shunt seen.   LA volume Index is 47.4 ml/m² ml/m2.  Right Atrium: Normal right atrial size.  Pericardium: There is no evidence of pericardial effusion.  Mitral Valve: Structurally normal mitral valve, with normal leaflet   excursion. No evidence of mitral valve regurgitation is seen.  Tricuspid Valve: Structurally normal tricuspid valve, with normal leaflet   excursion. No tricuspid regurgitation is visualized. Adequate TR velocity   was not obtained to accurately assess RVSP.  Aortic Valve: Normal trileaflet aortic valvewith normal opening. No   evidence of aortic valve regurgitation is seen.  Pulmonic Valve: Structurally normal pulmonic valve, with normal leaflet   excursion. No indication of pulmonic valve regurgitation.  Aorta: The aortic root and ascending aortaare structurally normal, with   no evidence of dilitation.  Pulmonary Artery: The main pulmonary artery is normal in size.  Shunts: Agitated saline contrast was given intravenously to evaluate for   intracardiac shunting.     2D AND M-MODE MEASUREMENTS (normal ranges within parentheses):  Left                 Normal    Aorta/Left           Normal  Ventricle:                     Atrium:  IVSd (2D):  1.42 cm  (0.7-1.1) AoV Cusp      2.31   (1.5-2.6)  LVPWd (2D): 1.51 cm  (0.7-1.1) Separation:   cm  LVIDd (2D): 3.18 cm  (3.4-5.7) Left Atrium   3.65   (1.9-4.0)  LVIDs (2D): 2.59 cm            (Mmode):      cm  LV FS (2D): 18.4 %   (>25%)    LA Volume     47.3  IVSd        1.22 cm  (0.7-1.1) Index         ml/m²  (Mmode):                       Right  LVPWd       1.09 cm  (0.7-1.1) Ventricle:  (Mmode):                       RVd (2D):      2.23 cm  LVIDd       4.77 cm  (3.4-5.7)  (Mmode):  LVIDs       3.63 cm  (Mmode):  LV FS       23.9 %   (>25%)  (Mmode):  Relative    0.95     (<0.42)  Wall  Thickness  Rel. Wall   0.46     (<0.42)  Thickness  Mm  LV Mass     102.1  Index:      g/m²  Mmode    SPECTRAL DOPPLER ANALYSIS:  LV DIASTOLIC FUNCTION:  MV Peak E: 1.04 m/s Decel Time: 175 msec  MV Peak A: 0.19 m/s  E/A Ratio: 5.62    Aortic Valve:  AoV VMax:    0.96 m/s AoV Area, Vmax: 3.18 cm² Vmax Indx: 1.57 cm²/m²  AoV Pk Grad: 3.7 mmHg    LVOT Vmax: 0.77 m/s  LVOT VTI:  0.17 m  LVOT Diam: 2.25 cm    Mitral Valve:  MV P1/2 Time: 50.71 msec  MV Area, PHT: 4.34 cm²       [id number] Melissa Gordon M.D., Electronically signed on 9/17/2018 at 7:39:37   PM       *** Final ***    MELISSA GORDON M.D., RESIDENT RADIOLOGIST  This document has been electronically signed.  MELISSA GORDON M.D., RESIDENT RADIOLOGIST  This document has beenelectronically signed. Sep 17 2018 12:42PM    PHYSICAL EXAM:    GENERAL: NAD, well-developed, AAOx3  HEENT:  Atraumatic, Normocephalic. EOMI, PERRLA, conjunctiva and sclera clear, No JVD  PULMONARY: Clear to auscultation bilaterally; No wheeze  CARDIOVASCULAR: Regular rate and rhythm; No murmurs, rubs, or gallops  GASTROINTESTINAL: Soft, Nontender, Nondistended  MUSCULOSKELETAL:  2+ Peripheral Pulses, No clubbing, cyanosis, or edema  NEUROLOGY: non-focal  SKIN: No rashes or lesions

## 2018-09-19 NOTE — CONSULT NOTE ADULT - CONSULT REQUESTED BY NAME
HARVEY Barnard
Jeff[place]
Keena
Mayuri Ornelas
Medicine
dr armstrong
medicine
opal
medicine

## 2018-09-19 NOTE — PROGRESS NOTE ADULT - SUBJECTIVE AND OBJECTIVE BOX
09-19-18 @ 08:52    JULISA CONKLIN  68y  Male  Sitting OOB, comfortable, no c/o. Feels good.     INTERVAL EVENTS: none    MEDICATIONS  (STANDING):  allopurinol 100 milliGRAM(s) Oral two times a day  amLODIPine   Tablet 10 milliGRAM(s) Oral daily  aspirin  chewable 81 milliGRAM(s) Oral daily  atorvastatin 10 milliGRAM(s) Oral at bedtime  dextrose 5%. 1000 milliLiter(s) (50 mL/Hr) IV Continuous <Continuous>  dextrose 50% Injectable 12.5 Gram(s) IV Push once  dextrose 50% Injectable 25 Gram(s) IV Push once  dextrose 50% Injectable 25 Gram(s) IV Push once  docusate sodium 100 milliGRAM(s) Oral two times a day  doxazosin 8 milliGRAM(s) Oral at bedtime  insulin glargine Injectable (LANTUS) 55 Unit(s) SubCutaneous at bedtime  insulin lispro (HumaLOG) corrective regimen sliding scale   SubCutaneous three times a day before meals  insulin lispro Injectable (HumaLOG) 10 Unit(s) SubCutaneous three times a day before meals  labetalol 100 milliGRAM(s) Oral two times a day  mycophenolate mofetil 1500 milliGRAM(s) Oral two times a day  pantoprazole    Tablet 40 milliGRAM(s) Oral before breakfast  senna 2 Tablet(s) Oral at bedtime  tacrolimus 1 milliGRAM(s) Oral every 12 hours  trimethoprim  160 mG/sulfamethoxazole 800 mG 1 Tablet(s) Oral two times a day    MEDICATIONS  (PRN):  acetaminophen   Tablet .. 650 milliGRAM(s) Oral every 6 hours PRN Temp greater or equal to 38C (100.4F), Moderate Pain (4 - 6)  aluminum hydroxide/magnesium hydroxide/simethicone Suspension 30 milliLiter(s) Oral every 4 hours PRN Dyspepsia  dextrose 40% Gel 15 Gram(s) Oral once PRN Blood Glucose LESS THAN 70 milliGRAM(s)/deciliter  glucagon  Injectable 1 milliGRAM(s) IntraMuscular once PRN Glucose LESS THAN 70 milligrams/deciliter      T(C): 36.1 (09-19-18 @ 16:50), Max: 36.2 (09-18-18 @ 23:32)  HR: 100 (09-19-18 @ 16:50) (94 - 104)  BP: 122/66 (09-19-18 @ 16:50) (119/82 - 152/89)  RR: 18 (09-19-18 @ 16:50) (18 - 18)  SpO2: --  Wt(kg): --Vital Signs Last 24 Hrs  T(C): 36.1 (19 Sep 2018 16:50), Max: 36.2 (18 Sep 2018 23:32)  T(F): 96.9 (19 Sep 2018 16:50), Max: 97.2 (18 Sep 2018 23:32)  HR: 100 (19 Sep 2018 16:50) (94 - 104)  BP: 122/66 (19 Sep 2018 16:50) (119/82 - 152/89)  BP(mean): --  RR: 18 (19 Sep 2018 16:50) (18 - 18)  SpO2: --    PHYSICAL EXAM:  GENERAL: NAD  NECK: no JVD.   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 No new lesion. Clinically better. Ambulate as tolerate w/ assist, rehab.   DKA resolved, Endo f/u appreciated, as adv : Blood sugars labile. 157 mg 109 mg 67 mg 147 mg 232 mg.. On lantus 55 u HS and humlog 10 u ac  Will benefit with nutritional counselling. . Continue to monitor blood sugars , avoid hypoglycemia..Snacks between meals will help.  CKD : known. Cr improving, suggesting prerenal. Cont to observe. Stable.   s/p heart transplant : has been stable. He is known to me prior to transplant. Does f/u w/ Dr. Finn. Cont anti rejection Rx.   HTN : STable, cont rx.   DLD ; Cont statin.   BPH : Sono proved also. Off Rouse, doing well  UTI : to be f/u by ID, for advise. Discussed w/ resident in details.      He is doing better. Will plan disch. & f/u as OP, if cont stable.

## 2018-09-19 NOTE — CONSULT NOTE ADULT - CONSULT REQUESTED DATE/TIME
11-Sep-2018 02:30
11-Sep-2018 09:09
11-Sep-2018 10:33
11-Sep-2018 12:42
12-Sep-2018 08:53
12-Sep-2018 13:22
16-Sep-2018 16:12
16-Sep-2018 23:46
19-Sep-2018 00:00
13-Sep-2018 14:54

## 2018-09-20 LAB
ANION GAP SERPL CALC-SCNC: 17 MMOL/L — HIGH (ref 7–14)
BASOPHILS # BLD AUTO: 0.02 K/UL — SIGNIFICANT CHANGE UP (ref 0–0.2)
BASOPHILS NFR BLD AUTO: 0.4 % — SIGNIFICANT CHANGE UP (ref 0–1)
BUN SERPL-MCNC: 25 MG/DL — HIGH (ref 10–20)
C-ANCA SER-ACNC: NEGATIVE — SIGNIFICANT CHANGE UP
CALCIUM SERPL-MCNC: 9.1 MG/DL — SIGNIFICANT CHANGE UP (ref 8.5–10.1)
CHLORIDE SERPL-SCNC: 104 MMOL/L — SIGNIFICANT CHANGE UP (ref 98–110)
CO2 SERPL-SCNC: 21 MMOL/L — SIGNIFICANT CHANGE UP (ref 17–32)
CREAT SERPL-MCNC: 1.4 MG/DL — SIGNIFICANT CHANGE UP (ref 0.7–1.5)
EOSINOPHIL # BLD AUTO: 0.08 K/UL — SIGNIFICANT CHANGE UP (ref 0–0.7)
EOSINOPHIL NFR BLD AUTO: 1.8 % — SIGNIFICANT CHANGE UP (ref 0–8)
GLUCOSE BLDC GLUCOMTR-MCNC: 138 MG/DL — HIGH (ref 70–99)
GLUCOSE BLDC GLUCOMTR-MCNC: 204 MG/DL — HIGH (ref 70–99)
GLUCOSE BLDC GLUCOMTR-MCNC: 82 MG/DL — SIGNIFICANT CHANGE UP (ref 70–99)
GLUCOSE SERPL-MCNC: 212 MG/DL — HIGH (ref 70–99)
HCT VFR BLD CALC: 35 % — LOW (ref 42–52)
HGB BLD-MCNC: 11.1 G/DL — LOW (ref 14–18)
IMM GRANULOCYTES NFR BLD AUTO: 0.9 % — HIGH (ref 0.1–0.3)
LYMPHOCYTES # BLD AUTO: 0.81 K/UL — LOW (ref 1.2–3.4)
LYMPHOCYTES # BLD AUTO: 18 % — LOW (ref 20.5–51.1)
MAGNESIUM SERPL-MCNC: 1.7 MG/DL — LOW (ref 1.8–2.4)
MCHC RBC-ENTMCNC: 26.3 PG — LOW (ref 27–31)
MCHC RBC-ENTMCNC: 31.7 G/DL — LOW (ref 32–37)
MCV RBC AUTO: 82.9 FL — SIGNIFICANT CHANGE UP (ref 80–94)
MONOCYTES # BLD AUTO: 0.63 K/UL — HIGH (ref 0.1–0.6)
MONOCYTES NFR BLD AUTO: 14 % — HIGH (ref 1.7–9.3)
NEUTROPHILS # BLD AUTO: 2.92 K/UL — SIGNIFICANT CHANGE UP (ref 1.4–6.5)
NEUTROPHILS NFR BLD AUTO: 64.9 % — SIGNIFICANT CHANGE UP (ref 42.2–75.2)
NRBC # BLD: 0 /100 WBCS — SIGNIFICANT CHANGE UP (ref 0–0)
P-ANCA SER-ACNC: NEGATIVE — SIGNIFICANT CHANGE UP
PLATELET # BLD AUTO: 117 K/UL — LOW (ref 130–400)
POTASSIUM SERPL-MCNC: 4.9 MMOL/L — SIGNIFICANT CHANGE UP (ref 3.5–5)
POTASSIUM SERPL-SCNC: 4.9 MMOL/L — SIGNIFICANT CHANGE UP (ref 3.5–5)
RBC # BLD: 4.22 M/UL — LOW (ref 4.7–6.1)
RBC # FLD: 14.5 % — SIGNIFICANT CHANGE UP (ref 11.5–14.5)
SODIUM SERPL-SCNC: 142 MMOL/L — SIGNIFICANT CHANGE UP (ref 135–146)
TACROLIMUS SERPL-MCNC: 6.6 NG/ML — SIGNIFICANT CHANGE UP
WBC # BLD: 4.5 K/UL — LOW (ref 4.8–10.8)
WBC # FLD AUTO: 4.5 K/UL — LOW (ref 4.8–10.8)

## 2018-09-20 RX ORDER — INSULIN LISPRO 100/ML
8 VIAL (ML) SUBCUTANEOUS
Qty: 0 | Refills: 0 | Status: DISCONTINUED | OUTPATIENT
Start: 2018-09-20 | End: 2018-09-20

## 2018-09-20 RX ORDER — INSULIN LISPRO 100/ML
5 VIAL (ML) SUBCUTANEOUS
Qty: 0 | Refills: 0 | Status: DISCONTINUED | OUTPATIENT
Start: 2018-09-20 | End: 2018-09-21

## 2018-09-20 RX ORDER — MAGNESIUM OXIDE 400 MG ORAL TABLET 241.3 MG
400 TABLET ORAL
Qty: 0 | Refills: 0 | Status: DISCONTINUED | OUTPATIENT
Start: 2018-09-20 | End: 2018-09-21

## 2018-09-20 RX ORDER — INSULIN GLARGINE 100 [IU]/ML
45 INJECTION, SOLUTION SUBCUTANEOUS EVERY MORNING
Qty: 0 | Refills: 0 | Status: DISCONTINUED | OUTPATIENT
Start: 2018-09-20 | End: 2018-09-20

## 2018-09-20 RX ORDER — INSULIN GLARGINE 100 [IU]/ML
20 INJECTION, SOLUTION SUBCUTANEOUS EVERY MORNING
Qty: 0 | Refills: 0 | Status: DISCONTINUED | OUTPATIENT
Start: 2018-09-20 | End: 2018-09-21

## 2018-09-20 RX ADMIN — TACROLIMUS 1 MILLIGRAM(S): 5 CAPSULE ORAL at 05:47

## 2018-09-20 RX ADMIN — MYCOPHENOLATE MOFETIL 1500 MILLIGRAM(S): 250 CAPSULE ORAL at 18:20

## 2018-09-20 RX ADMIN — Medication 100 MILLIGRAM(S): at 05:48

## 2018-09-20 RX ADMIN — MYCOPHENOLATE MOFETIL 1500 MILLIGRAM(S): 250 CAPSULE ORAL at 05:49

## 2018-09-20 RX ADMIN — AMLODIPINE BESYLATE 10 MILLIGRAM(S): 2.5 TABLET ORAL at 05:48

## 2018-09-20 RX ADMIN — Medication 100 MILLIGRAM(S): at 18:18

## 2018-09-20 RX ADMIN — Medication 2: at 12:26

## 2018-09-20 RX ADMIN — INSULIN GLARGINE 20 UNIT(S): 100 INJECTION, SOLUTION SUBCUTANEOUS at 11:48

## 2018-09-20 RX ADMIN — Medication 1 TABLET(S): at 18:18

## 2018-09-20 RX ADMIN — PANTOPRAZOLE SODIUM 40 MILLIGRAM(S): 20 TABLET, DELAYED RELEASE ORAL at 05:50

## 2018-09-20 RX ADMIN — TACROLIMUS 1 MILLIGRAM(S): 5 CAPSULE ORAL at 18:19

## 2018-09-20 RX ADMIN — ATORVASTATIN CALCIUM 10 MILLIGRAM(S): 80 TABLET, FILM COATED ORAL at 22:31

## 2018-09-20 RX ADMIN — MAGNESIUM OXIDE 400 MG ORAL TABLET 400 MILLIGRAM(S): 241.3 TABLET ORAL at 18:17

## 2018-09-20 RX ADMIN — Medication 81 MILLIGRAM(S): at 11:51

## 2018-09-20 RX ADMIN — Medication 8 MILLIGRAM(S): at 22:31

## 2018-09-20 RX ADMIN — MAGNESIUM OXIDE 400 MG ORAL TABLET 400 MILLIGRAM(S): 241.3 TABLET ORAL at 11:50

## 2018-09-20 RX ADMIN — Medication 1 TABLET(S): at 05:49

## 2018-09-20 NOTE — PROGRESS NOTE ADULT - SUBJECTIVE AND OBJECTIVE BOX
09-20-18 @ 12:21    JULISA CONKLIN  68y  Male  Seen OOB to chair.   No c/o. Ambulates w/ assist. Better comfortable. Able to get up but weaker.     INTERVAL EVENTS: none    MEDICATIONS  (STANDING):  allopurinol 100 milliGRAM(s) Oral two times a day  amLODIPine   Tablet 10 milliGRAM(s) Oral daily  aspirin  chewable 81 milliGRAM(s) Oral daily  atorvastatin 10 milliGRAM(s) Oral at bedtime  dextrose 5%. 1000 milliLiter(s) (50 mL/Hr) IV Continuous <Continuous>  dextrose 50% Injectable 12.5 Gram(s) IV Push once  dextrose 50% Injectable 25 Gram(s) IV Push once  dextrose 50% Injectable 25 Gram(s) IV Push once  docusate sodium 100 milliGRAM(s) Oral two times a day  doxazosin 8 milliGRAM(s) Oral at bedtime  insulin glargine Injectable (LANTUS) 20 Unit(s) SubCutaneous every morning  insulin lispro (HumaLOG) corrective regimen sliding scale   SubCutaneous three times a day before meals  insulin lispro Injectable (HumaLOG) 5 Unit(s) SubCutaneous three times a day before meals  labetalol 100 milliGRAM(s) Oral two times a day  magnesium oxide 400 milliGRAM(s) Oral three times a day with meals  mycophenolate mofetil 1500 milliGRAM(s) Oral two times a day  pantoprazole    Tablet 40 milliGRAM(s) Oral before breakfast  senna 2 Tablet(s) Oral at bedtime  tacrolimus 1 milliGRAM(s) Oral every 12 hours  trimethoprim  160 mG/sulfamethoxazole 800 mG 1 Tablet(s) Oral two times a day    MEDICATIONS  (PRN):  acetaminophen   Tablet .. 650 milliGRAM(s) Oral every 6 hours PRN Temp greater or equal to 38C (100.4F), Moderate Pain (4 - 6)  aluminum hydroxide/magnesium hydroxide/simethicone Suspension 30 milliLiter(s) Oral every 4 hours PRN Dyspepsia  dextrose 40% Gel 15 Gram(s) Oral once PRN Blood Glucose LESS THAN 70 milliGRAM(s)/deciliter  glucagon  Injectable 1 milliGRAM(s) IntraMuscular once PRN Glucose LESS THAN 70 milligrams/deciliter      T(C): 36.6 (09-20-18 @ 16:11), Max: 36.6 (09-20-18 @ 16:11)  HR: 94 (09-20-18 @ 16:11) (94 - 104)  BP: 135/82 (09-20-18 @ 16:11) (135/82 - 170/85)  RR: 18 (09-20-18 @ 16:11) (18 - 18)  SpO2: --  Wt(kg): --Vital Signs Last 24 Hrs  T(C): 36.6 (20 Sep 2018 16:11), Max: 36.6 (20 Sep 2018 16:11)  T(F): 97.9 (20 Sep 2018 16:11), Max: 97.9 (20 Sep 2018 16:11)  HR: 94 (20 Sep 2018 16:11) (94 - 104)  BP: 135/82 (20 Sep 2018 16:11) (135/82 - 170/85)  BP(mean): --  RR: 18 (20 Sep 2018 16:11) (18 - 18)  SpO2: --    PHYSICAL EXAM:  GENERAL: NAD  NECK: no bruit  CHEST/LUNG: NVBS  HEART: S1S2, reg  ABDOMEN: obese, benign  EXTREMITIES: no CCE                          11.1   4.50  )-----------( 117      ( 20 Sep 2018 05:37 )             35.0     09-20    142  |  104  |  25<H>  ----------------------------<  212<H>  4.9   |  21  |  1.4    Ca    9.1      20 Sep 2018 05:37  Mg     1.7     09-20              RADIOLOGY & ADDITIONAL TESTS:      ASSESSMENT / PLAN  :    Left cerebellar bleed : Has hemorrhogic changes. No new lesion. Clinically better. Ambulate as tolerate w/ assist, rehab. If OK by neuro, consider discharge. He favors to go to home w/ PT.  DKA resolved, Endo f/u noted, BSL being very labile, insulin being adjusted. Occasional relatively low gl.   Avoid hypoglycemia..Snacks between meals.  CKD : known. Cr improving, suggesting prerenal. Cont to observe. Stable.   s/p heart transplant : has been stable. He is known to me prior to transplant. Does f/u w/ Dr. Finn. Cont anti rejection Rx.   HTN : STable, cont rx.   DLD ; Cont statin.   BPH : stable.   UTI : Had f/u by ID, Abx to be contd as per advise.

## 2018-09-20 NOTE — SWALLOW BEDSIDE ASSESSMENT ADULT - SLP PERTINENT HISTORY OF CURRENT PROBLEM
s/p L cerebellar CVA
pt admitted with headache and slurred speech. cth revealed cerebellar hemorrhage

## 2018-09-20 NOTE — SWALLOW BEDSIDE ASSESSMENT ADULT - SLP GENERAL OBSERVATIONS
pt received in bed awake alert w/o c/o pain. +room air. speech clear and fluent. pt denying acute cognitive changes at this time. as per , pt awaiting MD- possible d/c to home once medically cleared.
pt awake alert without c/o pain. + dysarthria

## 2018-09-20 NOTE — SWALLOW BEDSIDE ASSESSMENT ADULT - SWALLOW EVAL: DIAGNOSIS
+toleration observed for regular consistency and thin liquids w/o overt s/s aspiration/penetration
mild oral dysphagia for soft. toleration observed for puree and thins. pt does not have dentures present therefore regular not recommended

## 2018-09-20 NOTE — PROGRESS NOTE ADULT - ASSESSMENT
68M  Hx Heart transplant (on MMF, [place])    Admitted with DKA and cerebellar bleed  Sepsis ruled out on admission  Ucx with Burkholderia s/p 8 days cefepime with continued symptoms and + repeat cultures    Given symptoms favor prolonging treatment course to complete 14 days    - bactrim po 1 DS tab BID to complete 14 days (end 9/25)  - monitor K and Cr, low K diet  - Can trial pyridium  - CT with prostatomegaly- Urology OP followup    Spectra [phone]

## 2018-09-20 NOTE — PROGRESS NOTE ADULT - SUBJECTIVE AND OBJECTIVE BOX
JULISA CONKLIN  68y, Male      OVERNIGHT EVENTS:  continues to c/o dysuria  k elevated, ate a banana today    ROS negative except as per above    VITALS:  T(F): 96.4, Max: 97.5 (09-19-18 @ 23:41)  HR: 99  BP: 142/79  RR: 18Vital Signs Last 24 Hrs  T(C): 35.8 (20 Sep 2018 07:50), Max: 36.4 (19 Sep 2018 23:41)  T(F): 96.4 (20 Sep 2018 07:50), Max: 97.5 (19 Sep 2018 23:41)  HR: 99 (20 Sep 2018 07:50) (99 - 104)  BP: 142/79 (20 Sep 2018 07:50) (122/66 - 170/85)  BP(mean): --  RR: 18 (20 Sep 2018 07:50) (18 - 18)  SpO2: --    PHYSICAL EXAM:  Gen: Awake and alert, non-toxic appearing, NAD  HEENT: NCAT. EOMI. MMM.   CV: RRR, no murmurs  Lungs: CTAB, no w/r/r  Abd: Soft. 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  Organism: Burkholderia cepacia (09-18-18 @ 15:31)  Organism: Burkholderia cepacia (09-18-18 @ 15:31)      -  Ceftazidime: S 8      -  Levofloxacin: R >4      -  Meropenem: S 2      -  Trimethoprim/Sulfamethoxazole: S 1/19      Method Type: LUTHER          RADIOLOGY & ADDITIONAL TESTS:    ANTIBIOTICS:  cefepime   IVPB   100 mL/Hr IV Intermittent (09-10-18 @ 19:38)    cefepime   IVPB   100 mL/Hr IV Intermittent (09-18-18 @ 05:56)   100 mL/Hr IV Intermittent (09-17-18 @ 17:03)   100 mL/Hr IV Intermittent (09-17-18 @ 05:30)   100 mL/Hr IV Intermittent (09-16-18 @ 17:29)   100 mL/Hr IV Intermittent (09-16-18 @ 05:38)   100 mL/Hr IV Intermittent (09-15-18 @ 17:11)   100 mL/Hr IV Intermittent (09-15-18 @ 05:50)   100 mL/Hr IV Intermittent (09-14-18 @ 17:11)   100 mL/Hr IV Intermittent (09-14-18 @ 06:24)   100 mL/Hr IV Intermittent (09-13-18 @ 18:37)   100 mL/Hr IV Intermittent (09-13-18 @ 06:17)   100 mL/Hr IV Intermittent (09-12-18 @ 17:14)   100 mL/Hr IV Intermittent (09-12-18 @ 06:28)   100 mL/Hr IV Intermittent (09-11-18 @ 17:19)   100 mL/Hr IV Intermittent (09-11-18 @ 06:58)    ciprofloxacin   IVPB   200 mL/Hr IV Intermittent (09-10-18 @ 19:38)    trimethoprim  160 mG/sulfamethoxazole 800 mG   1 Tablet(s) Oral (09-20-18 @ 05:49)   1 Tablet(s) Oral (09-19-18 @ 18:26)        trimethoprim  160 mG/sulfamethoxazole 800 mG 1 Tablet(s) Oral two times a day

## 2018-09-20 NOTE — PROGRESS NOTE ADULT - ASSESSMENT
The patient on 9/20 was in NAD; conversant, still c/o dysuria. Was switched to Bactrim on which the patient can be d/c'd per ID.     #Left cerebellar hemorrhage  - measuring 0.9 x 0.4 cm and rpt CTH shows  0.9 x 0.5 cm hyperdensity in the left cerebellum which may represent a cavernoma with recent bleed.   - Repeat CT head 9/12 shows decrease in size of bleed  - MRI Brain w/ and w/o contrast 9/14: acute right frontal infarcts; tiny hemorrhage w/ surrounding edema in left cerebellum  - CT Angiogram Head/Neck 9/14: Occlusion of the left vertebral artery from its origin. Partial reconstitution of the cervical segments but additional occlusion of the proximal V4 segment. Retrograde filling of the distal left vertebral artery and left PICA, with a moderate stenosis of the proximal left PICA noted.  - CT Head 9/15 unchanged from prior CT Head  - F/u w/ Cardio for HOSEA  - F/u with Neuro  - Maintain SBP <160  - NSGY does not want to intervene  - Neuro checks Q4h  - PT eval  - C/w aspirin 81 mg PO qD  - F/u w/ Neuro regarding AC besides ASA upon d/c (per Dr. Abdalla)-attempted to reach at [phone] times, no response. Follow up.    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  - C/w doxazosin 8 mg PO qHS      # Chest discomfort in the AM 9/15- resolved  -  F/u EKG  - Trops neg x 2    # DKA - resolving  - Anion gap 15 <-- 20 <-- 19 <-- 17  - C/w Lantus 52 U; lispro 17 U  - IVF d/c'd  - monitor anion gap  -follow FS closely, patient's BS labile     #UTI  - + u/a  - f/u urine cx sensitivities   - 8 day cefepime 1g BID course completed   - ID:  Bactrim DS BID for 7 days     # WANDY on CKD likely prerenal- resolved  -f/u BMP    # HTN  - maintain SBP<160  - c/w labetalol 100 BID    # hx of cardiac transplant  - c/w immunosuppressants  - Prograf level - f/u  - may call [place] to update patient's status  - TTE w/ bubble study pending    DVT ppx: SCD  Diet: mechanical soft dysphagia 2; low potassium diet  Full code   Activity: OOB to chair.  Discharge planning- Home w/ VNS  Include referral to Outpatient Diabetes Management Program ([phone]) upon d/c The patient on 9/20 was in NAD; conversant, still c/o dysuria. Was switched to Bactrim on which the patient can be d/c'd per ID. He was also c/o absent bowel movement for approximately 1 week; suppository was ordered. I reached out to Neuro several times to ask whether the patient can be d/c'd merely on aspirin without other AC; have yet to hear back from them. Will continue to f/u with them.    #Left cerebellar hemorrhage  - measuring 0.9 x 0.4 cm and rpt CTH shows  0.9 x 0.5 cm hyperdensity in the left cerebellum which may represent a cavernoma with recent bleed.   - Repeat CT head 9/12 shows decrease in size of bleed  - MRI Brain w/ and w/o contrast 9/14: acute right frontal infarcts; tiny hemorrhage w/ surrounding edema in left cerebellum  - CT Angiogram Head/Neck 9/14: Occlusion of the left vertebral artery from its origin. Partial reconstitution of the cervical segments but additional occlusion of the proximal V4 segment. Retrograde filling of the distal left vertebral artery and left PICA, with a moderate stenosis of the proximal left PICA noted.  - CT Head 9/15 unchanged from prior CT Head  - F/u w/ Cardio for HOSEA  - F/u with Neuro  - Maintain SBP <160  - NSGY does not want to intervene  - Neuro checks Q4h  - PT eval  - C/w aspirin 81 mg PO qD  - F/u w/ Neuro regarding AC besides ASA upon d/c (per Dr. Abdalla)-attempted to reach at [phone] times, no response. Follow up.    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  - C/w doxazosin 8 mg PO qHS      # Chest discomfort in the AM 9/15- resolved  -  F/u EKG  - Trops neg x 2    # DKA - resolving  - Anion gap 15 <-- 20 <-- 19 <-- 17  - C/w Lantus 52 U; lispro 17 U  - IVF d/c'd  - monitor anion gap  -follow FS closely, patient's BS labile     #UTI  - + u/a  - f/u urine cx sensitivities   - 8 day cefepime 1g BID course completed   - ID:  Bactrim DS BID for 7 days     # WANDY on CKD likely prerenal- resolved  -f/u BMP    # HTN  - maintain SBP<160  - c/w labetalol 100 BID    # hx of cardiac transplant  - c/w immunosuppressants  - Prograf level - f/u  - may call [place] to update patient's status  - TTE w/ bubble study pending    DVT ppx: SCD  Diet: mechanical soft dysphagia 2; low potassium diet  Full code   Activity: OOB to chair.  Discharge planning- Home w/ VNS  Anticipated d/c for tomorrow.  Include referral to Outpatient Diabetes Management Program ([phone]) upon d/c The patient on 9/20 was in NAD; conversant, still c/o dysuria. Was switched to Bactrim on which the patient can be d/c'd per ID. He was also c/o absent bowel movement for approximately 1 week; suppository was ordered. I reached out to Neuro several times to ask whether the patient can be d/c'd merely on aspirin without other AC; have yet to hear back from them. Will continue to f/u with them.    #Left cerebellar hemorrhage  - measuring 0.9 x 0.4 cm and rpt CTH shows  0.9 x 0.5 cm hyperdensity in the left cerebellum which may represent a cavernoma with recent bleed.   - Repeat CT head 9/12 shows decrease in size of bleed  - MRI Brain w/ and w/o contrast 9/14: acute right frontal infarcts; tiny hemorrhage w/ surrounding edema in left cerebellum  - CT Angiogram Head/Neck 9/14: Occlusion of the left vertebral artery from its origin. Partial reconstitution of the cervical segments but additional occlusion of the proximal V4 segment. Retrograde filling of the distal left vertebral artery and left PICA, with a moderate stenosis of the proximal left PICA noted.  - CT Head 9/15 unchanged from prior CT Head  - F/u w/ Cardio for HOSEA  - F/u with Neuro  - Maintain SBP <160  - NSGY does not want to intervene  - Neuro checks Q4h  - PT eval  - C/w aspirin 81 mg PO qD  - Per Dr. 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  - C/w doxazosin 8 mg PO qHS      # Chest discomfort in the AM 9/15- resolved  -  F/u EKG  - Trops neg x 2    # DKA - resolving  - Anion gap 15 <-- 20 <-- 19 <-- 17  - C/w Lantus 52 U; lispro 17 U  - IVF d/c'd  - monitor anion gap  -follow FS closely, patient's BS labile     #UTI  - + u/a  - f/u urine cx sensitivities   - 8 day cefepime 1g BID course completed   - ID:  Bactrim DS BID for 7 days     # WANDY on CKD likely prerenal- resolved  -f/u BMP    # HTN  - maintain SBP<160  - c/w labetalol 100 BID    # hx of cardiac transplant  - c/w immunosuppressants  - Prograf level - f/u  - may call [place] to update patient's status  - TTE w/ bubble study pending    DVT ppx: SCD  Diet: mechanical soft dysphagia 2; low potassium diet  Full code   Activity: OOB to chair.  Discharge planning- Home w/ VNS  Anticipated d/c for tomorrow.  Include referral to Outpatient Diabetes Management Program ([phone]) upon d/c

## 2018-09-21 VITALS
RESPIRATION RATE: 18 BRPM | SYSTOLIC BLOOD PRESSURE: 145 MMHG | DIASTOLIC BLOOD PRESSURE: 83 MMHG | TEMPERATURE: 96 F | HEART RATE: 95 BPM

## 2018-09-21 LAB
ANA TITR SER: NEGATIVE — SIGNIFICANT CHANGE UP
ANION GAP SERPL CALC-SCNC: 18 MMOL/L — HIGH (ref 7–14)
AUTO DIFF PNL BLD: ABNORMAL
BASOPHILS # BLD AUTO: 0.03 K/UL — SIGNIFICANT CHANGE UP (ref 0–0.2)
BASOPHILS NFR BLD AUTO: 0.6 % — SIGNIFICANT CHANGE UP (ref 0–1)
BUN SERPL-MCNC: 18 MG/DL — SIGNIFICANT CHANGE UP (ref 10–20)
CALCIUM SERPL-MCNC: 9 MG/DL — SIGNIFICANT CHANGE UP (ref 8.5–10.1)
CHLORIDE SERPL-SCNC: 103 MMOL/L — SIGNIFICANT CHANGE UP (ref 98–110)
CO2 SERPL-SCNC: 20 MMOL/L — SIGNIFICANT CHANGE UP (ref 17–32)
CREAT SERPL-MCNC: 1.5 MG/DL — SIGNIFICANT CHANGE UP (ref 0.7–1.5)
EOSINOPHIL # BLD AUTO: 0.05 K/UL — SIGNIFICANT CHANGE UP (ref 0–0.7)
EOSINOPHIL NFR BLD AUTO: 1 % — SIGNIFICANT CHANGE UP (ref 0–8)
GLUCOSE BLDC GLUCOMTR-MCNC: 157 MG/DL — HIGH (ref 70–99)
GLUCOSE BLDC GLUCOMTR-MCNC: 164 MG/DL — HIGH (ref 70–99)
GLUCOSE BLDC GLUCOMTR-MCNC: 189 MG/DL — HIGH (ref 70–99)
GLUCOSE BLDC GLUCOMTR-MCNC: 61 MG/DL — LOW (ref 70–99)
GLUCOSE BLDC GLUCOMTR-MCNC: 84 MG/DL — SIGNIFICANT CHANGE UP (ref 70–99)
GLUCOSE BLDC GLUCOMTR-MCNC: 89 MG/DL — SIGNIFICANT CHANGE UP (ref 70–99)
GLUCOSE SERPL-MCNC: 90 MG/DL — SIGNIFICANT CHANGE UP (ref 70–99)
HCT VFR BLD CALC: 35.7 % — LOW (ref 42–52)
HGB BLD-MCNC: 11.5 G/DL — LOW (ref 14–18)
IMM GRANULOCYTES NFR BLD AUTO: 0.6 % — HIGH (ref 0.1–0.3)
LYMPHOCYTES # BLD AUTO: 0.86 K/UL — LOW (ref 1.2–3.4)
LYMPHOCYTES # BLD AUTO: 16.6 % — LOW (ref 20.5–51.1)
MAGNESIUM SERPL-MCNC: 1.6 MG/DL — LOW (ref 1.8–2.4)
MCHC RBC-ENTMCNC: 26.4 PG — LOW (ref 27–31)
MCHC RBC-ENTMCNC: 32.2 G/DL — SIGNIFICANT CHANGE UP (ref 32–37)
MCV RBC AUTO: 82.1 FL — SIGNIFICANT CHANGE UP (ref 80–94)
MONOCYTES # BLD AUTO: 0.58 K/UL — SIGNIFICANT CHANGE UP (ref 0.1–0.6)
MONOCYTES NFR BLD AUTO: 11.2 % — HIGH (ref 1.7–9.3)
NEUTROPHILS # BLD AUTO: 3.64 K/UL — SIGNIFICANT CHANGE UP (ref 1.4–6.5)
NEUTROPHILS NFR BLD AUTO: 70 % — SIGNIFICANT CHANGE UP (ref 42.2–75.2)
NRBC # BLD: 0 /100 WBCS — SIGNIFICANT CHANGE UP (ref 0–0)
PLATELET # BLD AUTO: 141 K/UL — SIGNIFICANT CHANGE UP (ref 130–400)
POTASSIUM SERPL-MCNC: 4.1 MMOL/L — SIGNIFICANT CHANGE UP (ref 3.5–5)
POTASSIUM SERPL-SCNC: 4.1 MMOL/L — SIGNIFICANT CHANGE UP (ref 3.5–5)
RBC # BLD: 4.35 M/UL — LOW (ref 4.7–6.1)
RBC # FLD: 14.4 % — SIGNIFICANT CHANGE UP (ref 11.5–14.5)
SODIUM SERPL-SCNC: 141 MMOL/L — SIGNIFICANT CHANGE UP (ref 135–146)
TACROLIMUS SERPL-MCNC: 16 NG/ML — SIGNIFICANT CHANGE UP
WBC # BLD: 5.19 K/UL — SIGNIFICANT CHANGE UP (ref 4.8–10.8)
WBC # FLD AUTO: 5.19 K/UL — SIGNIFICANT CHANGE UP (ref 4.8–10.8)

## 2018-09-21 RX ORDER — SENNA PLUS 8.6 MG/1
2 TABLET ORAL
Qty: 0 | Refills: 0 | COMMUNITY
Start: 2018-09-21

## 2018-09-21 RX ORDER — FINASTERIDE 5 MG/1
1 TABLET, FILM COATED ORAL
Qty: 30 | Refills: 1
Start: 2018-09-21 | End: 2018-11-19

## 2018-09-21 RX ORDER — INSULIN GLARGINE 100 [IU]/ML
0 INJECTION, SOLUTION SUBCUTANEOUS
Qty: 0 | Refills: 0 | COMMUNITY

## 2018-09-21 RX ORDER — INSULIN ASPART 100 [IU]/ML
0 INJECTION, SUSPENSION SUBCUTANEOUS
Qty: 0 | Refills: 0 | COMMUNITY

## 2018-09-21 RX ORDER — DOCUSATE SODIUM 100 MG
1 CAPSULE ORAL
Qty: 0 | Refills: 0 | COMMUNITY
Start: 2018-09-21

## 2018-09-21 RX ORDER — FINASTERIDE 5 MG/1
5 TABLET, FILM COATED ORAL DAILY
Qty: 0 | Refills: 0 | Status: DISCONTINUED | OUTPATIENT
Start: 2018-09-21 | End: 2018-09-21

## 2018-09-21 RX ORDER — SENNA PLUS 8.6 MG/1
2 TABLET ORAL
Qty: 60 | Refills: 1
Start: 2018-09-21 | End: 2018-11-19

## 2018-09-21 RX ORDER — MAGNESIUM OXIDE 400 MG ORAL TABLET 241.3 MG
1 TABLET ORAL
Qty: 0 | Refills: 1 | DISCHARGE
Start: 2018-09-21 | End: 2018-11-19

## 2018-09-21 RX ORDER — MAGNESIUM OXIDE 400 MG ORAL TABLET 241.3 MG
1 TABLET ORAL
Qty: 90 | Refills: 1
Start: 2018-09-21 | End: 2018-11-19

## 2018-09-21 RX ORDER — DOCUSATE SODIUM 100 MG
1 CAPSULE ORAL
Qty: 60 | Refills: 1
Start: 2018-09-21 | End: 2018-11-19

## 2018-09-21 RX ORDER — ATORVASTATIN CALCIUM 80 MG/1
1 TABLET, FILM COATED ORAL
Qty: 30 | Refills: 1
Start: 2018-09-21 | End: 2018-11-19

## 2018-09-21 RX ORDER — INSULIN LISPRO 100/ML
5 VIAL (ML) SUBCUTANEOUS
Qty: 5 | Refills: 1
Start: 2018-09-21 | End: 2018-11-19

## 2018-09-21 RX ORDER — LABETALOL HCL 100 MG
1 TABLET ORAL
Qty: 60 | Refills: 0
Start: 2018-09-21 | End: 2018-10-20

## 2018-09-21 RX ADMIN — MYCOPHENOLATE MOFETIL 1500 MILLIGRAM(S): 250 CAPSULE ORAL at 05:59

## 2018-09-21 RX ADMIN — TACROLIMUS 1 MILLIGRAM(S): 5 CAPSULE ORAL at 05:58

## 2018-09-21 RX ADMIN — PANTOPRAZOLE SODIUM 40 MILLIGRAM(S): 20 TABLET, DELAYED RELEASE ORAL at 05:59

## 2018-09-21 RX ADMIN — MAGNESIUM OXIDE 400 MG ORAL TABLET 400 MILLIGRAM(S): 241.3 TABLET ORAL at 08:19

## 2018-09-21 RX ADMIN — Medication 100 MILLIGRAM(S): at 05:57

## 2018-09-21 RX ADMIN — Medication 100 MILLIGRAM(S): at 08:23

## 2018-09-21 RX ADMIN — INSULIN GLARGINE 20 UNIT(S): 100 INJECTION, SOLUTION SUBCUTANEOUS at 11:48

## 2018-09-21 RX ADMIN — Medication 5 UNIT(S): at 12:18

## 2018-09-21 RX ADMIN — Medication 100 MILLIGRAM(S): at 05:58

## 2018-09-21 RX ADMIN — AMLODIPINE BESYLATE 10 MILLIGRAM(S): 2.5 TABLET ORAL at 05:57

## 2018-09-21 RX ADMIN — MAGNESIUM OXIDE 400 MG ORAL TABLET 400 MILLIGRAM(S): 241.3 TABLET ORAL at 11:40

## 2018-09-21 RX ADMIN — Medication 1 TABLET(S): at 06:05

## 2018-09-21 RX ADMIN — FINASTERIDE 5 MILLIGRAM(S): 5 TABLET, FILM COATED ORAL at 12:19

## 2018-09-21 RX ADMIN — Medication 1: at 12:17

## 2018-09-21 RX ADMIN — Medication 81 MILLIGRAM(S): at 11:40

## 2018-09-21 NOTE — PROGRESS NOTE ADULT - REASON FOR ADMISSION
Cerebellar bleed, DKA
diabetes
Cerebellar bleed, DKA

## 2018-09-21 NOTE — PROGRESS NOTE ADULT - PROVIDER SPECIALTY LIST ADULT
Critical Care
Endocrinology
Infectious Disease
Infectious Disease
Internal Medicine
Neurology
Pulmonology
Neurology
Internal Medicine

## 2018-09-21 NOTE — PROGRESS NOTE ADULT - SUBJECTIVE AND OBJECTIVE BOX
JULISA CONKLIN  68y  Male      SUBJECTIVE:  no complaints ambulating anxious to go home    PAST MEDICAL & SURGICAL HISTORY:  Urinary tract infection without hematuria, site unspecified  Chronic kidney disease, unspecified CKD stage  Bilateral hearing loss, unspecified hearing loss type  Benign prostatic hyperplasia with urinary frequency  Diabetes  High cholesterol  HTN (hypertension)  Heart transplant recipient  H/O heart transplant    68y    REVIEW OF SYSTEMS:    T(C): 35.4 (09-21-18 @ 07:39), Max: 36.6 (09-20-18 @ 16:11)  HR: 95 (09-21-18 @ 07:39) (94 - 101)  BP: 145/83 (09-21-18 @ 07:39) (134/71 - 146/87)  RR: 18 (09-21-18 @ 07:39) (18 - 18)  SpO2: --  Wt(kg): --Vital Signs Last 24 Hrs  T(C): 35.4 (21 Sep 2018 07:39), Max: 36.6 (20 Sep 2018 16:11)  T(F): 95.8 (21 Sep 2018 07:39), Max: 97.9 (20 Sep 2018 16:11)  HR: 95 (21 Sep 2018 07:39) (94 - 101)  BP: 145/83 (21 Sep 2018 07:39) (134/71 - 146/87)  BP(mean): --  RR: 18 (21 Sep 2018 07:39) (18 - 18)  SpO2: --    MEDICATION:  acetaminophen   Tablet .. 650 milliGRAM(s) Oral every 6 hours PRN  allopurinol 100 milliGRAM(s) Oral two times a day  aluminum hydroxide/magnesium hydroxide/simethicone Suspension 30 milliLiter(s) Oral every 4 hours PRN  amLODIPine   Tablet 10 milliGRAM(s) Oral daily  aspirin  chewable 81 milliGRAM(s) Oral daily  atorvastatin 10 milliGRAM(s) Oral at bedtime  dextrose 40% Gel 15 Gram(s) Oral once PRN  dextrose 5%. 1000 milliLiter(s) IV Continuous <Continuous>  dextrose 50% Injectable 12.5 Gram(s) IV Push once  dextrose 50% Injectable 25 Gram(s) IV Push once  dextrose 50% Injectable 25 Gram(s) IV Push once  docusate sodium 100 milliGRAM(s) Oral two times a day  doxazosin 8 milliGRAM(s) Oral at bedtime  finasteride 5 milliGRAM(s) Oral daily  glucagon  Injectable 1 milliGRAM(s) IntraMuscular once PRN  insulin glargine Injectable (LANTUS) 20 Unit(s) SubCutaneous every morning  insulin lispro (HumaLOG) corrective regimen sliding scale   SubCutaneous three times a day before meals  insulin lispro Injectable (HumaLOG) 5 Unit(s) SubCutaneous three times a day before meals  labetalol 100 milliGRAM(s) Oral two times a day  magnesium oxide 400 milliGRAM(s) Oral three times a day with meals  mycophenolate mofetil 1500 milliGRAM(s) Oral two times a day  pantoprazole    Tablet 40 milliGRAM(s) Oral before breakfast  senna 2 Tablet(s) Oral at bedtime  tacrolimus 1 milliGRAM(s) Oral every 12 hours  trimethoprim  160 mG/sulfamethoxazole 800 mG 1 Tablet(s) Oral two times a day    LABS:                     11.5   5.19  )-----------( 141      ( 21 Sep 2018 08:01 )             35.7     09-21    141  |  103  |  18  ----------------------------<  90  4.1   |  20  |  1.5    Ca    9.0      21 Sep 2018 08:01  Mg     1.6     09-21  RADIOLOGY:    PHYSICAL EXAM:  alert oriented x3  heart rsr s1s2 +  lungs clear  abdomen soft non tender  neuro no focal deficits gait slightly unsteady     IMPRESSION:  cerebellar bleed  T2DM s/p DKA sugars improved  hypertension  HEART TRANSPLANT 10 YRS AGO  CKD III bun/cr 19/1.3  UTI ON ANTIBIOTICS  BPH  bilateral hearing loss  CHR ANEMIA H/H STABLE    PLAN:  MAY DISCHARGE HOME TODAY   HOME PT  F/U IN OFFICE IN ONE WEEK   NEUROLOGY /U IN 2 WEEKS  F/U DR [facility] IN 2 WEEKS   F/U AS OUTPATIENT FOR UTI AND BPH

## 2018-09-21 NOTE — PROGRESS NOTE ADULT - ATTENDING COMMENTS
Diabetes . Blood sugars very labile FBS 48 mg and after orange juice 333 mg,5 pm fs 95 mg and at 9.26 pm FS 64  and at 10 pm 106 pm.Not clear if food consumption with carbs have reduced,  Reduce lantus to 45 u sc HS and reduce premeal humalog to8 hu AC.
Diabetes blood sugars tend to be low between 10 am and 11.30 am and high after dinner  euthyroid. Heart regular chest clear  Will benefit with snack between breakfast and lunch and proper meal planning will help avoid hyper/hypoglycemic attacks.  Advised nutritional counselling.
Diabetes still poorly controlled . mg 180 mg, 105 mg 172 mg.   Euthyroid heart regular chest clear. Abdomen obese.   PLAN increase Lantus to 55 u HS.
Diabetes. Blood sugars better with no hypoglycemic values  mg, 82 mg at 5 pm and  and 138 mg  at 9 pm. Will continue with present insulin regime. Monitor blood sugars. Physical therapy to continue.
Diabetes. Blood sugars labile. 157 mg 109 mg 67 mg 147 mg 232 mg.. On lantus 55 u HS and humlog 10 u ac  Will benefit with nutritional counselling. . Continue to monitor blood sugars , avoid hypoglycemia..Snacks between meals will help.
67 yo M w/ h/o cardiac transplant on immunosuppressants currently p/w multiple acute/subacute infarcts with hemorrhagic components of unclear etiology.  r/o cardioembolic source.  r/o underlying angiopathy.     Plan:  - HOSEA  - continue antiplts for now  - continue stroke unit  - continue PT/OT  - check ESR, CRP, MYKE, ANCA, SSA, SSB, B2 glycoprotein, RF, lupus anticoagulant.

## 2018-09-21 NOTE — PROGRESS NOTE ADULT - NSHPATTENDINGPLANDISCUSS_GEN_ALL_CORE
RESIDENT PATIENT AND FAMILY
Dr. Ac, resident in ICU
Dr. [place], resident
FAMILY PATIENT AND RESIDENT
ICU team
Floor resident
Floor resident
ICU team

## 2018-09-21 NOTE — PROGRESS NOTE ADULT - SUBJECTIVE AND OBJECTIVE BOX
JULISA CONKLIN  68y, Male      OVERNIGHT EVENTS:  continue to c/o burning with urination    ROS negative except as per above    VITALS:  T(F): 95.8, Max: 97.9 (09-20-18 @ 16:11)  HR: 95  BP: 145/83  RR: 18Vital Signs Last 24 Hrs  T(C): 35.4 (21 Sep 2018 07:39), Max: 36.6 (20 Sep 2018 16:11)  T(F): 95.8 (21 Sep 2018 07:39), Max: 97.9 (20 Sep 2018 16:11)  HR: 95 (21 Sep 2018 07:39) (94 - 101)  BP: 145/83 (21 Sep 2018 07:39) (134/71 - 146/87)  BP(mean): --  RR: 18 (21 Sep 2018 07:39) (18 - 18)  SpO2: --    PHYSICAL EXAM:  Gen: Awake and alert, non-toxic appearing, NAD  HEENT: NCAT. EOMI. MMM.   CV: RRR, no murmurs  Lungs: CTAB, no w/r/r  Abd: Soft. NTND, +suprapubic TTP, no CVAT  : wnl  Extr: wwp, no edema  Skin: no rash  Neuro: No focal deficits  Lines: clean      TESTS & MEASUREMENTS:                        11.5   5.19  )-----------( 141      ( 21 Sep 2018 08:01 )             35.7     09-21    141  |  103  |  18  ----------------------------<  90  4.1   |  20  |  1.5    Ca    9.0      21 Sep 2018 08:01  Mg     1.6     09-21          Culture - Urine (collected 09-16-18 @ 11:11)  Source: .Urine Clean Catch (Midstream)  Final Report (09-18-18 @ 15:31):    >100,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  Organism: Burkholderia cepacia (09-18-18 @ 15:31)  Organism: Burkholderia cepacia (09-18-18 @ 15:31)      -  Ceftazidime: S 8      -  Levofloxacin: R >4      -  Meropenem: S 2      -  Trimethoprim/Sulfamethoxazole: S 1/19      Method Type: LUTHER          RADIOLOGY & ADDITIONAL TESTS:    ANTIBIOTICS:  cefepime   IVPB   100 mL/Hr IV Intermittent (09-10-18 @ 19:38)    cefepime   IVPB   100 mL/Hr IV Intermittent (09-18-18 @ 05:56)   100 mL/Hr IV Intermittent (09-17-18 @ 17:03)   100 mL/Hr IV Intermittent (09-17-18 @ 05:30)   100 mL/Hr IV Intermittent (09-16-18 @ 17:29)   100 mL/Hr IV Intermittent (09-16-18 @ 05:38)   100 mL/Hr IV Intermittent (09-15-18 @ 17:11)   100 mL/Hr IV Intermittent (09-15-18 @ 05:50)   100 mL/Hr IV Intermittent (09-14-18 @ 17:11)   100 mL/Hr IV Intermittent (09-14-18 @ 06:24)   100 mL/Hr IV Intermittent (09-13-18 @ 18:37)   100 mL/Hr IV Intermittent (09-13-18 @ 06:17)   100 mL/Hr IV Intermittent (09-12-18 @ 17:14)   100 mL/Hr IV Intermittent (09-12-18 @ 06:28)   100 mL/Hr IV Intermittent (09-11-18 @ 17:19)   100 mL/Hr IV Intermittent (09-11-18 @ 06:58)    ciprofloxacin   IVPB   200 mL/Hr IV Intermittent (09-10-18 @ 19:38)    trimethoprim  160 mG/sulfamethoxazole 800 mG   1 Tablet(s) Oral (09-21-18 @ 06:05)   1 Tablet(s) Oral (09-20-18 @ 18:18)   1 Tablet(s) Oral (09-20-18 @ 05:49)   1 Tablet(s) Oral (09-19-18 @ 18:26)        trimethoprim  160 mG/sulfamethoxazole 800 mG 1 Tablet(s) Oral two times a day

## 2018-09-21 NOTE — PROGRESS NOTE ADULT - ASSESSMENT
68M  Hx Heart transplant (on MMF, [place])    Admitted with DKA and cerebellar bleed  Sepsis ruled out on admission  Ucx with Burkholderia s/p 8 days cefepime with continued symptoms and + repeat cultures    Given symptoms favor prolonging treatment course to complete 14 days    - bactrim po 1 DS tab BID to complete 14 days (end 9/25)  - monitor K and Cr, low K diet  - Can trial pyridium  - Consider Urology consult given persistent dysuria  - CT with prostatomegaly- Urology OP followup    Spectra [id number]

## 2018-09-21 NOTE — PROGRESS NOTE ADULT - SUBJECTIVE AND OBJECTIVE BOX
JULISA CONKLIN 68y Male  MRN#: [id number]       SUBJECTIVE  Patient is a 68y old Male who presents with a chief complaint of Cerebellar bleed, DKA (20 Sep 2018 00:40)  Currently admitted to medicine with the primary diagnosis of ICH (intracerebral hemorrhage).    OBJECTIVE  PAST MEDICAL & SURGICAL HISTORY  Urinary tract infection without hematuria, site unspecified  Chronic kidney disease, unspecified CKD stage  Bilateral hearing loss, unspecified hearing loss type  Benign prostatic hyperplasia with urinary frequency  Diabetes  High cholesterol  HTN (hypertension)  Heart transplant recipient  H/O heart transplant    ALLERGIES:  codeine (Unknown)    MEDICATIONS:  STANDING MEDICATIONS  allopurinol 100 milliGRAM(s) Oral two times a day  amLODIPine Tablet 10 milliGRAM(s) Oral daily  aspirin  chewable 81 milliGRAM(s) Oral daily  atorvastatin 10 milliGRAM(s) Oral at bedtime  docusate sodium 100 milliGRAM(s) Oral two times a day  doxazosin 8 milliGRAM(s) Oral at bedtime  insulin glargine Injectable (LANTUS) 20 Unit(s) SubCutaneous every morning  insulin lispro (HumaLOG) corrective regimen sliding scale   SubCutaneous three times a day before meals  labetalol 100 milliGRAM(s) Oral two times a day  magnesium oxide 400 milliGRAM(s) Oral three times a day with meals  mycophenolate mofetil 1500 milliGRAM(s) Oral two times a day  pantoprazole    Tablet 40 milliGRAM(s) Oral before breakfast  senna 2 Tablet(s) Oral at bedtime  tacrolimus 1 milliGRAM(s) Oral every 12 hours  trimethoprim  160 mG/sulfamethoxazole 800 mG 1 Tablet(s) Oral two times a day    PRN MEDICATIONS  acetaminophen   Tablet .. 650 milliGRAM(s) Oral every 6 hours PRN  aluminum hydroxide/magnesium hydroxide/simethicone Suspension 30 milliLiter(s) Oral every 4 hours PRN  dextrose 40% Gel 15 Gram(s) Oral once PRN  glucagon  Injectable 1 milliGRAM(s) IntraMuscular once PRN      VITAL SIGNS: Last 24 Hours  T(C): 35.8 (20 Sep 2018 07:50), Max: 36.4 (19 Sep 2018 23:41)  T(F): 96.4 (20 Sep 2018 07:50), Max: 97.5 (19 Sep 2018 23:41)  HR: 99 (20 Sep 2018 07:50) (99 - 104)  BP: 142/79 (20 Sep 2018 07:50) (122/66 - 170/85)  BP(mean): --  RR: 18 (20 Sep 2018 07:50) (18 - 18)  SpO2: --    LABS:                        11.1   4.50  )-----------( 117      ( 20 Sep 2018 05:37 )             35.0     09-20    142  |  104  |  25<H>  ----------------------------<  212<H>  4.9   |  21  |  1.4    Ca    9.1      20 Sep 2018 05:37  Mg     1.7     09-20    PHYSICAL EXAM:    GENERAL: NAD, well-developed, AAOx3  HEENT:  Atraumatic, Normocephalic. EOMI, PERRLA, conjunctiva and sclera clear, No JVD  PULMONARY: Clear to auscultation bilaterally; No wheeze  CARDIOVASCULAR: Regular rate and rhythm; No murmurs, rubs, or gallops  GASTROINTESTINAL: Soft, Nontender, Nondistended  MUSCULOSKELETAL:  2+ Peripheral Pulses, No clubbing, cyanosis, or edema  NEUROLOGY: non-focal; slight instability on ambulation, possibly subjective  SKIN: No rashes or lesions

## 2018-09-25 DIAGNOSIS — E11.10 TYPE 2 DIABETES MELLITUS WITH KETOACIDOSIS WITHOUT COMA: ICD-10-CM

## 2018-09-25 DIAGNOSIS — E87.0 HYPEROSMOLALITY AND HYPERNATREMIA: ICD-10-CM

## 2018-09-25 DIAGNOSIS — N40.1 BENIGN PROSTATIC HYPERPLASIA WITH LOWER URINARY TRACT SYMPTOMS: ICD-10-CM

## 2018-09-25 DIAGNOSIS — E87.6 HYPOKALEMIA: ICD-10-CM

## 2018-09-25 DIAGNOSIS — R35.0 FREQUENCY OF MICTURITION: ICD-10-CM

## 2018-09-25 DIAGNOSIS — I12.9 HYPERTENSIVE CHRONIC KIDNEY DISEASE WITH STAGE 1 THROUGH STAGE 4 CHRONIC KIDNEY DISEASE, OR UNSPECIFIED CHRONIC KIDNEY DISEASE: ICD-10-CM

## 2018-09-25 DIAGNOSIS — H91.93 UNSPECIFIED HEARING LOSS, BILATERAL: ICD-10-CM

## 2018-09-25 DIAGNOSIS — E11.22 TYPE 2 DIABETES MELLITUS WITH DIABETIC CHRONIC KIDNEY DISEASE: ICD-10-CM

## 2018-09-25 DIAGNOSIS — D63.1 ANEMIA IN CHRONIC KIDNEY DISEASE: ICD-10-CM

## 2018-09-25 DIAGNOSIS — I25.2 OLD MYOCARDIAL INFARCTION: ICD-10-CM

## 2018-09-25 DIAGNOSIS — E83.39 OTHER DISORDERS OF PHOSPHORUS METABOLISM: ICD-10-CM

## 2018-09-25 DIAGNOSIS — Z87.891 PERSONAL HISTORY OF NICOTINE DEPENDENCE: ICD-10-CM

## 2018-09-25 DIAGNOSIS — Z79.899 OTHER LONG TERM (CURRENT) DRUG THERAPY: ICD-10-CM

## 2018-09-25 DIAGNOSIS — Z94.1 HEART TRANSPLANT STATUS: ICD-10-CM

## 2018-09-25 DIAGNOSIS — N17.9 ACUTE KIDNEY FAILURE, UNSPECIFIED: ICD-10-CM

## 2018-09-25 DIAGNOSIS — I61.4 NONTRAUMATIC INTRACEREBRAL HEMORRHAGE IN CEREBELLUM: ICD-10-CM

## 2018-09-25 DIAGNOSIS — N18.3 CHRONIC KIDNEY DISEASE, STAGE 3 (MODERATE): ICD-10-CM

## 2018-09-25 DIAGNOSIS — Z88.9 ALLERGY STATUS TO UNSPECIFIED DRUGS, MEDICAMENTS AND BIOLOGICAL SUBSTANCES: ICD-10-CM

## 2018-09-25 DIAGNOSIS — N39.0 URINARY TRACT INFECTION, SITE NOT SPECIFIED: ICD-10-CM

## 2018-09-25 DIAGNOSIS — Z79.4 LONG TERM (CURRENT) USE OF INSULIN: ICD-10-CM

## 2018-11-16 ENCOUNTER — INPATIENT (INPATIENT)
Facility: HOSPITAL | Age: 68
LOS: 4 days | Discharge: HOME | End: 2018-11-21
Attending: INTERNAL MEDICINE | Admitting: INTERNAL MEDICINE

## 2018-11-16 VITALS
DIASTOLIC BLOOD PRESSURE: 83 MMHG | SYSTOLIC BLOOD PRESSURE: 120 MMHG | HEART RATE: 108 BPM | RESPIRATION RATE: 18 BRPM | TEMPERATURE: 98 F

## 2018-11-16 DIAGNOSIS — Z94.1 HEART TRANSPLANT STATUS: ICD-10-CM

## 2018-11-16 DIAGNOSIS — R41.82 ALTERED MENTAL STATUS, UNSPECIFIED: ICD-10-CM

## 2018-11-16 DIAGNOSIS — N18.9 CHRONIC KIDNEY DISEASE, UNSPECIFIED: ICD-10-CM

## 2018-11-16 DIAGNOSIS — Z94.1 HEART TRANSPLANT STATUS: Chronic | ICD-10-CM

## 2018-11-16 DIAGNOSIS — N39.0 URINARY TRACT INFECTION, SITE NOT SPECIFIED: ICD-10-CM

## 2018-11-16 DIAGNOSIS — I10 ESSENTIAL (PRIMARY) HYPERTENSION: ICD-10-CM

## 2018-11-16 DIAGNOSIS — E11.10 TYPE 2 DIABETES MELLITUS WITH KETOACIDOSIS WITHOUT COMA: ICD-10-CM

## 2018-11-16 DIAGNOSIS — N40.1 BENIGN PROSTATIC HYPERPLASIA WITH LOWER URINARY TRACT SYMPTOMS: ICD-10-CM

## 2018-11-16 DIAGNOSIS — H91.93 UNSPECIFIED HEARING LOSS, BILATERAL: ICD-10-CM

## 2018-11-16 PROBLEM — E78.00 PURE HYPERCHOLESTEROLEMIA, UNSPECIFIED: Chronic | Status: ACTIVE | Noted: 2018-09-10

## 2018-11-16 PROBLEM — E11.9 TYPE 2 DIABETES MELLITUS WITHOUT COMPLICATIONS: Chronic | Status: ACTIVE | Noted: 2018-09-10

## 2018-11-16 LAB
ALBUMIN SERPL ELPH-MCNC: 4.7 G/DL — SIGNIFICANT CHANGE UP (ref 3.5–5.2)
ALP SERPL-CCNC: 141 U/L — HIGH (ref 30–115)
ALT FLD-CCNC: 5 U/L — SIGNIFICANT CHANGE UP (ref 0–41)
ANION GAP SERPL CALC-SCNC: 12 MMOL/L — SIGNIFICANT CHANGE UP (ref 7–14)
ANION GAP SERPL CALC-SCNC: 15 MMOL/L — HIGH (ref 7–14)
ANION GAP SERPL CALC-SCNC: 17 MMOL/L — HIGH (ref 7–14)
ANION GAP SERPL CALC-SCNC: 19 MMOL/L — HIGH (ref 7–14)
ANION GAP SERPL CALC-SCNC: 24 MMOL/L — HIGH (ref 7–14)
APPEARANCE CSF: CLEAR — SIGNIFICANT CHANGE UP
APPEARANCE UR: CLEAR — SIGNIFICANT CHANGE UP
APTT BLD: 23.6 SEC — CRITICAL LOW (ref 27–39.2)
AST SERPL-CCNC: 10 U/L — SIGNIFICANT CHANGE UP (ref 0–41)
BACTERIA # UR AUTO: ABNORMAL
BASE EXCESS BLDV CALC-SCNC: -3.5 MMOL/L — LOW (ref -2–2)
BASOPHILS # BLD AUTO: 0.03 K/UL — SIGNIFICANT CHANGE UP (ref 0–0.2)
BASOPHILS NFR BLD AUTO: 0.3 % — SIGNIFICANT CHANGE UP (ref 0–1)
BILIRUB SERPL-MCNC: 0.6 MG/DL — SIGNIFICANT CHANGE UP (ref 0.2–1.2)
BILIRUB UR-MCNC: NEGATIVE — SIGNIFICANT CHANGE UP
BUN SERPL-MCNC: 24 MG/DL — HIGH (ref 10–20)
BUN SERPL-MCNC: 26 MG/DL — HIGH (ref 10–20)
BUN SERPL-MCNC: 27 MG/DL — HIGH (ref 10–20)
BUN SERPL-MCNC: 30 MG/DL — HIGH (ref 10–20)
BUN SERPL-MCNC: 32 MG/DL — HIGH (ref 10–20)
CA-I SERPL-SCNC: 1.24 MMOL/L — SIGNIFICANT CHANGE UP (ref 1.12–1.3)
CALCIUM SERPL-MCNC: 10.1 MG/DL — SIGNIFICANT CHANGE UP (ref 8.5–10.1)
CALCIUM SERPL-MCNC: 8.8 MG/DL — SIGNIFICANT CHANGE UP (ref 8.5–10.1)
CALCIUM SERPL-MCNC: 9.2 MG/DL — SIGNIFICANT CHANGE UP (ref 8.5–10.1)
CALCIUM SERPL-MCNC: 9.5 MG/DL — SIGNIFICANT CHANGE UP (ref 8.5–10.1)
CALCIUM SERPL-MCNC: 9.6 MG/DL — SIGNIFICANT CHANGE UP (ref 8.5–10.1)
CHLORIDE SERPL-SCNC: 102 MMOL/L — SIGNIFICANT CHANGE UP (ref 98–110)
CHLORIDE SERPL-SCNC: 106 MMOL/L — SIGNIFICANT CHANGE UP (ref 98–110)
CHLORIDE SERPL-SCNC: 108 MMOL/L — SIGNIFICANT CHANGE UP (ref 98–110)
CHLORIDE SERPL-SCNC: 109 MMOL/L — SIGNIFICANT CHANGE UP (ref 98–110)
CHLORIDE SERPL-SCNC: 88 MMOL/L — LOW (ref 98–110)
CO2 SERPL-SCNC: 19 MMOL/L — SIGNIFICANT CHANGE UP (ref 17–32)
CO2 SERPL-SCNC: 21 MMOL/L — SIGNIFICANT CHANGE UP (ref 17–32)
CO2 SERPL-SCNC: 22 MMOL/L — SIGNIFICANT CHANGE UP (ref 17–32)
CO2 SERPL-SCNC: 24 MMOL/L — SIGNIFICANT CHANGE UP (ref 17–32)
CO2 SERPL-SCNC: 24 MMOL/L — SIGNIFICANT CHANGE UP (ref 17–32)
COLOR CSF: COLORLESS — SIGNIFICANT CHANGE UP
COLOR SPEC: YELLOW — SIGNIFICANT CHANGE UP
CREAT SERPL-MCNC: 1.4 MG/DL — SIGNIFICANT CHANGE UP (ref 0.7–1.5)
CREAT SERPL-MCNC: 1.6 MG/DL — HIGH (ref 0.7–1.5)
CREAT SERPL-MCNC: 1.8 MG/DL — HIGH (ref 0.7–1.5)
DIFF PNL FLD: ABNORMAL
EOSINOPHIL # BLD AUTO: 0.02 K/UL — SIGNIFICANT CHANGE UP (ref 0–0.7)
EOSINOPHIL NFR BLD AUTO: 0.2 % — SIGNIFICANT CHANGE UP (ref 0–8)
EPI CELLS # UR: ABNORMAL /HPF
GAS PNL BLDV: 132 MMOL/L — LOW (ref 136–145)
GAS PNL BLDV: SIGNIFICANT CHANGE UP
GLUCOSE BLDC GLUCOMTR-MCNC: 114 MG/DL — HIGH (ref 70–99)
GLUCOSE BLDC GLUCOMTR-MCNC: 123 MG/DL — HIGH (ref 70–99)
GLUCOSE BLDC GLUCOMTR-MCNC: 129 MG/DL — HIGH (ref 70–99)
GLUCOSE BLDC GLUCOMTR-MCNC: 143 MG/DL — HIGH (ref 70–99)
GLUCOSE BLDC GLUCOMTR-MCNC: 143 MG/DL — HIGH (ref 70–99)
GLUCOSE BLDC GLUCOMTR-MCNC: 144 MG/DL — HIGH (ref 70–99)
GLUCOSE BLDC GLUCOMTR-MCNC: 146 MG/DL — HIGH (ref 70–99)
GLUCOSE BLDC GLUCOMTR-MCNC: 156 MG/DL — HIGH (ref 70–99)
GLUCOSE BLDC GLUCOMTR-MCNC: 164 MG/DL — HIGH (ref 70–99)
GLUCOSE BLDC GLUCOMTR-MCNC: 167 MG/DL — HIGH (ref 70–99)
GLUCOSE BLDC GLUCOMTR-MCNC: 169 MG/DL — HIGH (ref 70–99)
GLUCOSE BLDC GLUCOMTR-MCNC: 198 MG/DL — HIGH (ref 70–99)
GLUCOSE BLDC GLUCOMTR-MCNC: 205 MG/DL — HIGH (ref 70–99)
GLUCOSE BLDC GLUCOMTR-MCNC: 213 MG/DL — HIGH (ref 70–99)
GLUCOSE BLDC GLUCOMTR-MCNC: 224 MG/DL — HIGH (ref 70–99)
GLUCOSE BLDC GLUCOMTR-MCNC: 235 MG/DL — HIGH (ref 70–99)
GLUCOSE BLDC GLUCOMTR-MCNC: 307 MG/DL — HIGH (ref 70–99)
GLUCOSE BLDC GLUCOMTR-MCNC: 476 MG/DL — CRITICAL HIGH (ref 70–99)
GLUCOSE BLDC GLUCOMTR-MCNC: 95 MG/DL — SIGNIFICANT CHANGE UP (ref 70–99)
GLUCOSE CSF-MCNC: 278 MG/DL — HIGH (ref 45–75)
GLUCOSE SERPL-MCNC: 118 MG/DL — HIGH (ref 70–99)
GLUCOSE SERPL-MCNC: 152 MG/DL — HIGH (ref 70–99)
GLUCOSE SERPL-MCNC: 208 MG/DL — HIGH (ref 70–99)
GLUCOSE SERPL-MCNC: 219 MG/DL — HIGH (ref 70–99)
GLUCOSE SERPL-MCNC: 925 MG/DL — CRITICAL HIGH (ref 70–99)
GLUCOSE UR QL: >=1000
HCO3 BLDV-SCNC: 22 MMOL/L — SIGNIFICANT CHANGE UP (ref 22–29)
HCT VFR BLD CALC: 41.3 % — LOW (ref 42–52)
HCT VFR BLD CALC: 42.8 % — SIGNIFICANT CHANGE UP (ref 42–52)
HCT VFR BLD CALC: 44.1 % — SIGNIFICANT CHANGE UP (ref 42–52)
HCT VFR BLDA CALC: 46.9 % — HIGH (ref 34–44)
HGB BLD CALC-MCNC: 15.3 G/DL — SIGNIFICANT CHANGE UP (ref 14–18)
HGB BLD-MCNC: 14.2 G/DL — SIGNIFICANT CHANGE UP (ref 14–18)
HGB BLD-MCNC: 14.2 G/DL — SIGNIFICANT CHANGE UP (ref 14–18)
HGB BLD-MCNC: 14.3 G/DL — SIGNIFICANT CHANGE UP (ref 14–18)
HOROWITZ INDEX BLDV+IHG-RTO: 21 — SIGNIFICANT CHANGE UP
IMM GRANULOCYTES NFR BLD AUTO: 0.8 % — HIGH (ref 0.1–0.3)
INR BLD: 1.06 RATIO — SIGNIFICANT CHANGE UP (ref 0.65–1.3)
KETONES UR-MCNC: 80 — SIGNIFICANT CHANGE UP
LACTATE BLDV-MCNC: 1.9 MMOL/L — HIGH (ref 0.5–1.6)
LEUKOCYTE ESTERASE UR-ACNC: NEGATIVE — SIGNIFICANT CHANGE UP
LIDOCAIN IGE QN: 19 U/L — SIGNIFICANT CHANGE UP (ref 7–60)
LYMPHOCYTES # BLD AUTO: 1.07 K/UL — LOW (ref 1.2–3.4)
LYMPHOCYTES # BLD AUTO: 10.2 % — LOW (ref 20.5–51.1)
MAGNESIUM SERPL-MCNC: 1.9 MG/DL — SIGNIFICANT CHANGE UP (ref 1.8–2.4)
MCHC RBC-ENTMCNC: 26.3 PG — LOW (ref 27–31)
MCHC RBC-ENTMCNC: 26.6 PG — LOW (ref 27–31)
MCHC RBC-ENTMCNC: 27.7 PG — SIGNIFICANT CHANGE UP (ref 27–31)
MCHC RBC-ENTMCNC: 32.2 G/DL — SIGNIFICANT CHANGE UP (ref 32–37)
MCHC RBC-ENTMCNC: 33.2 G/DL — SIGNIFICANT CHANGE UP (ref 32–37)
MCHC RBC-ENTMCNC: 34.6 G/DL — SIGNIFICANT CHANGE UP (ref 32–37)
MCV RBC AUTO: 80 FL — SIGNIFICANT CHANGE UP (ref 80–94)
MCV RBC AUTO: 80.1 FL — SIGNIFICANT CHANGE UP (ref 80–94)
MCV RBC AUTO: 81.8 FL — SIGNIFICANT CHANGE UP (ref 80–94)
MONOCYTES # BLD AUTO: 0.73 K/UL — HIGH (ref 0.1–0.6)
MONOCYTES NFR BLD AUTO: 7 % — SIGNIFICANT CHANGE UP (ref 1.7–9.3)
NEUTROPHILS # BLD AUTO: 8.52 K/UL — HIGH (ref 1.4–6.5)
NEUTROPHILS # CSF: SIGNIFICANT CHANGE UP % (ref 0–6)
NEUTROPHILS NFR BLD AUTO: 81.5 % — HIGH (ref 42.2–75.2)
NITRITE UR-MCNC: NEGATIVE — SIGNIFICANT CHANGE UP
NRBC # BLD: 0 /100 WBCS — SIGNIFICANT CHANGE UP (ref 0–0)
NRBC # BLD: 0 /100 WBCS — SIGNIFICANT CHANGE UP (ref 0–0)
NRBC NFR CSF: 12 /UL — HIGH (ref 0–5)
NT-PROBNP SERPL-SCNC: 2302 PG/ML — HIGH (ref 0–300)
NT-PROBNP SERPL-SCNC: 2507 PG/ML — HIGH (ref 0–300)
PCO2 BLDV: 42 MMHG — SIGNIFICANT CHANGE UP (ref 41–51)
PH BLDV: 7.33 — SIGNIFICANT CHANGE UP (ref 7.26–7.43)
PH UR: 6 — SIGNIFICANT CHANGE UP (ref 5–8)
PHOSPHATE SERPL-MCNC: 1.9 MG/DL — LOW (ref 2.1–4.9)
PLATELET # BLD AUTO: 130 K/UL — SIGNIFICANT CHANGE UP (ref 130–400)
PLATELET # BLD AUTO: 147 K/UL — SIGNIFICANT CHANGE UP (ref 130–400)
PLATELET # BLD AUTO: 150 K/UL — SIGNIFICANT CHANGE UP (ref 130–400)
PO2 BLDV: 28 MMHG — SIGNIFICANT CHANGE UP (ref 20–40)
POTASSIUM BLDV-SCNC: 4.7 MMOL/L — SIGNIFICANT CHANGE UP (ref 3.3–5.6)
POTASSIUM SERPL-MCNC: 3.1 MMOL/L — LOW (ref 3.5–5)
POTASSIUM SERPL-MCNC: 3.9 MMOL/L — SIGNIFICANT CHANGE UP (ref 3.5–5)
POTASSIUM SERPL-MCNC: 4.2 MMOL/L — SIGNIFICANT CHANGE UP (ref 3.5–5)
POTASSIUM SERPL-MCNC: 4.2 MMOL/L — SIGNIFICANT CHANGE UP (ref 3.5–5)
POTASSIUM SERPL-MCNC: 5 MMOL/L — SIGNIFICANT CHANGE UP (ref 3.5–5)
POTASSIUM SERPL-SCNC: 3.1 MMOL/L — LOW (ref 3.5–5)
POTASSIUM SERPL-SCNC: 3.9 MMOL/L — SIGNIFICANT CHANGE UP (ref 3.5–5)
POTASSIUM SERPL-SCNC: 4.2 MMOL/L — SIGNIFICANT CHANGE UP (ref 3.5–5)
POTASSIUM SERPL-SCNC: 4.2 MMOL/L — SIGNIFICANT CHANGE UP (ref 3.5–5)
POTASSIUM SERPL-SCNC: 5 MMOL/L — SIGNIFICANT CHANGE UP (ref 3.5–5)
PROT CSF-MCNC: 114 MG/DL — HIGH (ref 15–45)
PROT SERPL-MCNC: 7.5 G/DL — SIGNIFICANT CHANGE UP (ref 6–8)
PROT UR-MCNC: 100
PROTHROM AB SERPL-ACNC: 11.5 SEC — SIGNIFICANT CHANGE UP (ref 9.95–12.87)
RBC # BLD: 5.16 M/UL — SIGNIFICANT CHANGE UP (ref 4.7–6.1)
RBC # BLD: 5.34 M/UL — SIGNIFICANT CHANGE UP (ref 4.7–6.1)
RBC # BLD: 5.39 M/UL — SIGNIFICANT CHANGE UP (ref 4.7–6.1)
RBC # CSF: 0 /UL — SIGNIFICANT CHANGE UP (ref 0–0)
RBC # FLD: 13.6 % — SIGNIFICANT CHANGE UP (ref 11.5–14.5)
RBC # FLD: 13.7 % — SIGNIFICANT CHANGE UP (ref 11.5–14.5)
RBC # FLD: 13.8 % — SIGNIFICANT CHANGE UP (ref 11.5–14.5)
RBC CASTS # UR COMP ASSIST: ABNORMAL /HPF
SAO2 % BLDV: 49 % — SIGNIFICANT CHANGE UP
SODIUM SERPL-SCNC: 131 MMOL/L — LOW (ref 135–146)
SODIUM SERPL-SCNC: 144 MMOL/L — SIGNIFICANT CHANGE UP (ref 135–146)
SODIUM SERPL-SCNC: 145 MMOL/L — SIGNIFICANT CHANGE UP (ref 135–146)
SP GR SPEC: 1.01 — SIGNIFICANT CHANGE UP (ref 1.01–1.03)
TROPONIN T SERPL-MCNC: <0.01 NG/ML — SIGNIFICANT CHANGE UP
TUBE TYPE: SIGNIFICANT CHANGE UP
UROBILINOGEN FLD QL: 0.2 — SIGNIFICANT CHANGE UP (ref 0.2–0.2)
WBC # BLD: 10.45 K/UL — SIGNIFICANT CHANGE UP (ref 4.8–10.8)
WBC # BLD: 7.66 K/UL — SIGNIFICANT CHANGE UP (ref 4.8–10.8)
WBC # BLD: 9.09 K/UL — SIGNIFICANT CHANGE UP (ref 4.8–10.8)
WBC # FLD AUTO: 10.45 K/UL — SIGNIFICANT CHANGE UP (ref 4.8–10.8)
WBC # FLD AUTO: 7.66 K/UL — SIGNIFICANT CHANGE UP (ref 4.8–10.8)
WBC # FLD AUTO: 9.09 K/UL — SIGNIFICANT CHANGE UP (ref 4.8–10.8)

## 2018-11-16 RX ORDER — ATORVASTATIN CALCIUM 80 MG/1
10 TABLET, FILM COATED ORAL AT BEDTIME
Qty: 0 | Refills: 0 | Status: DISCONTINUED | OUTPATIENT
Start: 2018-11-16 | End: 2018-11-21

## 2018-11-16 RX ORDER — MYCOPHENOLATE MOFETIL 250 MG/1
1000 CAPSULE ORAL
Qty: 0 | Refills: 0 | Status: DISCONTINUED | OUTPATIENT
Start: 2018-11-16 | End: 2018-11-21

## 2018-11-16 RX ORDER — ACYCLOVIR SODIUM 500 MG
VIAL (EA) INTRAVENOUS
Qty: 0 | Refills: 0 | Status: DISCONTINUED | OUTPATIENT
Start: 2018-11-16 | End: 2018-11-17

## 2018-11-16 RX ORDER — DOCUSATE SODIUM 100 MG
100 CAPSULE ORAL
Qty: 0 | Refills: 0 | Status: DISCONTINUED | OUTPATIENT
Start: 2018-11-16 | End: 2018-11-21

## 2018-11-16 RX ORDER — TACROLIMUS 5 MG/1
1 CAPSULE ORAL EVERY 12 HOURS
Qty: 0 | Refills: 0 | Status: DISCONTINUED | OUTPATIENT
Start: 2018-11-16 | End: 2018-11-21

## 2018-11-16 RX ORDER — CEFEPIME 1 G/1
INJECTION, POWDER, FOR SOLUTION INTRAMUSCULAR; INTRAVENOUS
Qty: 0 | Refills: 0 | Status: DISCONTINUED | OUTPATIENT
Start: 2018-11-16 | End: 2018-11-17

## 2018-11-16 RX ORDER — DEXTROSE MONOHYDRATE, SODIUM CHLORIDE, AND POTASSIUM CHLORIDE 50; .745; 4.5 G/1000ML; G/1000ML; G/1000ML
1000 INJECTION, SOLUTION INTRAVENOUS
Qty: 0 | Refills: 0 | Status: DISCONTINUED | OUTPATIENT
Start: 2018-11-16 | End: 2018-11-17

## 2018-11-16 RX ORDER — DOXAZOSIN MESYLATE 4 MG
8 TABLET ORAL AT BEDTIME
Qty: 0 | Refills: 0 | Status: DISCONTINUED | OUTPATIENT
Start: 2018-11-16 | End: 2018-11-21

## 2018-11-16 RX ORDER — HEPARIN SODIUM 5000 [USP'U]/ML
5000 INJECTION INTRAVENOUS; SUBCUTANEOUS EVERY 8 HOURS
Qty: 0 | Refills: 0 | Status: DISCONTINUED | OUTPATIENT
Start: 2018-11-16 | End: 2018-11-16

## 2018-11-16 RX ORDER — SODIUM CHLORIDE 9 MG/ML
1000 INJECTION INTRAMUSCULAR; INTRAVENOUS; SUBCUTANEOUS ONCE
Qty: 0 | Refills: 0 | Status: COMPLETED | OUTPATIENT
Start: 2018-11-16 | End: 2018-11-16

## 2018-11-16 RX ORDER — ALLOPURINOL 300 MG
100 TABLET ORAL
Qty: 0 | Refills: 0 | Status: DISCONTINUED | OUTPATIENT
Start: 2018-11-16 | End: 2018-11-21

## 2018-11-16 RX ORDER — SODIUM CHLORIDE 9 MG/ML
1000 INJECTION INTRAMUSCULAR; INTRAVENOUS; SUBCUTANEOUS
Qty: 0 | Refills: 0 | Status: DISCONTINUED | OUTPATIENT
Start: 2018-11-16 | End: 2018-11-16

## 2018-11-16 RX ORDER — AMLODIPINE BESYLATE 2.5 MG/1
5 TABLET ORAL DAILY
Qty: 0 | Refills: 0 | Status: DISCONTINUED | OUTPATIENT
Start: 2018-11-16 | End: 2018-11-21

## 2018-11-16 RX ORDER — ACYCLOVIR SODIUM 500 MG
VIAL (EA) INTRAVENOUS
Qty: 0 | Refills: 0 | Status: DISCONTINUED | OUTPATIENT
Start: 2018-11-16 | End: 2018-11-16

## 2018-11-16 RX ORDER — INSULIN HUMAN 100 [IU]/ML
10 INJECTION, SOLUTION SUBCUTANEOUS ONCE
Qty: 0 | Refills: 0 | Status: COMPLETED | OUTPATIENT
Start: 2018-11-16 | End: 2018-11-16

## 2018-11-16 RX ORDER — CEFEPIME 1 G/1
2000 INJECTION, POWDER, FOR SOLUTION INTRAMUSCULAR; INTRAVENOUS EVERY 12 HOURS
Qty: 0 | Refills: 0 | Status: DISCONTINUED | OUTPATIENT
Start: 2018-11-17 | End: 2018-11-17

## 2018-11-16 RX ORDER — FUROSEMIDE 40 MG
40 TABLET ORAL ONCE
Qty: 0 | Refills: 0 | Status: COMPLETED | OUTPATIENT
Start: 2018-11-16 | End: 2018-11-16

## 2018-11-16 RX ORDER — SODIUM CHLORIDE 9 MG/ML
1000 INJECTION, SOLUTION INTRAVENOUS
Qty: 0 | Refills: 0 | Status: DISCONTINUED | OUTPATIENT
Start: 2018-11-16 | End: 2018-11-16

## 2018-11-16 RX ORDER — AMPICILLIN TRIHYDRATE 250 MG
2 CAPSULE ORAL ONCE
Qty: 0 | Refills: 0 | Status: COMPLETED | OUTPATIENT
Start: 2018-11-16 | End: 2018-11-16

## 2018-11-16 RX ORDER — AMPICILLIN TRIHYDRATE 250 MG
CAPSULE ORAL
Qty: 0 | Refills: 0 | Status: DISCONTINUED | OUTPATIENT
Start: 2018-11-16 | End: 2018-11-17

## 2018-11-16 RX ORDER — ACYCLOVIR SODIUM 500 MG
800 VIAL (EA) INTRAVENOUS EVERY 12 HOURS
Qty: 0 | Refills: 0 | Status: DISCONTINUED | OUTPATIENT
Start: 2018-11-17 | End: 2018-11-17

## 2018-11-16 RX ORDER — PANTOPRAZOLE SODIUM 20 MG/1
40 TABLET, DELAYED RELEASE ORAL
Qty: 0 | Refills: 0 | Status: DISCONTINUED | OUTPATIENT
Start: 2018-11-16 | End: 2018-11-21

## 2018-11-16 RX ORDER — INSULIN HUMAN 100 [IU]/ML
1 INJECTION, SOLUTION SUBCUTANEOUS
Qty: 100 | Refills: 0 | Status: DISCONTINUED | OUTPATIENT
Start: 2018-11-16 | End: 2018-11-20

## 2018-11-16 RX ORDER — INSULIN HUMAN 100 [IU]/ML
8 INJECTION, SOLUTION SUBCUTANEOUS
Qty: 100 | Refills: 0 | Status: DISCONTINUED | OUTPATIENT
Start: 2018-11-16 | End: 2018-11-16

## 2018-11-16 RX ORDER — TERBINAFINE HCL 250 MG
1 TABLET ORAL
Qty: 0 | Refills: 0 | COMMUNITY

## 2018-11-16 RX ORDER — FINASTERIDE 5 MG/1
5 TABLET, FILM COATED ORAL DAILY
Qty: 0 | Refills: 0 | Status: DISCONTINUED | OUTPATIENT
Start: 2018-11-16 | End: 2018-11-21

## 2018-11-16 RX ORDER — AMPICILLIN TRIHYDRATE 250 MG
2 CAPSULE ORAL EVERY 6 HOURS
Qty: 0 | Refills: 0 | Status: DISCONTINUED | OUTPATIENT
Start: 2018-11-16 | End: 2018-11-17

## 2018-11-16 RX ORDER — LABETALOL HCL 100 MG
100 TABLET ORAL
Qty: 0 | Refills: 0 | Status: DISCONTINUED | OUTPATIENT
Start: 2018-11-16 | End: 2018-11-21

## 2018-11-16 RX ORDER — VANCOMYCIN HCL 1 G
1250 VIAL (EA) INTRAVENOUS EVERY 12 HOURS
Qty: 0 | Refills: 0 | Status: DISCONTINUED | OUTPATIENT
Start: 2018-11-16 | End: 2018-11-16

## 2018-11-16 RX ORDER — ACYCLOVIR SODIUM 500 MG
800 VIAL (EA) INTRAVENOUS ONCE
Qty: 0 | Refills: 0 | Status: COMPLETED | OUTPATIENT
Start: 2018-11-16 | End: 2018-11-16

## 2018-11-16 RX ORDER — CEFEPIME 1 G/1
2000 INJECTION, POWDER, FOR SOLUTION INTRAMUSCULAR; INTRAVENOUS ONCE
Qty: 0 | Refills: 0 | Status: COMPLETED | OUTPATIENT
Start: 2018-11-16 | End: 2018-11-16

## 2018-11-16 RX ORDER — POTASSIUM CHLORIDE 20 MEQ
20 PACKET (EA) ORAL ONCE
Qty: 0 | Refills: 0 | Status: COMPLETED | OUTPATIENT
Start: 2018-11-16 | End: 2018-11-16

## 2018-11-16 RX ORDER — VANCOMYCIN HCL 1 G
1250 VIAL (EA) INTRAVENOUS EVERY 24 HOURS
Qty: 0 | Refills: 0 | Status: DISCONTINUED | OUTPATIENT
Start: 2018-11-16 | End: 2018-11-17

## 2018-11-16 RX ADMIN — HEPARIN SODIUM 5000 UNIT(S): 5000 INJECTION INTRAVENOUS; SUBCUTANEOUS at 21:12

## 2018-11-16 RX ADMIN — SODIUM CHLORIDE 100 MILLILITER(S): 9 INJECTION, SOLUTION INTRAVENOUS at 11:46

## 2018-11-16 RX ADMIN — TACROLIMUS 1 MILLIGRAM(S): 5 CAPSULE ORAL at 18:07

## 2018-11-16 RX ADMIN — Medication 266 MILLIGRAM(S): at 14:26

## 2018-11-16 RX ADMIN — SODIUM CHLORIDE 1000 MILLILITER(S): 9 INJECTION INTRAMUSCULAR; INTRAVENOUS; SUBCUTANEOUS at 04:07

## 2018-11-16 RX ADMIN — Medication 50 MILLIEQUIVALENT(S): at 22:43

## 2018-11-16 RX ADMIN — INSULIN HUMAN 10 UNIT(S): 100 INJECTION, SOLUTION SUBCUTANEOUS at 04:07

## 2018-11-16 RX ADMIN — DEXTROSE MONOHYDRATE, SODIUM CHLORIDE, AND POTASSIUM CHLORIDE 75 MILLILITER(S): 50; .745; 4.5 INJECTION, SOLUTION INTRAVENOUS at 17:00

## 2018-11-16 RX ADMIN — Medication 8 MILLIGRAM(S): at 21:11

## 2018-11-16 RX ADMIN — Medication 166.67 MILLIGRAM(S): at 17:56

## 2018-11-16 RX ADMIN — SODIUM CHLORIDE 100 MILLILITER(S): 9 INJECTION INTRAMUSCULAR; INTRAVENOUS; SUBCUTANEOUS at 08:06

## 2018-11-16 RX ADMIN — Medication 2 MILLIGRAM(S): at 10:25

## 2018-11-16 RX ADMIN — Medication 1 MILLIGRAM(S): at 06:08

## 2018-11-16 RX ADMIN — INSULIN HUMAN 8 UNIT(S)/HR: 100 INJECTION, SOLUTION SUBCUTANEOUS at 04:07

## 2018-11-16 RX ADMIN — Medication 216 GRAM(S): at 23:36

## 2018-11-16 RX ADMIN — HEPARIN SODIUM 5000 UNIT(S): 5000 INJECTION INTRAVENOUS; SUBCUTANEOUS at 14:28

## 2018-11-16 RX ADMIN — INSULIN HUMAN 8 UNIT(S)/HR: 100 INJECTION, SOLUTION SUBCUTANEOUS at 07:50

## 2018-11-16 RX ADMIN — ATORVASTATIN CALCIUM 10 MILLIGRAM(S): 80 TABLET, FILM COATED ORAL at 21:12

## 2018-11-16 RX ADMIN — CEFEPIME 100 MILLIGRAM(S): 1 INJECTION, POWDER, FOR SOLUTION INTRAMUSCULAR; INTRAVENOUS at 16:47

## 2018-11-16 RX ADMIN — Medication 40 MILLIGRAM(S): at 16:50

## 2018-11-16 RX ADMIN — MYCOPHENOLATE MOFETIL 1000 MILLIGRAM(S): 250 CAPSULE ORAL at 18:07

## 2018-11-16 RX ADMIN — Medication 216 GRAM(S): at 16:46

## 2018-11-16 RX ADMIN — Medication 100 MILLIGRAM(S): at 18:09

## 2018-11-16 RX ADMIN — Medication 2 MILLIGRAM(S): at 12:38

## 2018-11-16 RX ADMIN — Medication 100 MILLIGRAM(S): at 18:08

## 2018-11-16 NOTE — ED ADULT NURSE NOTE - NSIMPLEMENTINTERV_GEN_ALL_ED
Implemented All Fall with Harm Risk Interventions:  [place] to call system. Call bell, personal items and telephone within reach. Instruct patient to call for assistance. Room bathroom lighting operational. Non-slip footwear when patient is off stretcher. Physically safe environment: no spills, clutter or unnecessary equipment. Stretcher in lowest position, wheels locked, appropriate side rails in place. Provide visual cue, wrist band, yellow gown, etc. Monitor gait and stability. Monitor for mental status changes and reorient to person, place, and time. Review medications for side effects contributing to fall risk. Reinforce activity limits and safety measures with patient and family. Provide visual clues: red socks.

## 2018-11-16 NOTE — H&P ADULT - NSHPLABSRESULTS_GEN_ALL_CORE
14.3   9.09  )-----------( 147      ( 2018 13:03 )             41.3       16    145  |  106  |  27<H>  ----------------------------<  152<H>  4.2   |  22  |  1.6<H>    Ca    9.5      2018 13:03  Phos  1.9       Mg     1.9         TPro  7.5  /  Alb  4.7  /  TBili  0.6  /  DBili  x   /  AST  10  /  ALT  5   /  AlkPhos  141<H>                Urinalysis Basic - ( 2018 04:15 )    Color: Yellow / Appearance: Clear / S.010 / pH: x  Gluc: x / Ketone: 80  / Bili: Negative / Urobili: 0.2   Blood: x / Protein: 100 / Nitrite: Negative   Leuk Esterase: Negative / RBC: 6-10 /HPF / WBC x   Sq Epi: x / Non Sq Epi: Occasional /HPF / Bacteria: Few        PT/INR - ( 2018 09:30 )   PT: 11.50 sec;   INR: 1.06 ratio         PTT - ( 2018 09:30 )  PTT:23.6 sec    Lactate Trend      CARDIAC MARKERS ( 2018 02:03 )  x     / <0.01 ng/mL / x     / x     / x            CAPILLARY BLOOD GLUCOSE      POCT Blood Glucose.: 169 mg/dL (2018 14:04)      02:44 - VBG - pH: 7.33  | pCO2: 42    | pO2: 28    | Lactate: 1.9            < from: CT Head No Cont (18 @ 06:27) >    IMPRESSION:    1.  Unchanged left cerebellar focus of hyperdensity, measuring 5 x 4 mm.    2.  Chronic microvascular ischemic changes.    3.  No CT evidence of acute intracranial pathology.    < from: Xray Chest 1 View- PORTABLE-Routine (18 @ 02:44) >    Impression:      No radiographic evidence of acute cardiopulmonary disease.    < end of copied text >

## 2018-11-16 NOTE — H&P ADULT - ASSESSMENT
68 M with above history presents with AMS, found to be in DKA and septic. s/p LP, CT head unremarkable.    1) DKA  - c/w insulin drip  - c/w IVF d5NS with K   - monitor FS, BMP, K status, AG  - Once AG closed will start SQ insulin and DC drip    2) AMS: possibly 2/2 sepsis  - s/p LP to RO meningitis  - FU pan cultures  - Acyclovir, Cefepime, vanco, ampicillin empirically  - will downgrade ABx once results return  - FU Echo, LE duplex  - FU cardio, renal, ID, Neuro consults    PPx/Diet/Dispo  - VTE PPx  - Full code  - NPO for now

## 2018-11-16 NOTE — H&P ADULT - ATTENDING COMMENTS
patient seen anjd examined and care reviewed with resident and CCM team. see my separate note in the chart

## 2018-11-16 NOTE — CONSULT NOTE ADULT - ASSESSMENT
A/P:     68 year old man found to be in DKA   - IV fluids  - insulin drip   - ct head reviewed  - if mental status does not improve consider LP   - consider neuro eval although hyperdensity in brain is unchanged  - fu all cx  - DVT ppx    pt is non compliant with meds     Prognosis guarded    CT 35 min

## 2018-11-16 NOTE — PROGRESS NOTE ADULT - SUBJECTIVE AND OBJECTIVE BOX
CC: f/u confusion      ROS:      Vital Signs Last 24 Hrs  T(C): 37.6 (16 Nov 2018 11:00), Max: 38.5 (16 Nov 2018 07:22)  T(F): 99.6 (16 Nov 2018 11:00), Max: 101.3 (16 Nov 2018 07:22)  HR: 105 (16 Nov 2018 11:00) (102 - 125)  BP: 182/90 (16 Nov 2018 11:00) (108/95 - 182/90)  BP(mean): 125 (16 Nov 2018 11:00) (110 - 125)  RR: 20 (16 Nov 2018 11:00) (18 - 20)  SpO2: 93% (16 Nov 2018 11:00) (93% - 98%)      PHYSICAL EXAM:  GENERAL: NAD, well-groomed, well-developed  HEAD = atraumatic, normoocehalic  EYES: conjunctiva and sclera clear  NECK: Supple, No JVD  NERVOUS SYSTEM:  Alert & Oriented X3, Good concentration  CHEST/LUNG: Clear to ausculatation bilaterally; No rales, rhonchi, wheezing, or rubs  HEART: Regular rate and rhythm; No murmurs, rubs, or gallops  ABDOMEN: Soft, Nontender, Nondistended; Bowel sounds present  EXTREMITIES:  BL No clubbing, cyanosis, or edema      DATA:                          14.3   9.09  )-----------( 147      ( 16 Nov 2018 13:03 )             41.3     11-16    145  |  106  |  27<H>  ----------------------------<  152<H>  4.2   |  22  |  1.6<H>    Ca    9.5      16 Nov 2018 13:03  Phos  1.9     11-16  Mg     1.9     11-16    TPro  7.5  /  Alb  4.7  /  TBili  0.6  /  DBili  x   /  AST  10  /  ALT  5   /  AlkPhos  141<H>  11-16 CC: f/u confusion      ROS:  unable, confused after ativan  no family at the bedside      Vital Signs Last 24 Hrs  T(C): 37.6 (16 Nov 2018 11:00), Max: 38.5 (16 Nov 2018 07:22)  T(F): 99.6 (16 Nov 2018 11:00), Max: 101.3 (16 Nov 2018 07:22)  HR: 105 (16 Nov 2018 11:00) (102 - 125)  BP: 182/90 (16 Nov 2018 11:00) (108/95 - 182/90)  BP(mean): 125 (16 Nov 2018 11:00) (110 - 125)  RR: 20 (16 Nov 2018 11:00) (18 - 20)  SpO2: 93% (16 Nov 2018 11:00) (93% - 98%)      PHYSICAL EXAM:  GENERAL: confused  NERVOUS SYSTEM:  Alert & Oriented X0, Good concentration  CHEST/LUNG: Clear to ausculatation bilaterally; No rales, rhonchi, wheezing, or rubs  HEART: Regular rate and rhythm; No murmurs, rubs, or gallops  ABDOMEN: Soft, Nontender  EXTREMITIES:  BL No clubbing, cyanosis, or edema      DATA:                          14.3   9.09  )-----------( 147      ( 16 Nov 2018 13:03 )             41.3     11-16    145  |  106  |  27<H>  ----------------------------<  152<H>  4.2   |  22  |  1.6<H>    Ca    9.5      16 Nov 2018 13:03  Phos  1.9     11-16  Mg     1.9     11-16    TPro  7.5  /  Alb  4.7  /  TBili  0.6  /  DBili  x   /  AST  10  /  ALT  5   /  AlkPhos  141<H>  11-16

## 2018-11-16 NOTE — ED PROVIDER NOTE - PROGRESS NOTE DETAILS
Discussed case with Dr. Siegel.  Accepts admission to ICU.  Finger sticks improving. will slow down on insulin drip.

## 2018-11-16 NOTE — CONSULT NOTE ADULT - SUBJECTIVE AND OBJECTIVE BOX
Neurology Consultation note    Name  JULISA CONKLIN    HPI:  68 y.o male with hx of heart transplant 10 Y AGO, DM, HTN, DLD, PRESENTS FOR HA AND AMS AT HOME. AS PER WIFE PATIENT WAS AFEBRILE AND COMPLAINING OF MILD HA. HE WAS CONFUSED (WALKING INTO WRONG ROOMS LOOKING FOR ODD THINGS) BUT NO LANGUAGE DYSFUNCTION  NO OTHER FOCALITY NOTED  PATIENT IS NOW SEDATED S/P LP  NOTED TO BE AGITATED          Vital Signs Last 24 Hrs  T(C): 37.6 (16 Nov 2018 11:00), Max: 38.5 (16 Nov 2018 07:22)  T(F): 99.6 (16 Nov 2018 11:00), Max: 101.3 (16 Nov 2018 07:22)  HR: 105 (16 Nov 2018 11:00) (102 - 125)  BP: 182/90 (16 Nov 2018 11:00) (108/95 - 182/90)  BP(mean): 125 (16 Nov 2018 11:00) (110 - 125)  RR: 20 (16 Nov 2018 11:00) (18 - 20)  SpO2: 93% (16 Nov 2018 11:00) (93% - 98%)    Neurological Exam:   MS: JUST GIVEN ATIVAN FOR LP  LETHARGIC  NOT VERBAL OR FOLLOWING COMMANDS  PERRL, FACE SYMMETRIC  NO NUCHAL RIGIDITY  WITHDRAWS EQUALLY    Medications  acyclovir IVPB      acyclovir IVPB 800 milliGRAM(s) IV Intermittent once  allopurinol 100 milliGRAM(s) Oral two times a day  amLODIPine   Tablet 5 milliGRAM(s) Oral daily  atorvastatin 10 milliGRAM(s) Oral at bedtime  dextrose 5% + sodium chloride 0.9% 1000 milliLiter(s) IV Continuous <Continuous>  docusate sodium 100 milliGRAM(s) Oral two times a day  doxazosin 8 milliGRAM(s) Oral at bedtime  finasteride 5 milliGRAM(s) Oral daily  heparin  Injectable 5000 Unit(s) SubCutaneous every 8 hours  insulin Infusion 8 Unit(s)/Hr IV Continuous <Continuous>  labetalol 100 milliGRAM(s) Oral two times a day  mycophenolate mofetil 1000 milliGRAM(s) Oral two times a day  pantoprazole    Tablet 40 milliGRAM(s) Oral before breakfast  pregabalin 100 milliGRAM(s) Oral two times a day  tacrolimus 1 milliGRAM(s) Oral every 12 hours      Lab  11-16    145  |  102  |  30<H>  ----------------------------<  208<H>  4.2   |  24  |  1.6<H>    Ca    10.1      16 Nov 2018 09:30    TPro  7.5  /  Alb  4.7  /  TBili  0.6  /  DBili  x   /  AST  10  /  ALT  5   /  AlkPhos  141<H>  11-16                          14.2   10.45 )-----------( 150      ( 16 Nov 2018 09:30 )             42.8     LIVER FUNCTIONS - ( 16 Nov 2018 02:03 )  Alb: 4.7 g/dL / Pro: 7.5 g/dL / ALK PHOS: 141 U/L / ALT: 5 U/L / AST: 10 U/L / GGT: x                   Radiology  CT Head No Cont:   EXAM:  CT BRAIN            PROCEDURE DATE:  11/16/2018            INTERPRETATION:  Clinical History / Reason for exam:  Altered mental   status    Technique:  Multiple contiguous axial CT images of the brain were   obtained from base to vertex without administration of intravenous   contrast.      Comparison:  Head CT 9/15/2018.    Findings:      The cortical sulci and ventricular system demonstrate parenchymal volume   loss.    There is periventricular and hemispheric white matter hypoattenuation   compatible with chronic microvascular ischemic changes.    Unchanged left cerebellar focus of hyperdensity, measuring 5 x 4 mm.    No acute intracranial hemorrhage, mass effect, midline shift, or   extra-axial fluid collection.    Gray-white differentiation is grossly well maintained.    Beam hardening artifact is noted overlying the brain stem and posterior   fossa which is inherent to CT in this location.    The paranasal sinuses and mastoid air cells are well aerated.      IMPRESSION:    1.  Unchanged left cerebellar focus of hyperdensity, measuring 5 x 4 mm.    2.  Chronic microvascular ischemic changes.    3.  No CT evidence of acute intracranial pathology.            Assessment: 67 YO MAN WITH THE AFOREMENTIONED PMH P/W HA AND AMS  PATIENT IMMUNOSUPPRESSED  LP JUST PERFORMED AND SENT FOR BROAD ID W/U PLUS CYTOLOGY  PATIENT STARTED EMPIRICALLY ON ABX AND ANTIVIRAL  R/O ENCEPHALITIS  Plan:  AWAIT LP RESULTS  REEG TO BE PERFORMED TODAY  IF LP (-) THEN REC MRI BRAIN WWO GADO  WILL FOLLOW Neurology Consultation note    Name  JULISA CONKLIN    HPI:  68 y.o male with hx of heart transplant 10 Y AGO, DM, HTN, DLD, PRESENTS FOR HA AND AMS AT HOME. PT IN DKA. AS PER WIFE PATIENT WAS AFEBRILE AND COMPLAINING OF MILD HA. HE WAS CONFUSED (WALKING INTO WRONG ROOMS LOOKING FOR ODD THINGS) BUT NO LANGUAGE DYSFUNCTION  NO OTHER FOCALITY NOTED  PATIENT IS NOW SEDATED S/P LP  NOTED TO BE AGITATED          Vital Signs Last 24 Hrs  T(C): 37.6 (16 Nov 2018 11:00), Max: 38.5 (16 Nov 2018 07:22)  T(F): 99.6 (16 Nov 2018 11:00), Max: 101.3 (16 Nov 2018 07:22)  HR: 105 (16 Nov 2018 11:00) (102 - 125)  BP: 182/90 (16 Nov 2018 11:00) (108/95 - 182/90)  BP(mean): 125 (16 Nov 2018 11:00) (110 - 125)  RR: 20 (16 Nov 2018 11:00) (18 - 20)  SpO2: 93% (16 Nov 2018 11:00) (93% - 98%)    Neurological Exam:   MS: JUST GIVEN ATIVAN FOR LP  LETHARGIC  NOT VERBAL OR FOLLOWING COMMANDS  PERRL, FACE SYMMETRIC  NO NUCHAL RIGIDITY  WITHDRAWS EQUALLY    Medications  acyclovir IVPB      acyclovir IVPB 800 milliGRAM(s) IV Intermittent once  allopurinol 100 milliGRAM(s) Oral two times a day  amLODIPine   Tablet 5 milliGRAM(s) Oral daily  atorvastatin 10 milliGRAM(s) Oral at bedtime  dextrose 5% + sodium chloride 0.9% 1000 milliLiter(s) IV Continuous <Continuous>  docusate sodium 100 milliGRAM(s) Oral two times a day  doxazosin 8 milliGRAM(s) Oral at bedtime  finasteride 5 milliGRAM(s) Oral daily  heparin  Injectable 5000 Unit(s) SubCutaneous every 8 hours  insulin Infusion 8 Unit(s)/Hr IV Continuous <Continuous>  labetalol 100 milliGRAM(s) Oral two times a day  mycophenolate mofetil 1000 milliGRAM(s) Oral two times a day  pantoprazole    Tablet 40 milliGRAM(s) Oral before breakfast  pregabalin 100 milliGRAM(s) Oral two times a day  tacrolimus 1 milliGRAM(s) Oral every 12 hours      Lab  11-16    145  |  102  |  30<H>  ----------------------------<  208<H>  4.2   |  24  |  1.6<H>    Ca    10.1      16 Nov 2018 09:30    TPro  7.5  /  Alb  4.7  /  TBili  0.6  /  DBili  x   /  AST  10  /  ALT  5   /  AlkPhos  141<H>  11-16                          14.2   10.45 )-----------( 150      ( 16 Nov 2018 09:30 )             42.8     LIVER FUNCTIONS - ( 16 Nov 2018 02:03 )  Alb: 4.7 g/dL / Pro: 7.5 g/dL / ALK PHOS: 141 U/L / ALT: 5 U/L / AST: 10 U/L / GGT: x                   Radiology  CT Head No Cont:   EXAM:  CT BRAIN            PROCEDURE DATE:  11/16/2018            INTERPRETATION:  Clinical History / Reason for exam:  Altered mental   status    Technique:  Multiple contiguous axial CT images of the brain were   obtained from base to vertex without administration of intravenous   contrast.      Comparison:  Head CT 9/15/2018.    Findings:      The cortical sulci and ventricular system demonstrate parenchymal volume   loss.    There is periventricular and hemispheric white matter hypoattenuation   compatible with chronic microvascular ischemic changes.    Unchanged left cerebellar focus of hyperdensity, measuring 5 x 4 mm.    No acute intracranial hemorrhage, mass effect, midline shift, or   extra-axial fluid collection.    Gray-white differentiation is grossly well maintained.    Beam hardening artifact is noted overlying the brain stem and posterior   fossa which is inherent to CT in this location.    The paranasal sinuses and mastoid air cells are well aerated.      IMPRESSION:    1.  Unchanged left cerebellar focus of hyperdensity, measuring 5 x 4 mm.    2.  Chronic microvascular ischemic changes.    3.  No CT evidence of acute intracranial pathology.            Assessment: 69 YO MAN WITH THE AFOREMENTIONED PMH P/W HA AND AMS  PATIENT IMMUNOSUPPRESSED  LP JUST PERFORMED AND SENT FOR BROAD ID W/U PLUS CYTOLOGY  PATIENT STARTED EMPIRICALLY ON ABX AND ANTIVIRAL  R/O ENCEPHALITIS. PT ALSO IN DKA ON ADM  Plan:  AWAIT LP RESULTS  REEG TO BE PERFORMED TODAY  IF LP (-) THEN REC MRI BRAIN WWO LEXII  WILL FOLLOW

## 2018-11-16 NOTE — CONSULT NOTE ADULT - SUBJECTIVE AND OBJECTIVE BOX
Patient is a 68y old  Male who presents with a chief complaint of DKA. altered mental status (2018 07:05)      HPI:    68 year old man with heart transplant ten years ago, on immunosuppressants for that, presenting with elevated blood sugars and DKA along with altered mental status, and headaches.    PAST MEDICAL & SURGICAL HISTORY:  Urinary tract infection without hematuria, site unspecified  Chronic kidney disease, unspecified CKD stage  Bilateral hearing loss, unspecified hearing loss type  Benign prostatic hyperplasia with urinary frequency  Diabetes  High cholesterol  HTN (hypertension)  Heart transplant recipient  H/O heart transplant      SOCIAL HX:   Smoking neg                         ETOH   neg                         Other    FAMILY HISTORY:  No pertinent family history in first degree relatives  :  No known cardiovacular family hisotry     ROS:  See HPI     Allergies    codeine (Unknown)    Intolerances          PHYSICAL EXAM    ICU Vital Signs Last 24 Hrs  T(C): 38.5 (2018 07:22), Max: 38.5 (2018 07:22)  T(F): 101.3 (2018 07:22), Max: 101.3 (2018 07:22)  HR: 120 (2018 07:22) (102 - 125)  BP: 123/85 (2018 07:22) (108/95 - 174/90)  BP(mean): --  ABP: --  ABP(mean): --  RR: 18 (2018 07:22) (18 - 20)  SpO2: 95% (2018 07:22) (95% - 98%)      General: In NAD, confused, awake  HEENT:  [place]              Lymphatic system: No cervical LN   Lungs: Bilateral BS, clear  Cardiovascular: Regular  Gastrointestinal: Soft, Positive BS, non tender  Musculoskeletal: No clubbing.  Moves all extremities.  Full range of motion   Skin: Warm.  Intact  Neurological: No motor or sensory deficit, confused, no neck stiffness       18 @ 07:01  -  18 @ 09:17  --------------------------------------------------------  IN:  Total IN: 0 mL    OUT:  Total OUT: 0 mL    Total NET: 0 mL          LABS:                          14.2   7.66  )-----------( 130      ( 2018 02:03 )             44.1                                                   131<L>  |  88<L>  |  32<H>  ----------------------------<  925<HH>  5.0   |  19  |  1.8<H>    Ca    9.6      2018 02:03    TPro  7.5  /  Alb  4.7  /  TBili  0.6  /  DBili  x   /  AST  10  /  ALT  5   /  AlkPhos  141<H>  16                                             Urinalysis Basic - ( 2018 04:15 )    Color: Yellow / Appearance: Clear / S.010 / pH: x  Gluc: x / Ketone: 80  / Bili: Negative / Urobili: 0.2   Blood: x / Protein: 100 / Nitrite: Negative   Leuk Esterase: Negative / RBC: 6-10 /HPF / WBC x   Sq Epi: x / Non Sq Epi: Occasional /HPF / Bacteria: Few        CARDIAC MARKERS ( 2018 02:03 )  x     / <0.01 ng/mL / x     / x     / x                                                LIVER FUNCTIONS - ( 2018 02:03 )  Alb: 4.7 g/dL / Pro: 7.5 g/dL / ALK PHOS: 141 U/L / ALT: 5 U/L / AST: 10 U/L / GGT: x                                                                                                                                       X-Rays Cardiomegaly, bilateral infiltrates                                                                                     ECHO pending    MEDICATIONS  (STANDING):  allopurinol 100 milliGRAM(s) Oral two times a day  amLODIPine   Tablet 5 milliGRAM(s) Oral daily  atorvastatin 10 milliGRAM(s) Oral at bedtime  docusate sodium 100 milliGRAM(s) Oral two times a day  doxazosin 8 milliGRAM(s) Oral at bedtime  finasteride 5 milliGRAM(s) Oral daily  insulin Infusion 8 Unit(s)/Hr (8 mL/Hr) IV Continuous <Continuous>  labetalol 100 milliGRAM(s) Oral two times a day  mycophenolate mofetil 1000 milliGRAM(s) Oral two times a day  pantoprazole    Tablet 40 milliGRAM(s) Oral before breakfast  pregabalin 100 milliGRAM(s) Oral two times a day  sodium chloride 0.9%. 1000 milliLiter(s) (100 mL/Hr) IV Continuous <Continuous>  tacrolimus 1 milliGRAM(s) Oral every 12 hours    MEDICATIONS  (PRN):

## 2018-11-16 NOTE — CONSULT NOTE ADULT - ASSESSMENT
IMPRESSION:    DKA - improving  AMS - r/o encephalitis        PLAN:    CNS: no depressants, LP at bedside, Neurology eval. EEG routine    HEENT: Oral care    PULMONARY:  HOB @ 45 degrees    CARDIOVASCULAR: 2d echo, serial enzymes, BNP, cardiology evaluation    GI: GI prophylaxis.  Feeding NPO for now    RENAL:  Follow up lytes.  Correct as needed. D5NS at 100cc/hour. Rouse insertion. Keep equal balance and avoid fluid overload. Renal and bladder US.    INFECTIOUS DISEASE: Follow up cultures. LP with workup for encephalitis. Cefepime, vancomycin, and ampicillin. Adjust to kidney function.    HEMATOLOGICAL:  DVT prophylaxis.    ENDOCRINE:  Follow up FS q1h.  Insulin protocol for DKA.    MUSCULOSKELETAL: bedrest for now.    ICU monitoring. IMPRESSION:    DKA - improving  AMS - r/o encephalitis        PLAN:    CNS: no depressants, LP at bedside, Neurology eval. EEG routine    HEENT: Oral care    PULMONARY:  HOB @ 45 degrees    CARDIOVASCULAR: 2d echo, serial enzymes, BNP, cardiology evaluation    GI: GI prophylaxis.  Feeding NPO for now    RENAL:  Follow up lytes.  Correct as needed. D5NS at 100cc/hour. Rouse insertion. Keep equal balance and avoid fluid overload. Renal and bladder US.  renal consult now   INFECTIOUS DISEASE: Follow up cultures. LP with workup for encephalitis. Cefepime, vancomycin, and ampicillin. Adjust to kidney function.  ID consult   HEMATOLOGICAL:  DVT prophylaxis.    ENDOCRINE:  Follow up FS q1h.  Insulin protocol for DKA. follow FS and AG   BMP Q 6 hrs     MUSCULOSKELETAL: bedrest for now.    ICU monitoring.

## 2018-11-16 NOTE — PATIENT PROFILE ADULT - ABILITY TO HEAR (WITH HEARING AID OR HEARING APPLIANCE IF NORMALLY USED):
Mildly to Moderately Impaired: difficulty hearing in some environments or speaker may need to increase volume or speak distinctly/Right ear  [place]

## 2018-11-16 NOTE — ED PROVIDER NOTE - ATTENDING CONTRIBUTION TO CARE
68 y.o. male, PMH noted, BIB wife for evaluation of AMS. When wife took his FS at home, it read HIGH, brought pt to ED. Pt is altered and is unable to provide any history. As per wife, no fever prior to arrival. No falls/trauma. On exam, pt in NAD, awake but altered, MM dry, head NC/AT, CN II-XII intact, lungs CTA B/L, CV S1S2 regular, abdomen soft/NT/ND/(+)BS, ext (-) edema, motor 5/5x4, sensation intact, follows commands. Will do labs, IVF, CT head and reevaluate.

## 2018-11-16 NOTE — ED ADULT NURSE REASSESSMENT NOTE - NS ED NURSE REASSESS COMMENT FT1
Blood Glucose 213, MD dunbar aware. Insulin drip stopped at this time, awaiting transport to go to unit. Will continue to closely monitor.

## 2018-11-16 NOTE — PROGRESS NOTE ADULT - ASSESSMENT
67 yo male with PMH that includes heart transplant on immunosuppressants, BPH, BL hearing loss, CKD, DM, DLD, HTN, who came with with fever, confusion, and hyperglycemia. he was evaluated in the ER and admitted to the ICU for DKA and encephalopathy.       # fever and Encephalitis, r/o meningitis in ummunosupressed patient. s/p LP with intensivist earlier today  - f/u csf data. includeing: cytology, cultures, PCR for viral, cell count, protein, glucose, crypt ag.   - will be started on empiric cefepime + vanco + acyclovir pending results  - f/u neuro. recs include MRI in LP is negative  - ID input is pending  - obtain tacrolimus levels given wandy  - EEG to be obtained  - f/u on ordered TTE  - droplet isolation pending csf results      # DKA - improving. gap still elevated. on insulin drip  - next labs for AG is at 3pm  - anticipate WANDY will improve as hydration status improves  - will requrest endo input  - diet as per protocol  - BMP q6 hrs      # WANDY on CKD:  - cont IVF, monitor i/o  - obtain Renal and bladder US.      # Heart trandsplant: meds verified by HS      # coordination of care:  - VTE ppx with HSQ  - plan reviewed with CCM team

## 2018-11-16 NOTE — PROGRESS NOTE ADULT - SUBJECTIVE AND OBJECTIVE BOX
FELLOW ON CALL NOTE:    Called to patient bedside, patient insisting that she wants to leave home.   Psychiatrist came to the bedside, and deemed patient fully competent, not lacking capacity.   We explained to the patient the consequences of her leaving the hospital without getting the cardiac cath, and patient understood and still insisted on leaving.  Patient's  also is here and they both are having a discussion about staying.  If patient leaves, it will be against the medical advice of all the physicians on the case, especially the cardiologist.  Again, as per phychiatry patient is fully competent and does not lack capacity to make decisions. FELLOW ON CALL NOTE:    Patient is positive 450 cc, will give a dose of Lasix IV.   CSf is showing 12 WBCs, no RBCs.   Unlikely bacterial meningitis, and more likely viral encephalitis.  Will follow.

## 2018-11-16 NOTE — ED PROVIDER NOTE - MEDICAL DECISION MAKING DETAILS
68 y.o. male, PMH noted, BIB wife for evaluation of AMS. When wife took his FS at home, it read HIGH, brought pt to ED. Pt is altered and is unable to provide any history. As per wife, no fever prior to arrival. No falls/trauma. On exam, pt in NAD, awake but altered, MM dry, head NC/AT, CN II-XII intact, lungs CTA B/L, CV S1S2 regular, abdomen soft/NT/ND/(+)BS, ext (-) edema, motor 5/5x4, sensation intact, follows commands. Pt found to be in DKA. IVF/Insulin started. Will admit to ICU.

## 2018-11-16 NOTE — ED PROVIDER NOTE - OBJECTIVE STATEMENT
68 y.o male with hx of heart transplant, DM, HTN, DLD, UTI presents to the ED for 68 y.o male with hx of heart transplant, DM, HTN, DLD, UTI presents to the ED for AMS.  Per wife this evening pt became acutely altered.  States that he is saying abnormal phrases.  States that he has not been complaint with medications for DM and diet.  No recent URI sxs, urinary sxs, abdominal pain, rashes. HPI limited secondary to pt's status. No recent head injuries or trauma.

## 2018-11-17 LAB
ALBUMIN SERPL ELPH-MCNC: 3.7 G/DL — SIGNIFICANT CHANGE UP (ref 3.5–5.2)
ALLERGY+IMMUNOLOGY DIAG STUDY NOTE: SIGNIFICANT CHANGE UP
ALP SERPL-CCNC: 93 U/L — SIGNIFICANT CHANGE UP (ref 30–115)
ALT FLD-CCNC: <5 U/L — SIGNIFICANT CHANGE UP (ref 0–41)
ANION GAP SERPL CALC-SCNC: 15 MMOL/L — HIGH (ref 7–14)
ANION GAP SERPL CALC-SCNC: 16 MMOL/L — HIGH (ref 7–14)
ANION GAP SERPL CALC-SCNC: 16 MMOL/L — HIGH (ref 7–14)
ANION GAP SERPL CALC-SCNC: 18 MMOL/L — HIGH (ref 7–14)
ANION GAP SERPL CALC-SCNC: 19 MMOL/L — HIGH (ref 7–14)
AST SERPL-CCNC: 12 U/L — SIGNIFICANT CHANGE UP (ref 0–41)
BASOPHILS # BLD AUTO: 0.02 K/UL — SIGNIFICANT CHANGE UP (ref 0–0.2)
BASOPHILS NFR BLD AUTO: 0.4 % — SIGNIFICANT CHANGE UP (ref 0–1)
BILIRUB DIRECT SERPL-MCNC: <0.2 MG/DL — SIGNIFICANT CHANGE UP (ref 0–0.2)
BILIRUB INDIRECT FLD-MCNC: >0.3 MG/DL — SIGNIFICANT CHANGE UP (ref 0.2–1.2)
BILIRUB SERPL-MCNC: 0.5 MG/DL — SIGNIFICANT CHANGE UP (ref 0.2–1.2)
BUN SERPL-MCNC: 17 MG/DL — SIGNIFICANT CHANGE UP (ref 10–20)
BUN SERPL-MCNC: 18 MG/DL — SIGNIFICANT CHANGE UP (ref 10–20)
BUN SERPL-MCNC: 21 MG/DL — HIGH (ref 10–20)
BUN SERPL-MCNC: 22 MG/DL — HIGH (ref 10–20)
BUN SERPL-MCNC: 23 MG/DL — HIGH (ref 10–20)
CALCIUM SERPL-MCNC: 8 MG/DL — LOW (ref 8.5–10.1)
CALCIUM SERPL-MCNC: 8 MG/DL — LOW (ref 8.5–10.1)
CALCIUM SERPL-MCNC: 8.5 MG/DL — SIGNIFICANT CHANGE UP (ref 8.5–10.1)
CALCIUM SERPL-MCNC: 8.7 MG/DL — SIGNIFICANT CHANGE UP (ref 8.5–10.1)
CALCIUM SERPL-MCNC: 8.9 MG/DL — SIGNIFICANT CHANGE UP (ref 8.5–10.1)
CHLORIDE SERPL-SCNC: 106 MMOL/L — SIGNIFICANT CHANGE UP (ref 98–110)
CHLORIDE SERPL-SCNC: 107 MMOL/L — SIGNIFICANT CHANGE UP (ref 98–110)
CHLORIDE SERPL-SCNC: 109 MMOL/L — SIGNIFICANT CHANGE UP (ref 98–110)
CO2 SERPL-SCNC: 18 MMOL/L — SIGNIFICANT CHANGE UP (ref 17–32)
CO2 SERPL-SCNC: 20 MMOL/L — SIGNIFICANT CHANGE UP (ref 17–32)
CO2 SERPL-SCNC: 20 MMOL/L — SIGNIFICANT CHANGE UP (ref 17–32)
CO2 SERPL-SCNC: 21 MMOL/L — SIGNIFICANT CHANGE UP (ref 17–32)
CO2 SERPL-SCNC: 22 MMOL/L — SIGNIFICANT CHANGE UP (ref 17–32)
CREAT SERPL-MCNC: 1.3 MG/DL — SIGNIFICANT CHANGE UP (ref 0.7–1.5)
CREAT SERPL-MCNC: 1.3 MG/DL — SIGNIFICANT CHANGE UP (ref 0.7–1.5)
CREAT SERPL-MCNC: 1.5 MG/DL — SIGNIFICANT CHANGE UP (ref 0.7–1.5)
CREAT SERPL-MCNC: 1.5 MG/DL — SIGNIFICANT CHANGE UP (ref 0.7–1.5)
CREAT SERPL-MCNC: 1.6 MG/DL — HIGH (ref 0.7–1.5)
CSF PCR RESULT: SIGNIFICANT CHANGE UP
EOSINOPHIL # BLD AUTO: 0.05 K/UL — SIGNIFICANT CHANGE UP (ref 0–0.7)
EOSINOPHIL NFR BLD AUTO: 0.9 % — SIGNIFICANT CHANGE UP (ref 0–8)
GLUCOSE BLDC GLUCOMTR-MCNC: 117 MG/DL — HIGH (ref 70–99)
GLUCOSE BLDC GLUCOMTR-MCNC: 119 MG/DL — HIGH (ref 70–99)
GLUCOSE BLDC GLUCOMTR-MCNC: 120 MG/DL — HIGH (ref 70–99)
GLUCOSE BLDC GLUCOMTR-MCNC: 136 MG/DL — HIGH (ref 70–99)
GLUCOSE BLDC GLUCOMTR-MCNC: 157 MG/DL — HIGH (ref 70–99)
GLUCOSE BLDC GLUCOMTR-MCNC: 162 MG/DL — HIGH (ref 70–99)
GLUCOSE BLDC GLUCOMTR-MCNC: 168 MG/DL — HIGH (ref 70–99)
GLUCOSE BLDC GLUCOMTR-MCNC: 186 MG/DL — HIGH (ref 70–99)
GLUCOSE BLDC GLUCOMTR-MCNC: 208 MG/DL — HIGH (ref 70–99)
GLUCOSE BLDC GLUCOMTR-MCNC: 214 MG/DL — HIGH (ref 70–99)
GLUCOSE BLDC GLUCOMTR-MCNC: 216 MG/DL — HIGH (ref 70–99)
GLUCOSE BLDC GLUCOMTR-MCNC: 252 MG/DL — HIGH (ref 70–99)
GLUCOSE BLDC GLUCOMTR-MCNC: 263 MG/DL — HIGH (ref 70–99)
GLUCOSE SERPL-MCNC: 126 MG/DL — HIGH (ref 70–99)
GLUCOSE SERPL-MCNC: 138 MG/DL — HIGH (ref 70–99)
GLUCOSE SERPL-MCNC: 141 MG/DL — HIGH (ref 70–99)
GLUCOSE SERPL-MCNC: 186 MG/DL — HIGH (ref 70–99)
GLUCOSE SERPL-MCNC: 198 MG/DL — HIGH (ref 70–99)
GRAM STN FLD: SIGNIFICANT CHANGE UP
HCT VFR BLD CALC: 39.3 % — LOW (ref 42–52)
HCT VFR BLD CALC: 41.5 % — LOW (ref 42–52)
HGB BLD-MCNC: 13 G/DL — LOW (ref 14–18)
HGB BLD-MCNC: 13.5 G/DL — LOW (ref 14–18)
IMM GRANULOCYTES NFR BLD AUTO: 0.5 % — HIGH (ref 0.1–0.3)
LYMPHOCYTES # BLD AUTO: 0.94 K/UL — LOW (ref 1.2–3.4)
LYMPHOCYTES # BLD AUTO: 17.2 % — LOW (ref 20.5–51.1)
MAGNESIUM SERPL-MCNC: 1.7 MG/DL — LOW (ref 1.8–2.4)
MAGNESIUM SERPL-MCNC: 1.7 MG/DL — LOW (ref 1.8–2.4)
MCHC RBC-ENTMCNC: 26.5 PG — LOW (ref 27–31)
MCHC RBC-ENTMCNC: 26.9 PG — LOW (ref 27–31)
MCHC RBC-ENTMCNC: 32.5 G/DL — SIGNIFICANT CHANGE UP (ref 32–37)
MCHC RBC-ENTMCNC: 33.1 G/DL — SIGNIFICANT CHANGE UP (ref 32–37)
MCV RBC AUTO: 81.2 FL — SIGNIFICANT CHANGE UP (ref 80–94)
MCV RBC AUTO: 81.5 FL — SIGNIFICANT CHANGE UP (ref 80–94)
MONOCYTES # BLD AUTO: 0.37 K/UL — SIGNIFICANT CHANGE UP (ref 0.1–0.6)
MONOCYTES NFR BLD AUTO: 6.8 % — SIGNIFICANT CHANGE UP (ref 1.7–9.3)
NEUTROPHILS # BLD AUTO: 4.05 K/UL — SIGNIFICANT CHANGE UP (ref 1.4–6.5)
NEUTROPHILS NFR BLD AUTO: 74.2 % — SIGNIFICANT CHANGE UP (ref 42.2–75.2)
NIGHT BLUE STAIN TISS: SIGNIFICANT CHANGE UP
NRBC # BLD: 0 /100 WBCS — SIGNIFICANT CHANGE UP (ref 0–0)
PLATELET # BLD AUTO: 107 K/UL — LOW (ref 130–400)
PLATELET # BLD AUTO: 121 K/UL — LOW (ref 130–400)
POTASSIUM SERPL-MCNC: 3.1 MMOL/L — LOW (ref 3.5–5)
POTASSIUM SERPL-MCNC: 3.3 MMOL/L — LOW (ref 3.5–5)
POTASSIUM SERPL-MCNC: 3.5 MMOL/L — SIGNIFICANT CHANGE UP (ref 3.5–5)
POTASSIUM SERPL-MCNC: 3.6 MMOL/L — SIGNIFICANT CHANGE UP (ref 3.5–5)
POTASSIUM SERPL-MCNC: 3.7 MMOL/L — SIGNIFICANT CHANGE UP (ref 3.5–5)
POTASSIUM SERPL-SCNC: 3.1 MMOL/L — LOW (ref 3.5–5)
POTASSIUM SERPL-SCNC: 3.3 MMOL/L — LOW (ref 3.5–5)
POTASSIUM SERPL-SCNC: 3.5 MMOL/L — SIGNIFICANT CHANGE UP (ref 3.5–5)
POTASSIUM SERPL-SCNC: 3.6 MMOL/L — SIGNIFICANT CHANGE UP (ref 3.5–5)
POTASSIUM SERPL-SCNC: 3.7 MMOL/L — SIGNIFICANT CHANGE UP (ref 3.5–5)
PROT SERPL-MCNC: 6 G/DL — SIGNIFICANT CHANGE UP (ref 6–8)
RBC # BLD: 4.84 M/UL — SIGNIFICANT CHANGE UP (ref 4.7–6.1)
RBC # BLD: 5.09 M/UL — SIGNIFICANT CHANGE UP (ref 4.7–6.1)
RBC # FLD: 13.7 % — SIGNIFICANT CHANGE UP (ref 11.5–14.5)
RBC # FLD: 13.8 % — SIGNIFICANT CHANGE UP (ref 11.5–14.5)
SODIUM SERPL-SCNC: 141 MMOL/L — SIGNIFICANT CHANGE UP (ref 135–146)
SODIUM SERPL-SCNC: 142 MMOL/L — SIGNIFICANT CHANGE UP (ref 135–146)
SODIUM SERPL-SCNC: 144 MMOL/L — SIGNIFICANT CHANGE UP (ref 135–146)
SODIUM SERPL-SCNC: 145 MMOL/L — SIGNIFICANT CHANGE UP (ref 135–146)
SODIUM SERPL-SCNC: 147 MMOL/L — HIGH (ref 135–146)
SPECIMEN SOURCE: SIGNIFICANT CHANGE UP
SPECIMEN SOURCE: SIGNIFICANT CHANGE UP
TYPE + AB SCN PNL BLD: SIGNIFICANT CHANGE UP
WBC # BLD: 5.46 K/UL — SIGNIFICANT CHANGE UP (ref 4.8–10.8)
WBC # BLD: 6.44 K/UL — SIGNIFICANT CHANGE UP (ref 4.8–10.8)
WBC # FLD AUTO: 5.46 K/UL — SIGNIFICANT CHANGE UP (ref 4.8–10.8)
WBC # FLD AUTO: 6.44 K/UL — SIGNIFICANT CHANGE UP (ref 4.8–10.8)

## 2018-11-17 RX ORDER — CEFTRIAXONE 500 MG/1
2 INJECTION, POWDER, FOR SOLUTION INTRAMUSCULAR; INTRAVENOUS EVERY 24 HOURS
Qty: 0 | Refills: 0 | Status: DISCONTINUED | OUTPATIENT
Start: 2018-11-17 | End: 2018-11-20

## 2018-11-17 RX ORDER — SODIUM CHLORIDE 9 MG/ML
1000 INJECTION, SOLUTION INTRAVENOUS
Qty: 0 | Refills: 0 | Status: DISCONTINUED | OUTPATIENT
Start: 2018-11-17 | End: 2018-11-20

## 2018-11-17 RX ORDER — POTASSIUM CHLORIDE 20 MEQ
20 PACKET (EA) ORAL
Qty: 0 | Refills: 0 | Status: COMPLETED | OUTPATIENT
Start: 2018-11-17 | End: 2018-11-17

## 2018-11-17 RX ORDER — MAGNESIUM SULFATE 500 MG/ML
2 VIAL (ML) INJECTION
Qty: 0 | Refills: 0 | Status: COMPLETED | OUTPATIENT
Start: 2018-11-17 | End: 2018-11-17

## 2018-11-17 RX ORDER — HEPARIN SODIUM 5000 [USP'U]/ML
5000 INJECTION INTRAVENOUS; SUBCUTANEOUS EVERY 8 HOURS
Qty: 0 | Refills: 0 | Status: DISCONTINUED | OUTPATIENT
Start: 2018-11-17 | End: 2018-11-18

## 2018-11-17 RX ADMIN — Medication 8 MILLIGRAM(S): at 21:59

## 2018-11-17 RX ADMIN — CEFEPIME 100 MILLIGRAM(S): 1 INJECTION, POWDER, FOR SOLUTION INTRAMUSCULAR; INTRAVENOUS at 06:19

## 2018-11-17 RX ADMIN — Medication 50 MILLIEQUIVALENT(S): at 12:37

## 2018-11-17 RX ADMIN — Medication 100 MILLIGRAM(S): at 18:23

## 2018-11-17 RX ADMIN — ATORVASTATIN CALCIUM 10 MILLIGRAM(S): 80 TABLET, FILM COATED ORAL at 22:00

## 2018-11-17 RX ADMIN — PANTOPRAZOLE SODIUM 40 MILLIGRAM(S): 20 TABLET, DELAYED RELEASE ORAL at 08:25

## 2018-11-17 RX ADMIN — FINASTERIDE 5 MILLIGRAM(S): 5 TABLET, FILM COATED ORAL at 12:34

## 2018-11-17 RX ADMIN — INSULIN HUMAN 1 UNIT(S)/HR: 100 INJECTION, SOLUTION SUBCUTANEOUS at 19:00

## 2018-11-17 RX ADMIN — MYCOPHENOLATE MOFETIL 1000 MILLIGRAM(S): 250 CAPSULE ORAL at 18:25

## 2018-11-17 RX ADMIN — Medication 50 MILLIEQUIVALENT(S): at 09:00

## 2018-11-17 RX ADMIN — Medication 100 MILLIGRAM(S): at 18:24

## 2018-11-17 RX ADMIN — SODIUM CHLORIDE 100 MILLILITER(S): 9 INJECTION, SOLUTION INTRAVENOUS at 10:00

## 2018-11-17 RX ADMIN — INSULIN HUMAN 1 UNIT(S)/HR: 100 INJECTION, SOLUTION SUBCUTANEOUS at 06:05

## 2018-11-17 RX ADMIN — TACROLIMUS 1 MILLIGRAM(S): 5 CAPSULE ORAL at 18:24

## 2018-11-17 RX ADMIN — INSULIN HUMAN 1 UNIT(S)/HR: 100 INJECTION, SOLUTION SUBCUTANEOUS at 07:00

## 2018-11-17 RX ADMIN — Medication 266 MILLIGRAM(S): at 06:17

## 2018-11-17 RX ADMIN — Medication 216 GRAM(S): at 05:57

## 2018-11-17 RX ADMIN — SODIUM CHLORIDE 100 MILLILITER(S): 9 INJECTION, SOLUTION INTRAVENOUS at 21:00

## 2018-11-17 RX ADMIN — CEFTRIAXONE 100 GRAM(S): 500 INJECTION, POWDER, FOR SOLUTION INTRAMUSCULAR; INTRAVENOUS at 15:26

## 2018-11-17 RX ADMIN — Medication 50 GRAM(S): at 09:00

## 2018-11-17 RX ADMIN — HEPARIN SODIUM 5000 UNIT(S): 5000 INJECTION INTRAVENOUS; SUBCUTANEOUS at 14:20

## 2018-11-17 RX ADMIN — Medication 216 GRAM(S): at 13:08

## 2018-11-17 RX ADMIN — HEPARIN SODIUM 5000 UNIT(S): 5000 INJECTION INTRAVENOUS; SUBCUTANEOUS at 22:00

## 2018-11-17 RX ADMIN — Medication 50 GRAM(S): at 12:37

## 2018-11-17 NOTE — CONSULT NOTE ADULT - SUBJECTIVE AND OBJECTIVE BOX
CARDIOLOGY CONSULT NOTE     CHIEF COMPLAINT/REASON FOR CONSULT:    HPI:  This is a 69 YO M with a PMHx of heart transplant, DM, HTN, DLD, who presented from home  for AMS. His wife checked his FS and it was high bermudez brought him to the ED. In the ED he was found to be septic and in DKA, was started on a insulin drip and IVF. Otherwise his wife denies fevers, chills, headaches, cough, urinary complaints, abdominal pain, nausea, vomiting, or any other complaints.    ICU Vital Signs Last 24 Hrs  T(C): 37.6 (16 Nov 2018 11:00), Max: 38.5 (16 Nov 2018 07:22)  T(F): 99.6 (16 Nov 2018 11:00), Max: 101.3 (16 Nov 2018 07:22)  HR: 94 (16 Nov 2018 13:00) (94 - 125)  BP: 111/69 (16 Nov 2018 13:00) (108/95 - 182/90)  BP(mean): 84 (16 Nov 2018 13:00) (84 - 125)  ABP: --  ABP(mean): --  RR: 24 (16 Nov 2018 13:00) (18 - 24)  SpO2: 93% (16 Nov 2018 13:00) (93% - 98%) (16 Nov 2018 14:12)      PAST MEDICAL & SURGICAL HISTORY:  Urinary tract infection without hematuria, site unspecified  Chronic kidney disease, unspecified CKD stage  Bilateral hearing loss, unspecified hearing loss type  Benign prostatic hyperplasia with urinary frequency  Diabetes  High cholesterol  HTN (hypertension)  Heart transplant recipient  H/O heart transplant      Cardiac Risks:   [x ]HTN, [x ] DM, [ ] Smoking, [ ] FH,  [ ] Lipids        MEDICATIONS:  MEDICATIONS  (STANDING):  acyclovir IVPB      acyclovir IVPB 800 milliGRAM(s) IV Intermittent every 12 hours  allopurinol 100 milliGRAM(s) Oral two times a day  amLODIPine   Tablet 5 milliGRAM(s) Oral daily  ampicillin  IVPB      ampicillin  IVPB 2 Gram(s) IV Intermittent every 6 hours  atorvastatin 10 milliGRAM(s) Oral at bedtime  cefepime   IVPB 2000 milliGRAM(s) IV Intermittent every 12 hours  cefepime   IVPB      dextrose 5% + sodium chloride 0.45%. 1000 milliLiter(s) (100 mL/Hr) IV Continuous <Continuous>  docusate sodium 100 milliGRAM(s) Oral two times a day  doxazosin 8 milliGRAM(s) Oral at bedtime  finasteride 5 milliGRAM(s) Oral daily  heparin  Injectable 5000 Unit(s) SubCutaneous every 8 hours  insulin Infusion 1 Unit(s)/Hr (1 mL/Hr) IV Continuous <Continuous>  labetalol 100 milliGRAM(s) Oral two times a day  magnesium sulfate  IVPB 2 Gram(s) IV Intermittent every 2 hours  mycophenolate mofetil 1000 milliGRAM(s) Oral two times a day  pantoprazole    Tablet 40 milliGRAM(s) Oral before breakfast  potassium chloride  20 mEq/100 mL IVPB 20 milliEquivalent(s) IV Intermittent every 2 hours  pregabalin 100 milliGRAM(s) Oral two times a day  tacrolimus 1 milliGRAM(s) Oral every 12 hours  vancomycin  IVPB 1250 milliGRAM(s) IV Intermittent every 24 hours      FAMILY HISTORY:  No pertinent family history in first degree relatives      SOCIAL HISTORY:      [ ] Marital status     Allergies    codeine (Unknown)      	    REVIEW OF SYSTEMS:  CONSTITUTIONAL: No fever, weight loss, or fatigue  EYES: No eye pain, visual disturbances, or discharge  ENMT:  No difficulty hearing, tinnitus, vertigo; No sinus or throat pain  NECK: No pain or stiffness  RESPIRATORY: No cough, wheezing, chills or hemoptysis; No Shortness of Breath  CARDIOVASCULAR: No chest pain, palpitations, passing out, dizziness, or leg swelling  GASTROINTESTINAL: No abdominal or epigastric pain. No nausea, vomiting, or hematemesis; No diarrhea or constipation. No melena or hematochezia.  GENITOURINARY: No dysuria, frequency, hematuria, or incontinence  NEUROLOGICAL: No headaches, memory loss, loss of strength, numbness, or tremors  SKIN: No itching, burning, rashes, or lesions   	    [ ] All others negative	  [ ] Unable to obtain    PHYSICAL EXAM:  T(C): 35.8 (11-17-18 @ 03:00), Max: 37.6 (11-16-18 @ 11:00)  HR: 91 (11-17-18 @ 07:00) (85 - 105)  BP: 158/92 (11-17-18 @ 07:00) (103/58 - 182/90)  RR: 23 (11-17-18 @ 07:00) (19 - 32)  SpO2: 98% (11-17-18 @ 05:00) (86% - 98%)  Wt(kg): --  I&O's Summary    16 Nov 2018 07:01  -  17 Nov 2018 07:00  --------------------------------------------------------  IN: 2834 mL / OUT: 1560 mL / NET: 1274 mL    17 Nov 2018 07:01  -  17 Nov 2018 10:10  --------------------------------------------------------  IN: 318 mL / OUT: 170 mL / NET: 148 mL        Appearance: Normal	  Psychiatry: A & O x 3, Mood & affect appropriate  HEENT:   Normal oral mucosa, PERRL, EOMI	  Lymphatic: No lymphadenopathy  Cardiovascular: Normal S1 S2,RRR, No JVD, No murmurs  Respiratory: Lungs clear to auscultation	  Gastrointestinal:  Soft, Non-tender, + BS	  Skin: No rashes, No ecchymoses, No cyanosis	  Neurologic: Non-focal  Extremities: Normal range of motion, No clubbing, cyanosis or edema  Vascular: Peripheral pulses palpable 2+ bilaterally      ECG:  	st lph nsst    	  LABS:	 	    CARDIAC MARKERS:          Serum Pro-Brain Natriuretic Peptide: 2302 pg/mL (11-16 @ 19:56)  Serum Pro-Brain Natriuretic Peptide: 2507 pg/mL (11-16 @ 13:03)                            13.0   5.46  )-----------( 121      ( 17 Nov 2018 05:45 )             39.3     11-17    147<H>  |  109  |  23<H>  ----------------------------<  198<H>  3.3<L>   |  22  |  1.5    Ca    8.5      17 Nov 2018 05:45  Phos  1.9     11-16  Mg     1.7     11-17    TPro  6.0  /  Alb  3.7  /  TBili  0.5  /  DBili  <0.2  /  AST  12  /  ALT  <5  /  AlkPhos  93  11-17    PT/INR - ( 16 Nov 2018 09:30 )   PT: 11.50 sec;   INR: 1.06 ratio         PTT - ( 16 Nov 2018 09:30 )  PTT:23.6 sec  proBNP: Serum Pro-Brain Natriuretic Peptide: 2302 pg/mL (11-16 @ 19:56)  Serum Pro-Brain Natriuretic Peptide: 2507 pg/mL (11-16 @ 13:03)

## 2018-11-17 NOTE — PROGRESS NOTE ADULT - ASSESSMENT
67 yo male with PMH that includes heart transplant on immunosuppressants, BPH, BL hearing loss, CKD, DM, DLD, HTN, who came with with fever, confusion, and hyperglycemia. he was evaluated in the ER and admitted to the ICU for DKA and encephalopathy.       # fever and Encephalitis, r/o meningitis in ummunosupressed patient. s/p LP with intensivist earlier today  - f/u csf data. includeing: cytology, cultures, PCR for viral, cell count, protein, glucose, crypt ag.   - will be started on empiric cefepime + vanco + acyclovir pending results  - f/u neuro. recs include MRI in LP is negative  - ID input is pending  - obtain tacrolimus levels given wandy  - EEG to be obtained  - f/u on ordered TTE  - droplet isolation pending csf results      # DKA - improving. gap still elevated. on insulin drip  - next labs for AG is at 3pm  - anticipate WANDY will improve as hydration status improves  - will requrest endo input  - diet as per protocol  - BMP q6 hrs      # WANDY on CKD:  - cont IVF, monitor i/o  - obtain Renal and bladder US.      # Heart trandsplant: meds verified by HS      # coordination of care:  - VTE ppx with HSQ  - plan reviewed with CCM team 69 yo male with PMH that includes heart transplant on immunosuppressants, BPH, BL hearing loss, CKD, DM, DLD, HTN, who came with with fever, confusion, and hyperglycemia. he was evaluated in the ER and admitted to the ICU for DKA and encephalopathy.       # fever and encephalopathy, r/o meningitis in immunosuppressed patient. s/p LP yesterday. csf gram stain negative and cx pending and cell count does not suggest bacterial meningitis  - f/u csf data, including cytology, cultures, PCR for viral, crypt ag, VDRL  - cont empiric cefepime + vanco + acyclovir pending results and ID input. vanco trough with tomorrow am labs  - f/u neuro recs re possibly obtaining an MRI of the brain?  - obtain tacrolimus levels and cultures  ---- f/u on ordered TTE  - f/u AM CXR due to net negative in and overnight hypoxemia  ----- sat now =       # AM CBC ? dilutional, repeat with T&S      # DKA - Gap now closed  - f/u am chemistry  - on insulin drip, management of which is per CCM team  - anticipate WANDY will cont to improve as hydration status improves  - f/u endo input  ---- diet      # WANDY on CKD:  - cont IVF, monitor i/o  - obtain Renal and bladder US.      # Heart trandsplant: meds verified by HS  --- spitting out meds      # coordination of care:  ----- VTE ppx: HSQ held by CCM team 67 yo male with PMH that includes heart transplant on immunosuppressants, BPH, BL hearing loss, CKD, DM, DLD, HTN, who came with with fever, confusion, and hyperglycemia. he was evaluated in the ER and admitted to the ICU for DKA and encephalopathy.       # fever and encephalopathy, r/o meningitis in immunosuppressed patient. s/p LP yesterday. csf gram stain negative and cx pending and cell count does not suggest bacterial meningitis  - f/u csf data, including cytology, cultures, PCR for viral, crypt ag, VDRL  - cont empiric cefepime + vanco + acyclovir pending results and ID input. vanco trough with tomorrow am labs  - f/u neuro recs re possibly obtaining an MRI of the brain? though now he is improving  - obtain tacrolimus levels and cultures  ---- f/u on ordered TTE  - f/u AM CXR due to net negative in and overnight hypoxemia  ----- sat now =       # AM CBC ? dilutional, repeat with T&S      # DKA - Gap now closed  - f/u am chemistry  - on insulin drip, management of which is per CCM team  - anticipate WNADY will cont to improve as hydration status improves  - f/u endo input  ---- diet      # WANDY on CKD:  - cont IVF, monitor i/o  - obtain Renal and bladder US.      # Heart trandsplant: meds verified by HS  --- spitting out meds      # coordination of care:  ----- VTE ppx: HSQ held by CCM team 67 yo male with PMH that includes heart transplant on immunosuppressants, BPH, BL hearing loss, CKD, DM, DLD, HTN, who came with with fever, confusion, and hyperglycemia. he was evaluated in the ER and admitted to the ICU for DKA and encephalopathy.       # fever and encephalopathy, r/o meningitis in immunosuppressed patient. s/p LP yesterday. csf gram stain negative and cx pending and cell count does not suggest bacterial meningitis  - f/u csf data, including cytology, cultures, PCR for viral, crypt ag, VDRL  - cont empiric cefepime + vanco + acyclovir pending results and ID input. vanco trough with tomorrow am labs  - f/u neuro recs re possibly obtaining an MRI of the brain? though now he is improving  - obtain tacrolimus levels and cultures  - f/u AM CXR due to net negative in and overnight hypoxemia, now on room air      # AM CBC ? dilutional, repeat with T&S      # DKA - Gap now reopeend and insuling drip adjusted  - on insulin drip, management of which is per CCM team  - anticipate WANDY will cont to improve as hydration status improves  - f/u endo input  - diet when more awake  - anion gap and CBC at 11am  - D51/2NS at 100cc/hour    # WANDY on CKD:  - cont IVF, monitor i/o  - renal to see    # Heart trandsplant: meds verified by HS  - spitting out meds this am, but not crushable and no iv equivalent  - with coaxing he is now accepting them      # coordination of care:  - VTE ppx: HSQ to resume  - plan of care reviewed with CCM team and wife

## 2018-11-17 NOTE — PROGRESS NOTE ADULT - SUBJECTIVE AND OBJECTIVE BOX
CC: f/u confusion      INTERVAL EVENTS INCLUDE:        ROS:        Vital Signs Last 24 Hrs          PHYSICAL EXAM:  GENERAL: confused  NERVOUS SYSTEM:  Alert & Oriented X0, Good concentration  CHEST/LUNG: Clear to ausculatation bilaterally; No rales, rhonchi, wheezing, or rubs  HEART: Regular rate and rhythm; No murmurs, rubs, or gallops  ABDOMEN: Soft, Nontender  EXTREMITIES:  BL No clubbing, cyanosis, or edema      DATA:                          14.3   9.09  )-----------( 147      ( 16 Nov 2018 13:03 )             41.3     11-16    145  |  106  |  27<H>  ----------------------------<  152<H>  4.2   |  22  |  1.6<H>    Ca    9.5      16 Nov 2018 13:03  Phos  1.9     11-16  Mg     1.9     11-16    TPro  7.5  /  Alb  4.7  /  TBili  0.6  /  DBili  x   /  AST  10  /  ALT  5   /  AlkPhos  141<H>  11-16 CC: f/u confusion      INTERVAL EVENTS INCLUDE:  MAR flow sheet indicated that patient refused am meds  further reveal some hypoxemia overnight for which he was started on NC and also received lasix x1 dose for net negative in  further reveals HSQ was discontinued by icu team overnight  AM CBC banck ? dilutional. am chemistry is pending  anion gap closed last night and CCM team adjusted rate of insulin drip      ROS:        Vital Signs Last 24 Hrs  T(C): 35.8 (17 Nov 2018 03:00), Max: 37.6 (16 Nov 2018 11:00)  T(F): 96.4 (17 Nov 2018 03:00), Max: 99.6 (16 Nov 2018 11:00)  HR: 88 (17 Nov 2018 06:00) (85 - 119)  BP: 146/98 (17 Nov 2018 06:00) (103/58 - 182/90)  BP(mean): 118 (17 Nov 2018 06:00) (76 - 125)  RR: 21 (17 Nov 2018 05:00) (19 - 32)  SpO2: 98% (17 Nov 2018 05:00) (86% - 98%)        PHYSICAL EXAM:  GENERAL: confused  NERVOUS SYSTEM:  Alert & Oriented X0, Good concentration  CHEST/LUNG: Clear to ausculatation bilaterally; No rales, rhonchi, wheezing, or rubs  HEART: Regular rate and rhythm; No murmurs, rubs, or gallops  ABDOMEN: Soft, Nontender  EXTREMITIES:  BL No clubbing, cyanosis, or edema      DATA:                          13.0   5.46  )-----------( 121      ( 17 Nov 2018 05:45 )             39.3     11-16    145  |  109  |  24<H>  ----------------------------<  118<H>  3.1<L>   |  24  |  1.4    Ca    8.8      16 Nov 2018 19:56  Phos  1.9     11-16  Mg     1.9     11-16    TPro  7.5  /  Alb  4.7  /  TBili  0.6  /  DBili  x   /  AST  10  /  ALT  5   /  AlkPhos  141<H>  11-16    CARDIAC MARKERS ( 16 Nov 2018 02:03 )  x     / <0.01 ng/mL / x     / x     / x          Culture - CSF with Gram Stain (collected 16 Nov 2018 11:00)  Source: .CSF CSF  Gram Stain (17 Nov 2018 00:42):    polymorphonuclear leukocytes seen    No organisms seen    by cytocentrifuge      EEG:  Consistent with mild to moderate diffuse cerebral electrophysiological   dysfunction secondary to none specific cause      Renal sono:  1.  No hydronephrosis.  2.  Right renal cyst and left renal peripheral calcifications, stable. CC: f/u confusion      INTERVAL EVENTS INCLUDE:  MAR flow sheet indicated that patient refused am meds  further reveal some hypoxemia overnight for which he was started on NC and also received lasix x1 dose for net negative in  further reveals HSQ was discontinued by icu team overnight  AM CBC banck ? dilutional. am chemistry is pending  anion gap closed last night and CCM team adjusted rate of insulin drip      ROS:  more awake today  no chest pain or other concerns  wife is at the bedside, tells me he is more awake today      Vital Signs Last 24 Hrs  T(C): 35.8 (17 Nov 2018 03:00), Max: 37.6 (16 Nov 2018 11:00)  T(F): 96.4 (17 Nov 2018 03:00), Max: 99.6 (16 Nov 2018 11:00)  HR: 88 (17 Nov 2018 06:00) (85 - 119)  BP: 146/98 (17 Nov 2018 06:00) (103/58 - 182/90)  BP(mean): 118 (17 Nov 2018 06:00) (76 - 125)  RR: 21 (17 Nov 2018 05:00) (19 - 32)  SpO2: 98% (17 Nov 2018 05:00) (86% - 98%)        PHYSICAL EXAM:  GENERAL: confused but more awake today,, alert and oriented today to self, place, wife. not to time  NERVOUS SYSTEM:  Alert & Oriented X2, confused but improved  CHEST/LUNG: Clear to ausculatation bilaterally; No rales, rhonchi, wheezing, or rubs  HEART: Regular rate and rhythm; No murmurs, rubs, or gallops  ABDOMEN: Soft, Nontender  EXTREMITIES:  BL No clubbing, cyanosis, or edema BLE      DATA:                          13.0   5.46  )-----------( 121      ( 17 Nov 2018 05:45 )             39.3     11-16    145  |  109  |  24<H>  ----------------------------<  118<H>  3.1<L>   |  24  |  1.4    Ca    8.8      16 Nov 2018 19:56  Phos  1.9     11-16  Mg     1.9     11-16    TPro  7.5  /  Alb  4.7  /  TBili  0.6  /  DBili  x   /  AST  10  /  ALT  5   /  AlkPhos  141<H>  11-16    CARDIAC MARKERS ( 16 Nov 2018 02:03 )  x     / <0.01 ng/mL / x     / x     / x          Culture - CSF with Gram Stain (collected 16 Nov 2018 11:00)  Source: .CSF CSF  Gram Stain (17 Nov 2018 00:42):    polymorphonuclear leukocytes seen    No organisms seen    by cytocentrifuge      EEG:  Consistent with mild to moderate diffuse cerebral electrophysiological   dysfunction secondary to none specific cause      Renal sono:  1.  No hydronephrosis.  2.  Right renal cyst and left renal peripheral calcifications, stable.        TTE in paper chart: improves LV size normal size and function. no segmental WMA. LA moderately dilated

## 2018-11-17 NOTE — PROGRESS NOTE ADULT - SUBJECTIVE AND OBJECTIVE BOX
Patient is a 68y old  Male who presents with a chief complaint of Altered mental status (2018 07:25)        Interval Events: Back in DKA overnight events. Not taking meds orally    REVIEW OF SYSTEMS:  unable to access      OBJECTIVE:  ICU Vital Signs Last 24 Hrs  T(C): 35.8 (2018 03:00), Max: 37.6 (2018 11:00)  T(F): 96.4 (2018 03:00), Max: 99.6 (2018 11:00)  HR: 88 (2018 06:00) (85 - 119)  BP: 146/98 (2018 06:00) (103/58 - 182/90)  BP(mean): 118 (2018 06:00) (76 - 125)  RR: 21 (2018 05:00) (19 - 32)  SpO2: 98% (2018 05:00) (86% - 98%)         @ 07:  -   @ 07:00  --------------------------------------------------------  IN: 2759 mL / OUT: 1560 mL / NET: 1199 mL     @ 07:01  -   @ 08:33  --------------------------------------------------------  IN: 0 mL / OUT: 125 mL / NET: -125 mL      CAPILLARY BLOOD GLUCOSE      POCT Blood Glucose.: 157 mg/dL (2018 08:06)      PHYSICAL EXAM:  General: Awake, confused  HEENT: Atraumatic, normocephalic.                 Mallampatti Grade                 No nasal congestion.                No tonsillar or pharyngeal exudates.  Lymph Nodes: No palpable lymphadenopathy neck, supraclavicular region  Neck: No JVD. No carotid bruit, no thyromegally  Respiratory: Normal chest expansion                         Normal percussion                         Normal and equal air entry                         No wheeze, rhonchi or rales.  Cardiovascular: S1 S2 normal. No murmurs, rubs or gallops.   Abdomen: Soft, non-tender, non-distended. No organomegaly.  Extremities: Warm to touch. Peripheral pulse palpable. No pedal edema.   Skin: No rashes or skin lesions, warm dry  Neurological: Motor and sensory examination equal and normal in all four extremities.  Psychiatry: Appropriate mood and affect.    HOSPITAL MEDICATIONS:  MEDICATIONS  (STANDING):  acyclovir IVPB      acyclovir IVPB 800 milliGRAM(s) IV Intermittent every 12 hours  allopurinol 100 milliGRAM(s) Oral two times a day  amLODIPine   Tablet 5 milliGRAM(s) Oral daily  ampicillin  IVPB      ampicillin  IVPB 2 Gram(s) IV Intermittent every 6 hours  atorvastatin 10 milliGRAM(s) Oral at bedtime  cefepime   IVPB 2000 milliGRAM(s) IV Intermittent every 12 hours  cefepime   IVPB      dextrose 5% + sodium chloride 0.9% with potassium chloride 20 mEq/L 1000 milliLiter(s) (75 mL/Hr) IV Continuous <Continuous>  docusate sodium 100 milliGRAM(s) Oral two times a day  doxazosin 8 milliGRAM(s) Oral at bedtime  finasteride 5 milliGRAM(s) Oral daily  insulin Infusion 1 Unit(s)/Hr (1 mL/Hr) IV Continuous <Continuous>  labetalol 100 milliGRAM(s) Oral two times a day  magnesium sulfate  IVPB 2 Gram(s) IV Intermittent every 2 hours  mycophenolate mofetil 1000 milliGRAM(s) Oral two times a day  pantoprazole    Tablet 40 milliGRAM(s) Oral before breakfast  potassium chloride  20 mEq/100 mL IVPB 20 milliEquivalent(s) IV Intermittent every 2 hours  pregabalin 100 milliGRAM(s) Oral two times a day  tacrolimus 1 milliGRAM(s) Oral every 12 hours  vancomycin  IVPB 1250 milliGRAM(s) IV Intermittent every 24 hours    MEDICATIONS  (PRN):      LABS:                        13.0   5.46  )-----------( 121      ( 2018 05:45 )             39.3     11-    147<H>  |  109  |  23<H>  ----------------------------<  198<H>  3.3<L>   |  22  |  1.5    Ca    8.5      2018 05:45  Phos  1.9     -  Mg     1.7         TPro  6.0  /  Alb  3.7  /  TBili  0.5  /  DBili  <0.2  /  AST  12  /  ALT  <5  /  AlkPhos  93      PT/INR - ( 2018 09:30 )   PT: 11.50 sec;   INR: 1.06 ratio         PTT - ( 2018 09:30 )  PTT:23.6 sec  Urinalysis Basic - ( 2018 04:15 )    Color: Yellow / Appearance: Clear / S.010 / pH: x  Gluc: x / Ketone: 80  / Bili: Negative / Urobili: 0.2   Blood: x / Protein: 100 / Nitrite: Negative   Leuk Esterase: Negative / RBC: 6-10 /HPF / WBC x   Sq Epi: x / Non Sq Epi: Occasional /HPF / Bacteria: Few        Venous Blood Gas:  16 @ 02:44  7.33/42/28/22/49  VBG Lactate: 1.9              RADIOLOGY:Radiology personally reviewed.

## 2018-11-17 NOTE — CONSULT NOTE ADULT - ASSESSMENT
Patient had heart transplant 10 years ago. Here because of DM. Echo lv function normal. Need Rx DM. Continue transplant meds Correct lytes

## 2018-11-17 NOTE — CONSULT NOTE ADULT - SUBJECTIVE AND OBJECTIVE BOX
RUBINJULISA  68y, Male  Allergy: codeine (Unknown)      HPI:  This is a 69 YO M with a PMHx of heart transplant, DM, HTN, DLD, who presented from home  for AMS. His wife checked his FS and it was high bermudez brought him to the ED. In the ED he was found to be septic and in DKA, was started on a insulin drip and IVF. Otherwise his wife denies fevers, chills, headaches, cough, urinary complaints, abdominal pain, nausea, vomiting, or any other complaints.    ICU Vital Signs Last 24 Hrs  T(C): 37.6 (2018 11:00), Max: 38.5 (2018 07:22)  T(F): 99.6 (2018 11:00), Max: 101.3 (2018 07:22)  HR: 94 (2018 13:00) (94 - 125)  BP: 111/69 (2018 13:00) (108/95 - 182/90)  BP(mean): 84 (2018 13:00) (84 - 125)  ABP: --  ABP(mean): --  RR: 24 (2018 13:00) (18 - 24)  SpO2: 93% (2018 13:00) (93% - 98%) (2018 14:12)    FAMILY HISTORY:  No pertinent family history in first degree relatives    PAST MEDICAL & SURGICAL HISTORY:  Urinary tract infection without hematuria, site unspecified  Chronic kidney disease, unspecified CKD stage  Bilateral hearing loss, unspecified hearing loss type  Benign prostatic hyperplasia with urinary frequency  Diabetes  High cholesterol  HTN (hypertension)  Heart transplant recipient  H/O heart transplant        VITALS:  T(F): 95.7, Max: 98.8 (--18 @ 15:12)  HR: 91  BP: 158/92  RR: 18Vital Signs Last 24 Hrs  T(C): 35.4 (2018 11:00), Max: 37.1 (2018 15:12)  T(F): 95.7 (2018 11:00), Max: 98.8 (2018 15:12)  HR: 91 (2018 07:00) (85 - 101)  BP: 158/92 (2018 07:00) (103/58 - 158/92)  BP(mean): 119 (2018 07:00) (76 - 119)  RR: 18 (2018 11:00) (18 - 32)  SpO2: 98% (2018 05:00) (86% - 98%)    TESTS & MEASUREMENTS:                        13.0   5.46  )-----------( 121      ( 2018 05:45 )             39.3         147<H>  |  109  |  23<H>  ----------------------------<  198<H>  3.3<L>   |  22  |  1.5    Ca    8.5      2018 05:45  Phos  1.9       Mg     1.7         TPro  6.0  /  Alb  3.7  /  TBili  0.5  /  DBili  <0.2  /  AST  12  /  ALT  <5  /  AlkPhos  93      LIVER FUNCTIONS - ( 2018 05:45 )  Alb: 3.7 g/dL / Pro: 6.0 g/dL / ALK PHOS: 93 U/L / ALT: <5 U/L / AST: 12 U/L / GGT: x             Culture - CSF with Gram Stain (collected 18 @ 11:00)  Source: .CSF CSF  Gram Stain (18 @ 00:42):    polymorphonuclear leukocytes seen    No organisms seen    by cytocentrifuge      Urinalysis Basic - ( 2018 04:15 )    Color: Yellow / Appearance: Clear / S.010 / pH: x  Gluc: x / Ketone: 80  / Bili: Negative / Urobili: 0.2   Blood: x / Protein: 100 / Nitrite: Negative   Leuk Esterase: Negative / RBC: 6-10 /HPF / WBC x   Sq Epi: x / Non Sq Epi: Occasional /HPF / Bacteria: Few          RADIOLOGY & ADDITIONAL TESTS:    ANTIBIOTICS:  acyclovir IVPB      acyclovir IVPB 800 milliGRAM(s) IV Intermittent every 12 hours  ampicillin  IVPB      ampicillin  IVPB 2 Gram(s) IV Intermittent every 6 hours  cefepime   IVPB 2000 milliGRAM(s) IV Intermittent every 12 hours  cefepime   IVPB      vancomycin  IVPB 1250 milliGRAM(s) IV Intermittent every 24 hours

## 2018-11-17 NOTE — PROGRESS NOTE ADULT - ASSESSMENT
IMPRESSION:    DKA - improving  AMS - r/o encephalitis        PLAN:    CNS: no depressants, LP done, Neurology eval. EEG routine    HEENT: Oral care    PULMONARY:  HOB @ 45 degrees    CARDIOVASCULAR: 2d echo, serial enzymes, BNP, cardiology evaluation    GI: GI prophylaxis.  Feeding NPO for now    RENAL:  Follow up lytes.  Correct as needed. D51/2NS at 100cc/hour. Rouse insertion. Keep equal balance and avoid fluid overload. Renal and bladder US.  renal consult now   INFECTIOUS DISEASE: Follow up cultures. . cont Cefepime, vancomycin, and ampicillin. Adjust to kidney function.  ID consult   HEMATOLOGICAL:  DVT prophylaxis.    ENDOCRINE:  Follow up FS q1h.  Insulin protocol for DKA. follow FS and AG   BMP Q 6 hrs     MUSCULOSKELETAL: bedrest for now.    Cont ICU monitoring.

## 2018-11-17 NOTE — CONSULT NOTE ADULT - ASSESSMENT
68 M with above history presents with AMS, found to be in DKA and septic. s/p LP, CT head unremarkable.  CSF with no evidence of infection  Clinically very stable    RECOMMENDATIONS:  D/c current ABx  Rocephin 2 gm iv q24h till BCx known

## 2018-11-18 LAB
ANION GAP SERPL CALC-SCNC: 12 MMOL/L — SIGNIFICANT CHANGE UP (ref 7–14)
ANION GAP SERPL CALC-SCNC: 13 MMOL/L — SIGNIFICANT CHANGE UP (ref 7–14)
ANION GAP SERPL CALC-SCNC: 13 MMOL/L — SIGNIFICANT CHANGE UP (ref 7–14)
ANION GAP SERPL CALC-SCNC: 14 MMOL/L — SIGNIFICANT CHANGE UP (ref 7–14)
ANION GAP SERPL CALC-SCNC: 17 MMOL/L — HIGH (ref 7–14)
BASOPHILS # BLD AUTO: 0.02 K/UL — SIGNIFICANT CHANGE UP (ref 0–0.2)
BASOPHILS NFR BLD AUTO: 0.4 % — SIGNIFICANT CHANGE UP (ref 0–1)
BUN SERPL-MCNC: 13 MG/DL — SIGNIFICANT CHANGE UP (ref 10–20)
BUN SERPL-MCNC: 13 MG/DL — SIGNIFICANT CHANGE UP (ref 10–20)
BUN SERPL-MCNC: 15 MG/DL — SIGNIFICANT CHANGE UP (ref 10–20)
CALCIUM SERPL-MCNC: 7.7 MG/DL — LOW (ref 8.5–10.1)
CALCIUM SERPL-MCNC: 7.9 MG/DL — LOW (ref 8.5–10.1)
CALCIUM SERPL-MCNC: 8 MG/DL — LOW (ref 8.5–10.1)
CALCIUM SERPL-MCNC: 8.1 MG/DL — LOW (ref 8.5–10.1)
CALCIUM SERPL-MCNC: 8.1 MG/DL — LOW (ref 8.5–10.1)
CHLORIDE SERPL-SCNC: 101 MMOL/L — SIGNIFICANT CHANGE UP (ref 98–110)
CHLORIDE SERPL-SCNC: 101 MMOL/L — SIGNIFICANT CHANGE UP (ref 98–110)
CHLORIDE SERPL-SCNC: 102 MMOL/L — SIGNIFICANT CHANGE UP (ref 98–110)
CHLORIDE SERPL-SCNC: 102 MMOL/L — SIGNIFICANT CHANGE UP (ref 98–110)
CHLORIDE SERPL-SCNC: 103 MMOL/L — SIGNIFICANT CHANGE UP (ref 98–110)
CLOSURE TME COLL+EPINEP BLD: SIGNIFICANT CHANGE UP K/UL (ref 150–400)
CO2 SERPL-SCNC: 19 MMOL/L — SIGNIFICANT CHANGE UP (ref 17–32)
CO2 SERPL-SCNC: 20 MMOL/L — SIGNIFICANT CHANGE UP (ref 17–32)
CO2 SERPL-SCNC: 21 MMOL/L — SIGNIFICANT CHANGE UP (ref 17–32)
CREAT SERPL-MCNC: 1.3 MG/DL — SIGNIFICANT CHANGE UP (ref 0.7–1.5)
CRYPTOC AG CSF-ACNC: NEGATIVE — SIGNIFICANT CHANGE UP
EOSINOPHIL # BLD AUTO: 0.09 K/UL — SIGNIFICANT CHANGE UP (ref 0–0.7)
EOSINOPHIL NFR BLD AUTO: 1.6 % — SIGNIFICANT CHANGE UP (ref 0–8)
GLUCOSE BLDC GLUCOMTR-MCNC: 112 MG/DL — HIGH (ref 70–99)
GLUCOSE BLDC GLUCOMTR-MCNC: 119 MG/DL — HIGH (ref 70–99)
GLUCOSE BLDC GLUCOMTR-MCNC: 163 MG/DL — HIGH (ref 70–99)
GLUCOSE BLDC GLUCOMTR-MCNC: 171 MG/DL — HIGH (ref 70–99)
GLUCOSE BLDC GLUCOMTR-MCNC: 173 MG/DL — HIGH (ref 70–99)
GLUCOSE BLDC GLUCOMTR-MCNC: 175 MG/DL — HIGH (ref 70–99)
GLUCOSE BLDC GLUCOMTR-MCNC: 188 MG/DL — HIGH (ref 70–99)
GLUCOSE BLDC GLUCOMTR-MCNC: 189 MG/DL — HIGH (ref 70–99)
GLUCOSE BLDC GLUCOMTR-MCNC: 206 MG/DL — HIGH (ref 70–99)
GLUCOSE BLDC GLUCOMTR-MCNC: 241 MG/DL — HIGH (ref 70–99)
GLUCOSE BLDC GLUCOMTR-MCNC: 241 MG/DL — HIGH (ref 70–99)
GLUCOSE BLDC GLUCOMTR-MCNC: 307 MG/DL — HIGH (ref 70–99)
GLUCOSE BLDC GLUCOMTR-MCNC: 318 MG/DL — HIGH (ref 70–99)
GLUCOSE BLDC GLUCOMTR-MCNC: 395 MG/DL — HIGH (ref 70–99)
GLUCOSE SERPL-MCNC: 112 MG/DL — HIGH (ref 70–99)
GLUCOSE SERPL-MCNC: 189 MG/DL — HIGH (ref 70–99)
GLUCOSE SERPL-MCNC: 221 MG/DL — HIGH (ref 70–99)
GLUCOSE SERPL-MCNC: 272 MG/DL — HIGH (ref 70–99)
GLUCOSE SERPL-MCNC: 366 MG/DL — HIGH (ref 70–99)
HCT VFR BLD CALC: 36.5 % — LOW (ref 42–52)
HCT VFR BLD CALC: 37.8 % — LOW (ref 42–52)
HCT VFR BLD CALC: 37.9 % — LOW (ref 42–52)
HGB BLD-MCNC: 12.1 G/DL — LOW (ref 14–18)
HGB BLD-MCNC: 12.4 G/DL — LOW (ref 14–18)
HGB BLD-MCNC: 12.4 G/DL — LOW (ref 14–18)
IMM GRANULOCYTES NFR BLD AUTO: 0.9 % — HIGH (ref 0.1–0.3)
LYMPHOCYTES # BLD AUTO: 0.96 K/UL — LOW (ref 1.2–3.4)
LYMPHOCYTES # BLD AUTO: 17.2 % — LOW (ref 20.5–51.1)
MAGNESIUM SERPL-MCNC: 1.9 MG/DL — SIGNIFICANT CHANGE UP (ref 1.8–2.4)
MCHC RBC-ENTMCNC: 26.9 PG — LOW (ref 27–31)
MCHC RBC-ENTMCNC: 27 PG — SIGNIFICANT CHANGE UP (ref 27–31)
MCHC RBC-ENTMCNC: 27.1 PG — SIGNIFICANT CHANGE UP (ref 27–31)
MCHC RBC-ENTMCNC: 32.7 G/DL — SIGNIFICANT CHANGE UP (ref 32–37)
MCHC RBC-ENTMCNC: 32.8 G/DL — SIGNIFICANT CHANGE UP (ref 32–37)
MCHC RBC-ENTMCNC: 33.2 G/DL — SIGNIFICANT CHANGE UP (ref 32–37)
MCV RBC AUTO: 81.7 FL — SIGNIFICANT CHANGE UP (ref 80–94)
MCV RBC AUTO: 82.2 FL — SIGNIFICANT CHANGE UP (ref 80–94)
MCV RBC AUTO: 82.4 FL — SIGNIFICANT CHANGE UP (ref 80–94)
MONOCYTES # BLD AUTO: 0.56 K/UL — SIGNIFICANT CHANGE UP (ref 0.1–0.6)
MONOCYTES NFR BLD AUTO: 10.1 % — HIGH (ref 1.7–9.3)
NEUTROPHILS # BLD AUTO: 3.89 K/UL — SIGNIFICANT CHANGE UP (ref 1.4–6.5)
NEUTROPHILS NFR BLD AUTO: 69.8 % — SIGNIFICANT CHANGE UP (ref 42.2–75.2)
NRBC # BLD: 0 /100 WBCS — SIGNIFICANT CHANGE UP (ref 0–0)
NRBC # BLD: 0 /100 WBCS — SIGNIFICANT CHANGE UP (ref 0–0)
PLATELET # BLD AUTO: 87 K/UL — LOW (ref 130–400)
PLATELET # BLD AUTO: 91 K/UL — LOW (ref 130–400)
PLATELET # BLD AUTO: 98 K/UL — LOW (ref 130–400)
POTASSIUM SERPL-MCNC: 3.8 MMOL/L — SIGNIFICANT CHANGE UP (ref 3.5–5)
POTASSIUM SERPL-MCNC: 4.3 MMOL/L — SIGNIFICANT CHANGE UP (ref 3.5–5)
POTASSIUM SERPL-MCNC: 4.8 MMOL/L — SIGNIFICANT CHANGE UP (ref 3.5–5)
POTASSIUM SERPL-MCNC: 5.2 MMOL/L — HIGH (ref 3.5–5)
POTASSIUM SERPL-MCNC: 5.5 MMOL/L — HIGH (ref 3.5–5)
POTASSIUM SERPL-SCNC: 3.8 MMOL/L — SIGNIFICANT CHANGE UP (ref 3.5–5)
POTASSIUM SERPL-SCNC: 4.3 MMOL/L — SIGNIFICANT CHANGE UP (ref 3.5–5)
POTASSIUM SERPL-SCNC: 4.8 MMOL/L — SIGNIFICANT CHANGE UP (ref 3.5–5)
POTASSIUM SERPL-SCNC: 5.2 MMOL/L — HIGH (ref 3.5–5)
POTASSIUM SERPL-SCNC: 5.5 MMOL/L — HIGH (ref 3.5–5)
RBC # BLD: 4.47 M/UL — LOW (ref 4.7–6.1)
RBC # BLD: 4.59 M/UL — LOW (ref 4.7–6.1)
RBC # BLD: 4.61 M/UL — LOW (ref 4.7–6.1)
RBC # FLD: 13.9 % — SIGNIFICANT CHANGE UP (ref 11.5–14.5)
RBC # FLD: 14.2 % — SIGNIFICANT CHANGE UP (ref 11.5–14.5)
RBC # FLD: 14.2 % — SIGNIFICANT CHANGE UP (ref 11.5–14.5)
SODIUM SERPL-SCNC: 133 MMOL/L — LOW (ref 135–146)
SODIUM SERPL-SCNC: 135 MMOL/L — SIGNIFICANT CHANGE UP (ref 135–146)
SODIUM SERPL-SCNC: 136 MMOL/L — SIGNIFICANT CHANGE UP (ref 135–146)
SODIUM SERPL-SCNC: 136 MMOL/L — SIGNIFICANT CHANGE UP (ref 135–146)
SODIUM SERPL-SCNC: 138 MMOL/L — SIGNIFICANT CHANGE UP (ref 135–146)
VANCOMYCIN TROUGH SERPL-MCNC: <4 UG/ML — LOW (ref 5–10)
WBC # BLD: 4.31 K/UL — LOW (ref 4.8–10.8)
WBC # BLD: 4.66 K/UL — LOW (ref 4.8–10.8)
WBC # BLD: 5.57 K/UL — SIGNIFICANT CHANGE UP (ref 4.8–10.8)
WBC # FLD AUTO: 4.31 K/UL — LOW (ref 4.8–10.8)
WBC # FLD AUTO: 4.66 K/UL — LOW (ref 4.8–10.8)
WBC # FLD AUTO: 5.57 K/UL — SIGNIFICANT CHANGE UP (ref 4.8–10.8)

## 2018-11-18 RX ORDER — POTASSIUM CHLORIDE 20 MEQ
40 PACKET (EA) ORAL EVERY 4 HOURS
Qty: 0 | Refills: 0 | Status: DISCONTINUED | OUTPATIENT
Start: 2018-11-18 | End: 2018-11-18

## 2018-11-18 RX ORDER — DEXTROSE 50 % IN WATER 50 %
12.5 SYRINGE (ML) INTRAVENOUS ONCE
Qty: 0 | Refills: 0 | Status: DISCONTINUED | OUTPATIENT
Start: 2018-11-18 | End: 2018-11-21

## 2018-11-18 RX ORDER — SODIUM CHLORIDE 9 MG/ML
1000 INJECTION, SOLUTION INTRAVENOUS
Qty: 0 | Refills: 0 | Status: DISCONTINUED | OUTPATIENT
Start: 2018-11-18 | End: 2018-11-21

## 2018-11-18 RX ORDER — INSULIN LISPRO 100/ML
10 VIAL (ML) SUBCUTANEOUS
Qty: 0 | Refills: 0 | Status: DISCONTINUED | OUTPATIENT
Start: 2018-11-18 | End: 2018-11-21

## 2018-11-18 RX ORDER — DEXTROSE 50 % IN WATER 50 %
15 SYRINGE (ML) INTRAVENOUS ONCE
Qty: 0 | Refills: 0 | Status: DISCONTINUED | OUTPATIENT
Start: 2018-11-18 | End: 2018-11-21

## 2018-11-18 RX ORDER — INSULIN LISPRO 100/ML
VIAL (ML) SUBCUTANEOUS AT BEDTIME
Qty: 0 | Refills: 0 | Status: DISCONTINUED | OUTPATIENT
Start: 2018-11-18 | End: 2018-11-21

## 2018-11-18 RX ORDER — POTASSIUM CHLORIDE 20 MEQ
20 PACKET (EA) ORAL
Qty: 0 | Refills: 0 | Status: COMPLETED | OUTPATIENT
Start: 2018-11-18 | End: 2018-11-18

## 2018-11-18 RX ORDER — HEPARIN SODIUM 5000 [USP'U]/ML
5000 INJECTION INTRAVENOUS; SUBCUTANEOUS EVERY 12 HOURS
Qty: 0 | Refills: 0 | Status: DISCONTINUED | OUTPATIENT
Start: 2018-11-18 | End: 2018-11-21

## 2018-11-18 RX ORDER — DEXTROSE 50 % IN WATER 50 %
25 SYRINGE (ML) INTRAVENOUS ONCE
Qty: 0 | Refills: 0 | Status: DISCONTINUED | OUTPATIENT
Start: 2018-11-18 | End: 2018-11-21

## 2018-11-18 RX ORDER — ACETAMINOPHEN 500 MG
650 TABLET ORAL EVERY 6 HOURS
Qty: 0 | Refills: 0 | Status: DISCONTINUED | OUTPATIENT
Start: 2018-11-18 | End: 2018-11-21

## 2018-11-18 RX ORDER — INSULIN GLARGINE 100 [IU]/ML
50 INJECTION, SOLUTION SUBCUTANEOUS AT BEDTIME
Qty: 0 | Refills: 0 | Status: DISCONTINUED | OUTPATIENT
Start: 2018-11-18 | End: 2018-11-19

## 2018-11-18 RX ORDER — GLUCAGON INJECTION, SOLUTION 0.5 MG/.1ML
1 INJECTION, SOLUTION SUBCUTANEOUS ONCE
Qty: 0 | Refills: 0 | Status: DISCONTINUED | OUTPATIENT
Start: 2018-11-18 | End: 2018-11-21

## 2018-11-18 RX ADMIN — HEPARIN SODIUM 5000 UNIT(S): 5000 INJECTION INTRAVENOUS; SUBCUTANEOUS at 18:01

## 2018-11-18 RX ADMIN — SODIUM CHLORIDE 100 MILLILITER(S): 9 INJECTION, SOLUTION INTRAVENOUS at 07:00

## 2018-11-18 RX ADMIN — Medication 100 MILLIGRAM(S): at 05:46

## 2018-11-18 RX ADMIN — TACROLIMUS 1 MILLIGRAM(S): 5 CAPSULE ORAL at 05:45

## 2018-11-18 RX ADMIN — Medication 100 MILLIGRAM(S): at 05:45

## 2018-11-18 RX ADMIN — Medication 50 MILLIEQUIVALENT(S): at 03:24

## 2018-11-18 RX ADMIN — Medication 50 MILLIEQUIVALENT(S): at 05:44

## 2018-11-18 RX ADMIN — INSULIN HUMAN 1 UNIT(S)/HR: 100 INJECTION, SOLUTION SUBCUTANEOUS at 07:00

## 2018-11-18 RX ADMIN — AMLODIPINE BESYLATE 5 MILLIGRAM(S): 2.5 TABLET ORAL at 05:46

## 2018-11-18 RX ADMIN — Medication 8 MILLIGRAM(S): at 21:24

## 2018-11-18 RX ADMIN — Medication 650 MILLIGRAM(S): at 15:05

## 2018-11-18 RX ADMIN — HEPARIN SODIUM 5000 UNIT(S): 5000 INJECTION INTRAVENOUS; SUBCUTANEOUS at 05:47

## 2018-11-18 RX ADMIN — CEFTRIAXONE 100 GRAM(S): 500 INJECTION, POWDER, FOR SOLUTION INTRAMUSCULAR; INTRAVENOUS at 17:59

## 2018-11-18 RX ADMIN — Medication 650 MILLIGRAM(S): at 14:35

## 2018-11-18 RX ADMIN — INSULIN HUMAN 1 UNIT(S)/HR: 100 INJECTION, SOLUTION SUBCUTANEOUS at 23:28

## 2018-11-18 RX ADMIN — Medication 100 MILLIGRAM(S): at 18:00

## 2018-11-18 RX ADMIN — TACROLIMUS 1 MILLIGRAM(S): 5 CAPSULE ORAL at 18:00

## 2018-11-18 RX ADMIN — Medication 100 MILLIGRAM(S): at 18:01

## 2018-11-18 RX ADMIN — ATORVASTATIN CALCIUM 10 MILLIGRAM(S): 80 TABLET, FILM COATED ORAL at 21:25

## 2018-11-18 RX ADMIN — Medication 40 MILLIEQUIVALENT(S): at 05:44

## 2018-11-18 RX ADMIN — FINASTERIDE 5 MILLIGRAM(S): 5 TABLET, FILM COATED ORAL at 12:55

## 2018-11-18 RX ADMIN — SODIUM CHLORIDE 100 MILLILITER(S): 9 INJECTION, SOLUTION INTRAVENOUS at 05:44

## 2018-11-18 RX ADMIN — MYCOPHENOLATE MOFETIL 1000 MILLIGRAM(S): 250 CAPSULE ORAL at 18:02

## 2018-11-18 RX ADMIN — PANTOPRAZOLE SODIUM 40 MILLIGRAM(S): 20 TABLET, DELAYED RELEASE ORAL at 05:46

## 2018-11-18 RX ADMIN — MYCOPHENOLATE MOFETIL 1000 MILLIGRAM(S): 250 CAPSULE ORAL at 05:45

## 2018-11-18 NOTE — CONSULT NOTE ADULT - SUBJECTIVE AND OBJECTIVE BOX
[facility]  INITIAL CONSULT NOTE  --------------------------------------------------------------------------------  HPI:  69 yo male h/o Stage III CKD, cardiomyopathy s/p heart transplant, HTN, DM.  Came w/ severe hyperglycemia.  Pt currently feeling better.  Initial Screat 1.6        PAST HISTORY  --------------------------------------------------------------------------------  PAST MEDICAL & SURGICAL HISTORY:  Urinary tract infection without hematuria, site unspecified  Chronic kidney disease, unspecified CKD stage  Bilateral hearing loss, unspecified hearing loss type  Benign prostatic hyperplasia with urinary frequency  Diabetes  High cholesterol  HTN (hypertension)  Heart transplant recipient  H/O heart transplant    FAMILY HISTORY:  No pertinent family history in first degree relatives    PAST SOCIAL HISTORY:    ALLERGIES & MEDICATIONS  --------------------------------------------------------------------------------  Allergies    codeine (Unknown)    Intolerances      Standing Inpatient Medications  allopurinol 100 milliGRAM(s) Oral two times a day  amLODIPine   Tablet 5 milliGRAM(s) Oral daily  atorvastatin 10 milliGRAM(s) Oral at bedtime  cefTRIAXone   IVPB 2 Gram(s) IV Intermittent every 24 hours  dextrose 5% + sodium chloride 0.45%. 1000 milliLiter(s) IV Continuous <Continuous>  docusate sodium 100 milliGRAM(s) Oral two times a day  doxazosin 8 milliGRAM(s) Oral at bedtime  finasteride 5 milliGRAM(s) Oral daily  heparin  Injectable 5000 Unit(s) SubCutaneous every 12 hours  insulin Infusion 1 Unit(s)/Hr IV Continuous <Continuous>  labetalol 100 milliGRAM(s) Oral two times a day  mycophenolate mofetil 1000 milliGRAM(s) Oral two times a day  pantoprazole    Tablet 40 milliGRAM(s) Oral before breakfast  pregabalin 100 milliGRAM(s) Oral two times a day  tacrolimus 1 milliGRAM(s) Oral every 12 hours    PRN Inpatient Medications  acetaminophen   Tablet .. 650 milliGRAM(s) Oral every 6 hours PRN      REVIEW OF SYSTEMS  --------------------------------------------------------------------------------  Gen: No weight changes, fatigue, fevers/chills, weakness  Skin: No rashes  Head/Eyes/Ears/Mouth: No headache; Normal hearing; Normal vision w/o blurriness; No sinus pain/discomfort, sore throat  Respiratory: No dyspnea, cough, wheezing, hemoptysis  CV: No chest pain, PND, orthopnea  GI: No abdominal pain, diarrhea, constipation, nausea, vomiting, melena, hematochezia  : No increased frequency, dysuria, hematuria, nocturia  MSK: No joint pain/swelling; no back pain; no edema  Neuro: No dizziness/lightheadedness, weakness, seizures, numbness, tingling  Heme: No easy bruising or bleeding  Endo: No heat/cold intolerance  Psych: No significant nervousness, anxiety, stress, depression    All other systems were reviewed and are negative, except as noted.    VITALS/PHYSICAL EXAM  --------------------------------------------------------------------------------  T(C): 36.4 (11-18-18 @ 11:00), Max: 36.4 (11-18-18 @ 11:00)  HR: 92 (11-18-18 @ 13:11) (88 - 95)  BP: 131/81 (11-18-18 @ 13:11) (93/56 - 149/77)  RR: 21 (11-18-18 @ 13:11) (17 - 31)  SpO2: --  Wt(kg): --        11-17-18 @ 07:01  -  11-18-18 @ 07:00  --------------------------------------------------------  IN: 3014 mL / OUT: 1220 mL / NET: 1794 mL    11-18-18 @ 07:01  -  11-18-18 @ 15:41  --------------------------------------------------------  IN: 873 mL / OUT: 325 mL / NET: 548 mL      Physical Exam:  	Gen: NAD, well-appearing  	HEENT: PERRL, supple neck, clear oropharynx  	Pulm: CTA B/L  	CV: RRR, S1S2; no rub  	Back: No spinal or CVA tenderness; no sacral edema  	Abd: +BS, soft, nontender/nondistended  	: No suprapubic tenderness  	UE: Warm, FROM, no clubbing, intact strength; no edema; no asterixis  	LE: Warm, FROM, no clubbing, intact strength; no edema  	Neuro: No focal deficits, intact gait  	Psych: Normal affect and mood  	Skin: Warm, without rashes  	Vascular access:    LABS/STUDIES  --------------------------------------------------------------------------------              12.4   4.66  >-----------<  87       [11-18-18 @ 11:41]              37.8     136  |  102  |  13  ----------------------------<  366      [11-18-18 @ 11:41]  5.2   |  21  |  1.3        Ca     7.9     [11-18-18 @ 11:41]      Mg     1.9     [11-18-18 @ 05:47]    TPro  6.0  /  Alb  3.7  /  TBili  0.5  /  DBili  <0.2  /  AST  12  /  ALT  <5  /  AlkPhos  93  [11-17-18 @ 05:45]          Creatinine Trend:  SCr 1.3 [11-18 @ 11:41]  SCr 1.3 [11-18 @ 05:47]  SCr 1.3 [11-17 @ 21:48]  SCr 1.3 [11-17 @ 19:57]  SCr 1.6 [11-17 @ 14:52]    Urinalysis - [11-16-18 @ 04:15]      Color Yellow / Appearance Clear / SG 1.010 / pH 6.0      Gluc >=1000 / Ketone 80  / Bili Negative / Urobili 0.2       Blood Small / Protein 100 / Leuk Est Negative / Nitrite Negative      RBC 6-10 / WBC  / Hyaline  / Gran  / Sq Epi  / Non Sq Epi Occasional / Bacteria Few      HbA1c 13.0      [09-11-18 @ 11:57]  Lipid: chol 143, , HDL 24,       [09-11-18 @ 11:00]      MYKE: titer Negative, pattern --      [09-19-18 @ 05:25]  ANCA: cANCA Negative, pANCA Negative, atypical ANCA Indeterminate Method interference due to MYKE fluorescence      [09-19-18 @ 05:25]

## 2018-11-18 NOTE — PROGRESS NOTE ADULT - SUBJECTIVE AND OBJECTIVE BOX
Neurology Progress Note    Interval History:      Patient seen in f/u for AMS.  He has made some improvements but still has difficulty with dates and makes some paraphasic errors.  He corrects himself often.  Recent brain MRI showed left cerebellar bleed, small.  CSF studies showed no infections and were only positive for elevated CSF protein > 100  EEG negative for seizure activity only showed mild slowing.      Vital Signs Last 24 Hrs  T(C): 35.7 (18 Nov 2018 15:15), Max: 36.4 (18 Nov 2018 11:00)  T(F): 96.3 (18 Nov 2018 15:15), Max: 97.6 (18 Nov 2018 11:00)  HR: 92 (18 Nov 2018 13:11) (88 - 95)  BP: 131/81 (18 Nov 2018 13:11) (93/56 - 149/77)  BP(mean): 102 (18 Nov 2018 13:11) (66 - 102)  RR: 23 (18 Nov 2018 15:15) (17 - 31)  SpO2: --    MEDICATIONS  (STANDING):  allopurinol 100 milliGRAM(s) Oral two times a day  amLODIPine   Tablet 5 milliGRAM(s) Oral daily  atorvastatin 10 milliGRAM(s) Oral at bedtime  cefTRIAXone   IVPB 2 Gram(s) IV Intermittent every 24 hours  dextrose 5% + sodium chloride 0.45%. 1000 milliLiter(s) (100 mL/Hr) IV Continuous <Continuous>  docusate sodium 100 milliGRAM(s) Oral two times a day  doxazosin 8 milliGRAM(s) Oral at bedtime  finasteride 5 milliGRAM(s) Oral daily  heparin  Injectable 5000 Unit(s) SubCutaneous every 12 hours  insulin Infusion 1 Unit(s)/Hr (1 mL/Hr) IV Continuous <Continuous>  labetalol 100 milliGRAM(s) Oral two times a day  mycophenolate mofetil 1000 milliGRAM(s) Oral two times a day  pantoprazole    Tablet 40 milliGRAM(s) Oral before breakfast  pregabalin 100 milliGRAM(s) Oral two times a day  tacrolimus 1 milliGRAM(s) Oral every 12 hours    Labs:  CBC Full  -  ( 18 Nov 2018 11:41 )  WBC Count : 4.66 K/uL  Hemoglobin : 12.4 g/dL  Hematocrit : 37.8 %  Platelet Count - Automated : 87 K/uL  Mean Cell Volume : 82.4 fL  Mean Cell Hemoglobin : 27.0 pg  Mean Cell Hemoglobin Concentration : 32.8 g/dL  Auto Neutrophil # : x  Auto Lymphocyte # : x  Auto Monocyte # : x  Auto Eosinophil # : x  Auto Basophil # : x  Auto Neutrophil % : x  Auto Lymphocyte % : x  Auto Monocyte % : x  Auto Eosinophil % : x  Auto Basophil % : x    11-18    135  |  101  |  15  ----------------------------<  272<H>  4.8   |  20  |  1.3    Ca    8.1<L>      18 Nov 2018 15:06  Mg     1.9     11-18    TPro  6.0  /  Alb  3.7  /  TBili  0.5  /  DBili  <0.2  /  AST  12  /  ALT  <5  /  AlkPhos  93  11-17    LIVER FUNCTIONS - ( 17 Nov 2018 05:45 )  Alb: 3.7 g/dL / Pro: 6.0 g/dL / ALK PHOS: 93 U/L / ALT: <5 U/L / AST: 12 U/L / GGT: x               Assessment:  This is a 68y Male w/ h/o AMS, improving apparently.  W/u positive for increased CSF protein of uncertain significance.  Cerebral bleeds can cause increased CSF protein but the bleed identified was more of a microbleed so I'm uncertain if this is sufficient to   cause the elevation in the protein.    Plan:  Continue PT/OT/Speech as allowed.  Continue antibiotics.  Check B12/folate, TSH. Neurology Progress Note    Interval History:      Patient seen in f/u for AMS in setting of DKA and sepsis.  He has made some improvements but still has difficulty with dates and makes some paraphasic errors.  He corrects himself often.  Recent brain MRI showed left cerebellar bleed, small.  CSF studies showed no infections and were only positive for elevated CSF protein > 100  EEG negative for seizure activity only showed mild slowing.      Vital Signs Last 24 Hrs  T(C): 35.7 (18 Nov 2018 15:15), Max: 36.4 (18 Nov 2018 11:00)  T(F): 96.3 (18 Nov 2018 15:15), Max: 97.6 (18 Nov 2018 11:00)  HR: 92 (18 Nov 2018 13:11) (88 - 95)  BP: 131/81 (18 Nov 2018 13:11) (93/56 - 149/77)  BP(mean): 102 (18 Nov 2018 13:11) (66 - 102)  RR: 23 (18 Nov 2018 15:15) (17 - 31)  SpO2: --    MEDICATIONS  (STANDING):  allopurinol 100 milliGRAM(s) Oral two times a day  amLODIPine   Tablet 5 milliGRAM(s) Oral daily  atorvastatin 10 milliGRAM(s) Oral at bedtime  cefTRIAXone   IVPB 2 Gram(s) IV Intermittent every 24 hours  dextrose 5% + sodium chloride 0.45%. 1000 milliLiter(s) (100 mL/Hr) IV Continuous <Continuous>  docusate sodium 100 milliGRAM(s) Oral two times a day  doxazosin 8 milliGRAM(s) Oral at bedtime  finasteride 5 milliGRAM(s) Oral daily  heparin  Injectable 5000 Unit(s) SubCutaneous every 12 hours  insulin Infusion 1 Unit(s)/Hr (1 mL/Hr) IV Continuous <Continuous>  labetalol 100 milliGRAM(s) Oral two times a day  mycophenolate mofetil 1000 milliGRAM(s) Oral two times a day  pantoprazole    Tablet 40 milliGRAM(s) Oral before breakfast  pregabalin 100 milliGRAM(s) Oral two times a day  tacrolimus 1 milliGRAM(s) Oral every 12 hours    Labs:  CBC Full  -  ( 18 Nov 2018 11:41 )  WBC Count : 4.66 K/uL  Hemoglobin : 12.4 g/dL  Hematocrit : 37.8 %  Platelet Count - Automated : 87 K/uL  Mean Cell Volume : 82.4 fL  Mean Cell Hemoglobin : 27.0 pg  Mean Cell Hemoglobin Concentration : 32.8 g/dL  Auto Neutrophil # : x  Auto Lymphocyte # : x  Auto Monocyte # : x  Auto Eosinophil # : x  Auto Basophil # : x  Auto Neutrophil % : x  Auto Lymphocyte % : x  Auto Monocyte % : x  Auto Eosinophil % : x  Auto Basophil % : x    11-18    135  |  101  |  15  ----------------------------<  272<H>  4.8   |  20  |  1.3    Ca    8.1<L>      18 Nov 2018 15:06  Mg     1.9     11-18    TPro  6.0  /  Alb  3.7  /  TBili  0.5  /  DBili  <0.2  /  AST  12  /  ALT  <5  /  AlkPhos  93  11-17    LIVER FUNCTIONS - ( 17 Nov 2018 05:45 )  Alb: 3.7 g/dL / Pro: 6.0 g/dL / ALK PHOS: 93 U/L / ALT: <5 U/L / AST: 12 U/L / GGT: x               Assessment:  This is a 68y Male w/ AMS likely secondary to toxic metabolic encephalopathy, slowly improving.  W/u positive for increased CSF protein of uncertain significance.  Cerebral bleeds can cause increased CSF protein but the bleed identified was more of a microbleed so I'm uncertain if this is sufficient to   cause the elevation in the protein.    Plan:  PT/OT/Speech as allowed.  Continue antibiotics.  Check B12/folate, TSH.

## 2018-11-18 NOTE — PROGRESS NOTE ADULT - SUBJECTIVE AND OBJECTIVE BOX
CC: f/u confusion      INTERVAL EVENTS INCLUDE:  seen by ID who adjusted abx to Rocephin 2 gm iv q24h till BCx known      ROS:        Vital Signs Last 24 Hrs  T(C): 35.8 (18 Nov 2018 07:14), Max: 36 (17 Nov 2018 19:02)  T(F): 96.5 (18 Nov 2018 07:14), Max: 96.8 (17 Nov 2018 19:02)  HR: 90 (18 Nov 2018 04:55) (88 - 107)  BP: 121/79 (18 Nov 2018 04:55) (102/65 - 128/78)  BP(mean): 96 (18 Nov 2018 04:55) (79 - 97)  RR: 19 (18 Nov 2018 07:14) (17 - 31)  SpO2: --      PHYSICAL EXAM:  GENERAL: confused but more awake today,, alert and oriented today to self, place, wife. not to time  NERVOUS SYSTEM:  Alert & Oriented X2, confused but improved  CHEST/LUNG: Clear to ausculatation bilaterally; No rales, rhonchi, wheezing, or rubs  HEART: Regular rate and rhythm; No murmurs, rubs, or gallops  ABDOMEN: Soft, Nontender  EXTREMITIES:  BL No clubbing, cyanosis, or edema BLE      DATA:    EEG:  Consistent with mild to moderate diffuse cerebral electrophysiological   dysfunction secondary to none specific cause      Renal sono:  1.  No hydronephrosis.  2.  Right renal cyst and left renal peripheral calcifications, stable.      TTE in paper chart: improves LV size normal size and function. no segmental WMA. LA moderately dilated        Culture - CSF with Gram Stain (collected 16 Nov 2018 11:00)  Source: .CSF CSF  Gram Stain (17 Nov 2018 00:42):    polymorphonuclear leukocytes seen    No organisms seen    by cytocentrifuge  Preliminary Report (17 Nov 2018 15:35):    No growth    Culture - Acid Fast - CSF (collected 16 Nov 2018 11:00)  Source: .CSF CSF      blood cultures pending  tacrolimus level pending                            12.1   4.31  )-----------( 91       ( 18 Nov 2018 05:47 )             36.5     11-18    138  |  102  |  13  ----------------------------<  221<H>  3.8   |  19  |  1.3    Ca    7.7<L>      18 Nov 2018 05:47  Phos  1.9     11-16  Mg     1.9     11-18    TPro  6.0  /  Alb  3.7  /  TBili  0.5  /  DBili  <0.2  /  AST  12  /  ALT  <5  /  AlkPhos  93  11-17 CC: f/u confusion      INTERVAL EVENTS INCLUDE:  seen by ID who adjusted abx to Rocephin 2 gm iv q24h till BCx known  I reviewed clinical situation and imaging with intensivist this am. He also informs me that insulin drio was held overnight and now back on due to worsening off the driop. overall MS is sig improved though      ROS:  patient is sitting in bed, reading the sunday paper. he is AAOX3 now and on RA. Wife at the bed      Vital Signs Last 24 Hrs  T(C): 35.8 (18 Nov 2018 07:14), Max: 36 (17 Nov 2018 19:02)  T(F): 96.5 (18 Nov 2018 07:14), Max: 96.8 (17 Nov 2018 19:02)  HR: 90 (18 Nov 2018 04:55) (88 - 107)  BP: 121/79 (18 Nov 2018 04:55) (102/65 - 128/78)  BP(mean): 96 (18 Nov 2018 04:55) (79 - 97)  RR: 19 (18 Nov 2018 07:14) (17 - 31)  SpO2: --      PHYSICAL EXAM:  GENERAL: confused but more awake today,, alert and oriented today to self, place, wife. not to time  NERVOUS SYSTEM:  Alert & Oriented X2, confused but improved  CHEST/LUNG: Clear to ausculatation bilaterally; No rales, rhonchi, wheezing, or rubs  HEART: Regular rate and rhythm; No murmurs, rubs, or gallops  ABDOMEN: Soft, Nontender  EXTREMITIES:  BL No clubbing, cyanosis, or edema BLE      DATA:    EEG:  Consistent with mild to moderate diffuse cerebral electrophysiological   dysfunction secondary to none specific cause      Renal sono:  1.  No hydronephrosis.  2.  Right renal cyst and left renal peripheral calcifications, stable.      TTE in paper chart: improves LV size normal size and function. no segmental WMA. LA moderately dilated        Culture - CSF with Gram Stain (collected 16 Nov 2018 11:00)  Source: .CSF CSF  Gram Stain (17 Nov 2018 00:42):    polymorphonuclear leukocytes seen    No organisms seen    by cytocentrifuge  Preliminary Report (17 Nov 2018 15:35):    No growth    Culture - Acid Fast - CSF (collected 16 Nov 2018 11:00)  Source: .CSF CSF      blood cultures pending  tacrolimus level pending                            12.1   4.31  )-----------( 91       ( 18 Nov 2018 05:47 )             36.5     11-18    138  |  102  |  13  ----------------------------<  221<H>  3.8   |  19  |  1.3    Ca    7.7<L>      18 Nov 2018 05:47  Phos  1.9     11-16  Mg     1.9     11-18    TPro  6.0  /  Alb  3.7  /  TBili  0.5  /  DBili  <0.2  /  AST  12  /  ALT  <5  /  AlkPhos  93  11-17 CC: f/u confusion      INTERVAL EVENTS INCLUDE:  seen by ID who adjusted abx to Rocephin 2 gm iv q24h till BCx known  I reviewed clinical situation and imaging with intensivist this am. He also informs me that insulin drio was held overnight and now back on due to worsening off the driop. overall MS is sig improved though      ROS:  patient is sitting in bed, reading the sunday paper. he is AAOX3 now and on RA. Wife at the bedside agrees MS is much better but not back tp bnasel;ine  he denies complaintas       Vital Signs Last 24 Hrs  T(C): 35.8 (18 Nov 2018 07:14), Max: 36 (17 Nov 2018 19:02)  T(F): 96.5 (18 Nov 2018 07:14), Max: 96.8 (17 Nov 2018 19:02)  HR: 90 (18 Nov 2018 04:55) (88 - 107)  BP: 121/79 (18 Nov 2018 04:55) (102/65 - 128/78)  BP(mean): 96 (18 Nov 2018 04:55) (79 - 97)  RR: 19 (18 Nov 2018 07:14) (17 - 31)  SpO2: --      PHYSICAL EXAM:  GENERAL: awake alert and oriented today  NERVOUS SYSTEM:  Alert & Oriented X3, MS improving  CHEST/LUNG: Clear to ausculatation bilaterally; No rales, rhonchi, wheezing, or rubs  HEART: Regular rate and rhythm; No murmurs, rubs, or gallops  ABDOMEN: Soft, Nontender  EXTREMITIES:  BL No clubbing, cyanosis, or edema BLE      DATA:    EEG:  Consistent with mild to moderate diffuse cerebral electrophysiological   dysfunction secondary to none specific cause      Renal sono:  1.  No hydronephrosis.  2.  Right renal cyst and left renal peripheral calcifications, stable.      TTE in paper chart: improves LV size normal size and function. no segmental WMA. LA moderately dilated        Culture - CSF with Gram Stain (collected 16 Nov 2018 11:00)  Source: .CSF CSF  Gram Stain (17 Nov 2018 00:42):    polymorphonuclear leukocytes seen    No organisms seen    by cytocentrifuge  Preliminary Report (17 Nov 2018 15:35):    No growth    Culture - Acid Fast - CSF (collected 16 Nov 2018 11:00)  Source: .CSF CSF      blood cultures pending  tacrolimus level pending                            12.1   4.31  )-----------( 91       ( 18 Nov 2018 05:47 )             36.5     11-18    138  |  102  |  13  ----------------------------<  221<H>  3.8   |  19  |  1.3    Ca    7.7<L>      18 Nov 2018 05:47  Phos  1.9     11-16  Mg     1.9     11-18    TPro  6.0  /  Alb  3.7  /  TBili  0.5  /  DBili  <0.2  /  AST  12  /  ALT  <5  /  AlkPhos  93  11-17

## 2018-11-18 NOTE — PROGRESS NOTE ADULT - ASSESSMENT
69 yo male with PMH that includes heart transplant on immunosuppressants, BPH, BL hearing loss, CKD, DM, DLD, HTN,recent CVA with microhemorrhages,  who came with with fever, confusion, and hyperglycemia. he was evaluated in the ER and admitted to the ICU for DKA and encephalopathy.       # fever and encephalopathy, r/o sepsis in immunosuppressed patient. s/p LP that was not consistent with bacterial meningitis with csf and bcx pending. cryptococcal csg ag negative. csf PCR negative  - f/u rest of csf data, including cytology, cultures, VDRL  - seen by ID who adjusted abx to Rocephin 2 gm iv q24h till BCx known  - f/u neuro today. obtain brain MRI given recent CVA and unrevealing LP. hold HSQW in favor of SCDs  - f/u tacrolimus levels      # anemia: slow decreas in in all cell- lines, with acute on chronic thrombocytopenia  - check FOBt  - trend CBC  - check manual platelet count  - heme eval      # DKA   ---- on insulin drip / IVF, management of which is per CCM team  ---- potassium  - Cr is back to his usual CKD  - f/u endo input      # Heart transplant: back on his antirejection meds      # coordination of care:  - VTE ppx: SCDs  ----- plan of care reviewed with CCM team and wife 69 yo male with PMH that includes heart transplant on immunosuppressants, BPH, BL hearing loss, CKD, DM, DLD, HTN,recent CVA with microhemorrhages,  who came with with fever, confusion, and hyperglycemia. he was evaluated in the ER and admitted to the ICU for DKA and encephalopathy.       # fever and encephalopathy, r/o sepsis in immunosuppressed patient. s/p LP that was not consistent with bacterial meningitis with csf and bcx pending. cryptococcal csg ag negative. csf PCR negative  - f/u rest of csf data, including cytology, cultures, VDRL  - seen by ID who adjusted abx to Rocephin 2 gm iv q24h till BCx known  - f/u neuro today, prior mention made of obtaining MR brain, poss due to hx of recent CVA. His MS is now improved though and cannot travel now due to insulin drip. discussed with intensivist who advised neuro - re-eval given significant improvment to MS  - f/u tacrolimus levels      # anemia: slow decreas in in all cell- lines, with acute on chronic thrombocytopenia  - check FOBt  - trend CBC  - check manual platelet count  - heme eval if drops further      # DKA   - now back on insulin drip, management of which is per CCM team  - Cr is back to his usual CKD  - f/u endo input      # Heart transplant: back on his antirejection meds      # coordination of care:  - VTE ppx: HSQ  - plan of care reviewed with wife and intensivist, and enddorsed to CCM team for followup

## 2018-11-18 NOTE — PROGRESS NOTE ADULT - SUBJECTIVE AND OBJECTIVE BOX
JULISA CONKLIN 68y Male  MRN#: [id number]   CODE STATUS: Full code      SUBJECTIVE  Patient is a 68y old Male who presents with a chief complaint of Altered mental status (18 Nov 2018 16:47)  Currently admitted to medicine with the primary diagnosis of DKA (diabetic ketoacidoses)  Hospital course has been complicated by AMS, Sepsis, DKA  Today is hospital day 2d, and this morning he is AAO x 3, sitting in chair and has no complaints.      OBJECTIVE  PAST MEDICAL & SURGICAL HISTORY  Urinary tract infection without hematuria, site unspecified  Chronic kidney disease, unspecified CKD stage  Bilateral hearing loss, unspecified hearing loss type  Benign prostatic hyperplasia with urinary frequency  Diabetes  High cholesterol  HTN (hypertension)  Heart transplant recipient  H/O heart transplant    ALLERGIES:  codeine (Unknown)    MEDICATIONS:  STANDING MEDICATIONS  allopurinol 100 milliGRAM(s) Oral two times a day  amLODIPine   Tablet 5 milliGRAM(s) Oral daily  atorvastatin 10 milliGRAM(s) Oral at bedtime  cefTRIAXone   IVPB 2 Gram(s) IV Intermittent every 24 hours  dextrose 5% + sodium chloride 0.45%. 1000 milliLiter(s) IV Continuous <Continuous>  docusate sodium 100 milliGRAM(s) Oral two times a day  doxazosin 8 milliGRAM(s) Oral at bedtime  finasteride 5 milliGRAM(s) Oral daily  heparin  Injectable 5000 Unit(s) SubCutaneous every 12 hours  insulin Infusion 1 Unit(s)/Hr IV Continuous <Continuous>  labetalol 100 milliGRAM(s) Oral two times a day  mycophenolate mofetil 1000 milliGRAM(s) Oral two times a day  pantoprazole    Tablet 40 milliGRAM(s) Oral before breakfast  pregabalin 100 milliGRAM(s) Oral two times a day  tacrolimus 1 milliGRAM(s) Oral every 12 hours    PRN MEDICATIONS  acetaminophen   Tablet .. 650 milliGRAM(s) Oral every 6 hours PRN      VITAL SIGNS: Last 24 Hours  T(C): 35.7 (18 Nov 2018 15:15), Max: 36.4 (18 Nov 2018 11:00)  T(F): 96.3 (18 Nov 2018 15:15), Max: 97.6 (18 Nov 2018 11:00)  HR: 92 (18 Nov 2018 13:11) (88 - 95)  BP: 131/81 (18 Nov 2018 13:11) (93/56 - 149/77)  BP(mean): 102 (18 Nov 2018 13:11) (66 - 102)  RR: 23 (18 Nov 2018 15:15) (17 - 31)  SpO2: --    LABS:                        12.4   4.66  )-----------( 87       ( 18 Nov 2018 11:41 )             37.8     11-18    135  |  101  |  15  ----------------------------<  272<H>  4.8   |  20  |  1.3    Ca    8.1<L>      18 Nov 2018 15:06  Mg     1.9     11-18    TPro  6.0  /  Alb  3.7  /  TBili  0.5  /  DBili  <0.2  /  AST  12  /  ALT  <5  /  AlkPhos  93  11-17      Culture - CSF with Gram Stain (collected 16 Nov 2018 11:00)  Source: .CSF CSF  Gram Stain (17 Nov 2018 00:42):    polymorphonuclear leukocytes seen    No organisms seen    by cytocentrifuge  Preliminary Report (17 Nov 2018 15:35):    No growth    Culture - Acid Fast - CSF (collected 16 Nov 2018 11:00)  Source: .CSF CSF      PHYSICAL EXAM:    GENERAL: Sitting in chair, in NAD, well-developed, AAOx3  HEENT:  Atraumatic, Normocephalic. EOMI, PERRLA, conjunctiva and sclera clear, No JVD  PULMONARY: Clear to auscultation bilaterally; No wheeze  CARDIOVASCULAR: Regular rate and rhythm; No murmurs, rubs, or gallops  GASTROINTESTINAL: Soft, Nontender, Nondistended; Bowel sounds present  MUSCULOSKELETAL:  2+ Peripheral Pulses, No clubbing, cyanosis, or edema  NEUROLOGY: non-focal  SKIN: No rashes or lesions      ASSESSMENT & PLAN    69 yo male with PMH that includes heart transplant on immunosuppressants, BPH, BL hearing loss, CKD, DM, DLD, HTN,recent CVA with microhemorrhages,  who came with with fever, confusion, and hyperglycemia. he was evaluated in the ER and admitted to the ICU for DKA and encephalopathy.       1) Fever and Encephalopathy, r/o sepsis in immunosuppressed patient. s/p LP that was not consistent with bacterial meningitis with csf and bcx pending. cryptococcal csg ag negative. csf PCR negative  - f/u rest of csf data, including cytology, cultures, VDRL  - seen by ID who adjusted abx to Rocephin 2 gm iv q24h till BCx known  - f/u neuro today, prior mention made of obtaining MR brain, poss due to hx of recent CVA. His MS is now improved though and cannot travel now due to insulin drip. Discussed with intensivist who advised neuro - re-eval given significant improvement to MS: Today's Neuro recs appreciated   - f/u tacrolimus levels  - AAOx3 but still not at baseline per his wife      2) Anemia: slow decrease in all cell- lines, with acute on chronic thrombocytopenia  - check FOBt  - trend CBC  - check manual platelet count  - heme eval if drops further      3) DKA   - now back on insulin drip, will follow FS, adjust accordingly  - Cr is back to his usual CKD  - f/u endo input      4) Heart transplant:   - back on his antirejection meds  - Cardio eval appreciated        Present today:  ( ) Congestive Heart Failure, Yes? ( )Acute / ( )Acute on Chronic / ( )Chronic  :  ( )Systolic / ( )Diastolic               Plan:  ( ) Complicated Pneumonia, Type?  ( )Parapneumonic effusion / ( )Abscess / ( ) Multilobar / ( )Other               Plan:  ( ) Morbid Obesity, Yes? BMI:               Plan:  ( ) Functional Quadriplegia               Plan:  ( ) Encephalopathy               Plan:    ( ) Discussion with patient and/or family regarding goals of care  ( ) Discussed Case and Plan with Medical Attending, Name:      # Planned Disposition: ________ JULISA CONKLIN 68y Male  MRN#: [id number]   CODE STATUS: Full code      SUBJECTIVE  Patient is a 68y old Male who presents with a chief complaint of Altered mental status (18 Nov 2018 16:47)  Currently admitted to medicine with the primary diagnosis of DKA (diabetic ketoacidoses)  Hospital course has been complicated by AMS, Sepsis, DKA  Today is hospital day 2d, and this morning he is AAO x 3, sitting in chair and has no complaints.      OBJECTIVE  PAST MEDICAL & SURGICAL HISTORY  Urinary tract infection without hematuria, site unspecified  Chronic kidney disease, unspecified CKD stage  Bilateral hearing loss, unspecified hearing loss type  Benign prostatic hyperplasia with urinary frequency  Diabetes  High cholesterol  HTN (hypertension)  Heart transplant recipient  H/O heart transplant    ALLERGIES:  codeine (Unknown)    MEDICATIONS:  STANDING MEDICATIONS  allopurinol 100 milliGRAM(s) Oral two times a day  amLODIPine   Tablet 5 milliGRAM(s) Oral daily  atorvastatin 10 milliGRAM(s) Oral at bedtime  cefTRIAXone   IVPB 2 Gram(s) IV Intermittent every 24 hours  dextrose 5% + sodium chloride 0.45%. 1000 milliLiter(s) IV Continuous <Continuous>  docusate sodium 100 milliGRAM(s) Oral two times a day  doxazosin 8 milliGRAM(s) Oral at bedtime  finasteride 5 milliGRAM(s) Oral daily  heparin  Injectable 5000 Unit(s) SubCutaneous every 12 hours  insulin Infusion 1 Unit(s)/Hr IV Continuous <Continuous>  labetalol 100 milliGRAM(s) Oral two times a day  mycophenolate mofetil 1000 milliGRAM(s) Oral two times a day  pantoprazole    Tablet 40 milliGRAM(s) Oral before breakfast  pregabalin 100 milliGRAM(s) Oral two times a day  tacrolimus 1 milliGRAM(s) Oral every 12 hours    PRN MEDICATIONS  acetaminophen   Tablet .. 650 milliGRAM(s) Oral every 6 hours PRN      VITAL SIGNS: Last 24 Hours  T(C): 35.7 (18 Nov 2018 15:15), Max: 36.4 (18 Nov 2018 11:00)  T(F): 96.3 (18 Nov 2018 15:15), Max: 97.6 (18 Nov 2018 11:00)  HR: 92 (18 Nov 2018 13:11) (88 - 95)  BP: 131/81 (18 Nov 2018 13:11) (93/56 - 149/77)  BP(mean): 102 (18 Nov 2018 13:11) (66 - 102)  RR: 23 (18 Nov 2018 15:15) (17 - 31)  SpO2: --    LABS:                        12.4   4.66  )-----------( 87       ( 18 Nov 2018 11:41 )             37.8     11-18    135  |  101  |  15  ----------------------------<  272<H>  4.8   |  20  |  1.3    Ca    8.1<L>      18 Nov 2018 15:06  Mg     1.9     11-18    TPro  6.0  /  Alb  3.7  /  TBili  0.5  /  DBili  <0.2  /  AST  12  /  ALT  <5  /  AlkPhos  93  11-17      Culture - CSF with Gram Stain (collected 16 Nov 2018 11:00)  Source: .CSF CSF  Gram Stain (17 Nov 2018 00:42):    polymorphonuclear leukocytes seen    No organisms seen    by cytocentrifuge  Preliminary Report (17 Nov 2018 15:35):    No growth    Culture - Acid Fast - CSF (collected 16 Nov 2018 11:00)  Source: .CSF CSF      PHYSICAL EXAM:    GENERAL: Sitting in chair, in NAD, well-developed, AAOx3  HEENT:  Atraumatic, Normocephalic. EOMI, PERRLA, conjunctiva and sclera clear, No JVD  PULMONARY: Clear to auscultation bilaterally; No wheeze  CARDIOVASCULAR: Regular rate and rhythm; No murmurs, rubs, or gallops  GASTROINTESTINAL: Soft, Nontender, Nondistended; Bowel sounds present  MUSCULOSKELETAL:  2+ Peripheral Pulses, No clubbing, cyanosis, or edema  NEUROLOGY: non-focal  SKIN: No rashes or lesions      ASSESSMENT & PLAN    69 yo male with PMH that includes heart transplant on immunosuppressants, BPH, BL hearing loss, CKD, DM, DLD, HTN,recent CVA with microhemorrhages,  who came with with fever, confusion, and hyperglycemia. he was evaluated in the ER and admitted to the ICU for DKA and encephalopathy.       1) Fever and Encephalopathy, r/o sepsis in immunosuppressed patient. s/p LP that was not consistent with bacterial meningitis with csf and bcx pending. cryptococcal csg ag negative. csf PCR negative  - f/u rest of csf data, including cytology, cultures, VDRL  - seen by ID who adjusted abx to Rocephin 2 gm iv q24h till BCx known  - f/u neuro today, prior mention made of obtaining MR brain, poss due to hx of recent CVA. His MS is now improved though and cannot travel now due to insulin drip. Discussed with intensivist who advised neuro - re-eval given significant improvement to MS: Today's Neuro recs appreciated   - f/u tacrolimus levels  - AAOx3 but still not at baseline per his wife      2) Anemia: slow decrease in all cell- lines, with acute on chronic thrombocytopenia  - check FOBt  - trend CBC  - check manual platelet count  - heme eval if drops further      3) DKA   - now back on insulin drip, will follow FS, adjust accordingly  - Cr is back to his usual CKD  - f/u endo input      4) Heart transplant:   - back on his antirejection meds  - Cardio eval appreciated    Ppx/diet/dispo  - SQ heparin  - Diet as tolerated; carb consistent  - Full code, from home

## 2018-11-18 NOTE — PROGRESS NOTE ADULT - ASSESSMENT
IMPRESSION:    DKA - was improving back in mild DKA presently  AMS - r/o encephalitis        PLAN:    CNS: no depressants, LP done, Neurology eval. EEG routine    HEENT: Oral care    PULMONARY:  HOB @ 45 degrees    CARDIOVASCULAR: 2d echo, serial enzymes, BNP, cardiology evaluation    GI: GI prophylaxis.  Feedings    RENAL:  Follow up lytes.  Correct as needed. D51/2NS at 100cc/hour. Rouse insertion. Keep equal balance and avoid fluid overload. Renal and bladder US.  renal f/u    INFECTIOUS DISEASE: Follow up cultures. . cont Cefepime, vancomycin, and ampicillin. Adjust to kidney function.  ID consult     HEMATOLOGICAL:  DVT prophylaxis.    ENDOCRINE:  Follow up FS q1h.  Insulin protocol for DKA. follow FS and HCO3  BMP Q 6 hrs   DO NOT STOP IV INSULIN    MUSCULOSKELETAL: bedrest for now.    Cont ICU monitoring.

## 2018-11-18 NOTE — CONSULT NOTE ADULT - ATTENDING COMMENTS
One of multiple admissions for this 68 yr old male with diabetes mellitus for over 12 years, on basal/bolus insulin with lantus at 50 u hs and pre meal novolog at 10 u ac.Pt has been poorly compliant with diet and exercise inspite of nutritional counselling at the office. As per his wife when she visited at the office pt has memory issues, not clear as to etiology at this time. Pt denies polyuria/ polydypsia,   H/O heart transplant on antirejection meds.Stable  Euthyroid , heart regular, chest clear,abdomen has minimal epigastric tenderness.  Hyperosmolar type 2 diabetes mellitus with metabolic ketoacidosis improving at this time but still requiring large amount of insulin 4units iv infusion at this time.  S/P cardiac transplant stable.  Memory lapses. Needs further evaluation.  PLAN.1. Restart lantus 50 u Hs and novolog 10 u ac and gradually taper insulin drip and D/C          2. Check. HBAIC,TSH, Lipids, Vit B12 and folates .Monitor blood sugars 2 hourly until stable.          3. Neurology consult for memory impairment.

## 2018-11-18 NOTE — PROGRESS NOTE ADULT - SUBJECTIVE AND OBJECTIVE BOX
Patient is a 68y old  Male who presents with a chief complaint of Altered mental status (17 Nov 2018 11:52)      T(F): 96.8 (11-17-18 @ 23:01), Max: 96.8 (11-17-18 @ 19:02)  HR: 90 (11-18-18 @ 04:55)  BP: 121/79 (11-18-18 @ 04:55)  RR: 17 (11-18-18 @ 05:02)  SpO2: --    PHYSICAL EXAM:  GENERAL: NAD, well-groomed, well-developed  HEAD:  Atraumatic, Normocephalic  EYES: EOMI, PERRLA, conjunctiva and sclera clear  ENMT: No tonsillar erythema, exudates, or enlargement; Moist mucous membranes, Good dentition, No lesions  NECK: Supple, No JVD, Normal thyroid  NERVOUS SYSTEM:  Alert & Oriented X3,  Motor Strength 5/5 B/L upper and lower extremities  CHEST/LUNG: Clear to percussion bilaterally; No rales, rhonchi, wheezing, or rubs  HEART: Regular rate and rhythm; No murmurs, rubs, or gallops  ABDOMEN: Soft, Nontender, Nondistended; Bowel sounds present  EXTREMITIES:   No clubbing, cyanosis, or edema  LYMPH: No lymphadenopathy noted  SKIN: No rashes or lesions    labs  11-17    141  |  106  |  17  ----------------------------<  126<H>  3.1<L>   |  20  |  1.3    Ca    8.0<L>      17 Nov 2018 21:48  Phos  1.9     11-16  Mg     1.7     11-17    TPro  6.0  /  Alb  3.7  /  TBili  0.5  /  DBili  <0.2  /  AST  12  /  ALT  <5  /  AlkPhos  93  11-17                          12.1   4.31  )-----------( 91       ( 18 Nov 2018 05:47 )             36.5       Culture - CSF with Gram Stain (collected 16 Nov 2018 11:00)  Source: .CSF CSF  Gram Stain (17 Nov 2018 00:42):    polymorphonuclear leukocytes seen    No organisms seen    by cytocentrifuge  Preliminary Report (17 Nov 2018 15:35):    No growth    Culture - Acid Fast - CSF (collected 16 Nov 2018 11:00)  Source: .CSF CSF      PT/INR - ( 16 Nov 2018 09:30 )   PT: 11.50 sec;   INR: 1.06 ratio         PTT - ( 16 Nov 2018 09:30 )  PTT:23.6 sec        allopurinol 100 milliGRAM(s) Oral two times a day  amLODIPine   Tablet 5 milliGRAM(s) Oral daily  atorvastatin 10 milliGRAM(s) Oral at bedtime  cefTRIAXone   IVPB 2 Gram(s) IV Intermittent every 24 hours  dextrose 5% + sodium chloride 0.45%. 1000 milliLiter(s) IV Continuous <Continuous>  docusate sodium 100 milliGRAM(s) Oral two times a day  doxazosin 8 milliGRAM(s) Oral at bedtime  finasteride 5 milliGRAM(s) Oral daily  heparin  Injectable 5000 Unit(s) SubCutaneous every 8 hours  insulin Infusion 1 Unit(s)/Hr IV Continuous <Continuous>  labetalol 100 milliGRAM(s) Oral two times a day  mycophenolate mofetil 1000 milliGRAM(s) Oral two times a day  pantoprazole    Tablet 40 milliGRAM(s) Oral before breakfast  potassium chloride   Powder 40 milliEquivalent(s) Oral every 4 hours  pregabalin 100 milliGRAM(s) Oral two times a day  tacrolimus 1 milliGRAM(s) Oral every 12 hours

## 2018-11-18 NOTE — PROGRESS NOTE ADULT - SUBJECTIVE AND OBJECTIVE BOX
Patient is a 68y old  Male who presents with a chief complaint of Altered mental status (18 Nov 2018 07:26)        Interval Events: No overnight events.  Insulin stopped for a while last night.    REVIEW OF SYSTEMS:  Constitutional: No fevers or chills. No weight loss. No fatigue or generalized malaise.  Eyes: No itching or discharge from the eyes  ENT: No ear pain. No ear discharge. No nasal congestion. No post nasal drip. No epistaxis. No throat pain. No sore throat. No difficulty swallowing.   CV: No chest pain. No palpitations. No lightheadedness or dizziness.   Resp: No dyspnea at rest. No dyspnea on exertion. No orthopnea. No wheezing. No cough. No stridor. No sputum production. No chest pain with respiration.  GI: No nausea. No vomiting. No diarrhea.  MSK: No joint pain or pain in any extremities  Integumentary: No skin lesions. No pedal edema.  Neurological: No gross motor weakness. No sensory changes.      OBJECTIVE:  ICU Vital Signs Last 24 Hrs  T(C): 35.8 (18 Nov 2018 07:14), Max: 36 (17 Nov 2018 19:02)  T(F): 96.5 (18 Nov 2018 07:14), Max: 96.8 (17 Nov 2018 19:02)  HR: 93 (18 Nov 2018 07:15) (88 - 107)  BP: 149/77 (18 Nov 2018 07:15) (102/65 - 149/77)  BP(mean): 96 (18 Nov 2018 07:15) (79 - 97)  RR: 22 (18 Nov 2018 07:15) (17 - 31)  SpO2: --        11-17 @ 07:01 - 11-18 @ 07:00  --------------------------------------------------------  IN: 3016 mL / OUT: 1220 mL / NET: 1796 mL    11-18 @ 07:01 - 11-18 @ 08:25  --------------------------------------------------------  IN: 205 mL / OUT: 0 mL / NET: 205 mL      CAPILLARY BLOOD GLUCOSE      POCT Blood Glucose.: 241 mg/dL (18 Nov 2018 06:50)      PHYSICAL EXAM:  General: confused   HEENT: Atraumatic, normocephalic.                 Mallampatti Grade                 No nasal congestion.                No tonsillar or pharyngeal exudates.  Lymph Nodes: No palpable lymphadenopathy neck, supraclavicular region  Neck: No JVD. No carotid bruit, no thyromegally  Respiratory: Normal chest expansion                         Normal percussion                         Normal and equal air entry                         No wheeze, rhonchi or rales.  Cardiovascular: S1 S2 normal. No murmurs, rubs or gallops.   Abdomen: Soft, non-tender, non-distended. No organomegaly.  Extremities: Warm to touch. Peripheral pulse palpable. No pedal edema.   Skin: No rashes or skin lesions, warm dry  Neurological: Motor and sensory examination equal and normal in all four extremities.  Psychiatry: Appropriate mood and affect.    HOSPITAL MEDICATIONS:  MEDICATIONS  (STANDING):  allopurinol 100 milliGRAM(s) Oral two times a day  amLODIPine   Tablet 5 milliGRAM(s) Oral daily  atorvastatin 10 milliGRAM(s) Oral at bedtime  cefTRIAXone   IVPB 2 Gram(s) IV Intermittent every 24 hours  dextrose 5% + sodium chloride 0.45%. 1000 milliLiter(s) (100 mL/Hr) IV Continuous <Continuous>  docusate sodium 100 milliGRAM(s) Oral two times a day  doxazosin 8 milliGRAM(s) Oral at bedtime  finasteride 5 milliGRAM(s) Oral daily  heparin  Injectable 5000 Unit(s) SubCutaneous every 8 hours  insulin Infusion 1 Unit(s)/Hr (1 mL/Hr) IV Continuous <Continuous>  labetalol 100 milliGRAM(s) Oral two times a day  mycophenolate mofetil 1000 milliGRAM(s) Oral two times a day  pantoprazole    Tablet 40 milliGRAM(s) Oral before breakfast  potassium chloride   Powder 40 milliEquivalent(s) Oral every 4 hours  pregabalin 100 milliGRAM(s) Oral two times a day  tacrolimus 1 milliGRAM(s) Oral every 12 hours    MEDICATIONS  (PRN):      LABS:                        12.1   4.31  )-----------( 91       ( 18 Nov 2018 05:47 )             36.5     11-18    138  |  102  |  13  ----------------------------<  221<H>  3.8   |  19  |  1.3    Ca    7.7<L>      18 Nov 2018 05:47  Phos  1.9     11-16  Mg     1.9     11-18    TPro  6.0  /  Alb  3.7  /  TBili  0.5  /  DBili  <0.2  /  AST  12  /  ALT  <5  /  AlkPhos  93  11-17    PT/INR - ( 16 Nov 2018 09:30 )   PT: 11.50 sec;   INR: 1.06 ratio         PTT - ( 16 Nov 2018 09:30 )  PTT:23.6 sec                  RADIOLOGY:Radiology personally reviewed.

## 2018-11-18 NOTE — CONSULT NOTE ADULT - ASSESSMENT
1)  Known CKD, w/ above serum creat actually lower than usual baseline.  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  Pt will keep his scheduled appt w/ me once discharged.

## 2018-11-18 NOTE — CONSULT NOTE ADULT - SUBJECTIVE AND OBJECTIVE BOX
JULISA CONKLIN 68y Male  MRN#: [id number]   CODE STATUS: Full code      SUBJECTIVE  Patient is a 68y old Male who presents with a chief complaint of Altered mental status (18 Nov 2018 16:47)  Currently admitted to medicine with the primary diagnosis of DKA (diabetic ketoacidoses)  Hospital course has been complicated by AMS, Sepsis, DKA  Today is hospital day 2d, and this morning he is AAO x 3, sitting in chair and has no complaints.      OBJECTIVE  PAST MEDICAL & SURGICAL HISTORY  Urinary tract infection without hematuria, site unspecified  Chronic kidney disease, unspecified CKD stage  Bilateral hearing loss, unspecified hearing loss type  Benign prostatic hyperplasia with urinary frequency  Diabetes  High cholesterol  HTN (hypertension)  Heart transplant recipient  H/O heart transplant    ALLERGIES:  codeine (Unknown)    MEDICATIONS:  STANDING MEDICATIONS  allopurinol 100 milliGRAM(s) Oral two times a day  amLODIPine   Tablet 5 milliGRAM(s) Oral daily  atorvastatin 10 milliGRAM(s) Oral at bedtime  cefTRIAXone   IVPB 2 Gram(s) IV Intermittent every 24 hours  dextrose 5% + sodium chloride 0.45%. 1000 milliLiter(s) IV Continuous <Continuous>  docusate sodium 100 milliGRAM(s) Oral two times a day  doxazosin 8 milliGRAM(s) Oral at bedtime  finasteride 5 milliGRAM(s) Oral daily  heparin  Injectable 5000 Unit(s) SubCutaneous every 12 hours  insulin Infusion 1 Unit(s)/Hr IV Continuous <Continuous>  labetalol 100 milliGRAM(s) Oral two times a day  mycophenolate mofetil 1000 milliGRAM(s) Oral two times a day  pantoprazole    Tablet 40 milliGRAM(s) Oral before breakfast  pregabalin 100 milliGRAM(s) Oral two times a day  tacrolimus 1 milliGRAM(s) Oral every 12 hours    PRN MEDICATIONS  acetaminophen   Tablet .. 650 milliGRAM(s) Oral every 6 hours PRN      VITAL SIGNS: Last 24 Hours  T(C): 35.7 (18 Nov 2018 15:15), Max: 36.4 (18 Nov 2018 11:00)  T(F): 96.3 (18 Nov 2018 15:15), Max: 97.6 (18 Nov 2018 11:00)  HR: 92 (18 Nov 2018 13:11) (88 - 95)  BP: 131/81 (18 Nov 2018 13:11) (93/56 - 149/77)  BP(mean): 102 (18 Nov 2018 13:11) (66 - 102)  RR: 23 (18 Nov 2018 15:15) (17 - 31)  SpO2: --    LABS:                        12.4   4.66  )-----------( 87       ( 18 Nov 2018 11:41 )             37.8     11-18    135  |  101  |  15  ----------------------------<  272<H>  4.8   |  20  |  1.3    Ca    8.1<L>      18 Nov 2018 15:06  Mg     1.9     11-18    TPro  6.0  /  Alb  3.7  /  TBili  0.5  /  DBili  <0.2  /  AST  12  /  ALT  <5  /  AlkPhos  93  11-17      Culture - CSF with Gram Stain (collected 16 Nov 2018 11:00)  Source: .CSF CSF  Gram Stain (17 Nov 2018 00:42):    polymorphonuclear leukocytes seen    No organisms seen    by cytocentrifuge  Preliminary Report (17 Nov 2018 15:35):    No growth    Culture - Acid Fast - CSF (collected 16 Nov 2018 11:00)  Source: .CSF CSF      PHYSICAL EXAM:    GENERAL: Sitting in chair, in NAD, well-developed, AAOx3  HEENT:  Atraumatic, Normocephalic. EOMI, PERRLA, conjunctiva and sclera clear, No JVD  PULMONARY: Clear to auscultation bilaterally; No wheeze  CARDIOVASCULAR: Regular rate and rhythm; No murmurs, rubs, or gallops  GASTROINTESTINAL: Soft, Nontender, Nondistended; Bowel sounds present  MUSCULOSKELETAL:  2+ Peripheral Pulses, No clubbing, cyanosis, or edema  NEUROLOGY: non-focal  SKIN: No rashes or lesions      ASSESSMENT & PLAN    67 yo male with PMH that includes heart transplant on immunosuppressants, BPH, BL hearing loss, CKD, DM, DLD, HTN,recent CVA with microhemorrhages,  who came with with fever, confusion, and hyperglycemia. he was evaluated in the ER and admitted to the ICU for DKA and encephalopathy.       1) Fever and Encephalopathy, r/o sepsis in immunosuppressed patient. s/p LP that was not consistent with bacterial meningitis with csf and bcx pending. cryptococcal csg ag negative. csf PCR negative  - f/u rest of csf data, including cytology, cultures, VDRL  - seen by ID who adjusted abx to Rocephin 2 gm iv q24h till BCx known  - f/u neuro today, prior mention made of obtaining MR brain, poss due to hx of recent CVA. His MS is now improved though and cannot travel now due to insulin drip. Discussed with intensivist who advised neuro - re-eval given significant improvement to MS: Today's Neuro recs appreciated   - f/u tacrolimus levels  - AAOx3 but still not at baseline per his wife      2) Anemia: slow decrease in all cell- lines, with acute on chronic thrombocytopenia  - check FOBt  - trend CBC  - check manual platelet count  - heme eval if drops further      3) DKA   - now back on insulin drip, will follow FS, adjust accordingly  - Cr is back to his usual CKD  - f/u endo input      4) Heart transplant:   - back on his antirejection meds  - Cardio eval appreciated    Ppx/diet/dispo  - SQ heparin  - Diet as tolerated; carb consistent  - Full code, from home

## 2018-11-19 LAB
ALBUMIN SERPL ELPH-MCNC: 3.4 G/DL — LOW (ref 3.5–5.2)
ALP SERPL-CCNC: 99 U/L — SIGNIFICANT CHANGE UP (ref 30–115)
ALT FLD-CCNC: <5 U/L — SIGNIFICANT CHANGE UP (ref 0–41)
ANION GAP SERPL CALC-SCNC: 13 MMOL/L — SIGNIFICANT CHANGE UP (ref 7–14)
ANION GAP SERPL CALC-SCNC: 15 MMOL/L — HIGH (ref 7–14)
ANION GAP SERPL CALC-SCNC: 15 MMOL/L — HIGH (ref 7–14)
APTT BLD: 26.6 SEC — LOW (ref 27–39.2)
AST SERPL-CCNC: 10 U/L — SIGNIFICANT CHANGE UP (ref 0–41)
BASOPHILS # BLD AUTO: 0.02 K/UL — SIGNIFICANT CHANGE UP (ref 0–0.2)
BASOPHILS NFR BLD AUTO: 0.4 % — SIGNIFICANT CHANGE UP (ref 0–1)
BILIRUB SERPL-MCNC: 0.2 MG/DL — SIGNIFICANT CHANGE UP (ref 0.2–1.2)
BUN SERPL-MCNC: 13 MG/DL — SIGNIFICANT CHANGE UP (ref 10–20)
BUN SERPL-MCNC: 15 MG/DL — SIGNIFICANT CHANGE UP (ref 10–20)
BUN SERPL-MCNC: 16 MG/DL — SIGNIFICANT CHANGE UP (ref 10–20)
CALCIUM SERPL-MCNC: 8.2 MG/DL — LOW (ref 8.5–10.1)
CALCIUM SERPL-MCNC: 8.2 MG/DL — LOW (ref 8.5–10.1)
CALCIUM SERPL-MCNC: 8.4 MG/DL — LOW (ref 8.5–10.1)
CHLORIDE SERPL-SCNC: 102 MMOL/L — SIGNIFICANT CHANGE UP (ref 98–110)
CHLORIDE SERPL-SCNC: 103 MMOL/L — SIGNIFICANT CHANGE UP (ref 98–110)
CHLORIDE SERPL-SCNC: 104 MMOL/L — SIGNIFICANT CHANGE UP (ref 98–110)
CO2 SERPL-SCNC: 19 MMOL/L — SIGNIFICANT CHANGE UP (ref 17–32)
CO2 SERPL-SCNC: 19 MMOL/L — SIGNIFICANT CHANGE UP (ref 17–32)
CO2 SERPL-SCNC: 21 MMOL/L — SIGNIFICANT CHANGE UP (ref 17–32)
CREAT SERPL-MCNC: 1.3 MG/DL — SIGNIFICANT CHANGE UP (ref 0.7–1.5)
CULTURE RESULTS: NO GROWTH — SIGNIFICANT CHANGE UP
EOSINOPHIL # BLD AUTO: 0.07 K/UL — SIGNIFICANT CHANGE UP (ref 0–0.7)
EOSINOPHIL NFR BLD AUTO: 1.4 % — SIGNIFICANT CHANGE UP (ref 0–8)
ESTIMATED AVERAGE GLUCOSE: 335 MG/DL — HIGH (ref 68–114)
FOLATE SERPL-MCNC: 8.1 NG/ML — SIGNIFICANT CHANGE UP
GLUCOSE BLDC GLUCOMTR-MCNC: 124 MG/DL — HIGH (ref 70–99)
GLUCOSE BLDC GLUCOMTR-MCNC: 130 MG/DL — HIGH (ref 70–99)
GLUCOSE BLDC GLUCOMTR-MCNC: 152 MG/DL — HIGH (ref 70–99)
GLUCOSE BLDC GLUCOMTR-MCNC: 175 MG/DL — HIGH (ref 70–99)
GLUCOSE BLDC GLUCOMTR-MCNC: 180 MG/DL — HIGH (ref 70–99)
GLUCOSE BLDC GLUCOMTR-MCNC: 192 MG/DL — HIGH (ref 70–99)
GLUCOSE BLDC GLUCOMTR-MCNC: 206 MG/DL — HIGH (ref 70–99)
GLUCOSE BLDC GLUCOMTR-MCNC: 213 MG/DL — HIGH (ref 70–99)
GLUCOSE BLDC GLUCOMTR-MCNC: 217 MG/DL — HIGH (ref 70–99)
GLUCOSE BLDC GLUCOMTR-MCNC: 245 MG/DL — HIGH (ref 70–99)
GLUCOSE BLDC GLUCOMTR-MCNC: 259 MG/DL — HIGH (ref 70–99)
GLUCOSE SERPL-MCNC: 178 MG/DL — HIGH (ref 70–99)
GLUCOSE SERPL-MCNC: 212 MG/DL — HIGH (ref 70–99)
GLUCOSE SERPL-MCNC: 212 MG/DL — HIGH (ref 70–99)
HBA1C BLD-MCNC: 13.3 % — HIGH (ref 4–5.6)
HCT VFR BLD CALC: 39.6 % — LOW (ref 42–52)
HGB BLD-MCNC: 12.6 G/DL — LOW (ref 14–18)
IMM GRANULOCYTES NFR BLD AUTO: 0.6 % — HIGH (ref 0.1–0.3)
INR BLD: 1.1 RATIO — SIGNIFICANT CHANGE UP (ref 0.65–1.3)
LYMPHOCYTES # BLD AUTO: 1.28 K/UL — SIGNIFICANT CHANGE UP (ref 1.2–3.4)
LYMPHOCYTES # BLD AUTO: 25 % — SIGNIFICANT CHANGE UP (ref 20.5–51.1)
MAGNESIUM SERPL-MCNC: 1.8 MG/DL — SIGNIFICANT CHANGE UP (ref 1.8–2.4)
MCHC RBC-ENTMCNC: 26.5 PG — LOW (ref 27–31)
MCHC RBC-ENTMCNC: 31.8 G/DL — LOW (ref 32–37)
MCV RBC AUTO: 83.4 FL — SIGNIFICANT CHANGE UP (ref 80–94)
MONOCYTES # BLD AUTO: 0.53 K/UL — SIGNIFICANT CHANGE UP (ref 0.1–0.6)
MONOCYTES NFR BLD AUTO: 10.3 % — HIGH (ref 1.7–9.3)
NEUTROPHILS # BLD AUTO: 3.2 K/UL — SIGNIFICANT CHANGE UP (ref 1.4–6.5)
NEUTROPHILS NFR BLD AUTO: 62.3 % — SIGNIFICANT CHANGE UP (ref 42.2–75.2)
NRBC # BLD: 0 /100 WBCS — SIGNIFICANT CHANGE UP (ref 0–0)
PLATELET # BLD AUTO: 109 K/UL — LOW (ref 130–400)
POTASSIUM SERPL-MCNC: 4.4 MMOL/L — SIGNIFICANT CHANGE UP (ref 3.5–5)
POTASSIUM SERPL-MCNC: 4.8 MMOL/L — SIGNIFICANT CHANGE UP (ref 3.5–5)
POTASSIUM SERPL-MCNC: 5 MMOL/L — SIGNIFICANT CHANGE UP (ref 3.5–5)
POTASSIUM SERPL-SCNC: 4.4 MMOL/L — SIGNIFICANT CHANGE UP (ref 3.5–5)
POTASSIUM SERPL-SCNC: 4.8 MMOL/L — SIGNIFICANT CHANGE UP (ref 3.5–5)
POTASSIUM SERPL-SCNC: 5 MMOL/L — SIGNIFICANT CHANGE UP (ref 3.5–5)
PROT SERPL-MCNC: 5.9 G/DL — LOW (ref 6–8)
PROTHROM AB SERPL-ACNC: 11.9 SEC — SIGNIFICANT CHANGE UP (ref 9.95–12.87)
RBC # BLD: 4.75 M/UL — SIGNIFICANT CHANGE UP (ref 4.7–6.1)
RBC # FLD: 14.2 % — SIGNIFICANT CHANGE UP (ref 11.5–14.5)
SODIUM SERPL-SCNC: 135 MMOL/L — SIGNIFICANT CHANGE UP (ref 135–146)
SODIUM SERPL-SCNC: 136 MMOL/L — SIGNIFICANT CHANGE UP (ref 135–146)
SODIUM SERPL-SCNC: 140 MMOL/L — SIGNIFICANT CHANGE UP (ref 135–146)
SPECIMEN SOURCE: SIGNIFICANT CHANGE UP
TACROLIMUS SERPL-MCNC: 3.1 NG/ML — SIGNIFICANT CHANGE UP
TACROLIMUS SERPL-MCNC: 5.4 NG/ML — SIGNIFICANT CHANGE UP
TSH SERPL-MCNC: 3.76 UIU/ML — SIGNIFICANT CHANGE UP (ref 0.27–4.2)
WBC # BLD: 5.13 K/UL — SIGNIFICANT CHANGE UP (ref 4.8–10.8)
WBC # FLD AUTO: 5.13 K/UL — SIGNIFICANT CHANGE UP (ref 4.8–10.8)

## 2018-11-19 RX ORDER — INSULIN GLARGINE 100 [IU]/ML
10 INJECTION, SOLUTION SUBCUTANEOUS ONCE
Qty: 0 | Refills: 0 | Status: COMPLETED | OUTPATIENT
Start: 2018-11-19 | End: 2018-11-19

## 2018-11-19 RX ORDER — INSULIN GLARGINE 100 [IU]/ML
60 INJECTION, SOLUTION SUBCUTANEOUS AT BEDTIME
Qty: 0 | Refills: 0 | Status: DISCONTINUED | OUTPATIENT
Start: 2018-11-20 | End: 2018-11-21

## 2018-11-19 RX ADMIN — INSULIN GLARGINE 10 UNIT(S): 100 INJECTION, SOLUTION SUBCUTANEOUS at 22:46

## 2018-11-19 RX ADMIN — Medication 100 MILLIGRAM(S): at 05:51

## 2018-11-19 RX ADMIN — Medication 100 MILLIGRAM(S): at 17:30

## 2018-11-19 RX ADMIN — Medication 100 MILLIGRAM(S): at 17:31

## 2018-11-19 RX ADMIN — MYCOPHENOLATE MOFETIL 1000 MILLIGRAM(S): 250 CAPSULE ORAL at 05:51

## 2018-11-19 RX ADMIN — INSULIN GLARGINE 50 UNIT(S): 100 INJECTION, SOLUTION SUBCUTANEOUS at 21:16

## 2018-11-19 RX ADMIN — HEPARIN SODIUM 5000 UNIT(S): 5000 INJECTION INTRAVENOUS; SUBCUTANEOUS at 17:27

## 2018-11-19 RX ADMIN — MYCOPHENOLATE MOFETIL 1000 MILLIGRAM(S): 250 CAPSULE ORAL at 17:27

## 2018-11-19 RX ADMIN — FINASTERIDE 5 MILLIGRAM(S): 5 TABLET, FILM COATED ORAL at 11:08

## 2018-11-19 RX ADMIN — ATORVASTATIN CALCIUM 10 MILLIGRAM(S): 80 TABLET, FILM COATED ORAL at 21:17

## 2018-11-19 RX ADMIN — Medication 100 MILLIGRAM(S): at 05:50

## 2018-11-19 RX ADMIN — Medication 8 MILLIGRAM(S): at 21:17

## 2018-11-19 RX ADMIN — PANTOPRAZOLE SODIUM 40 MILLIGRAM(S): 20 TABLET, DELAYED RELEASE ORAL at 05:51

## 2018-11-19 RX ADMIN — CEFTRIAXONE 100 GRAM(S): 500 INJECTION, POWDER, FOR SOLUTION INTRAMUSCULAR; INTRAVENOUS at 17:27

## 2018-11-19 RX ADMIN — INSULIN HUMAN 1 UNIT(S)/HR: 100 INJECTION, SOLUTION SUBCUTANEOUS at 19:37

## 2018-11-19 RX ADMIN — TACROLIMUS 1 MILLIGRAM(S): 5 CAPSULE ORAL at 17:27

## 2018-11-19 RX ADMIN — HEPARIN SODIUM 5000 UNIT(S): 5000 INJECTION INTRAVENOUS; SUBCUTANEOUS at 05:52

## 2018-11-19 RX ADMIN — SODIUM CHLORIDE 100 MILLILITER(S): 9 INJECTION, SOLUTION INTRAVENOUS at 19:35

## 2018-11-19 RX ADMIN — INSULIN GLARGINE 50 UNIT(S): 100 INJECTION, SOLUTION SUBCUTANEOUS at 00:38

## 2018-11-19 RX ADMIN — AMLODIPINE BESYLATE 5 MILLIGRAM(S): 2.5 TABLET ORAL at 05:51

## 2018-11-19 RX ADMIN — TACROLIMUS 1 MILLIGRAM(S): 5 CAPSULE ORAL at 05:50

## 2018-11-19 RX ADMIN — SODIUM CHLORIDE 100 MILLILITER(S): 9 INJECTION, SOLUTION INTRAVENOUS at 00:41

## 2018-11-19 NOTE — PHYSICAL THERAPY INITIAL EVALUATION ADULT - IMPAIRMENTS CONTRIBUTING TO GAIT DEVIATIONS, PT EVAL
narrow base of support/impaired postural control/decreased strength/decreased endurance/impaired balance

## 2018-11-19 NOTE — PROGRESS NOTE ADULT - SUBJECTIVE AND OBJECTIVE BOX
Patient is a 68y old  Male who presents with a chief complaint of Altered mental status (18 Nov 2018 23:57)      T(F): 98 (11-19-18 @ 07:12), Max: 98 (11-19-18 @ 07:12)  HR: 96 (11-19-18 @ 07:18)  BP: 127/85 (11-19-18 @ 07:18)  RR: 21 (11-19-18 @ 07:18)  SpO2: --    PHYSICAL EXAM:  GENERAL: NAD, well-groomed, well-developed  HEAD:  Atraumatic, Normocephalic  EYES: EOMI, PERRLA, conjunctiva and sclera clear  ENMT: No tonsillar erythema, exudates, or enlargement; Moist mucous membranes, Good dentition, No lesions  NECK: Supple, No JVD, Normal thyroid  NERVOUS SYSTEM:  Alert & Oriented X3,  Motor Strength 5/5 B/L upper and lower extremities  CHEST/LUNG: Clear to percussion bilaterally; No rales, rhonchi, wheezing, or rubs  HEART: Regular rate and rhythm; No murmurs, rubs, or gallops  ABDOMEN: Soft, Nontender, Nondistended; Bowel sounds present  EXTREMITIES:   No clubbing, cyanosis, or edema  LYMPH: No lymphadenopathy noted  SKIN: No rashes or lesions    labs  11-18    133<L>  |  101  |  15  ----------------------------<  189<H>  5.5<H>   |  20  |  1.3    Ca    8.0<L>      18 Nov 2018 21:35  Mg     1.9     11-18                            12.6   5.13  )-----------( x        ( 19 Nov 2018 05:42 )             39.6       Culture - Blood (collected 17 Nov 2018 11:05)  Source: .Blood Blood-Peripheral  Preliminary Report (19 Nov 2018 01:02):    No growth to date.    Culture - Blood (collected 17 Nov 2018 11:05)  Source: .Blood Blood-Peripheral  Preliminary Report (19 Nov 2018 01:02):    No growth to date.    Culture - CSF with Gram Stain (collected 16 Nov 2018 11:00)  Source: .CSF CSF  Gram Stain (17 Nov 2018 00:42):    polymorphonuclear leukocytes seen    No organisms seen    by cytocentrifuge  Preliminary Report (17 Nov 2018 15:35):    No growth    Culture - Acid Fast - CSF (collected 16 Nov 2018 11:00)  Source: .CSF CSF      PT/INR - ( 19 Nov 2018 05:42 )   PT: 11.90 sec;   INR: 1.10 ratio         PTT - ( 19 Nov 2018 05:42 )  PTT:26.6 sec        acetaminophen   Tablet .. 650 milliGRAM(s) Oral every 6 hours PRN  allopurinol 100 milliGRAM(s) Oral two times a day  amLODIPine   Tablet 5 milliGRAM(s) Oral daily  atorvastatin 10 milliGRAM(s) Oral at bedtime  cefTRIAXone   IVPB 2 Gram(s) IV Intermittent every 24 hours  dextrose 40% Gel 15 Gram(s) Oral once PRN  dextrose 5% + sodium chloride 0.45%. 1000 milliLiter(s) IV Continuous <Continuous>  dextrose 5%. 1000 milliLiter(s) IV Continuous <Continuous>  dextrose 50% Injectable 12.5 Gram(s) IV Push once  dextrose 50% Injectable 25 Gram(s) IV Push once  dextrose 50% Injectable 25 Gram(s) IV Push once  docusate sodium 100 milliGRAM(s) Oral two times a day  doxazosin 8 milliGRAM(s) Oral at bedtime  finasteride 5 milliGRAM(s) Oral daily  glucagon  Injectable 1 milliGRAM(s) IntraMuscular once PRN  heparin  Injectable 5000 Unit(s) SubCutaneous every 12 hours  insulin glargine Injectable (LANTUS) 50 Unit(s) SubCutaneous at bedtime  insulin Infusion 1 Unit(s)/Hr IV Continuous <Continuous>  insulin lispro (HumaLOG) corrective regimen sliding scale   SubCutaneous at bedtime  insulin lispro Injectable (HumaLOG) 10 Unit(s) SubCutaneous before breakfast  insulin lispro Injectable (HumaLOG) 10 Unit(s) SubCutaneous before lunch  insulin lispro Injectable (HumaLOG) 10 Unit(s) SubCutaneous before dinner  labetalol 100 milliGRAM(s) Oral two times a day  mycophenolate mofetil 1000 milliGRAM(s) Oral two times a day  pantoprazole    Tablet 40 milliGRAM(s) Oral before breakfast  pregabalin 100 milliGRAM(s) Oral two times a day  tacrolimus 1 milliGRAM(s) Oral every 12 hours

## 2018-11-19 NOTE — CONSULT NOTE ADULT - REASON FOR ADMISSION
Altered mental status
DKA. altered mental status
Altered mental status

## 2018-11-19 NOTE — PHYSICAL THERAPY INITIAL EVALUATION ADULT - GAIT DEVIATIONS NOTED, PT EVAL
decreased rajesh/stooped posture, dec heel strike/pushoff/decreased step length/decreased weight-shifting ability

## 2018-11-19 NOTE — PROGRESS NOTE ADULT - ASSESSMENT
68 M with above history presents with AMS, found to be in DKA and septic. s/p LP, CT head unremarkable.  CSF with no evidence of infection  Clinically very stable    RECOMMENDATIONS:  D/c current ABx  Rocephin 2 gm iv q24h till BCx known 68 M with above history presents with AMS, found to be in DKA and septic. s/p LP, CT head unremarkable.      # Encephalopathy- most likely metabolic - CSF with no evidence of infection  # Suspected Pneumonia      Would recommend:    1. Follow up final cx  2. Continue Ceftriaxone until work up is done  3. Management of DKA as per ICU protocol    d/w ICU team

## 2018-11-19 NOTE — PROGRESS NOTE ADULT - SUBJECTIVE AND OBJECTIVE BOX
Patient is a 68y old  Male who presents with a chief complaint of Altered mental status (19 Nov 2018 21:14)      INTERVAL HPI/OVERNIGHT EVENTS:    MEDICATIONS  (STANDING):  allopurinol 100 milliGRAM(s) Oral two times a day  amLODIPine   Tablet 5 milliGRAM(s) Oral daily  atorvastatin 10 milliGRAM(s) Oral at bedtime  cefTRIAXone   IVPB 2 Gram(s) IV Intermittent every 24 hours  dextrose 5% + sodium chloride 0.45%. 1000 milliLiter(s) (100 mL/Hr) IV Continuous <Continuous>  dextrose 5%. 1000 milliLiter(s) (50 mL/Hr) IV Continuous <Continuous>  dextrose 50% Injectable 12.5 Gram(s) IV Push once  dextrose 50% Injectable 25 Gram(s) IV Push once  dextrose 50% Injectable 25 Gram(s) IV Push once  docusate sodium 100 milliGRAM(s) Oral two times a day  doxazosin 8 milliGRAM(s) Oral at bedtime  finasteride 5 milliGRAM(s) Oral daily  heparin  Injectable 5000 Unit(s) SubCutaneous every 12 hours  insulin glargine Injectable (LANTUS) 10 Unit(s) SubCutaneous once  insulin Infusion 1 Unit(s)/Hr (1 mL/Hr) IV Continuous <Continuous>  insulin lispro (HumaLOG) corrective regimen sliding scale   SubCutaneous at bedtime  insulin lispro Injectable (HumaLOG) 10 Unit(s) SubCutaneous before breakfast  insulin lispro Injectable (HumaLOG) 10 Unit(s) SubCutaneous before lunch  insulin lispro Injectable (HumaLOG) 10 Unit(s) SubCutaneous before dinner  labetalol 100 milliGRAM(s) Oral two times a day  mycophenolate mofetil 1000 milliGRAM(s) Oral two times a day  pantoprazole    Tablet 40 milliGRAM(s) Oral before breakfast  pregabalin 100 milliGRAM(s) Oral two times a day  tacrolimus 1 milliGRAM(s) Oral every 12 hours    MEDICATIONS  (PRN):  acetaminophen   Tablet .. 650 milliGRAM(s) Oral every 6 hours PRN Moderate Pain (4 - 6)  dextrose 40% Gel 15 Gram(s) Oral once PRN Blood Glucose LESS THAN 70 milliGRAM(s)/deciliter  glucagon  Injectable 1 milliGRAM(s) IntraMuscular once PRN Glucose LESS THAN 70 milligrams/deciliter      Allergies    codeine (Unknown)    Intolerances        REVIEW OF SYSTEMS:  CONSTITUTIONAL: No fever, weight loss, or fatigue  EYES: No eye pain, visual disturbances, or discharge  ENMT:  No difficulty hearing, tinnitus, vertigo; No sinus or throat pain  NECK: No pain or stiffness  BREASTS: No pain, masses, or nipple discharge  RESPIRATORY: No cough, wheezing, chills or hemoptysis; No shortness of breath  CARDIOVASCULAR: No chest pain, palpitations, dizziness, or leg swelling  GASTROINTESTINAL: No abdominal or epigastric pain. No nausea, vomiting, or hematemesis; No diarrhea or constipation. No melena or hematochezia.  GENITOURINARY: No dysuria, frequency, hematuria, or incontinence  NEUROLOGICAL: No headaches, memory loss, loss of strength, numbness, or tremors  SKIN: No itching, burning, rashes, or lesions   LYMPH NODES: No enlarged glands  ENDOCRINE: No heat or cold intolerance; No hair loss  MUSCULOSKELETAL: No joint pain or swelling; No muscle, back, or extremity pain  PSYCHIATRIC: No depression, anxiety, mood swings, or difficulty sleeping  HEME/LYMPH: No easy bruising, or bleeding gums  ALLERGY AND IMMUNOLOGIC: No hives or eczema    Vital Signs Last 24 Hrs  T(C): 36.1 (19 Nov 2018 19:10), Max: 36.7 (19 Nov 2018 07:12)  T(F): 97 (19 Nov 2018 19:10), Max: 98 (19 Nov 2018 07:12)  HR: 99 (19 Nov 2018 19:10) (93 - 99)  BP: 130/76 (19 Nov 2018 19:10) (105/55 - 144/89)  BP(mean): 97 (19 Nov 2018 19:05) (73 - 109)  RR: 20 (19 Nov 2018 19:10) (18 - 33)  SpO2: --    PHYSICAL EXAM:  GENERAL: NAD, well-groomed, well-developed  HEAD:  Atraumatic, Normocephalic  EYES: EOMI, PERRLA, conjunctiva and sclera clear  ENMT: No tonsillar erythema, exudates, or enlargement; Moist mucous membranes, Good dentition, No lesions  NECK: Supple, No JVD, Normal thyroid  NERVOUS SYSTEM:  Alert & Oriented X3, Good concentration; Motor Strength 5/5 B/L upper and lower extremities; DTRs 2+ intact and symmetric  CHEST/LUNG: Clear to percussion bilaterally; No rales, rhonchi, wheezing, or rubs  HEART: Regular rate and rhythm; No murmurs, rubs, or gallops  ABDOMEN: Soft, Nontender, Nondistended; Bowel sounds present  EXTREMITIES:  2+ Peripheral Pulses, No clubbing, cyanosis, or edema  LYMPH: No lymphadenopathy noted  SKIN: No rashes or lesions    LABS:                        12.6   5.13  )-----------( 109      ( 19 Nov 2018 05:42 )             39.6     11-19    136  |  102  |  16  ----------------------------<  178<H>  4.4   |  19  |  1.3    Ca    8.2<L>      19 Nov 2018 21:30  Mg     1.8     11-19    TPro  5.9<L>  /  Alb  3.4<L>  /  TBili  0.2  /  DBili  x   /  AST  10  /  ALT  <5  /  AlkPhos  99  11-19    PT/INR - ( 19 Nov 2018 05:42 )   PT: 11.90 sec;   INR: 1.10 ratio         PTT - ( 19 Nov 2018 05:42 )  PTT:26.6 sec    CAPILLARY BLOOD GLUCOSE      POCT Blood Glucose.: 175 mg/dL (19 Nov 2018 21:15)  POCT Blood Glucose.: 124 mg/dL (19 Nov 2018 18:09)  POCT Blood Glucose.: 213 mg/dL (19 Nov 2018 16:22)  POCT Blood Glucose.: 259 mg/dL (19 Nov 2018 14:03)  POCT Blood Glucose.: 217 mg/dL (19 Nov 2018 12:05)  POCT Blood Glucose.: 245 mg/dL (19 Nov 2018 09:58)  POCT Blood Glucose.: 152 mg/dL (19 Nov 2018 08:04)  POCT Blood Glucose.: 192 mg/dL (19 Nov 2018 05:59)  POCT Blood Glucose.: 206 mg/dL (19 Nov 2018 04:01)  POCT Blood Glucose.: 180 mg/dL (19 Nov 2018 01:58)  POCT Blood Glucose.: 175 mg/dL (18 Nov 2018 23:57)  POCT Blood Glucose.: 189 mg/dL (18 Nov 2018 23:03)      RADIOLOGY & ADDITIONAL TESTS:    < from: Xray Chest 1 View- PORTABLE-Routine (11.17.18 @ 05:48) >    Bibasilar opacities. Stable cardiomegaly.    < end of copied text >      MICROBIOLOGY DATA:    Culture - Blood (11.17.18 @ 11:05)    Specimen Source: .Blood Blood-Peripheral    Culture Results:   No growth to date.      Culture - Blood (11.17.18 @ 11:05)    Specimen Source: .Blood Blood-Peripheral    Culture Results:   No growth to date. Patient is seen and examined at the bed side, is afebrile. He is doing better. The Blood cultures are negative to date.      REVIEW OF SYSTEMS: All other review systems are negative        Vital Signs Last 24 Hrs  T(C): 36.1 (19 Nov 2018 19:10), Max: 36.7 (19 Nov 2018 07:12)  T(F): 97 (19 Nov 2018 19:10), Max: 98 (19 Nov 2018 07:12)  HR: 99 (19 Nov 2018 19:10) (93 - 99)  BP: 130/76 (19 Nov 2018 19:10) (105/55 - 144/89)  BP(mean): 97 (19 Nov 2018 19:05) (73 - 109)  RR: 20 (19 Nov 2018 19:10) (18 - 33)  SpO2: --      PHYSICAL EXAM:  GENERAL: Not in distress  CHEST/LUNG: Air entry bilaterally  HEART: s1 and s2 present  ABDOMEN:  Nontender and Nondistended  EXTREMITIES: No pedal  edema  CNS: Awake and  alert      MEDICATIONS  (STANDING):  allopurinol 100 milliGRAM(s) Oral two times a day  amLODIPine   Tablet 5 milliGRAM(s) Oral daily  atorvastatin 10 milliGRAM(s) Oral at bedtime  cefTRIAXone   IVPB 2 Gram(s) IV Intermittent every 24 hours  docusate sodium 100 milliGRAM(s) Oral two times a day  doxazosin 8 milliGRAM(s) Oral at bedtime  finasteride 5 milliGRAM(s) Oral daily  heparin  Injectable 5000 Unit(s) SubCutaneous every 12 hours  insulin glargine Injectable (LANTUS) 10 Unit(s) SubCutaneous once  insulin Infusion 1 Unit(s)/Hr (1 mL/Hr) IV Continuous <Continuous>  insulin lispro (HumaLOG) corrective regimen sliding scale   SubCutaneous at bedtime  insulin lispro Injectable (HumaLOG) 10 Unit(s) SubCutaneous before breakfast  insulin lispro Injectable (HumaLOG) 10 Unit(s) SubCutaneous before lunch  insulin lispro Injectable (HumaLOG) 10 Unit(s) SubCutaneous before dinner  labetalol 100 milliGRAM(s) Oral two times a day  mycophenolate mofetil 1000 milliGRAM(s) Oral two times a day  pantoprazole    Tablet 40 milliGRAM(s) Oral before breakfast  pregabalin 100 milliGRAM(s) Oral two times a day  tacrolimus 1 milliGRAM(s) Oral every 12 hours    MEDICATIONS  (PRN):  acetaminophen   Tablet .. 650 milliGRAM(s) Oral every 6 hours PRN Moderate Pain (4 - 6)  dextrose 40% Gel 15 Gram(s) Oral once PRN Blood Glucose LESS THAN 70 milliGRAM(s)/deciliter  glucagon  Injectable 1 milliGRAM(s) IntraMuscular once PRN Glucose LESS THAN 70 milligrams/deciliter      Allergies    codeine (Unknown)            LABS:                        12.6   5.13  )-----------( 109      ( 19 Nov 2018 05:42 )             39.6         11-19    136  |  102  |  16  ----------------------------<  178<H>  4.4   |  19  |  1.3    Ca    8.2<L>      19 Nov 2018 21:30  Mg     1.8     11-19    TPro  5.9<L>  /  Alb  3.4<L>  /  TBili  0.2  /  DBili  x   /  AST  10  /  ALT  <5  /  AlkPhos  99  11-19    PT/INR - ( 19 Nov 2018 05:42 )   PT: 11.90 sec;   INR: 1.10 ratio         PTT - ( 19 Nov 2018 05:42 )  PTT:26.6 sec    CAPILLARY BLOOD GLUCOSE      POCT Blood Glucose.: 175 mg/dL (19 Nov 2018 21:15)  POCT Blood Glucose.: 124 mg/dL (19 Nov 2018 18:09)  POCT Blood Glucose.: 213 mg/dL (19 Nov 2018 16:22)  POCT Blood Glucose.: 259 mg/dL (19 Nov 2018 14:03)  POCT Blood Glucose.: 217 mg/dL (19 Nov 2018 12:05)  POCT Blood Glucose.: 245 mg/dL (19 Nov 2018 09:58)  POCT Blood Glucose.: 152 mg/dL (19 Nov 2018 08:04)  POCT Blood Glucose.: 192 mg/dL (19 Nov 2018 05:59)  POCT Blood Glucose.: 206 mg/dL (19 Nov 2018 04:01)  POCT Blood Glucose.: 180 mg/dL (19 Nov 2018 01:58)  POCT Blood Glucose.: 175 mg/dL (18 Nov 2018 23:57)  POCT Blood Glucose.: 189 mg/dL (18 Nov 2018 23:03)      RADIOLOGY & ADDITIONAL TESTS:    < from: Xray Chest 1 View- PORTABLE-Routine (11.17.18 @ 05:48) >    Bibasilar opacities. Stable cardiomegaly.    < end of copied text >      MICROBIOLOGY DATA:    Culture - Blood (11.17.18 @ 11:05)    Specimen Source: .Blood Blood-Peripheral    Culture Results:   No growth to date.      Culture - Blood (11.17.18 @ 11:05)    Specimen Source: .Blood Blood-Peripheral    Culture Results:   No growth to date.

## 2018-11-19 NOTE — PHYSICAL THERAPY INITIAL EVALUATION ADULT - IMPAIRMENTS FOUND, PT EVAL
RTC MD 6 months     Labs today - drawn ljt  And on return    muscle strength/aerobic capacity/endurance/gait, locomotion, and balance

## 2018-11-19 NOTE — CONSULT NOTE ADULT - ATTENDING COMMENTS
Pt was started on lantus at 50 u sc Hs yesterday but still has problem regulating his blood sugars. and needing large dose of iv insulin infusion at 4 u/hr. blood sugars continue to be labile . Pt is alert and oriented at this time.Euthyroid heart regular chest clear.  PLAN. Increase Glargine to 60 sc HS from tonight. Monitor blood sugars.

## 2018-11-19 NOTE — CONSULT NOTE ADULT - SUBJECTIVE AND OBJECTIVE BOX
JULISA CONKLIN 68y Male  MRN#: [id number]   CODE STATUS: Full code      SUBJECTIVE  Patient is a 68y old Male who presents with a chief complaint of Altered mental status (19 Nov 2018 08:40)  Currently admitted to medicine with the primary diagnosis of DKA (diabetic ketoacidoses)  Hospital course has been complicated by sepsis, DKA  Today is hospital day 3d, and this morning he is comfortable in chair and reports no overnight events.     Present Today:           Rouse Catheter (x)No/ ()Yes? Indication:          Central Line (x)No/ ()Yes? Indication:          IV Fluids ()No/ (x)Yes? Type:  d5 1/2NS Rate: 100 Indication: DKA       OBJECTIVE  PAST MEDICAL & SURGICAL HISTORY  Urinary tract infection without hematuria, site unspecified  Chronic kidney disease, unspecified CKD stage  Bilateral hearing loss, unspecified hearing loss type  Benign prostatic hyperplasia with urinary frequency  Diabetes  High cholesterol  HTN (hypertension)  Heart transplant recipient  H/O heart transplant    ALLERGIES:  codeine (Unknown)    MEDICATIONS:  STANDING MEDICATIONS  allopurinol 100 milliGRAM(s) Oral two times a day  amLODIPine   Tablet 5 milliGRAM(s) Oral daily  atorvastatin 10 milliGRAM(s) Oral at bedtime  cefTRIAXone   IVPB 2 Gram(s) IV Intermittent every 24 hours  dextrose 5% + sodium chloride 0.45%. 1000 milliLiter(s) IV Continuous <Continuous>  dextrose 5%. 1000 milliLiter(s) IV Continuous <Continuous>  dextrose 50% Injectable 12.5 Gram(s) IV Push once  dextrose 50% Injectable 25 Gram(s) IV Push once  dextrose 50% Injectable 25 Gram(s) IV Push once  docusate sodium 100 milliGRAM(s) Oral two times a day  doxazosin 8 milliGRAM(s) Oral at bedtime  finasteride 5 milliGRAM(s) Oral daily  heparin  Injectable 5000 Unit(s) SubCutaneous every 12 hours  insulin glargine Injectable (LANTUS) 50 Unit(s) SubCutaneous at bedtime  insulin Infusion 1 Unit(s)/Hr IV Continuous <Continuous>  insulin lispro (HumaLOG) corrective regimen sliding scale   SubCutaneous at bedtime  insulin lispro Injectable (HumaLOG) 10 Unit(s) SubCutaneous before breakfast  insulin lispro Injectable (HumaLOG) 10 Unit(s) SubCutaneous before lunch  insulin lispro Injectable (HumaLOG) 10 Unit(s) SubCutaneous before dinner  labetalol 100 milliGRAM(s) Oral two times a day  mycophenolate mofetil 1000 milliGRAM(s) Oral two times a day  pantoprazole    Tablet 40 milliGRAM(s) Oral before breakfast  pregabalin 100 milliGRAM(s) Oral two times a day  tacrolimus 1 milliGRAM(s) Oral every 12 hours    PRN MEDICATIONS  acetaminophen   Tablet .. 650 milliGRAM(s) Oral every 6 hours PRN  dextrose 40% Gel 15 Gram(s) Oral once PRN  glucagon  Injectable 1 milliGRAM(s) IntraMuscular once PRN      VITAL SIGNS: Last 24 Hours  T(C): 36.5 (19 Nov 2018 15:25), Max: 36.7 (19 Nov 2018 07:12)  T(F): 97.7 (19 Nov 2018 15:25), Max: 98 (19 Nov 2018 07:12)  HR: 94 (19 Nov 2018 15:25) (89 - 99)  BP: 109/62 (19 Nov 2018 15:25) (105/55 - 144/89)  BP(mean): 81 (19 Nov 2018 15:11) (73 - 109)  RR: 20 (19 Nov 2018 15:25) (18 - 34)  SpO2: --    LABS:                        12.6   5.13  )-----------( 109      ( 19 Nov 2018 05:42 )             39.6     11-19    140  |  104  |  13  ----------------------------<  212<H>  5.0   |  21  |  1.3    Ca    8.4<L>      19 Nov 2018 05:42  Mg     1.8     11-19    TPro  5.9<L>  /  Alb  3.4<L>  /  TBili  0.2  /  DBili  x   /  AST  10  /  ALT  <5  /  AlkPhos  99  11-19    PT/INR - ( 19 Nov 2018 05:42 )   PT: 11.90 sec;   INR: 1.10 ratio         PTT - ( 19 Nov 2018 05:42 )  PTT:26.6 sec          Culture - Blood (collected 17 Nov 2018 11:05)  Source: .Blood Blood-Peripheral  Preliminary Report (19 Nov 2018 01:02):    No growth to date.    Culture - Blood (collected 17 Nov 2018 11:05)  Source: .Blood Blood-Peripheral  Preliminary Report (19 Nov 2018 01:02):    No growth to date.      PHYSICAL EXAM:    GENERAL: Well appearing M, sitting in chair, in NAD, well-developed, AAOx3  HEENT:  Atraumatic, Normocephalic. EOMI, PERRLA, conjunctiva and sclera clear, No JVD  PULMONARY: Clear to auscultation bilaterally; No wheeze  CARDIOVASCULAR: Regular rate and rhythm; No murmurs, rubs, or gallops  GASTROINTESTINAL: Soft, Nontender, Nondistended; Bowel sounds present  MUSCULOSKELETAL:  2+ Peripheral Pulses, No clubbing, cyanosis, or edema  NEUROLOGY: non-focal  SKIN: No rashes or lesions      ADMISSION SUMMARY  Patient is a 68y old Male who presents with a chief complaint of Altered mental status (19 Nov 2018 08:40)  Currently admitted to medicine with the primary diagnosis of DKA (diabetic ketoacidoses)  Hospital course has been complicated by Sepsis, DKA      ASSESSMENT & PLAN    69 yo male with PMH that includes heart transplant on immunosuppressants, BPH, BL hearing loss, CKD, DM, DLD, HTN,recent CVA with microhemorrhages,  who came with with fever, confusion, and hyperglycemia. he was evaluated in the ER and admitted to the ICU for DKA and encephalopathy.       1) Fever and Encephalopathy, r/o sepsis in immunosuppressed patient. s/p LP that was not consistent with bacterial meningitis with csf and bcx pending. cryptococcal csg ag negative. csf PCR negative  - f/u rest of csf data, including cytology, cultures, VDRL  - seen by ID who adjusted abx to Rocephin 2 gm iv q24h till BCx known  - f/u neuro today, prior mention made of obtaining MR brain, poss due to hx of recent CVA. His MS is now improved though and cannot travel now due to insulin drip. Discussed with intensivist who advised neuro - re-eval given significant improvement to MS: Today's Neuro recs appreciated   - f/u tacrolimus levels  - AAOx3; improving      2) Anemia: slow decrease in all cell- lines, with acute on chronic thrombocytopenia  - check FOBt  - trend CBC; stable  - check manual platelet count  - heme eval if drops further      3) DKA   - now back on insulin drip, will follow FS, adjust accordingly  - Cr is back to his usual CKD  - Endo eval appreciated      4) Heart transplant:   - back on his antirejection meds  - Cardio eval appreciated    Ppx/diet/dispo  - SQ heparin  - Diet as tolerated; carb consistent  - Full code, from home

## 2018-11-19 NOTE — PROGRESS NOTE ADULT - SUBJECTIVE AND OBJECTIVE BOX
Patient is a 68y old  Male who presents with a chief complaint of Altered mental status (19 Nov 2018 07:49)      Over Night Events:  Patient seen and examined feel good eating not on distress       ROS:  See HPI    PHYSICAL EXAM    ICU Vital Signs Last 24 Hrs  T(C): 36.7 (19 Nov 2018 07:12), Max: 36.7 (19 Nov 2018 07:12)  T(F): 98 (19 Nov 2018 07:12), Max: 98 (19 Nov 2018 07:12)  HR: 96 (19 Nov 2018 07:18) (89 - 99)  BP: 127/85 (19 Nov 2018 07:18) (93/56 - 144/89)  BP(mean): 100 (19 Nov 2018 07:18) (66 - 109)  ABP: --  ABP(mean): --  RR: 21 (19 Nov 2018 07:18) (18 - 34)  SpO2: --      General: Aox3  HEENT:         nick       Lymph Nodes: NO cervical LN   Lungs: Bilateral BS  Cardiovascular: Regular   Abdomen: Soft, Positive BS  Extremities: No clubbing   Skin: warm   Neurological: no focal deficit   Musculoskeletal: move all ext     I&O's Detail    18 Nov 2018 07:01  -  19 Nov 2018 07:00  --------------------------------------------------------  IN:    dextrose 5% + sodium chloride 0.45%.: 2200 mL    insulin Infusion: 130 mL    IV PiggyBack: 50 mL    Oral Fluid: 360 mL  Total IN: 2740 mL    OUT:    Indwelling Catheter - Urethral: 2805 mL  Total OUT: 2805 mL    Total NET: -65 mL      19 Nov 2018 07:01  -  19 Nov 2018 08:41  --------------------------------------------------------  IN:  Total IN: 0 mL    OUT:    Indwelling Catheter - Urethral: 100 mL  Total OUT: 100 mL    Total NET: -100 mL          LABS:                          12.6   5.13  )-----------( 109      ( 19 Nov 2018 05:42 )             39.6         19 Nov 2018 05:42    140    |  104    |  13     ----------------------------<  212    5.0     |  21     |  1.3      Ca    8.4        19 Nov 2018 05:42  Mg     1.8       19 Nov 2018 05:42    TPro  5.9    /  Alb  3.4    /  TBili  0.2    /  DBili  x      /  AST  10     /  ALT  <5     /  AlkPhos  99     19 Nov 2018 05:42  Amylase x     lipase x                                                 PT/INR - ( 19 Nov 2018 05:42 )   PT: 11.90 sec;   INR: 1.10 ratio         PTT - ( 19 Nov 2018 05:42 )  PTT:26.6 sec                                                                                                   Culture - Blood (collected 17 Nov 2018 11:05)  Source: .Blood Blood-Peripheral  Preliminary Report (19 Nov 2018 01:02):    No growth to date.    Culture - Blood (collected 17 Nov 2018 11:05)  Source: .Blood Blood-Peripheral  Preliminary Report (19 Nov 2018 01:02):    No growth to date.    Culture - CSF with Gram Stain (collected 16 Nov 2018 11:00)  Source: .CSF CSF  Gram Stain (17 Nov 2018 00:42):    polymorphonuclear leukocytes seen    No organisms seen    by cytocentrifuge  Preliminary Report (17 Nov 2018 15:35):    No growth    Culture - Acid Fast - CSF (collected 16 Nov 2018 11:00)  Source: .CSF CSF                                                                                           MEDICATIONS  (STANDING):  allopurinol 100 milliGRAM(s) Oral two times a day  amLODIPine   Tablet 5 milliGRAM(s) Oral daily  atorvastatin 10 milliGRAM(s) Oral at bedtime  cefTRIAXone   IVPB 2 Gram(s) IV Intermittent every 24 hours  dextrose 5% + sodium chloride 0.45%. 1000 milliLiter(s) (100 mL/Hr) IV Continuous <Continuous>  dextrose 5%. 1000 milliLiter(s) (50 mL/Hr) IV Continuous <Continuous>  dextrose 50% Injectable 12.5 Gram(s) IV Push once  dextrose 50% Injectable 25 Gram(s) IV Push once  dextrose 50% Injectable 25 Gram(s) IV Push once  docusate sodium 100 milliGRAM(s) Oral two times a day  doxazosin 8 milliGRAM(s) Oral at bedtime  finasteride 5 milliGRAM(s) Oral daily  heparin  Injectable 5000 Unit(s) SubCutaneous every 12 hours  insulin glargine Injectable (LANTUS) 50 Unit(s) SubCutaneous at bedtime  insulin Infusion 1 Unit(s)/Hr (1 mL/Hr) IV Continuous <Continuous>  insulin lispro (HumaLOG) corrective regimen sliding scale   SubCutaneous at bedtime  insulin lispro Injectable (HumaLOG) 10 Unit(s) SubCutaneous before breakfast  insulin lispro Injectable (HumaLOG) 10 Unit(s) SubCutaneous before lunch  insulin lispro Injectable (HumaLOG) 10 Unit(s) SubCutaneous before dinner  labetalol 100 milliGRAM(s) Oral two times a day  mycophenolate mofetil 1000 milliGRAM(s) Oral two times a day  pantoprazole    Tablet 40 milliGRAM(s) Oral before breakfast  pregabalin 100 milliGRAM(s) Oral two times a day  tacrolimus 1 milliGRAM(s) Oral every 12 hours    MEDICATIONS  (PRN):  acetaminophen   Tablet .. 650 milliGRAM(s) Oral every 6 hours PRN Moderate Pain (4 - 6)  dextrose 40% Gel 15 Gram(s) Oral once PRN Blood Glucose LESS THAN 70 milliGRAM(s)/deciliter  glucagon  Injectable 1 milliGRAM(s) IntraMuscular once PRN Glucose LESS THAN 70 milligrams/deciliter          Xrays:  TLC:  OG:  ET tube:                                                                                       ECHO:

## 2018-11-19 NOTE — PROGRESS NOTE ADULT - SUBJECTIVE AND OBJECTIVE BOX
JULISA CONKLIN 68y Male  MRN#: [id number]   CODE STATUS: Full code      SUBJECTIVE  Patient is a 68y old Male who presents with a chief complaint of Altered mental status (19 Nov 2018 08:40)  Currently admitted to medicine with the primary diagnosis of DKA (diabetic ketoacidoses)  Hospital course has been complicated by sepsis, DKA  Today is hospital day 3d, and this morning he is comfortable in chair and reports no overnight events.     Present Today:           Rouse Catheter (x)No/ ()Yes? Indication:          Central Line (x)No/ ()Yes? Indication:          IV Fluids ()No/ (x)Yes? Type:  d5 1/2NS Rate: 100 Indication: DKA       OBJECTIVE  PAST MEDICAL & SURGICAL HISTORY  Urinary tract infection without hematuria, site unspecified  Chronic kidney disease, unspecified CKD stage  Bilateral hearing loss, unspecified hearing loss type  Benign prostatic hyperplasia with urinary frequency  Diabetes  High cholesterol  HTN (hypertension)  Heart transplant recipient  H/O heart transplant    ALLERGIES:  codeine (Unknown)    MEDICATIONS:  STANDING MEDICATIONS  allopurinol 100 milliGRAM(s) Oral two times a day  amLODIPine   Tablet 5 milliGRAM(s) Oral daily  atorvastatin 10 milliGRAM(s) Oral at bedtime  cefTRIAXone   IVPB 2 Gram(s) IV Intermittent every 24 hours  dextrose 5% + sodium chloride 0.45%. 1000 milliLiter(s) IV Continuous <Continuous>  dextrose 5%. 1000 milliLiter(s) IV Continuous <Continuous>  dextrose 50% Injectable 12.5 Gram(s) IV Push once  dextrose 50% Injectable 25 Gram(s) IV Push once  dextrose 50% Injectable 25 Gram(s) IV Push once  docusate sodium 100 milliGRAM(s) Oral two times a day  doxazosin 8 milliGRAM(s) Oral at bedtime  finasteride 5 milliGRAM(s) Oral daily  heparin  Injectable 5000 Unit(s) SubCutaneous every 12 hours  insulin glargine Injectable (LANTUS) 50 Unit(s) SubCutaneous at bedtime  insulin Infusion 1 Unit(s)/Hr IV Continuous <Continuous>  insulin lispro (HumaLOG) corrective regimen sliding scale   SubCutaneous at bedtime  insulin lispro Injectable (HumaLOG) 10 Unit(s) SubCutaneous before breakfast  insulin lispro Injectable (HumaLOG) 10 Unit(s) SubCutaneous before lunch  insulin lispro Injectable (HumaLOG) 10 Unit(s) SubCutaneous before dinner  labetalol 100 milliGRAM(s) Oral two times a day  mycophenolate mofetil 1000 milliGRAM(s) Oral two times a day  pantoprazole    Tablet 40 milliGRAM(s) Oral before breakfast  pregabalin 100 milliGRAM(s) Oral two times a day  tacrolimus 1 milliGRAM(s) Oral every 12 hours    PRN MEDICATIONS  acetaminophen   Tablet .. 650 milliGRAM(s) Oral every 6 hours PRN  dextrose 40% Gel 15 Gram(s) Oral once PRN  glucagon  Injectable 1 milliGRAM(s) IntraMuscular once PRN      VITAL SIGNS: Last 24 Hours  T(C): 36.5 (19 Nov 2018 15:25), Max: 36.7 (19 Nov 2018 07:12)  T(F): 97.7 (19 Nov 2018 15:25), Max: 98 (19 Nov 2018 07:12)  HR: 94 (19 Nov 2018 15:25) (89 - 99)  BP: 109/62 (19 Nov 2018 15:25) (105/55 - 144/89)  BP(mean): 81 (19 Nov 2018 15:11) (73 - 109)  RR: 20 (19 Nov 2018 15:25) (18 - 34)  SpO2: --    LABS:                        12.6   5.13  )-----------( 109      ( 19 Nov 2018 05:42 )             39.6     11-19    140  |  104  |  13  ----------------------------<  212<H>  5.0   |  21  |  1.3    Ca    8.4<L>      19 Nov 2018 05:42  Mg     1.8     11-19    TPro  5.9<L>  /  Alb  3.4<L>  /  TBili  0.2  /  DBili  x   /  AST  10  /  ALT  <5  /  AlkPhos  99  11-19    PT/INR - ( 19 Nov 2018 05:42 )   PT: 11.90 sec;   INR: 1.10 ratio         PTT - ( 19 Nov 2018 05:42 )  PTT:26.6 sec          Culture - Blood (collected 17 Nov 2018 11:05)  Source: .Blood Blood-Peripheral  Preliminary Report (19 Nov 2018 01:02):    No growth to date.    Culture - Blood (collected 17 Nov 2018 11:05)  Source: .Blood Blood-Peripheral  Preliminary Report (19 Nov 2018 01:02):    No growth to date.      PHYSICAL EXAM:    GENERAL: Well appearing M, sitting in chair, in NAD, well-developed, AAOx3  HEENT:  Atraumatic, Normocephalic. EOMI, PERRLA, conjunctiva and sclera clear, No JVD  PULMONARY: Clear to auscultation bilaterally; No wheeze  CARDIOVASCULAR: Regular rate and rhythm; No murmurs, rubs, or gallops  GASTROINTESTINAL: Soft, Nontender, Nondistended; Bowel sounds present  MUSCULOSKELETAL:  2+ Peripheral Pulses, No clubbing, cyanosis, or edema  NEUROLOGY: non-focal  SKIN: No rashes or lesions      ADMISSION SUMMARY  Patient is a 68y old Male who presents with a chief complaint of Altered mental status (19 Nov 2018 08:40)  Currently admitted to medicine with the primary diagnosis of DKA (diabetic ketoacidoses)  Hospital course has been complicated by Sepsis, DKA      ASSESSMENT & PLAN    69 yo male with PMH that includes heart transplant on immunosuppressants, BPH, BL hearing loss, CKD, DM, DLD, HTN,recent CVA with microhemorrhages,  who came with with fever, confusion, and hyperglycemia. he was evaluated in the ER and admitted to the ICU for DKA and encephalopathy.       1) Fever and Encephalopathy, r/o sepsis in immunosuppressed patient. s/p LP that was not consistent with bacterial meningitis with csf and bcx pending. cryptococcal csg ag negative. csf PCR negative  - f/u rest of csf data, including cytology, cultures, VDRL  - seen by ID who adjusted abx to Rocephin 2 gm iv q24h till BCx known  - f/u neuro today, prior mention made of obtaining MR brain, poss due to hx of recent CVA. His MS is now improved though and cannot travel now due to insulin drip. Discussed with intensivist who advised neuro - re-eval given significant improvement to MS: Today's Neuro recs appreciated   - f/u tacrolimus levels  - AAOx3; improving      2) Anemia: slow decrease in all cell- lines, with acute on chronic thrombocytopenia  - check FOBt  - trend CBC; stable  - check manual platelet count  - heme eval if drops further      3) DKA   - now back on insulin drip, will follow FS, adjust accordingly  - Cr is back to his usual CKD  - Endo eval appreciated      4) Heart transplant:   - back on his antirejection meds  - Cardio eval appreciated    Ppx/diet/dispo  - SQ heparin  - Diet as tolerated; carb consistent  - Full code, from home

## 2018-11-19 NOTE — PHYSICAL THERAPY INITIAL EVALUATION ADULT - GENERAL OBSERVATIONS, REHAB EVAL
10:30-10:55 Chart reviewed. Pt encountered supine in bed, may be seen by Physical Therapist as confirmed with Nurse. Patient denied pain and would like to walk now; +tele; +IV

## 2018-11-19 NOTE — PHYSICAL THERAPY INITIAL EVALUATION ADULT - IMPAIRED TRANSFERS: SIT/STAND, REHAB EVAL
decreased strength/decreased endurance/narrow base of support/impaired postural control/impaired balance

## 2018-11-19 NOTE — PROGRESS NOTE ADULT - ASSESSMENT
IMPRESSION:    DKA - was improving back in mild DKA presently          PLAN:    CNS: no depressants back to his baseline     HEENT: Oral care    PULMONARY:  HOB @ 45 degrees    CARDIOVASCULAR: cardiology follow up     GI: GI prophylaxis.  Feedings    RENAL:  Follow up lytes.    IS and OS d/c IV fluyid for now   INFECTIOUS DISEASE: Follow up cultures. on rocephine as per ID follow ID  HEMATOLOGICAL:  DVT prophylaxis.    ENDOCRINE:  Follow up FS q1h.  continue insulin  drip until AG closed   repeat BMP after noon   BMP Q 6 hrs   DO NOT STOP IV INSULIN    MUSCULOSKELETAL: bedrest for now.    Cont ICU monitoring. IMPRESSION:    DKA - was improving back in mild DKA presently          PLAN:    CNS: no depressants back to his baseline     HEENT: Oral care    PULMONARY:  HOB @ 45 degrees    CARDIOVASCULAR: cardiology follow up     GI: GI prophylaxis.  Feedings    RENAL:  Follow up lytes.    IS and OS d/c IV fluyid for now   INFECTIOUS DISEASE: Follow up cultures. on rocephine as per ID follow ID  HEMATOLOGICAL:  DVT prophylaxis.    ENDOCRINE:  Follow up FS q1h.  continue insulin  drip until AG closed   repeat BMP after noon   BMP Q 6 hrs   DO NOT STOP IV INSULIN    MUSCULOSKELETAL: bedrest for now.    Cont ICU monitoring.    d/c frandy

## 2018-11-20 DIAGNOSIS — J15.9 UNSPECIFIED BACTERIAL PNEUMONIA: ICD-10-CM

## 2018-11-20 LAB
ANION GAP SERPL CALC-SCNC: 12 MMOL/L — SIGNIFICANT CHANGE UP (ref 7–14)
ANION GAP SERPL CALC-SCNC: 16 MMOL/L — HIGH (ref 7–14)
BASOPHILS # BLD AUTO: 0.01 K/UL — SIGNIFICANT CHANGE UP (ref 0–0.2)
BASOPHILS NFR BLD AUTO: 0.2 % — SIGNIFICANT CHANGE UP (ref 0–1)
BUN SERPL-MCNC: 14 MG/DL — SIGNIFICANT CHANGE UP (ref 10–20)
BUN SERPL-MCNC: 14 MG/DL — SIGNIFICANT CHANGE UP (ref 10–20)
CALCIUM SERPL-MCNC: 8.2 MG/DL — LOW (ref 8.5–10.1)
CALCIUM SERPL-MCNC: 8.3 MG/DL — LOW (ref 8.5–10.1)
CHLORIDE SERPL-SCNC: 105 MMOL/L — SIGNIFICANT CHANGE UP (ref 98–110)
CHLORIDE SERPL-SCNC: 106 MMOL/L — SIGNIFICANT CHANGE UP (ref 98–110)
CO2 SERPL-SCNC: 20 MMOL/L — SIGNIFICANT CHANGE UP (ref 17–32)
CO2 SERPL-SCNC: 21 MMOL/L — SIGNIFICANT CHANGE UP (ref 17–32)
CREAT SERPL-MCNC: 1.2 MG/DL — SIGNIFICANT CHANGE UP (ref 0.7–1.5)
CREAT SERPL-MCNC: 1.3 MG/DL — SIGNIFICANT CHANGE UP (ref 0.7–1.5)
EOSINOPHIL # BLD AUTO: 0.07 K/UL — SIGNIFICANT CHANGE UP (ref 0–0.7)
EOSINOPHIL NFR BLD AUTO: 1.6 % — SIGNIFICANT CHANGE UP (ref 0–8)
GAS PNL BLDA: SIGNIFICANT CHANGE UP
GLUCOSE BLDC GLUCOMTR-MCNC: 106 MG/DL — HIGH (ref 70–99)
GLUCOSE BLDC GLUCOMTR-MCNC: 111 MG/DL — HIGH (ref 70–99)
GLUCOSE BLDC GLUCOMTR-MCNC: 118 MG/DL — HIGH (ref 70–99)
GLUCOSE BLDC GLUCOMTR-MCNC: 131 MG/DL — HIGH (ref 70–99)
GLUCOSE BLDC GLUCOMTR-MCNC: 148 MG/DL — HIGH (ref 70–99)
GLUCOSE BLDC GLUCOMTR-MCNC: 67 MG/DL — LOW (ref 70–99)
GLUCOSE BLDC GLUCOMTR-MCNC: 75 MG/DL — SIGNIFICANT CHANGE UP (ref 70–99)
GLUCOSE BLDC GLUCOMTR-MCNC: 77 MG/DL — SIGNIFICANT CHANGE UP (ref 70–99)
GLUCOSE BLDC GLUCOMTR-MCNC: 93 MG/DL — SIGNIFICANT CHANGE UP (ref 70–99)
GLUCOSE SERPL-MCNC: 137 MG/DL — HIGH (ref 70–99)
GLUCOSE SERPL-MCNC: 99 MG/DL — SIGNIFICANT CHANGE UP (ref 70–99)
HCT VFR BLD CALC: 37 % — LOW (ref 42–52)
HGB BLD-MCNC: 11.8 G/DL — LOW (ref 14–18)
IMM GRANULOCYTES NFR BLD AUTO: 0.7 % — HIGH (ref 0.1–0.3)
LYMPHOCYTES # BLD AUTO: 0.99 K/UL — LOW (ref 1.2–3.4)
LYMPHOCYTES # BLD AUTO: 22 % — SIGNIFICANT CHANGE UP (ref 20.5–51.1)
MAGNESIUM SERPL-MCNC: 1.6 MG/DL — LOW (ref 1.8–2.4)
MCHC RBC-ENTMCNC: 26.6 PG — LOW (ref 27–31)
MCHC RBC-ENTMCNC: 31.9 G/DL — LOW (ref 32–37)
MCV RBC AUTO: 83.3 FL — SIGNIFICANT CHANGE UP (ref 80–94)
MONOCYTES # BLD AUTO: 0.56 K/UL — SIGNIFICANT CHANGE UP (ref 0.1–0.6)
MONOCYTES NFR BLD AUTO: 12.5 % — HIGH (ref 1.7–9.3)
NEUTROPHILS # BLD AUTO: 2.83 K/UL — SIGNIFICANT CHANGE UP (ref 1.4–6.5)
NEUTROPHILS NFR BLD AUTO: 63 % — SIGNIFICANT CHANGE UP (ref 42.2–75.2)
PLATELET # BLD AUTO: 99 K/UL — LOW (ref 130–400)
POTASSIUM SERPL-MCNC: 4.3 MMOL/L — SIGNIFICANT CHANGE UP (ref 3.5–5)
POTASSIUM SERPL-MCNC: 4.5 MMOL/L — SIGNIFICANT CHANGE UP (ref 3.5–5)
POTASSIUM SERPL-SCNC: 4.3 MMOL/L — SIGNIFICANT CHANGE UP (ref 3.5–5)
POTASSIUM SERPL-SCNC: 4.5 MMOL/L — SIGNIFICANT CHANGE UP (ref 3.5–5)
RBC # BLD: 4.44 M/UL — LOW (ref 4.7–6.1)
RBC # FLD: 14.2 % — SIGNIFICANT CHANGE UP (ref 11.5–14.5)
SODIUM SERPL-SCNC: 138 MMOL/L — SIGNIFICANT CHANGE UP (ref 135–146)
SODIUM SERPL-SCNC: 142 MMOL/L — SIGNIFICANT CHANGE UP (ref 135–146)
VIT B12 SERPL-MCNC: 773 PG/ML — SIGNIFICANT CHANGE UP (ref 232–1245)
WBC # BLD: 4.49 K/UL — LOW (ref 4.8–10.8)
WBC # FLD AUTO: 4.49 K/UL — LOW (ref 4.8–10.8)

## 2018-11-20 RX ORDER — MAGNESIUM SULFATE 500 MG/ML
2 VIAL (ML) INJECTION ONCE
Qty: 0 | Refills: 0 | Status: COMPLETED | OUTPATIENT
Start: 2018-11-20 | End: 2018-11-20

## 2018-11-20 RX ORDER — CEFPODOXIME PROXETIL 100 MG
200 TABLET ORAL EVERY 12 HOURS
Qty: 0 | Refills: 0 | Status: DISCONTINUED | OUTPATIENT
Start: 2018-11-20 | End: 2018-11-21

## 2018-11-20 RX ORDER — INSULIN HUMAN 100 [IU]/ML
1 INJECTION, SOLUTION SUBCUTANEOUS
Qty: 100 | Refills: 0 | Status: DISCONTINUED | OUTPATIENT
Start: 2018-11-20 | End: 2018-11-20

## 2018-11-20 RX ADMIN — INSULIN HUMAN 1 UNIT(S)/HR: 100 INJECTION, SOLUTION SUBCUTANEOUS at 05:11

## 2018-11-20 RX ADMIN — Medication 100 MILLIGRAM(S): at 05:14

## 2018-11-20 RX ADMIN — ATORVASTATIN CALCIUM 10 MILLIGRAM(S): 80 TABLET, FILM COATED ORAL at 21:45

## 2018-11-20 RX ADMIN — AMLODIPINE BESYLATE 5 MILLIGRAM(S): 2.5 TABLET ORAL at 05:10

## 2018-11-20 RX ADMIN — Medication 10 UNIT(S): at 12:41

## 2018-11-20 RX ADMIN — Medication 100 MILLIGRAM(S): at 17:54

## 2018-11-20 RX ADMIN — FINASTERIDE 5 MILLIGRAM(S): 5 TABLET, FILM COATED ORAL at 11:10

## 2018-11-20 RX ADMIN — MYCOPHENOLATE MOFETIL 1000 MILLIGRAM(S): 250 CAPSULE ORAL at 05:10

## 2018-11-20 RX ADMIN — MYCOPHENOLATE MOFETIL 1000 MILLIGRAM(S): 250 CAPSULE ORAL at 17:56

## 2018-11-20 RX ADMIN — HEPARIN SODIUM 5000 UNIT(S): 5000 INJECTION INTRAVENOUS; SUBCUTANEOUS at 17:53

## 2018-11-20 RX ADMIN — Medication 8 MILLIGRAM(S): at 21:45

## 2018-11-20 RX ADMIN — TACROLIMUS 1 MILLIGRAM(S): 5 CAPSULE ORAL at 17:54

## 2018-11-20 RX ADMIN — Medication 100 MILLIGRAM(S): at 05:10

## 2018-11-20 RX ADMIN — HEPARIN SODIUM 5000 UNIT(S): 5000 INJECTION INTRAVENOUS; SUBCUTANEOUS at 05:11

## 2018-11-20 RX ADMIN — TACROLIMUS 1 MILLIGRAM(S): 5 CAPSULE ORAL at 05:10

## 2018-11-20 RX ADMIN — Medication 25 GRAM(S): at 10:35

## 2018-11-20 RX ADMIN — Medication 10 UNIT(S): at 17:54

## 2018-11-20 RX ADMIN — Medication 200 MILLIGRAM(S): at 17:54

## 2018-11-20 RX ADMIN — INSULIN GLARGINE 60 UNIT(S): 100 INJECTION, SOLUTION SUBCUTANEOUS at 22:42

## 2018-11-20 RX ADMIN — SODIUM CHLORIDE 100 MILLILITER(S): 9 INJECTION, SOLUTION INTRAVENOUS at 05:09

## 2018-11-20 RX ADMIN — PANTOPRAZOLE SODIUM 40 MILLIGRAM(S): 20 TABLET, DELAYED RELEASE ORAL at 06:44

## 2018-11-20 NOTE — PROGRESS NOTE ADULT - SUBJECTIVE AND OBJECTIVE BOX
Patient is a 68y old  Male who presents with a chief complaint of Altered mental status (20 Nov 2018 05:28)      T(F): 97.7 (11-20-18 @ 07:00), Max: 97.7 (11-19-18 @ 15:25)  HR: 104 (11-20-18 @ 05:08)  BP: 143/76 (11-20-18 @ 05:08)  RR: 20 (11-20-18 @ 07:00)  SpO2: --    PHYSICAL EXAM:  GENERAL: NAD, well-groomed, well-developed  HEAD:  Atraumatic, Normocephalic  EYES: EOMI, PERRLA, conjunctiva and sclera clear  ENMT: No tonsillar erythema, exudates, or enlargement; Moist mucous membranes, Good dentition, No lesions  NECK: Supple, No JVD, Normal thyroid  NERVOUS SYSTEM:  Alert & Oriented X3,  Motor Strength 5/5 B/L upper and lower extremities  CHEST/LUNG: Clear to percussion bilaterally; No rales, rhonchi, wheezing, or rubs  HEART: Regular rate and rhythm; No murmurs, rubs, or gallops  ABDOMEN: Soft, Nontender, Nondistended; Bowel sounds present  EXTREMITIES:   No clubbing, cyanosis, or edema  LYMPH: No lymphadenopathy noted  SKIN: No rashes or lesions    labs  11-19    136  |  102  |  16  ----------------------------<  178<H>  4.4   |  19  |  1.3    Ca    8.2<L>      19 Nov 2018 21:30  Mg     1.8     11-19    TPro  5.9<L>  /  Alb  3.4<L>  /  TBili  0.2  /  DBili  x   /  AST  10  /  ALT  <5  /  AlkPhos  99  11-19                          11.8   4.49  )-----------( 99       ( 20 Nov 2018 05:17 )             37.0       Culture - Blood (collected 17 Nov 2018 11:05)  Source: .Blood Blood-Peripheral  Preliminary Report (19 Nov 2018 01:02):    No growth to date.    Culture - Blood (collected 17 Nov 2018 11:05)  Source: .Blood Blood-Peripheral  Preliminary Report (19 Nov 2018 01:02):    No growth to date.      PT/INR - ( 19 Nov 2018 05:42 )   PT: 11.90 sec;   INR: 1.10 ratio         PTT - ( 19 Nov 2018 05:42 )  PTT:26.6 sec        acetaminophen   Tablet .. 650 milliGRAM(s) Oral every 6 hours PRN  allopurinol 100 milliGRAM(s) Oral two times a day  amLODIPine   Tablet 5 milliGRAM(s) Oral daily  atorvastatin 10 milliGRAM(s) Oral at bedtime  cefTRIAXone   IVPB 2 Gram(s) IV Intermittent every 24 hours  dextrose 40% Gel 15 Gram(s) Oral once PRN  dextrose 5% + sodium chloride 0.45%. 1000 milliLiter(s) IV Continuous <Continuous>  dextrose 5%. 1000 milliLiter(s) IV Continuous <Continuous>  dextrose 50% Injectable 12.5 Gram(s) IV Push once  dextrose 50% Injectable 25 Gram(s) IV Push once  dextrose 50% Injectable 25 Gram(s) IV Push once  docusate sodium 100 milliGRAM(s) Oral two times a day  doxazosin 8 milliGRAM(s) Oral at bedtime  finasteride 5 milliGRAM(s) Oral daily  glucagon  Injectable 1 milliGRAM(s) IntraMuscular once PRN  heparin  Injectable 5000 Unit(s) SubCutaneous every 12 hours  insulin glargine Injectable (LANTUS) 60 Unit(s) SubCutaneous at bedtime  insulin Infusion 1 Unit(s)/Hr IV Continuous <Continuous>  insulin lispro (HumaLOG) corrective regimen sliding scale   SubCutaneous at bedtime  insulin lispro Injectable (HumaLOG) 10 Unit(s) SubCutaneous before breakfast  insulin lispro Injectable (HumaLOG) 10 Unit(s) SubCutaneous before lunch  insulin lispro Injectable (HumaLOG) 10 Unit(s) SubCutaneous before dinner  labetalol 100 milliGRAM(s) Oral two times a day  mycophenolate mofetil 1000 milliGRAM(s) Oral two times a day  pantoprazole    Tablet 40 milliGRAM(s) Oral before breakfast  pregabalin 100 milliGRAM(s) Oral two times a day  tacrolimus 1 milliGRAM(s) Oral every 12 hours

## 2018-11-20 NOTE — PROGRESS NOTE ADULT - PROBLEM SELECTOR PLAN 1
likely bacterial pneumonia  back to baseline  DC planning   complete 7 days of abx   PO Vantin   recall if needed

## 2018-11-20 NOTE — PROGRESS NOTE ADULT - ASSESSMENT
IMPRESSION:    DKA - resolved           PLAN:    CNS: no depressants back to his baseline     HEENT: Oral care    PULMONARY:  HOB @ 45 degrees    CARDIOVASCULAR: cardiology follow up     GI: GI prophylaxis.  Feedings    RENAL:  Follow up lytes.    IS and OS  INFECTIOUS DISEASE: agree PO Mir per ID   HEMATOLOGICAL:  DVT prophylaxis.    ENDOCRINE:  Follow up FS q1h.  lantus and novolog   follow BMP       MUSCULOSKELETAL: bedrest for now.  downgrade tele or floor as per cardiology   recall PRN IMPRESSION:    DKA - resolved           PLAN:    CNS: no depressants back to his baseline     HEENT: Oral care    PULMONARY:  HOB @ 45 degrees    CARDIOVASCULAR: cardiology follow up     GI: GI prophylaxis.  Feedings    RENAL:  Follow up lytes.  DO abg and also check lactic acid if PH stable d/c insulin and follow bmp bicarb and AG if increase Ag or drop bicarb will resume   patient has persistent elevated AG from before can be from Tacrolimus follow renal   IS and OS  INFECTIOUS DISEASE: agree PO Vantin per ID   HEMATOLOGICAL:  DVT prophylaxis.    ENDOCRINE:  Follow up FS q1h.  lantus and novolog   follow BMP       MUSCULOSKELETAL: bedrest for now.  downgrade tele or floor as per cardiology

## 2018-11-20 NOTE — PROGRESS NOTE ADULT - SUBJECTIVE AND OBJECTIVE BOX
infectious diseases progress note:  JULISA CONKLIN is a 68yMale patient    DKA (DIABETIC KETOACIDOSES);ALTERED MENTAL STATUS    Heart transplant recipient  Hypertension, unspecified type  Benign prostatic hyperplasia with urinary frequency  Bilateral hearing loss, unspecified hearing loss type  Chronic kidney disease, unspecified CKD stage  Urinary tract infection without hematuria, site unspecified  Altered mental status, unspecified altered mental status type  Diabetic ketoacidosis without coma associated with type 2 diabetes mellitus      ROS:  not relevant     Allergies    codeine (Unknown)    Intolerances        ANTIBIOTICS/RELEVANT:  antimicrobials  cefTRIAXone   IVPB 2 Gram(s) IV Intermittent every 24 hours    immunologic:  mycophenolate mofetil 1000 milliGRAM(s) Oral two times a day  tacrolimus 1 milliGRAM(s) Oral every 12 hours    OTHER:  acetaminophen   Tablet .. 650 milliGRAM(s) Oral every 6 hours PRN  allopurinol 100 milliGRAM(s) Oral two times a day  amLODIPine   Tablet 5 milliGRAM(s) Oral daily  atorvastatin 10 milliGRAM(s) Oral at bedtime  dextrose 40% Gel 15 Gram(s) Oral once PRN  dextrose 5% + sodium chloride 0.45%. 1000 milliLiter(s) IV Continuous <Continuous>  dextrose 5%. 1000 milliLiter(s) IV Continuous <Continuous>  dextrose 50% Injectable 12.5 Gram(s) IV Push once  dextrose 50% Injectable 25 Gram(s) IV Push once  dextrose 50% Injectable 25 Gram(s) IV Push once  docusate sodium 100 milliGRAM(s) Oral two times a day  doxazosin 8 milliGRAM(s) Oral at bedtime  finasteride 5 milliGRAM(s) Oral daily  glucagon  Injectable 1 milliGRAM(s) IntraMuscular once PRN  heparin  Injectable 5000 Unit(s) SubCutaneous every 12 hours  insulin glargine Injectable (LANTUS) 60 Unit(s) SubCutaneous at bedtime  insulin Infusion 1 Unit(s)/Hr IV Continuous <Continuous>  insulin lispro (HumaLOG) corrective regimen sliding scale   SubCutaneous at bedtime  insulin lispro Injectable (HumaLOG) 10 Unit(s) SubCutaneous before breakfast  insulin lispro Injectable (HumaLOG) 10 Unit(s) SubCutaneous before lunch  insulin lispro Injectable (HumaLOG) 10 Unit(s) SubCutaneous before dinner  labetalol 100 milliGRAM(s) Oral two times a day  pantoprazole    Tablet 40 milliGRAM(s) Oral before breakfast  pregabalin 100 milliGRAM(s) Oral two times a day      Objective:  T(F): 97.7 (11-20-18 @ 03:02), Max: 98 (11-19-18 @ 07:12)  HR: 104 (11-20-18 @ 05:08) (93 - 104)  BP: 143/76 (11-20-18 @ 05:08) (105/55 - 156/79)  RR: 20 (11-20-18 @ 05:08) (18 - 24)  SpO2: --    PHYSICAL EXAM:  Constitutional:Well-developed, well nourished  awake and alert . oriented  Ear/Nose/Throat: no oral lesion, no sinus tenderness on percussion	  Neck:no JVD, no lymphadenopathy, supple  Respiratory: CTA stanley  Cardiovascular: S1S2 RRR, no murmurs  Gastrointestinal:soft, (+) BS, no HSM  Extremities:no phlebitis         LABS:                        12.6   5.13  )-----------( 109      ( 19 Nov 2018 05:42 )             39.6     11-19    136  |  102  |  16  ----------------------------<  178<H>  4.4   |  19  |  1.3    Ca    8.2<L>      19 Nov 2018 21:30  Mg     1.8     11-19    TPro  5.9<L>  /  Alb  3.4<L>  /  TBili  0.2  /  DBili  x   /  AST  10  /  ALT  <5  /  AlkPhos  99  11-19    PT/INR - ( 19 Nov 2018 05:42 )   PT: 11.90 sec;   INR: 1.10 ratio         PTT - ( 19 Nov 2018 05:42 )  PTT:26.6 sec        MICROBIOLOGY:    Culture - Blood (collected 11-17-18 @ 11:05)  Source: .Blood Blood-Peripheral  Preliminary Report (11-19-18 @ 01:02):    No growth to date.    Culture - Blood (collected 11-17-18 @ 11:05)  Source: .Blood Blood-Peripheral  Preliminary Report (11-19-18 @ 01:02):    No growth to date.    Culture - CSF with Gram Stain (collected 11-16-18 @ 11:00)  Source: .CSF CSF  Gram Stain (11-17-18 @ 00:42):    polymorphonuclear leukocytes seen    No organisms seen    by cytocentrifuge  Final Report (11-19-18 @ 16:14):    No growth    Culture - Acid Fast - CSF (collected 11-16-18 @ 11:00)  Source: .CSF CSF        Culture - Blood (collected 17 Nov 2018 11:05)  Source: .Blood Blood-Peripheral  Preliminary Report (19 Nov 2018 01:02):    No growth to date.    Culture - Blood (collected 17 Nov 2018 11:05)  Source: .Blood Blood-Peripheral  Preliminary Report (19 Nov 2018 01:02):    No growth to date.      Culture Results:   No growth to date. (11-17 @ 11:05)  Culture Results:   No growth to date. (11-17 @ 11:05)  Culture Results:   No growth (11-16 @ 11:00)      CSF Segmented Neutrophils: tnp % (11-16 @ 11:00)    RADIOLOGY & ADDITIONAL STUDIES:

## 2018-11-20 NOTE — PROGRESS NOTE ADULT - ASSESSMENT
Patient with no complaints. Check labs . Adjust insulin.  Try ambulate if stable. Out patient F/U when stable

## 2018-11-20 NOTE — PROGRESS NOTE ADULT - SUBJECTIVE AND OBJECTIVE BOX
Patient is a 68y old  Male who presents with a chief complaint of Altered mental status (20 Nov 2018 07:50)      Over Night Events:  Patient seen and examined.       ROS:  See HPI    PHYSICAL EXAM    ICU Vital Signs Last 24 Hrs  T(C): 36.5 (20 Nov 2018 07:00), Max: 36.5 (19 Nov 2018 15:25)  T(F): 97.7 (20 Nov 2018 07:00), Max: 97.7 (19 Nov 2018 15:25)  HR: 104 (20 Nov 2018 05:08) (93 - 104)  BP: 143/76 (20 Nov 2018 05:08) (105/55 - 156/79)  BP(mean): 104 (20 Nov 2018 05:08) (73 - 109)  ABP: --  ABP(mean): --  RR: 20 (20 Nov 2018 07:00) (18 - 24)  SpO2: --      General:  HEENT:                Lymph Nodes: NO cervical LN   Lungs: Bilateral BS  Cardiovascular: Regular   Abdomen: Soft, Positive BS  Extremities: No clubbing   Skin:   Neurological:   Musculoskeletal: move all ext     I&O's Detail    19 Nov 2018 07:01  -  20 Nov 2018 07:00  --------------------------------------------------------  IN:    dextrose 5% + sodium chloride 0.45%.: 2400 mL    insulin Infusion: 75 mL    insulin Infusion: 5 mL    IV PiggyBack: 50 mL  Total IN: 2530 mL    OUT:    Indwelling Catheter - Urethral: 150 mL    Voided: 1325 mL  Total OUT: 1475 mL    Total NET: 1055 mL          LABS:                          11.8   4.49  )-----------( 99       ( 20 Nov 2018 05:17 )             37.0         20 Nov 2018 05:17    142    |  106    |  14     ----------------------------<  99     4.5     |  20     |  1.3      Ca    8.2        20 Nov 2018 05:17  Mg     1.6       20 Nov 2018 05:17                                               PT/INR - ( 19 Nov 2018 05:42 )   PT: 11.90 sec;   INR: 1.10 ratio         PTT - ( 19 Nov 2018 05:42 )  PTT:26.6 sec                                                                                                   Culture - Blood (collected 17 Nov 2018 11:05)  Source: .Blood Blood-Peripheral  Preliminary Report (19 Nov 2018 01:02):    No growth to date.    Culture - Blood (collected 17 Nov 2018 11:05)  Source: .Blood Blood-Peripheral  Preliminary Report (19 Nov 2018 01:02):    No growth to date.                                                                                           MEDICATIONS  (STANDING):  allopurinol 100 milliGRAM(s) Oral two times a day  amLODIPine   Tablet 5 milliGRAM(s) Oral daily  atorvastatin 10 milliGRAM(s) Oral at bedtime  cefTRIAXone   IVPB 2 Gram(s) IV Intermittent every 24 hours  dextrose 5% + sodium chloride 0.45%. 1000 milliLiter(s) (100 mL/Hr) IV Continuous <Continuous>  dextrose 5%. 1000 milliLiter(s) (50 mL/Hr) IV Continuous <Continuous>  dextrose 50% Injectable 12.5 Gram(s) IV Push once  dextrose 50% Injectable 25 Gram(s) IV Push once  dextrose 50% Injectable 25 Gram(s) IV Push once  docusate sodium 100 milliGRAM(s) Oral two times a day  doxazosin 8 milliGRAM(s) Oral at bedtime  finasteride 5 milliGRAM(s) Oral daily  heparin  Injectable 5000 Unit(s) SubCutaneous every 12 hours  insulin glargine Injectable (LANTUS) 60 Unit(s) SubCutaneous at bedtime  insulin Infusion 1 Unit(s)/Hr (1 mL/Hr) IV Continuous <Continuous>  insulin lispro (HumaLOG) corrective regimen sliding scale   SubCutaneous at bedtime  insulin lispro Injectable (HumaLOG) 10 Unit(s) SubCutaneous before breakfast  insulin lispro Injectable (HumaLOG) 10 Unit(s) SubCutaneous before lunch  insulin lispro Injectable (HumaLOG) 10 Unit(s) SubCutaneous before dinner  labetalol 100 milliGRAM(s) Oral two times a day  magnesium sulfate  IVPB 2 Gram(s) IV Intermittent once  mycophenolate mofetil 1000 milliGRAM(s) Oral two times a day  pantoprazole    Tablet 40 milliGRAM(s) Oral before breakfast  pregabalin 100 milliGRAM(s) Oral two times a day  tacrolimus 1 milliGRAM(s) Oral every 12 hours    MEDICATIONS  (PRN):  acetaminophen   Tablet .. 650 milliGRAM(s) Oral every 6 hours PRN Moderate Pain (4 - 6)  dextrose 40% Gel 15 Gram(s) Oral once PRN Blood Glucose LESS THAN 70 milliGRAM(s)/deciliter  glucagon  Injectable 1 milliGRAM(s) IntraMuscular once PRN Glucose LESS THAN 70 milligrams/deciliter          Xrays:  TLC:  OG:  ET tube:                                                                                       ECHO: Patient is a 68y old  Male who presents with a chief complaint of Altered mental status (20 Nov 2018 07:50)      Over Night Events:  Patient seen and examined feel good not on distress no fever       ROS:  See HPI    PHYSICAL EXAM    ICU Vital Signs Last 24 Hrs  T(C): 36.5 (20 Nov 2018 07:00), Max: 36.5 (19 Nov 2018 15:25)  T(F): 97.7 (20 Nov 2018 07:00), Max: 97.7 (19 Nov 2018 15:25)  HR: 104 (20 Nov 2018 05:08) (93 - 104)  BP: 143/76 (20 Nov 2018 05:08) (105/55 - 156/79)  BP(mean): 104 (20 Nov 2018 05:08) (73 - 109)  ABP: --  ABP(mean): --  RR: 20 (20 Nov 2018 07:00) (18 - 24)  SpO2: --      General:Aox3  HEENT:    nick            Lymph Nodes: NO cervical LN   Lungs: Bilateral BS  Cardiovascular: Regular   Abdomen: Soft, Positive BS  Extremities: No clubbing   Skin: warm   Neurological: no focal deficit   Musculoskeletal: move all ext     I&O's Detail    19 Nov 2018 07:01  -  20 Nov 2018 07:00  --------------------------------------------------------  IN:    dextrose 5% + sodium chloride 0.45%.: 2400 mL    insulin Infusion: 75 mL    insulin Infusion: 5 mL    IV PiggyBack: 50 mL  Total IN: 2530 mL    OUT:    Indwelling Catheter - Urethral: 150 mL    Voided: 1325 mL  Total OUT: 1475 mL    Total NET: 1055 mL          LABS:                          11.8   4.49  )-----------( 99       ( 20 Nov 2018 05:17 )             37.0         20 Nov 2018 05:17    142    |  106    |  14     ----------------------------<  99     4.5     |  20     |  1.3      Ca    8.2        20 Nov 2018 05:17  Mg     1.6       20 Nov 2018 05:17                                               PT/INR - ( 19 Nov 2018 05:42 )   PT: 11.90 sec;   INR: 1.10 ratio         PTT - ( 19 Nov 2018 05:42 )  PTT:26.6 sec                                                                                                   Culture - Blood (collected 17 Nov 2018 11:05)  Source: .Blood Blood-Peripheral  Preliminary Report (19 Nov 2018 01:02):    No growth to date.    Culture - Blood (collected 17 Nov 2018 11:05)  Source: .Blood Blood-Peripheral  Preliminary Report (19 Nov 2018 01:02):    No growth to date.                                                                                           MEDICATIONS  (STANDING):  allopurinol 100 milliGRAM(s) Oral two times a day  amLODIPine   Tablet 5 milliGRAM(s) Oral daily  atorvastatin 10 milliGRAM(s) Oral at bedtime  cefTRIAXone   IVPB 2 Gram(s) IV Intermittent every 24 hours  dextrose 5% + sodium chloride 0.45%. 1000 milliLiter(s) (100 mL/Hr) IV Continuous <Continuous>  dextrose 5%. 1000 milliLiter(s) (50 mL/Hr) IV Continuous <Continuous>  dextrose 50% Injectable 12.5 Gram(s) IV Push once  dextrose 50% Injectable 25 Gram(s) IV Push once  dextrose 50% Injectable 25 Gram(s) IV Push once  docusate sodium 100 milliGRAM(s) Oral two times a day  doxazosin 8 milliGRAM(s) Oral at bedtime  finasteride 5 milliGRAM(s) Oral daily  heparin  Injectable 5000 Unit(s) SubCutaneous every 12 hours  insulin glargine Injectable (LANTUS) 60 Unit(s) SubCutaneous at bedtime  insulin Infusion 1 Unit(s)/Hr (1 mL/Hr) IV Continuous <Continuous>  insulin lispro (HumaLOG) corrective regimen sliding scale   SubCutaneous at bedtime  insulin lispro Injectable (HumaLOG) 10 Unit(s) SubCutaneous before breakfast  insulin lispro Injectable (HumaLOG) 10 Unit(s) SubCutaneous before lunch  insulin lispro Injectable (HumaLOG) 10 Unit(s) SubCutaneous before dinner  labetalol 100 milliGRAM(s) Oral two times a day  magnesium sulfate  IVPB 2 Gram(s) IV Intermittent once  mycophenolate mofetil 1000 milliGRAM(s) Oral two times a day  pantoprazole    Tablet 40 milliGRAM(s) Oral before breakfast  pregabalin 100 milliGRAM(s) Oral two times a day  tacrolimus 1 milliGRAM(s) Oral every 12 hours    MEDICATIONS  (PRN):  acetaminophen   Tablet .. 650 milliGRAM(s) Oral every 6 hours PRN Moderate Pain (4 - 6)  dextrose 40% Gel 15 Gram(s) Oral once PRN Blood Glucose LESS THAN 70 milliGRAM(s)/deciliter  glucagon  Injectable 1 milliGRAM(s) IntraMuscular once PRN Glucose LESS THAN 70 milligrams/deciliter          Xrays:  TLC:  OG:  ET tube:                                                                                       ECHO:

## 2018-11-20 NOTE — PROGRESS NOTE ADULT - SUBJECTIVE AND OBJECTIVE BOX
JULISA CONKLIN 68y Male  MRN#: [id number]   CODE STATUS:________      SUBJECTIVE  Patient is a 68y old Male who presents with a chief complaint of Altered mental status (20 Nov 2018 08:34)  Currently admitted to medicine with the primary diagnosis of DKA (diabetic ketoacidoses)  Hospital course has been complicated by _______.   Today is hospital day 4d, and this morning he is _________ and reports ________ overnight events.     Present Today:           Rouse Catheter ()No/ ()Yes? Indication:          Central Line ()No/ ()Yes? Indication:          IV Fluids ()No/ ()Yes? Type:  Rate:  Indication:      OBJECTIVE  PAST MEDICAL & SURGICAL HISTORY  Urinary tract infection without hematuria, site unspecified  Chronic kidney disease, unspecified CKD stage  Bilateral hearing loss, unspecified hearing loss type  Benign prostatic hyperplasia with urinary frequency  Diabetes  High cholesterol  HTN (hypertension)  Heart transplant recipient  H/O heart transplant    ALLERGIES:  codeine (Unknown)    MEDICATIONS:  STANDING MEDICATIONS  allopurinol 100 milliGRAM(s) Oral two times a day  amLODIPine   Tablet 5 milliGRAM(s) Oral daily  atorvastatin 10 milliGRAM(s) Oral at bedtime  cefpodoxime 200 milliGRAM(s) Oral every 12 hours  dextrose 5%. 1000 milliLiter(s) IV Continuous <Continuous>  dextrose 50% Injectable 12.5 Gram(s) IV Push once  dextrose 50% Injectable 25 Gram(s) IV Push once  dextrose 50% Injectable 25 Gram(s) IV Push once  docusate sodium 100 milliGRAM(s) Oral two times a day  doxazosin 8 milliGRAM(s) Oral at bedtime  finasteride 5 milliGRAM(s) Oral daily  heparin  Injectable 5000 Unit(s) SubCutaneous every 12 hours  insulin glargine Injectable (LANTUS) 60 Unit(s) SubCutaneous at bedtime  insulin lispro (HumaLOG) corrective regimen sliding scale   SubCutaneous at bedtime  insulin lispro Injectable (HumaLOG) 10 Unit(s) SubCutaneous before breakfast  insulin lispro Injectable (HumaLOG) 10 Unit(s) SubCutaneous before lunch  insulin lispro Injectable (HumaLOG) 10 Unit(s) SubCutaneous before dinner  labetalol 100 milliGRAM(s) Oral two times a day  mycophenolate mofetil 1000 milliGRAM(s) Oral two times a day  pantoprazole    Tablet 40 milliGRAM(s) Oral before breakfast  pregabalin 100 milliGRAM(s) Oral two times a day  tacrolimus 1 milliGRAM(s) Oral every 12 hours    PRN MEDICATIONS  acetaminophen   Tablet .. 650 milliGRAM(s) Oral every 6 hours PRN  dextrose 40% Gel 15 Gram(s) Oral once PRN  glucagon  Injectable 1 milliGRAM(s) IntraMuscular once PRN      VITAL SIGNS: Last 24 Hours  T(C): 36.2 (20 Nov 2018 13:00), Max: 36.8 (20 Nov 2018 09:00)  T(F): 97.1 (20 Nov 2018 13:00), Max: 98.3 (20 Nov 2018 09:00)  HR: 93 (20 Nov 2018 11:06) (93 - 104)  BP: 115/74 (20 Nov 2018 11:06) (109/62 - 156/79)  BP(mean): 87 (20 Nov 2018 11:06) (76 - 109)  RR: 20 (20 Nov 2018 13:00) (18 - 20)  SpO2: --    LABS:                        11.8   4.49  )-----------( 99       ( 20 Nov 2018 05:17 )             37.0     11-20    138  |  105  |  14  ----------------------------<  137<H>  4.3   |  21  |  1.2    Ca    8.3<L>      20 Nov 2018 11:40  Mg     1.6     11-20    TPro  5.9<L>  /  Alb  3.4<L>  /  TBili  0.2  /  DBili  x   /  AST  10  /  ALT  <5  /  AlkPhos  99  11-19    PT/INR - ( 19 Nov 2018 05:42 )   PT: 11.90 sec;   INR: 1.10 ratio         PTT - ( 19 Nov 2018 05:42 )  PTT:26.6 sec              RADIOLOGY:      PHYSICAL EXAM:    GENERAL: NAD, well-developed, AAOx3  HEENT:  Atraumatic, Normocephalic. EOMI, PERRLA, conjunctiva and sclera clear, No JVD  PULMONARY: Clear to auscultation bilaterally; No wheeze  CARDIOVASCULAR: Regular rate and rhythm; No murmurs, rubs, or gallops  GASTROINTESTINAL: Soft, Nontender, Nondistended; Bowel sounds present  MUSCULOSKELETAL:  2+ Peripheral Pulses, No clubbing, cyanosis, or edema  NEUROLOGY: non-focal  SKIN: No rashes or lesions      ADMISSION SUMMARY  Patient is a 68y old Male who presents with a chief complaint of Altered mental status (20 Nov 2018 08:34)  Currently admitted to medicine with the primary diagnosis of DKA (diabetic ketoacidoses)  Hospital course has been complicated by DKA, Urosepsis      ASSESSMENT & PLAN    67 yo male with PMH that includes heart transplant on immunosuppressants, BPH, BL hearing loss, CKD, DM, DLD, HTN,recent CVA with microhemorrhages,  who came with with fever, confusion, and hyperglycemia. he was evaluated in the ER and admitted to the ICU for DKA and encephalopathy.       1) Fever and Encephalopathy, r/o sepsis in immunosuppressed patient. Likely 2/2 Urosepsis  - seen by ID, now on PO vantin 200 q12  - f/u tacrolimus level 5.4  - AAOx3, back to baseline    2) Anemia/Thrombocytopenia: Stable  - trend CBC      3) DKA   - Now off insulin drip, c/w AC + HS  - Monitor FS, BMP for AG; most recently 12  - Endo eval appreciated      4) Heart transplant:   - back on his antirejection meds  - Cardio eval appreciated    Ppx/diet/dispo  - SQ heparin  - Diet as tolerated; carb consistent  - Full code, from home        Present today:  ( ) Congestive Heart Failure, Yes? ( )Acute / ( )Acute on Chronic / ( )Chronic  :  ( )Systolic / ( )Diastolic               Plan:  ( ) Complicated Pneumonia, Type?  ( )Parapneumonic effusion / ( )Abscess / ( ) Multilobar / ( )Other               Plan:  ( ) Morbid Obesity, Yes? BMI:               Plan:  ( ) Functional Quadriplegia               Plan:  ( ) Encephalopathy               Plan:    (x) Discussion with patient and/or family regarding goals of care  (x) Discussed Case and Plan with Medical Attending Dr Gan      # Planned Disposition: Downgrade to floors JULISA CONKLIN 68y Male  MRN#: [id number]   CODE STATUS:________      SUBJECTIVE  Patient is a 68y old Male who presents with a chief complaint of Altered mental status (20 Nov 2018 08:34)  Currently admitted to medicine with the primary diagnosis of DKA (diabetic ketoacidoses)    Present Today:           Rouse Catheter ()No/ ()Yes? Indication:          Central Line ()No/ ()Yes? Indication:          IV Fluids ()No/ ()Yes? Type:  Rate:  Indication:      OBJECTIVE  PAST MEDICAL & SURGICAL HISTORY  Urinary tract infection without hematuria, site unspecified  Chronic kidney disease, unspecified CKD stage  Bilateral hearing loss, unspecified hearing loss type  Benign prostatic hyperplasia with urinary frequency  Diabetes  High cholesterol  HTN (hypertension)  Heart transplant recipient  H/O heart transplant    ALLERGIES:  codeine (Unknown)    MEDICATIONS:  STANDING MEDICATIONS  allopurinol 100 milliGRAM(s) Oral two times a day  amLODIPine   Tablet 5 milliGRAM(s) Oral daily  atorvastatin 10 milliGRAM(s) Oral at bedtime  cefpodoxime 200 milliGRAM(s) Oral every 12 hours  dextrose 5%. 1000 milliLiter(s) IV Continuous <Continuous>  dextrose 50% Injectable 12.5 Gram(s) IV Push once  dextrose 50% Injectable 25 Gram(s) IV Push once  dextrose 50% Injectable 25 Gram(s) IV Push once  docusate sodium 100 milliGRAM(s) Oral two times a day  doxazosin 8 milliGRAM(s) Oral at bedtime  finasteride 5 milliGRAM(s) Oral daily  heparin  Injectable 5000 Unit(s) SubCutaneous every 12 hours  insulin glargine Injectable (LANTUS) 60 Unit(s) SubCutaneous at bedtime  insulin lispro (HumaLOG) corrective regimen sliding scale   SubCutaneous at bedtime  insulin lispro Injectable (HumaLOG) 10 Unit(s) SubCutaneous before breakfast  insulin lispro Injectable (HumaLOG) 10 Unit(s) SubCutaneous before lunch  insulin lispro Injectable (HumaLOG) 10 Unit(s) SubCutaneous before dinner  labetalol 100 milliGRAM(s) Oral two times a day  mycophenolate mofetil 1000 milliGRAM(s) Oral two times a day  pantoprazole    Tablet 40 milliGRAM(s) Oral before breakfast  pregabalin 100 milliGRAM(s) Oral two times a day  tacrolimus 1 milliGRAM(s) Oral every 12 hours    PRN MEDICATIONS  acetaminophen   Tablet .. 650 milliGRAM(s) Oral every 6 hours PRN  dextrose 40% Gel 15 Gram(s) Oral once PRN  glucagon  Injectable 1 milliGRAM(s) IntraMuscular once PRN      VITAL SIGNS: Last 24 Hours  T(C): 36.2 (20 Nov 2018 13:00), Max: 36.8 (20 Nov 2018 09:00)  T(F): 97.1 (20 Nov 2018 13:00), Max: 98.3 (20 Nov 2018 09:00)  HR: 93 (20 Nov 2018 11:06) (93 - 104)  BP: 115/74 (20 Nov 2018 11:06) (109/62 - 156/79)  BP(mean): 87 (20 Nov 2018 11:06) (76 - 109)  RR: 20 (20 Nov 2018 13:00) (18 - 20)  SpO2: --    LABS:                        11.8   4.49  )-----------( 99       ( 20 Nov 2018 05:17 )             37.0     11-20    138  |  105  |  14  ----------------------------<  137<H>  4.3   |  21  |  1.2    Ca    8.3<L>      20 Nov 2018 11:40  Mg     1.6     11-20    TPro  5.9<L>  /  Alb  3.4<L>  /  TBili  0.2  /  DBili  x   /  AST  10  /  ALT  <5  /  AlkPhos  99  11-19    PT/INR - ( 19 Nov 2018 05:42 )   PT: 11.90 sec;   INR: 1.10 ratio         PTT - ( 19 Nov 2018 05:42 )  PTT:26.6 sec      PHYSICAL EXAM:    GENERAL: NAD, well-developed, AAOx3  HEENT:  Atraumatic, Normocephalic. EOMI, PERRLA, conjunctiva and sclera clear, No JVD  PULMONARY: Clear to auscultation bilaterally; No wheeze  CARDIOVASCULAR: Regular rate and rhythm; No murmurs, rubs, or gallops  GASTROINTESTINAL: Soft, Nontender, Nondistended; Bowel sounds present  MUSCULOSKELETAL:  2+ Peripheral Pulses, No clubbing, cyanosis, or edema  NEUROLOGY: non-focal  SKIN: No rashes or lesions      ADMISSION SUMMARY  Patient is a 68y old Male who presents with a chief complaint of Altered mental status (20 Nov 2018 08:34)  Currently admitted to medicine with the primary diagnosis of DKA (diabetic ketoacidoses)  Hospital course has been complicated by DKA, Urosepsis      ASSESSMENT & PLAN    69 yo male with PMH that includes heart transplant on immunosuppressants, BPH, BL hearing loss, CKD, DM, DLD, HTN,recent CVA with microhemorrhages,  who came with with fever, confusion, and hyperglycemia. he was evaluated in the ER and admitted to the ICU for DKA and encephalopathy.       1) Fever and Encephalopathy, r/o sepsis in immunosuppressed patient. Likely 2/2 UTI  - seen by ID, now on PO vantin 200 q12  - f/u tacrolimus level 5.4  - AAOx3, back to baseline    2) Anemia/Thrombocytopenia: Stable  - trend CBC      3) DKA   - Now off insulin drip, c/w AC + HS  - Monitor FS, BMP for AG; most recently 12  - Endo eval appreciated      4) Heart transplant:   - back on his antirejection meds  - Cardio eval appreciated    Ppx/diet/dispo  - SQ heparin  - Diet as tolerated; carb consistent  - Full code, from home        Present today:  ( ) Congestive Heart Failure, Yes? ( )Acute / ( )Acute on Chronic / ( )Chronic  :  ( )Systolic / ( )Diastolic               Plan:  ( ) Complicated Pneumonia, Type?  ( )Parapneumonic effusion / ( )Abscess / ( ) Multilobar / ( )Other               Plan:  ( ) Morbid Obesity, Yes? BMI:               Plan:  ( ) Functional Quadriplegia               Plan:  ( ) Encephalopathy               Plan:    (x) Discussion with patient and/or family regarding goals of care  (x) Discussed Case and Plan with Medical Attending Dr Gan      # Planned Disposition: Downgrade to floors

## 2018-11-21 ENCOUNTER — TRANSCRIPTION ENCOUNTER (OUTPATIENT)
Age: 68
End: 2018-11-21

## 2018-11-21 VITALS
HEART RATE: 112 BPM | TEMPERATURE: 96 F | RESPIRATION RATE: 18 BRPM | DIASTOLIC BLOOD PRESSURE: 74 MMHG | SYSTOLIC BLOOD PRESSURE: 112 MMHG

## 2018-11-21 LAB
ANION GAP SERPL CALC-SCNC: 11 MMOL/L — SIGNIFICANT CHANGE UP (ref 7–14)
BASOPHILS # BLD AUTO: 0.02 K/UL — SIGNIFICANT CHANGE UP (ref 0–0.2)
BASOPHILS NFR BLD AUTO: 0.4 % — SIGNIFICANT CHANGE UP (ref 0–1)
BUN SERPL-MCNC: 16 MG/DL — SIGNIFICANT CHANGE UP (ref 10–20)
CALCIUM SERPL-MCNC: 8.7 MG/DL — SIGNIFICANT CHANGE UP (ref 8.5–10.1)
CHLORIDE SERPL-SCNC: 108 MMOL/L — SIGNIFICANT CHANGE UP (ref 98–110)
CO2 SERPL-SCNC: 23 MMOL/L — SIGNIFICANT CHANGE UP (ref 17–32)
CREAT SERPL-MCNC: 1.3 MG/DL — SIGNIFICANT CHANGE UP (ref 0.7–1.5)
EOSINOPHIL # BLD AUTO: 0.06 K/UL — SIGNIFICANT CHANGE UP (ref 0–0.7)
EOSINOPHIL NFR BLD AUTO: 1.2 % — SIGNIFICANT CHANGE UP (ref 0–8)
GLUCOSE BLDC GLUCOMTR-MCNC: 112 MG/DL — HIGH (ref 70–99)
GLUCOSE BLDC GLUCOMTR-MCNC: 114 MG/DL — HIGH (ref 70–99)
GLUCOSE BLDC GLUCOMTR-MCNC: 121 MG/DL — HIGH (ref 70–99)
GLUCOSE BLDC GLUCOMTR-MCNC: 123 MG/DL — HIGH (ref 70–99)
GLUCOSE SERPL-MCNC: 112 MG/DL — HIGH (ref 70–99)
HCT VFR BLD CALC: 35.8 % — LOW (ref 42–52)
HGB BLD-MCNC: 11.4 G/DL — LOW (ref 14–18)
IMM GRANULOCYTES NFR BLD AUTO: 0.6 % — HIGH (ref 0.1–0.3)
LYMPHOCYTES # BLD AUTO: 0.97 K/UL — LOW (ref 1.2–3.4)
LYMPHOCYTES # BLD AUTO: 20.1 % — LOW (ref 20.5–51.1)
MAGNESIUM SERPL-MCNC: 1.7 MG/DL — LOW (ref 1.8–2.4)
MCHC RBC-ENTMCNC: 26.8 PG — LOW (ref 27–31)
MCHC RBC-ENTMCNC: 31.8 G/DL — LOW (ref 32–37)
MCV RBC AUTO: 84 FL — SIGNIFICANT CHANGE UP (ref 80–94)
MONOCYTES # BLD AUTO: 0.59 K/UL — SIGNIFICANT CHANGE UP (ref 0.1–0.6)
MONOCYTES NFR BLD AUTO: 12.2 % — HIGH (ref 1.7–9.3)
NEUTROPHILS # BLD AUTO: 3.16 K/UL — SIGNIFICANT CHANGE UP (ref 1.4–6.5)
NEUTROPHILS NFR BLD AUTO: 65.5 % — SIGNIFICANT CHANGE UP (ref 42.2–75.2)
PLATELET # BLD AUTO: 114 K/UL — LOW (ref 130–400)
POTASSIUM SERPL-MCNC: 4.8 MMOL/L — SIGNIFICANT CHANGE UP (ref 3.5–5)
POTASSIUM SERPL-SCNC: 4.8 MMOL/L — SIGNIFICANT CHANGE UP (ref 3.5–5)
RBC # BLD: 4.26 M/UL — LOW (ref 4.7–6.1)
RBC # FLD: 14.6 % — HIGH (ref 11.5–14.5)
SODIUM SERPL-SCNC: 142 MMOL/L — SIGNIFICANT CHANGE UP (ref 135–146)
VDRL CSF-TITR: NEGATIVE — SIGNIFICANT CHANGE UP
WBC # BLD: 4.83 K/UL — SIGNIFICANT CHANGE UP (ref 4.8–10.8)
WBC # FLD AUTO: 4.83 K/UL — SIGNIFICANT CHANGE UP (ref 4.8–10.8)

## 2018-11-21 RX ORDER — INSULIN GLARGINE 100 [IU]/ML
0 INJECTION, SOLUTION SUBCUTANEOUS
Qty: 0 | Refills: 0 | DISCHARGE
Start: 2018-11-21

## 2018-11-21 RX ORDER — INSULIN GLARGINE 100 [IU]/ML
20 INJECTION, SOLUTION SUBCUTANEOUS
Qty: 0 | Refills: 0 | COMMUNITY

## 2018-11-21 RX ORDER — INSULIN GLARGINE 100 [IU]/ML
60 INJECTION, SOLUTION SUBCUTANEOUS
Qty: 0 | Refills: 0 | DISCHARGE
Start: 2018-11-21

## 2018-11-21 RX ADMIN — Medication 10 UNIT(S): at 11:56

## 2018-11-21 RX ADMIN — Medication 100 MILLIGRAM(S): at 05:29

## 2018-11-21 RX ADMIN — HEPARIN SODIUM 5000 UNIT(S): 5000 INJECTION INTRAVENOUS; SUBCUTANEOUS at 05:26

## 2018-11-21 RX ADMIN — Medication 100 MILLIGRAM(S): at 05:34

## 2018-11-21 RX ADMIN — FINASTERIDE 5 MILLIGRAM(S): 5 TABLET, FILM COATED ORAL at 11:56

## 2018-11-21 RX ADMIN — Medication 100 MILLIGRAM(S): at 17:11

## 2018-11-21 RX ADMIN — Medication 10 UNIT(S): at 16:55

## 2018-11-21 RX ADMIN — Medication 650 MILLIGRAM(S): at 06:28

## 2018-11-21 RX ADMIN — TACROLIMUS 1 MILLIGRAM(S): 5 CAPSULE ORAL at 05:27

## 2018-11-21 RX ADMIN — Medication 200 MILLIGRAM(S): at 05:29

## 2018-11-21 RX ADMIN — Medication 100 MILLIGRAM(S): at 05:27

## 2018-11-21 RX ADMIN — Medication 200 MILLIGRAM(S): at 17:11

## 2018-11-21 RX ADMIN — TACROLIMUS 1 MILLIGRAM(S): 5 CAPSULE ORAL at 17:12

## 2018-11-21 RX ADMIN — Medication 100 MILLIGRAM(S): at 17:12

## 2018-11-21 RX ADMIN — PANTOPRAZOLE SODIUM 40 MILLIGRAM(S): 20 TABLET, DELAYED RELEASE ORAL at 06:28

## 2018-11-21 RX ADMIN — Medication 650 MILLIGRAM(S): at 14:12

## 2018-11-21 RX ADMIN — Medication 10 UNIT(S): at 08:55

## 2018-11-21 RX ADMIN — Medication 650 MILLIGRAM(S): at 14:32

## 2018-11-21 RX ADMIN — MYCOPHENOLATE MOFETIL 1000 MILLIGRAM(S): 250 CAPSULE ORAL at 17:13

## 2018-11-21 RX ADMIN — AMLODIPINE BESYLATE 5 MILLIGRAM(S): 2.5 TABLET ORAL at 05:29

## 2018-11-21 RX ADMIN — MYCOPHENOLATE MOFETIL 1000 MILLIGRAM(S): 250 CAPSULE ORAL at 05:30

## 2018-11-21 NOTE — DISCHARGE NOTE ADULT - PATIENT PORTAL LINK FT
You can access the [facility] Patient Portal, offered by [facility], by registering with the following website: [email or web address]

## 2018-11-21 NOTE — PROGRESS NOTE ADULT - ASSESSMENT
IMPRESSION:    DKA - resolved           PLAN:    CNS: no depressants back to his baseline     HEENT: Oral care    PULMONARY:  HOB @ 45 degrees    CARDIOVASCULAR: cardiology follow up     GI: GI prophylaxis.  Feedings    RENAL:  Follow up lytes.  continue meds follow renal as outpatient     INFECTIOUS DISEASE: agree PO Vantin per ID     HEMATOLOGICAL:  DVT prophylaxis.    ENDOCRINE:  Follow up FS q1h.  lantus and novolog   follow BMP       MUSCULOSKELETAL: bedrest for now.  downgrade to floor if ok by cardiology

## 2018-11-21 NOTE — DISCHARGE NOTE ADULT - MEDICATION SUMMARY - MEDICATIONS TO TAKE
I will START or STAY ON the medications listed below when I get home from the hospital:    finasteride 5 mg oral tablet  -- 1 tab(s) by mouth once a day MDD:one tab per day  -- Indication: For Benign prostatic hyperplasia with urinary frequency    spironolactone 25 mg oral tablet  -- 1 tab(s) by mouth once a day  -- Indication: For Hypertension, unspecified type    aspirin 81 mg oral tablet  -- 1 tab(s) by mouth once a day  -- Indication: For Heart transplant recipient    doxazosin 8 mg oral tablet  -- 1 tab(s) by mouth once a day  -- Indication: For Benign prostatic hyperplasia with urinary frequency    Lyrica 100 mg oral capsule  -- 1 cap(s) by mouth 2 times a day  -- Indication: For DKA (diabetic ketoacidoses)    HumaLOG 100 units/mL injectable solution  -- 5 unit(s) injectable 3 times a day (before meals)   -- Indication: For DKA (diabetic ketoacidoses)    Lantus 100 units/mL subcutaneous solution  -- 60 unit(s) subcutaneous once a day (at bedtime)  -- Indication: For DKA (diabetic ketoacidoses)    allopurinol 100 mg oral tablet  -- 1 tab(s) by mouth 2 times a day  -- Indication: For Gout    niacin 500 mg oral tablet  -- Indication: For Heart transplant recipient    atorvastatin 10 mg oral tablet  -- 1 tab(s) by mouth once a day (at bedtime)  -- Indication: For Heart transplant recipient    quiNINE 324 mg oral capsule  -- 2 cap(s) by mouth every 8 hours  -- Indication: For Heart transplant recipient    labetalol 100 mg oral tablet  -- 1 tab(s) by mouth 2 times a day MDD:2 tabs per day  -- Indication: For Hypertension, unspecified type    amLODIPine 5 mg oral tablet  -- 1 tab(s) by mouth once a day  -- Indication: For Hypertension, unspecified type    furosemide 40 mg oral tablet  -- 1 tab(s) by mouth once a day  -- Indication: For Hypertension, unspecified type    mycophenolate mofetil 500 mg oral tablet  -- Indication: For Heart transplant recipient    tacrolimus 1 mg oral capsule  -- cap(s) by mouth every 12 hours  -- Indication: For Heart transplant recipient    docusate sodium 100 mg oral capsule  -- 1 cap(s) by mouth 2 times a day  -- Indication: For Constipation    Innerclean oral tablet  -- 2 tab(s) by mouth once a day (at bedtime)  -- Indication: For Constipation    potassium chloride 10 mEq oral capsule, extended release  -- 1 cap(s) by mouth 2 times a day  -- Indication: For Low potassium    magnesium oxide 400 mg (241.3 mg elemental magnesium) oral tablet  -- 1 tab(s) by mouth 3 times a day (with meals) MDD:3 tabs  -- Indication: For Low magnesium    metaxalone 800 mg oral tablet  -- 1 tab(s) by mouth 3 times a day  -- Indication: For Muscle relaxant    ketorolac 0.5% ophthalmic solution  -- 1 drop(s) to each affected eye 4 times a day  -- Indication: For Pain reliever    pantoprazole 40 mg oral delayed release tablet  -- 1 tab(s) by mouth once a day  -- Indication: For GERD    Oysco 500 with D  -- Indication: For Low calcium

## 2018-11-21 NOTE — DISCHARGE NOTE ADULT - CARE PLAN
Principal Discharge DX:	Diabetic ketoacidosis without coma associated with type 2 diabetes mellitus  Goal:	DKA resolution, glucose control  Assessment and plan of treatment:	Please monitor your blood glucose and take your insulin as prescribed  Please follow up with your endocrinologist as soon as possible  Secondary Diagnosis:	Urinary tract infection without hematuria, site unspecified  Goal:	Sepsis, confusion resolved  Assessment and plan of treatment:	If symptoms return please return to the ER  Secondary Diagnosis:	HTN (hypertension)  Assessment and plan of treatment:	Please check your blood pressure at home, if BP is lower than 120/80 please do not take your BP medications  Please follow up with your PMD and cardiologist within 1 week  Secondary Diagnosis:	Heart transplant recipient  Assessment and plan of treatment:	Please continue your home medications as follow up with your cardiologist within 1-2 weeks

## 2018-11-21 NOTE — PROGRESS NOTE ADULT - ATTENDING COMMENTS
Attending Statement: I have personally performed a face to face diagnostic evaluation on this patient. I have written the above note pertaining to the history, exam and plan of care.
Agree with resident's note, HPI, PE, assessment and plan.  Pt was seen and examined independently.
Attending Statement: I have personally performed a face to face diagnostic evaluation on this patient. I have written the above note pertaining to the history, exam and plan of care.
Patient seen and examined independently. Agree with resident note with exceptions. Case discussed with housestaff, nursing and patient    Muscular thenar eminence pain - Doubt gout. Doesn't involve joint space. Outpt f/u with PMD if pain persists past 1 week. Hot or cold packs PRN    Discharge planning >30 mins spent coordinating discharge planning and direct patient encounter
Agree with resident's note, HPI, PE, assessment and plan.  Pt was seen and examined independently.

## 2018-11-21 NOTE — PROGRESS NOTE ADULT - SUBJECTIVE AND OBJECTIVE BOX
Patient is a 68y old  Male who presents with a chief complaint of Altered mental status (21 Nov 2018 06:12)      Over Night Events:  Patient seen and examined feel good off insulin wan to go home       ROS:  See HPI    PHYSICAL EXAM    ICU Vital Signs Last 24 Hrs  T(C): 36.3 (21 Nov 2018 07:00), Max: 36.8 (20 Nov 2018 09:00)  T(F): 97.4 (21 Nov 2018 07:00), Max: 98.3 (20 Nov 2018 09:00)  HR: 102 (21 Nov 2018 07:17) (93 - 102)  BP: 133/95 (21 Nov 2018 07:17) (115/74 - 160/88)  BP(mean): 108 (21 Nov 2018 07:17) (87 - 118)  ABP: --  ABP(mean): --  RR: 18 (21 Nov 2018 07:17) (18 - 20)  SpO2: 99% (20 Nov 2018 18:58) (99% - 99%)      General: Aox3  HEENT:      nick          Lymph Nodes: NO cervical LN   Lungs: Bilateral BS  Cardiovascular: Regular   Abdomen: Soft, Positive BS  Extremities: No clubbing   Skin: warm   Neurological: no focal deficit   Musculoskeletal: move all ext     I&O's Detail    20 Nov 2018 07:01  -  21 Nov 2018 07:00  --------------------------------------------------------  IN:    dextrose 5% + sodium chloride 0.45%.: 600 mL    insulin Infusion: 6 mL    IV PiggyBack: 50 mL    Oral Fluid: 240 mL  Total IN: 896 mL    OUT:    Voided: 2025 mL  Total OUT: 2025 mL    Total NET: -1129 mL          LABS:                          11.4   4.83  )-----------( 114      ( 21 Nov 2018 05:40 )             35.8         21 Nov 2018 05:40    142    |  108    |  16     ----------------------------<  112    4.8     |  23     |  1.3      Ca    8.7        21 Nov 2018 05:40  Mg     1.7       21 Nov 2018 05:40                                                                                                                                                                                                                                       MEDICATIONS  (STANDING):  allopurinol 100 milliGRAM(s) Oral two times a day  amLODIPine   Tablet 5 milliGRAM(s) Oral daily  atorvastatin 10 milliGRAM(s) Oral at bedtime  cefpodoxime 200 milliGRAM(s) Oral every 12 hours  dextrose 5%. 1000 milliLiter(s) (50 mL/Hr) IV Continuous <Continuous>  dextrose 50% Injectable 12.5 Gram(s) IV Push once  dextrose 50% Injectable 25 Gram(s) IV Push once  dextrose 50% Injectable 25 Gram(s) IV Push once  docusate sodium 100 milliGRAM(s) Oral two times a day  doxazosin 8 milliGRAM(s) Oral at bedtime  finasteride 5 milliGRAM(s) Oral daily  heparin  Injectable 5000 Unit(s) SubCutaneous every 12 hours  insulin glargine Injectable (LANTUS) 60 Unit(s) SubCutaneous at bedtime  insulin lispro (HumaLOG) corrective regimen sliding scale   SubCutaneous at bedtime  insulin lispro Injectable (HumaLOG) 10 Unit(s) SubCutaneous before breakfast  insulin lispro Injectable (HumaLOG) 10 Unit(s) SubCutaneous before lunch  insulin lispro Injectable (HumaLOG) 10 Unit(s) SubCutaneous before dinner  labetalol 100 milliGRAM(s) Oral two times a day  mycophenolate mofetil 1000 milliGRAM(s) Oral two times a day  pantoprazole    Tablet 40 milliGRAM(s) Oral before breakfast  pregabalin 100 milliGRAM(s) Oral two times a day  tacrolimus 1 milliGRAM(s) Oral every 12 hours    MEDICATIONS  (PRN):  acetaminophen   Tablet .. 650 milliGRAM(s) Oral every 6 hours PRN Moderate Pain (4 - 6)  dextrose 40% Gel 15 Gram(s) Oral once PRN Blood Glucose LESS THAN 70 milliGRAM(s)/deciliter  glucagon  Injectable 1 milliGRAM(s) IntraMuscular once PRN Glucose LESS THAN 70 milligrams/deciliter          Xrays:  TLC:  OG:  ET tube:                                                                                       ECHO:

## 2018-11-21 NOTE — PROGRESS NOTE ADULT - ASSESSMENT
L thumb pain . Tender not warm or swollen . Watch for gout . History gout . Ambulate with walker.  Out patient F/U when stable

## 2018-11-21 NOTE — DISCHARGE NOTE ADULT - PLAN OF CARE
DKA resolution, glucose control Please monitor your blood glucose and take your insulin as prescribed  Please follow up with your endocrinologist as soon as possible Sepsis, confusion resolved If symptoms return please return to the ER Please check your blood pressure at home, if BP is lower than 120/80 please do not take your BP medications  Please follow up with your PMD and cardiologist within 1 week Please continue your home medications as follow up with your cardiologist within 1-2 weeks

## 2018-11-21 NOTE — DISCHARGE NOTE ADULT - CARE PROVIDER_API CALL
Ced Montez), EndocrinologyMetabDiabetes; Internal Medicine  [address] [place] [address]  Phone: [phone]  Fax: [phone]    Cami Paulino), Internal Medicine  [address] [place] [address]  Phone: [phone]  Fax: [phone]    Marbin Fitzpatrick  Phone: (   )    -  Fax: (   )    -

## 2018-11-21 NOTE — DISCHARGE NOTE ADULT - SECONDARY DIAGNOSIS.
Urinary tract infection without hematuria, site unspecified HTN (hypertension) Heart transplant recipient

## 2018-11-21 NOTE — DISCHARGE NOTE ADULT - HOSPITAL COURSE
Patient admitted for AMS, sepsis, and DKA. Imaging and cultures including LP were negative, and patient was started on insulin drip and empiric antibiotics. Anion gap closed and patient returned to baseline, and patient will follow up with OP PMD, Cardiologist, and Endocrinologist.

## 2018-11-21 NOTE — PROGRESS NOTE ADULT - PROVIDER SPECIALTY LIST ADULT
Cardiology
Hospitalist
Infectious Disease
Infectious Disease
Internal Medicine
MICU
MICU
Neurology
Pulmonology
MICU
MICU
Cardiology
Pulmonology

## 2018-11-21 NOTE — PROGRESS NOTE ADULT - REASON FOR ADMISSION
Altered mental status
f/u confusion
Altered mental status

## 2018-11-21 NOTE — PROGRESS NOTE ADULT - SUBJECTIVE AND OBJECTIVE BOX
JULISA CONKLIN 68y Male  MRN#: [id number]   CODE STATUS: Full code      SUBJECTIVE  Patient is a 68y old Male who presents with a chief complaint of Altered mental status (21 Nov 2018 08:46)  Currently admitted to medicine with the primary diagnosis of DKA (diabetic ketoacidoses)  Hospital course has been complicated by DKA, Sepsis, AMS  Today is hospital day 5d, and this morning he is comfortable in the chair and reports no overnight events. Does complain of thumb pain which he attributes to gout.        OBJECTIVE  PAST MEDICAL & SURGICAL HISTORY  Urinary tract infection without hematuria, site unspecified  Chronic kidney disease, unspecified CKD stage  Bilateral hearing loss, unspecified hearing loss type  Benign prostatic hyperplasia with urinary frequency  Diabetes  High cholesterol  HTN (hypertension)  Heart transplant recipient  H/O heart transplant    ALLERGIES:  codeine (Unknown)    MEDICATIONS:  STANDING MEDICATIONS  allopurinol 100 milliGRAM(s) Oral two times a day  amLODIPine   Tablet 5 milliGRAM(s) Oral daily  atorvastatin 10 milliGRAM(s) Oral at bedtime  cefpodoxime 200 milliGRAM(s) Oral every 12 hours  dextrose 5%. 1000 milliLiter(s) IV Continuous <Continuous>  dextrose 50% Injectable 12.5 Gram(s) IV Push once  dextrose 50% Injectable 25 Gram(s) IV Push once  dextrose 50% Injectable 25 Gram(s) IV Push once  docusate sodium 100 milliGRAM(s) Oral two times a day  doxazosin 8 milliGRAM(s) Oral at bedtime  finasteride 5 milliGRAM(s) Oral daily  heparin  Injectable 5000 Unit(s) SubCutaneous every 12 hours  insulin glargine Injectable (LANTUS) 60 Unit(s) SubCutaneous at bedtime  insulin lispro (HumaLOG) corrective regimen sliding scale   SubCutaneous at bedtime  insulin lispro Injectable (HumaLOG) 10 Unit(s) SubCutaneous before breakfast  insulin lispro Injectable (HumaLOG) 10 Unit(s) SubCutaneous before lunch  insulin lispro Injectable (HumaLOG) 10 Unit(s) SubCutaneous before dinner  labetalol 100 milliGRAM(s) Oral two times a day  mycophenolate mofetil 1000 milliGRAM(s) Oral two times a day  pantoprazole    Tablet 40 milliGRAM(s) Oral before breakfast  pregabalin 100 milliGRAM(s) Oral two times a day  tacrolimus 1 milliGRAM(s) Oral every 12 hours    PRN MEDICATIONS  acetaminophen   Tablet .. 650 milliGRAM(s) Oral every 6 hours PRN  dextrose 40% Gel 15 Gram(s) Oral once PRN  glucagon  Injectable 1 milliGRAM(s) IntraMuscular once PRN      VITAL SIGNS: Last 24 Hours  T(C): 36 (21 Nov 2018 13:00), Max: 36.7 (20 Nov 2018 19:19)  T(F): 96.8 (21 Nov 2018 13:00), Max: 98.1 (20 Nov 2018 19:19)  HR: 96 (21 Nov 2018 13:00) (93 - 102)  BP: 133/69 (21 Nov 2018 13:00) (94/69 - 157/88)  BP(mean): 96 (21 Nov 2018 13:00) (72 - 117)  RR: 18 (21 Nov 2018 13:00) (18 - 20)  SpO2: 99% (20 Nov 2018 18:58) (99% - 99%)    LABS:                        11.4   4.83  )-----------( 114      ( 21 Nov 2018 05:40 )             35.8     11-21    142  |  108  |  16  ----------------------------<  112<H>  4.8   |  23  |  1.3    Ca    8.7      21 Nov 2018 05:40  Mg     1.7     11-21      PHYSICAL EXAM:    GENERAL: NAD, well-developed, AAOx3  HEENT:  Atraumatic, Normocephalic. EOMI, PERRLA, conjunctiva and sclera clear, No JVD  PULMONARY: Clear to auscultation bilaterally; No wheeze  CARDIOVASCULAR: Regular rate and rhythm; No murmurs, rubs, or gallops  GASTROINTESTINAL: Soft, Nontender, Nondistended; Bowel sounds present  MUSCULOSKELETAL:  2+ Peripheral Pulses, No clubbing, cyanosis, or edema. No joint swelling, erythema, warmth. Pain located on thenar eminence not joint.  NEUROLOGY: non-focal  SKIN: No rashes or lesions      ADMISSION SUMMARY  Patient is a 68y old Male who presents with a chief complaint of Altered mental status (21 Nov 2018 08:46)  Currently admitted to medicine with the primary diagnosis of DKA (diabetic ketoacidoses)  Hospital course has been complicated by DKA, sepsis, AMS       ASSESSMENT & PLAN    69 yo male with PMH that includes heart transplant on immunosuppressants, BPH, BL hearing loss, CKD, DM, DLD, HTN,recent CVA with microhemorrhages,  who came with with fever, confusion, and hyperglycemia. he was evaluated in the ER and admitted to the ICU for DKA and encephalopathy.       1) Fever and Encephalopathy, r/o sepsis in immunosuppressed patient. Likely 2/2 UTI although UA negative  - seen by ID, now on PO vantin 200 q12; will DC upon discharge  - f/u tacrolimus level 5.4  - AAOx3, back to baseline    2) Anemia/Thrombocytopenia: Stable  - trend CBC      3) DKA   - Now off insulin drip, c/w AC + HS  - Monitor FS, BMP for AG; most recently 12  - Endo eval appreciated      4) Heart transplant:   - back on his antirejection meds  - Cardio eval appreciated    Ppx/diet/dispo  - SQ heparin  - Diet as tolerated; carb consistent  - Full code, from home        (x) Discussion with patient and/or family regarding goals of care  (x) Discussed Case and Plan with Medical Attending, Name: Dr. Buchanan      # Planned Disposition: DC home today

## 2018-11-21 NOTE — PROGRESS NOTE ADULT - SUBJECTIVE AND OBJECTIVE BOX
Patient is a 68y old  Male who presents with a chief complaint of Altered mental status (20 Nov 2018 13:22)      T(F): 97.5 (11-21-18 @ 01:06), Max: 98.3 (11-20-18 @ 09:00)  HR: 93 (11-21-18 @ 03:00)  BP: 126/83 (11-21-18 @ 01:06)  RR: 20 (11-21-18 @ 03:00)  SpO2: 99% (11-20-18 @ 18:58) (99% - 99%)    PHYSICAL EXAM:  GENERAL: NAD, well-groomed, well-developed  HEAD:  Atraumatic, Normocephalic  EYES: EOMI, PERRLA, conjunctiva and sclera clear  ENMT: No tonsillar erythema, exudates, or enlargement; Moist mucous membranes, Good dentition, No lesions  NECK: Supple, No JVD, Normal thyroid  NERVOUS SYSTEM:  Alert & Oriented X3,  Motor Strength 5/5 B/L upper and lower extremities  CHEST/LUNG: Clear to percussion bilaterally; No rales, rhonchi, wheezing, or rubs  HEART: Regular rate and rhythm; No murmurs, rubs, or gallops  ABDOMEN: Soft, Nontender, Nondistended; Bowel sounds present  EXTREMITIES:   No clubbing, cyanosis, or edema  LYMPH: No lymphadenopathy noted  SKIN: No rashes or lesions    labs  11-20    138  |  105  |  14  ----------------------------<  137<H>  4.3   |  21  |  1.2    Ca    8.3<L>      20 Nov 2018 11:40  Mg     1.6     11-20                            11.8   4.49  )-----------( 99       ( 20 Nov 2018 05:17 )             37.0               acetaminophen   Tablet .. 650 milliGRAM(s) Oral every 6 hours PRN  allopurinol 100 milliGRAM(s) Oral two times a day  amLODIPine   Tablet 5 milliGRAM(s) Oral daily  atorvastatin 10 milliGRAM(s) Oral at bedtime  cefpodoxime 200 milliGRAM(s) Oral every 12 hours  dextrose 40% Gel 15 Gram(s) Oral once PRN  dextrose 5%. 1000 milliLiter(s) IV Continuous <Continuous>  dextrose 50% Injectable 12.5 Gram(s) IV Push once  dextrose 50% Injectable 25 Gram(s) IV Push once  dextrose 50% Injectable 25 Gram(s) IV Push once  docusate sodium 100 milliGRAM(s) Oral two times a day  doxazosin 8 milliGRAM(s) Oral at bedtime  finasteride 5 milliGRAM(s) Oral daily  glucagon  Injectable 1 milliGRAM(s) IntraMuscular once PRN  heparin  Injectable 5000 Unit(s) SubCutaneous every 12 hours  insulin glargine Injectable (LANTUS) 60 Unit(s) SubCutaneous at bedtime  insulin lispro (HumaLOG) corrective regimen sliding scale   SubCutaneous at bedtime  insulin lispro Injectable (HumaLOG) 10 Unit(s) SubCutaneous before breakfast  insulin lispro Injectable (HumaLOG) 10 Unit(s) SubCutaneous before lunch  insulin lispro Injectable (HumaLOG) 10 Unit(s) SubCutaneous before dinner  labetalol 100 milliGRAM(s) Oral two times a day  mycophenolate mofetil 1000 milliGRAM(s) Oral two times a day  pantoprazole    Tablet 40 milliGRAM(s) Oral before breakfast  pregabalin 100 milliGRAM(s) Oral two times a day  tacrolimus 1 milliGRAM(s) Oral every 12 hours

## 2018-11-21 NOTE — DISCHARGE NOTE ADULT - PROVIDER TOKENS
TOKEN:'[id number]',TOKEN:'[id number]',FREE:[LAST:[Nanette],FIRST:[Marbin],PHONE:[(   )    -],FAX:[(   )    -]]

## 2018-11-23 LAB
CULTURE RESULTS: SIGNIFICANT CHANGE UP
CULTURE RESULTS: SIGNIFICANT CHANGE UP
SPECIMEN SOURCE: SIGNIFICANT CHANGE UP
SPECIMEN SOURCE: SIGNIFICANT CHANGE UP
VIT B1 SERPL-MCNC: 87.8 NMOL/L — SIGNIFICANT CHANGE UP (ref 66.5–200)

## 2018-11-28 DIAGNOSIS — E11.00 TYPE 2 DIABETES MELLITUS WITH HYPEROSMOLARITY WITHOUT NONKETOTIC HYPERGLYCEMIC-HYPEROSMOLAR COMA (NKHHC): ICD-10-CM

## 2018-11-28 DIAGNOSIS — R80.9 PROTEINURIA, UNSPECIFIED: ICD-10-CM

## 2018-11-28 DIAGNOSIS — Z79.899 OTHER LONG TERM (CURRENT) DRUG THERAPY: ICD-10-CM

## 2018-11-28 DIAGNOSIS — M79.645 PAIN IN LEFT FINGER(S): ICD-10-CM

## 2018-11-28 DIAGNOSIS — D69.6 THROMBOCYTOPENIA, UNSPECIFIED: ICD-10-CM

## 2018-11-28 DIAGNOSIS — N18.3 CHRONIC KIDNEY DISEASE, STAGE 3 (MODERATE): ICD-10-CM

## 2018-11-28 DIAGNOSIS — I12.9 HYPERTENSIVE CHRONIC KIDNEY DISEASE WITH STAGE 1 THROUGH STAGE 4 CHRONIC KIDNEY DISEASE, OR UNSPECIFIED CHRONIC KIDNEY DISEASE: ICD-10-CM

## 2018-11-28 DIAGNOSIS — E78.5 HYPERLIPIDEMIA, UNSPECIFIED: ICD-10-CM

## 2018-11-28 DIAGNOSIS — N39.0 URINARY TRACT INFECTION, SITE NOT SPECIFIED: ICD-10-CM

## 2018-11-28 DIAGNOSIS — Z91.14 PATIENT'S OTHER NONCOMPLIANCE WITH MEDICATION REGIMEN: ICD-10-CM

## 2018-11-28 DIAGNOSIS — Z86.73 PERSONAL HISTORY OF TRANSIENT ISCHEMIC ATTACK (TIA), AND CEREBRAL INFARCTION WITHOUT RESIDUAL DEFICITS: ICD-10-CM

## 2018-11-28 DIAGNOSIS — E11.21 TYPE 2 DIABETES MELLITUS WITH DIABETIC NEPHROPATHY: ICD-10-CM

## 2018-11-28 DIAGNOSIS — N17.9 ACUTE KIDNEY FAILURE, UNSPECIFIED: ICD-10-CM

## 2018-11-28 DIAGNOSIS — A41.9 SEPSIS, UNSPECIFIED ORGANISM: ICD-10-CM

## 2018-11-28 DIAGNOSIS — N40.1 BENIGN PROSTATIC HYPERPLASIA WITH LOWER URINARY TRACT SYMPTOMS: ICD-10-CM

## 2018-11-28 DIAGNOSIS — E11.10 TYPE 2 DIABETES MELLITUS WITH KETOACIDOSIS WITHOUT COMA: ICD-10-CM

## 2018-11-28 DIAGNOSIS — R09.02 HYPOXEMIA: ICD-10-CM

## 2018-11-28 DIAGNOSIS — Z91.11 PATIENT'S NONCOMPLIANCE WITH DIETARY REGIMEN: ICD-10-CM

## 2018-11-28 DIAGNOSIS — E11.22 TYPE 2 DIABETES MELLITUS WITH DIABETIC CHRONIC KIDNEY DISEASE: ICD-10-CM

## 2018-11-28 DIAGNOSIS — D64.9 ANEMIA, UNSPECIFIED: ICD-10-CM

## 2018-11-28 DIAGNOSIS — H91.93 UNSPECIFIED HEARING LOSS, BILATERAL: ICD-10-CM

## 2018-11-28 DIAGNOSIS — G92 TOXIC ENCEPHALOPATHY: ICD-10-CM

## 2018-11-28 DIAGNOSIS — Z94.1 HEART TRANSPLANT STATUS: ICD-10-CM

## 2018-11-28 DIAGNOSIS — R35.0 FREQUENCY OF MICTURITION: ICD-10-CM

## 2019-01-05 LAB
CULTURE RESULTS: SIGNIFICANT CHANGE UP
SPECIMEN SOURCE: SIGNIFICANT CHANGE UP

## 2019-05-10 ENCOUNTER — EMERGENCY (EMERGENCY)
Facility: HOSPITAL | Age: 69
LOS: 0 days | Discharge: HOME | End: 2019-05-10
Attending: EMERGENCY MEDICINE | Admitting: EMERGENCY MEDICINE
Payer: COMMERCIAL

## 2019-05-10 VITALS
HEART RATE: 108 BPM | SYSTOLIC BLOOD PRESSURE: 135 MMHG | RESPIRATION RATE: 20 BRPM | OXYGEN SATURATION: 92 % | DIASTOLIC BLOOD PRESSURE: 90 MMHG

## 2019-05-10 VITALS
OXYGEN SATURATION: 94 % | HEART RATE: 98 BPM | RESPIRATION RATE: 18 BRPM | SYSTOLIC BLOOD PRESSURE: 136 MMHG | DIASTOLIC BLOOD PRESSURE: 82 MMHG | TEMPERATURE: 98 F

## 2019-05-10 DIAGNOSIS — Z94.1 HEART TRANSPLANT STATUS: Chronic | ICD-10-CM

## 2019-05-10 DIAGNOSIS — R06.02 SHORTNESS OF BREATH: ICD-10-CM

## 2019-05-10 DIAGNOSIS — Z79.1 LONG TERM (CURRENT) USE OF NON-STEROIDAL ANTI-INFLAMMATORIES (NSAID): ICD-10-CM

## 2019-05-10 DIAGNOSIS — Z79.84 LONG TERM (CURRENT) USE OF ORAL HYPOGLYCEMIC DRUGS: ICD-10-CM

## 2019-05-10 DIAGNOSIS — Z94.1 HEART TRANSPLANT STATUS: ICD-10-CM

## 2019-05-10 DIAGNOSIS — E78.5 HYPERLIPIDEMIA, UNSPECIFIED: ICD-10-CM

## 2019-05-10 DIAGNOSIS — R07.9 CHEST PAIN, UNSPECIFIED: ICD-10-CM

## 2019-05-10 DIAGNOSIS — E11.9 TYPE 2 DIABETES MELLITUS WITHOUT COMPLICATIONS: ICD-10-CM

## 2019-05-10 DIAGNOSIS — Z79.899 OTHER LONG TERM (CURRENT) DRUG THERAPY: ICD-10-CM

## 2019-05-10 DIAGNOSIS — Z79.52 LONG TERM (CURRENT) USE OF SYSTEMIC STEROIDS: ICD-10-CM

## 2019-05-10 DIAGNOSIS — I10 ESSENTIAL (PRIMARY) HYPERTENSION: ICD-10-CM

## 2019-05-10 DIAGNOSIS — Z79.83 LONG TERM (CURRENT) USE OF BISPHOSPHONATES: ICD-10-CM

## 2019-05-10 DIAGNOSIS — Z79.4 LONG TERM (CURRENT) USE OF INSULIN: ICD-10-CM

## 2019-05-10 DIAGNOSIS — J81.1 CHRONIC PULMONARY EDEMA: ICD-10-CM

## 2019-05-10 LAB
ALBUMIN SERPL ELPH-MCNC: 4 G/DL — SIGNIFICANT CHANGE UP (ref 3.5–5.2)
ALP SERPL-CCNC: 145 U/L — HIGH (ref 30–115)
ALT FLD-CCNC: 7 U/L — SIGNIFICANT CHANGE UP (ref 0–41)
ANION GAP SERPL CALC-SCNC: 16 MMOL/L — HIGH (ref 7–14)
AST SERPL-CCNC: 17 U/L — SIGNIFICANT CHANGE UP (ref 0–41)
BASE EXCESS BLDV CALC-SCNC: -0.4 MMOL/L — SIGNIFICANT CHANGE UP (ref -2–2)
BILIRUB SERPL-MCNC: 0.7 MG/DL — SIGNIFICANT CHANGE UP (ref 0.2–1.2)
BUN SERPL-MCNC: 26 MG/DL — HIGH (ref 10–20)
CA-I SERPL-SCNC: 1.2 MMOL/L — SIGNIFICANT CHANGE UP (ref 1.12–1.3)
CALCIUM SERPL-MCNC: 9.3 MG/DL — SIGNIFICANT CHANGE UP (ref 8.5–10.1)
CHLORIDE SERPL-SCNC: 107 MMOL/L — SIGNIFICANT CHANGE UP (ref 98–110)
CO2 SERPL-SCNC: 19 MMOL/L — SIGNIFICANT CHANGE UP (ref 17–32)
CREAT SERPL-MCNC: 1.7 MG/DL — HIGH (ref 0.7–1.5)
ERYTHROCYTE [SEDIMENTATION RATE] IN BLOOD: 10 MM/HR — SIGNIFICANT CHANGE UP (ref 0–10)
GAS PNL BLDV: 141 MMOL/L — SIGNIFICANT CHANGE UP (ref 136–145)
GAS PNL BLDV: SIGNIFICANT CHANGE UP
GLUCOSE SERPL-MCNC: 94 MG/DL — SIGNIFICANT CHANGE UP (ref 70–99)
HCO3 BLDV-SCNC: 25 MMOL/L — SIGNIFICANT CHANGE UP (ref 22–29)
HCT VFR BLD CALC: 46.6 % — SIGNIFICANT CHANGE UP (ref 42–52)
HCT VFR BLDA CALC: 47.6 % — HIGH (ref 34–44)
HGB BLD CALC-MCNC: 15.5 G/DL — SIGNIFICANT CHANGE UP (ref 14–18)
HGB BLD-MCNC: 14.7 G/DL — SIGNIFICANT CHANGE UP (ref 14–18)
LACTATE BLDV-MCNC: 1.5 MMOL/L — SIGNIFICANT CHANGE UP (ref 0.5–1.6)
MCHC RBC-ENTMCNC: 26.3 PG — LOW (ref 27–31)
MCHC RBC-ENTMCNC: 31.5 G/DL — LOW (ref 32–37)
MCV RBC AUTO: 83.5 FL — SIGNIFICANT CHANGE UP (ref 80–94)
NRBC # BLD: 0 /100 WBCS — SIGNIFICANT CHANGE UP (ref 0–0)
NT-PROBNP SERPL-SCNC: HIGH PG/ML (ref 0–300)
PCO2 BLDV: 42 MMHG — SIGNIFICANT CHANGE UP (ref 41–51)
PH BLDV: 7.38 — SIGNIFICANT CHANGE UP (ref 7.26–7.43)
PLATELET # BLD AUTO: 181 K/UL — SIGNIFICANT CHANGE UP (ref 130–400)
PO2 BLDV: 36 MMHG — SIGNIFICANT CHANGE UP (ref 20–40)
POTASSIUM BLDV-SCNC: 4.2 MMOL/L — SIGNIFICANT CHANGE UP (ref 3.3–5.6)
POTASSIUM SERPL-MCNC: 4.9 MMOL/L — SIGNIFICANT CHANGE UP (ref 3.5–5)
POTASSIUM SERPL-SCNC: 4.9 MMOL/L — SIGNIFICANT CHANGE UP (ref 3.5–5)
PROT SERPL-MCNC: 6.7 G/DL — SIGNIFICANT CHANGE UP (ref 6–8)
RBC # BLD: 5.58 M/UL — SIGNIFICANT CHANGE UP (ref 4.7–6.1)
RBC # FLD: 15.8 % — HIGH (ref 11.5–14.5)
SAO2 % BLDV: 63 % — SIGNIFICANT CHANGE UP
SODIUM SERPL-SCNC: 142 MMOL/L — SIGNIFICANT CHANGE UP (ref 135–146)
TROPONIN T SERPL-MCNC: 0.12 NG/ML — CRITICAL HIGH
WBC # BLD: 8.67 K/UL — SIGNIFICANT CHANGE UP (ref 4.8–10.8)
WBC # FLD AUTO: 8.67 K/UL — SIGNIFICANT CHANGE UP (ref 4.8–10.8)

## 2019-05-10 PROCEDURE — 71045 X-RAY EXAM CHEST 1 VIEW: CPT | Mod: 26

## 2019-05-10 PROCEDURE — 99291 CRITICAL CARE FIRST HOUR: CPT

## 2019-05-10 PROCEDURE — 93010 ELECTROCARDIOGRAM REPORT: CPT

## 2019-05-10 PROCEDURE — 93306 TTE W/DOPPLER COMPLETE: CPT | Mod: 26

## 2019-05-10 RX ORDER — LABETALOL HCL 100 MG
100 TABLET ORAL ONCE
Refills: 0 | Status: DISCONTINUED | OUTPATIENT
Start: 2019-05-10 | End: 2019-05-10

## 2019-05-10 RX ORDER — AMLODIPINE BESYLATE 2.5 MG/1
5 TABLET ORAL ONCE
Refills: 0 | Status: COMPLETED | OUTPATIENT
Start: 2019-05-10 | End: 2019-05-10

## 2019-05-10 RX ORDER — FUROSEMIDE 40 MG
60 TABLET ORAL ONCE
Refills: 0 | Status: COMPLETED | OUTPATIENT
Start: 2019-05-10 | End: 2019-05-10

## 2019-05-10 RX ADMIN — AMLODIPINE BESYLATE 5 MILLIGRAM(S): 2.5 TABLET ORAL at 19:45

## 2019-05-10 RX ADMIN — Medication 60 MILLIGRAM(S): at 14:51

## 2019-05-10 RX ADMIN — Medication 50 MILLIGRAM(S): at 16:57

## 2019-05-10 NOTE — ED PROVIDER NOTE - PHYSICAL EXAMINATION
CONSTITUTIONAL: well-appearing, in NAD  SKIN: Warm dry, normal skin turgor  HEAD: NCAT  EYES: EOMI, PERRLA, no scleral icterus, conjunctiva pink  ENT: normal pharynx with no erythema or exudates  NECK: Supple; non tender. Full ROM.  CARD: RRR, no murmurs.  RESP: RLL crackles. Dyspneic while speaking but saturating well.  ABD: soft, non-tender, + distended at baseline, no rebound or guarding.  EXT: Full ROM, no bony tenderness, + pedal edema, no calf tenderness  NEURO: normal motor. normal sensory.   PSYCH: Cooperative, appropriate.

## 2019-05-10 NOTE — ED PROVIDER NOTE - PROGRESS NOTE DETAILS
D/w CT Sx PA, pt should be transferred to hospital where transplant was performed or where his surgeon is. Will initiate transfer. Dr. Colunga w/ Dr. Orr and accepted the transfer to [place]. D/w Echo lab, they will be able to come down to do portable in 30 min. CASE D/W DR. MAHER, RECENT LAB WORK WITH LOW TACROLIMUS LEVEL AND WITH RECENT SXS, POSSIBLE REJECTION, WILL TRANSFER TO [place]. CASE D/W [facility] TRANSFER LINE. WILL ARRANGE FOR TRANSFER. D/w CT Sx PA, pt should be transferred to hospital where transplant was performed or where his surgeon is. Will initiate transfer. Dr. Colunga w/ Dr. Fitzpatrick and accepted the transfer to [place]. D/w Echo lab, they will be able to come down to do portable in 30 min. CASE D/W [place] TRANSFER LINE. WILL ARRANGE FOR TRANSFER. DR. CHAUDHARI ACCEPTING. I personally evaluated the patient. I reviewed the Resident’s or Physician Assistant’s note (as assigned above), and agree with the findings and plan except as documented in my note.   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 PT REPORTS COMPLIANCE WITH MEDICATIONS. NORMAL URINATION. + COUGH NONPRODUCTIVE. NO FEVER, CHILLS. NO N/V/D. VITALS NOTED. ALERT OX3 + TACHYPNEA. OP NORMAL. MMM. NECK SUPPLE. NO JVD. LUNGS CLEAR B/L. RRR. S1S2. S/P STERNOTOMY, ABD- SOFT NONTENDER. + B/L LEG EDEMA. CN 2-12 INTACT. NEURO EXAM NONFOCAL. abbie/renato Mccloud at transfer center [facility]. The patient is waiting for a bed and will be transferred to [address] [place] [address]. CASE SIGNED OUT TO DR. NIETO, PT AWAITING TRANSFER TO [place]. I, Dr. Murrieta, received signout from Dr. Colunga, pending transfer to [place] for ongoing management of concerning clinical picture for heart transplant rejection.

## 2019-05-10 NOTE — ED PROVIDER NOTE - CLINICAL SUMMARY MEDICAL DECISION MAKING FREE TEXT BOX
pw chest pain, SOB and ROSADO. Found to have signs of acute rejection of heart transplant. Steroids and lasix given. Accepted in transfer to [place]. Patient picked up by transport without events in the ED.

## 2019-05-10 NOTE — ED PROVIDER NOTE - CARE PLAN
Principal Discharge DX:	Chest pain  Secondary Diagnosis:	Pulmonary edema  Secondary Diagnosis:	Heart transplant recipient

## 2019-05-10 NOTE — ED PROVIDER NOTE - NS ED ROS FT
Constitutional:  (-) fever, (-) chills, (-) lethargy  Eyes:  (-) eye pain (-) visual changes  ENMT: (-) nasal discharge, (-) sore throat. (-) neck pain or stiffness  Cardiac: (+) chest pain (-) palpitations  Respiratory:  (+) cough (+) ROSADO.  GI:  (-) nausea (-) vomiting (-) diarrhea (-) abdominal pain.  :  (-) dysuria (-) frequency (-) burning.  MS:  (-) back pain (-) joint pain.  Neuro:  (-) headache (-) numbness (-) tingling (-) focal weakness  Skin:  (-) rash  Except as documented in the HPI,  all other systems are negative

## 2019-05-10 NOTE — ED PROVIDER NOTE - OBJECTIVE STATEMENT
68 y.o M w/ PMHx heart transplant 11 years ago following Dr. Fitzpatrick at [facility] on Tacrolimus, HTN, DM, HLD, BPH p/w CP, ROSADO, cough productive of clear sputum x 1 week. Pt was getting pre op testing for stents in R arm for peripheral vascular disease. Pts transplant center reported that certain antibodies were elevated and he needed to go to ED for evaluation. Otherwise, no fevers, no recent travel, no cough, no N/V, no lightheadedness, no abd pain.

## 2019-05-10 NOTE — ED ADULT NURSE NOTE - NSFALLRSKHRMRISKTYPE_ED_ALL_ED
surgery(72hr postop, recent leg amputee, sig. abd/thor surg)/bones(Osteoporosis,prev fx,steroid use,metastatic bone ca)

## 2019-06-23 ENCOUNTER — INPATIENT (INPATIENT)
Facility: HOSPITAL | Age: 69
LOS: 0 days | Discharge: ANOTHER HOSPITAL | End: 2019-06-24
Attending: INTERNAL MEDICINE | Admitting: INTERNAL MEDICINE
Payer: COMMERCIAL

## 2019-06-23 VITALS
TEMPERATURE: 98 F | OXYGEN SATURATION: 97 % | DIASTOLIC BLOOD PRESSURE: 87 MMHG | SYSTOLIC BLOOD PRESSURE: 120 MMHG | WEIGHT: 197.09 LBS | HEART RATE: 96 BPM | HEIGHT: 68 IN | RESPIRATION RATE: 19 BRPM

## 2019-06-23 DIAGNOSIS — Z94.1 HEART TRANSPLANT STATUS: Chronic | ICD-10-CM

## 2019-06-23 LAB
ALBUMIN SERPL ELPH-MCNC: 3.9 G/DL — SIGNIFICANT CHANGE UP (ref 3.5–5.2)
ALP SERPL-CCNC: 111 U/L — SIGNIFICANT CHANGE UP (ref 30–115)
ALT FLD-CCNC: 12 U/L — SIGNIFICANT CHANGE UP (ref 0–41)
ANION GAP SERPL CALC-SCNC: 11 MMOL/L — SIGNIFICANT CHANGE UP (ref 7–14)
APTT BLD: 28.9 SEC — SIGNIFICANT CHANGE UP (ref 27–39.2)
AST SERPL-CCNC: 28 U/L — SIGNIFICANT CHANGE UP (ref 0–41)
BILIRUB SERPL-MCNC: 0.3 MG/DL — SIGNIFICANT CHANGE UP (ref 0.2–1.2)
BUN SERPL-MCNC: 50 MG/DL — HIGH (ref 10–20)
CALCIUM SERPL-MCNC: 9.4 MG/DL — SIGNIFICANT CHANGE UP (ref 8.5–10.1)
CHLORIDE SERPL-SCNC: 104 MMOL/L — SIGNIFICANT CHANGE UP (ref 98–110)
CO2 SERPL-SCNC: 24 MMOL/L — SIGNIFICANT CHANGE UP (ref 17–32)
CREAT SERPL-MCNC: 2.4 MG/DL — HIGH (ref 0.7–1.5)
GLUCOSE SERPL-MCNC: 234 MG/DL — HIGH (ref 70–99)
HCT VFR BLD CALC: 41.6 % — LOW (ref 42–52)
HGB BLD-MCNC: 13.3 G/DL — LOW (ref 14–18)
INR BLD: 1.11 RATIO — SIGNIFICANT CHANGE UP (ref 0.65–1.3)
MCHC RBC-ENTMCNC: 26.3 PG — LOW (ref 27–31)
MCHC RBC-ENTMCNC: 32 G/DL — SIGNIFICANT CHANGE UP (ref 32–37)
MCV RBC AUTO: 82.2 FL — SIGNIFICANT CHANGE UP (ref 80–94)
NT-PROBNP SERPL-SCNC: 4800 PG/ML — HIGH (ref 0–300)
POTASSIUM SERPL-MCNC: 6.4 MMOL/L — CRITICAL HIGH (ref 3.5–5)
POTASSIUM SERPL-SCNC: 6.4 MMOL/L — CRITICAL HIGH (ref 3.5–5)
PROT SERPL-MCNC: 6.5 G/DL — SIGNIFICANT CHANGE UP (ref 6–8)
PROTHROM AB SERPL-ACNC: 12.8 SEC — SIGNIFICANT CHANGE UP (ref 9.95–12.87)
RBC # BLD: 5.06 M/UL — SIGNIFICANT CHANGE UP (ref 4.7–6.1)
RBC # FLD: 16.4 % — HIGH (ref 11.5–14.5)
SODIUM SERPL-SCNC: 139 MMOL/L — SIGNIFICANT CHANGE UP (ref 135–146)
TROPONIN T SERPL-MCNC: 0.03 NG/ML — CRITICAL HIGH
WBC # BLD: 7.15 K/UL — SIGNIFICANT CHANGE UP (ref 4.8–10.8)
WBC # FLD AUTO: 7.15 K/UL — SIGNIFICANT CHANGE UP (ref 4.8–10.8)

## 2019-06-23 PROCEDURE — 71046 X-RAY EXAM CHEST 2 VIEWS: CPT | Mod: 26

## 2019-06-23 PROCEDURE — 71045 X-RAY EXAM CHEST 1 VIEW: CPT | Mod: 26

## 2019-06-23 PROCEDURE — 99285 EMERGENCY DEPT VISIT HI MDM: CPT | Mod: 25

## 2019-06-23 RX ORDER — MYCOPHENOLATE MOFETIL 250 MG/1
0 CAPSULE ORAL
Qty: 0 | Refills: 0 | DISCHARGE

## 2019-06-23 RX ORDER — ALLOPURINOL 300 MG
1 TABLET ORAL
Qty: 0 | Refills: 0 | DISCHARGE

## 2019-06-23 RX ORDER — KETOROLAC TROMETHAMINE 0.5 %
1 DROPS OPHTHALMIC (EYE)
Qty: 0 | Refills: 0 | DISCHARGE

## 2019-06-23 RX ORDER — TACROLIMUS 5 MG/1
0 CAPSULE ORAL
Qty: 0 | Refills: 0 | DISCHARGE

## 2019-06-23 RX ORDER — SPIRONOLACTONE 25 MG/1
1 TABLET, FILM COATED ORAL
Qty: 0 | Refills: 0 | DISCHARGE

## 2019-06-23 NOTE — ED PROVIDER NOTE - OBJECTIVE STATEMENT
70 yo M with PMHx of HTN, HLD, DM, CKD, heart transplant 11 years ago on Cellcept/Tacrolimus/Prednisone (follows with Dr. Fitzpatrick--[facility]), and BPH presents to the ED c/o diffuse body pains, left sided chest pain, worsening lower extremity edema and ROSADO. Pt states he has felt symptoms for about 1 month and today they worsened. Pt states he has no energy to walk and when he does he feels SOB. Pt states he has been complaint with medications. Pt was seen here recently and was thought to be in acute rejection--was transferred to [facility] and had cardiac biopsy which did not show rejection. At that time pt was also poorly compliant with meds. Pt denies other complaints. Pt denies fever, chills, nausea, vomiting, abdominal pain, diarrhea, headache, dizziness, back pain, LOC, trauma, urinary symptoms, cough.

## 2019-06-23 NOTE — ED PROVIDER NOTE - ATTENDING CONTRIBUTION TO CARE
I personally evaluated the patient. I reviewed the Resident’s or Physician Assistant’s note (as assigned above), and agree with the findings and plan except as documented in my note.  Chart reviewed. H/O cardiac transplant, DM, HTN, CKD, presents with SOB, chest pain, diffuse aches and weakness. Was recently discharged from [facility]. Exam shows alert patient in no distress, HEENT NCAT, lungs +bibasilar rales, RR S1S2, abdomen soft Nt +BS, +leg edema. Will order labs, EKG, CXR, transplant consult and re-assess.

## 2019-06-23 NOTE — ED PROVIDER NOTE - CARE PLAN
Principal Discharge DX:	Chest pain  Secondary Diagnosis:	SOB (shortness of breath) on exertion  Secondary Diagnosis:	Edema

## 2019-06-23 NOTE — ED ADULT NURSE NOTE - NSIMPLEMENTINTERV_GEN_ALL_ED
Implemented All Universal Safety Interventions:  [place] to call system. Call bell, personal items and telephone within reach. Instruct patient to call for assistance. Room bathroom lighting operational. Non-slip footwear when patient is off stretcher. Physically safe environment: no spills, clutter or unnecessary equipment. Stretcher in lowest position, wheels locked, appropriate side rails in place.

## 2019-06-23 NOTE — ED ADULT NURSE NOTE - CHIEF COMPLAINT
The patient is a 69y Male complaining of The patient is a 69y Male complaining of weakness in legs and off balance

## 2019-06-23 NOTE — ED PROVIDER NOTE - CLINICAL SUMMARY MEDICAL DECISION MAKING FREE TEXT BOX
Labs noted for elevated BNP and mildly elevated trop. EKG non-specific changes. CXR CHF. Discussed case with transplant MD who will arrange for transfer in AM. Given Lasix. Will admit to tele.

## 2019-06-23 NOTE — ED PROVIDER NOTE - PHYSICAL EXAMINATION
VITAL SIGNS: I have reviewed nursing notes and confirm.  CONSTITUTIONAL: Well-developed; well-nourished; in no acute distress.  SKIN: Skin exam is warm and dry, no acute rash.  HEAD: Normocephalic; atraumatic.  EYES: Conjunctiva and sclera clear.  ENT: No nasal discharge; airway clear.   NECK: Supple; non tender.  CARD: S1, S2 normal; no murmurs, gallops, or rubs. Regular rate and rhythm.  RESP: No wheezes or rhonchi. Speaking in full sentences. (+) bibasilar rales. No accessory muscle use.   ABD: Normal bowel sounds; soft; non-distended; non-tender; No rebound or guarding. No CVA tenderness.  EXT: Normal ROM. 1+ LE edema. DP 2+ B/L.   NEURO: Alert, oriented. Grossly unremarkable. No focal deficits.

## 2019-06-23 NOTE — ED PROVIDER NOTE - PROGRESS NOTE DETAILS
Discussed case with [facility] transfer center and Dr. Gutierrez/Dr. Anand covering for Dr. Fitzpatrick (pt's cardiologist)--accept transfer to [facility].  Currently there are no beds available-transfer center will call back in AM with availability and transfer pt then.     Spoke with Dr. Santoro, pt approved for low-risk tele.     Discussed case with Dr. Chavez, aware of admission.

## 2019-06-23 NOTE — ED PROVIDER NOTE - NS ED ROS FT
Review of Systems  Constitutional:  No fever, chills.  Eyes:  No visual changes, eye pain, or discharge.  ENMT:  No hearing changes, pain, or discharge. No nasal congestion, discharge, or bleeding. No throat pain, swelling, or difficulty swallowing.  Cardiac:  No palpitations, syncope. (+) edema, chest pain  Respiratory:  No cough. No hemoptysis. (+) ROSADO  GI:  No nausea, vomiting, diarrhea, or abdominal pain.   :  No dysuria, hematuria, frequency, or burning.   MS:  No back pain.  Skin:  No skin rash, pruritis, jaundice, or lesions.  Neuro:  No headache, dizziness, loss of sensation, or focal weakness.  No change in mental status.   Endocrine: (+) DM

## 2019-06-24 ENCOUNTER — TRANSCRIPTION ENCOUNTER (OUTPATIENT)
Age: 69
End: 2019-06-24

## 2019-06-24 VITALS
RESPIRATION RATE: 16 BRPM | DIASTOLIC BLOOD PRESSURE: 94 MMHG | HEART RATE: 88 BPM | SYSTOLIC BLOOD PRESSURE: 141 MMHG | TEMPERATURE: 97 F

## 2019-06-24 DIAGNOSIS — Z94.1 HEART TRANSPLANT STATUS: ICD-10-CM

## 2019-06-24 DIAGNOSIS — N18.9 CHRONIC KIDNEY DISEASE, UNSPECIFIED: ICD-10-CM

## 2019-06-24 DIAGNOSIS — N40.1 BENIGN PROSTATIC HYPERPLASIA WITH LOWER URINARY TRACT SYMPTOMS: ICD-10-CM

## 2019-06-24 DIAGNOSIS — R06.02 SHORTNESS OF BREATH: ICD-10-CM

## 2019-06-24 DIAGNOSIS — E11.9 TYPE 2 DIABETES MELLITUS WITHOUT COMPLICATIONS: ICD-10-CM

## 2019-06-24 DIAGNOSIS — I10 ESSENTIAL (PRIMARY) HYPERTENSION: ICD-10-CM

## 2019-06-24 DIAGNOSIS — E78.00 PURE HYPERCHOLESTEROLEMIA, UNSPECIFIED: ICD-10-CM

## 2019-06-24 DIAGNOSIS — R07.9 CHEST PAIN, UNSPECIFIED: ICD-10-CM

## 2019-06-24 LAB
BASE EXCESS BLDV CALC-SCNC: 1.8 MMOL/L — SIGNIFICANT CHANGE UP (ref -2–2)
BASOPHILS # BLD AUTO: 0 K/UL — SIGNIFICANT CHANGE UP (ref 0–0.2)
BASOPHILS NFR BLD AUTO: 0 % — SIGNIFICANT CHANGE UP (ref 0–1)
CA-I SERPL-SCNC: 1.25 MMOL/L — SIGNIFICANT CHANGE UP (ref 1.12–1.3)
EOSINOPHIL # BLD AUTO: 0.07 K/UL — SIGNIFICANT CHANGE UP (ref 0–0.7)
EOSINOPHIL NFR BLD AUTO: 1 % — SIGNIFICANT CHANGE UP (ref 0–8)
GAS PNL BLDV: 142 MMOL/L — SIGNIFICANT CHANGE UP (ref 136–145)
GAS PNL BLDV: SIGNIFICANT CHANGE UP
GLUCOSE BLDC GLUCOMTR-MCNC: 265 MG/DL — HIGH (ref 70–99)
GLUCOSE BLDC GLUCOMTR-MCNC: 273 MG/DL — HIGH (ref 70–99)
GLUCOSE BLDC GLUCOMTR-MCNC: 330 MG/DL — HIGH (ref 70–99)
GLUCOSE BLDC GLUCOMTR-MCNC: 342 MG/DL — HIGH (ref 70–99)
HCO3 BLDV-SCNC: 27 MMOL/L — SIGNIFICANT CHANGE UP (ref 22–29)
HCT VFR BLDA CALC: 37.5 % — SIGNIFICANT CHANGE UP (ref 34–44)
HGB BLD CALC-MCNC: 12.2 G/DL — LOW (ref 14–18)
HOROWITZ INDEX BLDV+IHG-RTO: 21 — SIGNIFICANT CHANGE UP
LACTATE BLDV-MCNC: 1 MMOL/L — SIGNIFICANT CHANGE UP (ref 0.5–1.6)
LYMPHOCYTES # BLD AUTO: 1.29 K/UL — SIGNIFICANT CHANGE UP (ref 1.2–3.4)
LYMPHOCYTES # BLD AUTO: 18 % — LOW (ref 20.5–51.1)
METAMYELOCYTES # FLD: 1 % — HIGH (ref 0–0)
MONOCYTES # BLD AUTO: 0.57 K/UL — SIGNIFICANT CHANGE UP (ref 0.1–0.6)
MONOCYTES NFR BLD AUTO: 8 % — SIGNIFICANT CHANGE UP (ref 1.7–9.3)
NEUTROPHILS # BLD AUTO: 5.15 K/UL — SIGNIFICANT CHANGE UP (ref 1.4–6.5)
NEUTROPHILS NFR BLD AUTO: 70 % — SIGNIFICANT CHANGE UP (ref 42.2–75.2)
NEUTS BAND # BLD: 2 % — SIGNIFICANT CHANGE UP (ref 0–6)
NRBC # BLD: 0 /100 — SIGNIFICANT CHANGE UP (ref 0–0)
NRBC # BLD: SIGNIFICANT CHANGE UP /100 WBCS (ref 0–0)
PCO2 BLDV: 45 MMHG — SIGNIFICANT CHANGE UP (ref 41–51)
PH BLDV: 7.39 — SIGNIFICANT CHANGE UP (ref 7.26–7.43)
PLAT MORPH BLD: NORMAL — SIGNIFICANT CHANGE UP
PLATELET # BLD AUTO: 84 K/UL — LOW (ref 130–400)
PO2 BLDV: 61 MMHG — HIGH (ref 20–40)
POTASSIUM BLDV-SCNC: 4.1 MMOL/L — SIGNIFICANT CHANGE UP (ref 3.3–5.6)
RBC BLD AUTO: NORMAL — SIGNIFICANT CHANGE UP
SAO2 % BLDV: 92 % — SIGNIFICANT CHANGE UP
TACROLIMUS SERPL-MCNC: 19.6 NG/ML — SIGNIFICANT CHANGE UP

## 2019-06-24 RX ORDER — FUROSEMIDE 40 MG
40 TABLET ORAL DAILY
Refills: 0 | Status: DISCONTINUED | OUTPATIENT
Start: 2019-06-24 | End: 2019-06-24

## 2019-06-24 RX ORDER — DOXAZOSIN MESYLATE 4 MG
8 TABLET ORAL DAILY
Refills: 0 | Status: DISCONTINUED | OUTPATIENT
Start: 2019-06-24 | End: 2019-06-24

## 2019-06-24 RX ORDER — TACROLIMUS 5 MG/1
2 CAPSULE ORAL EVERY 12 HOURS
Refills: 0 | Status: DISCONTINUED | OUTPATIENT
Start: 2019-06-24 | End: 2019-06-24

## 2019-06-24 RX ORDER — INSULIN LISPRO 100/ML
5 VIAL (ML) SUBCUTANEOUS
Refills: 0 | Status: DISCONTINUED | OUTPATIENT
Start: 2019-06-24 | End: 2019-06-24

## 2019-06-24 RX ORDER — PREGABALIN 225 MG/1
1000 CAPSULE ORAL DAILY
Refills: 0 | Status: DISCONTINUED | OUTPATIENT
Start: 2019-06-24 | End: 2019-06-24

## 2019-06-24 RX ORDER — NIACIN 50 MG
500 TABLET ORAL AT BEDTIME
Refills: 0 | Status: DISCONTINUED | OUTPATIENT
Start: 2019-06-24 | End: 2019-06-24

## 2019-06-24 RX ORDER — FUROSEMIDE 40 MG
40 TABLET ORAL ONCE
Refills: 0 | Status: COMPLETED | OUTPATIENT
Start: 2019-06-24 | End: 2019-06-24

## 2019-06-24 RX ORDER — TACROLIMUS 5 MG/1
1 CAPSULE ORAL
Qty: 0 | Refills: 0 | DISCHARGE
Start: 2019-06-24

## 2019-06-24 RX ORDER — ASPIRIN/CALCIUM CARB/MAGNESIUM 324 MG
81 TABLET ORAL DAILY
Refills: 0 | Status: DISCONTINUED | OUTPATIENT
Start: 2019-06-24 | End: 2019-06-24

## 2019-06-24 RX ORDER — TAMSULOSIN HYDROCHLORIDE 0.4 MG/1
0.4 CAPSULE ORAL AT BEDTIME
Refills: 0 | Status: DISCONTINUED | OUTPATIENT
Start: 2019-06-24 | End: 2019-06-24

## 2019-06-24 RX ORDER — AMLODIPINE BESYLATE 2.5 MG/1
5 TABLET ORAL DAILY
Refills: 0 | Status: DISCONTINUED | OUTPATIENT
Start: 2019-06-24 | End: 2019-06-24

## 2019-06-24 RX ORDER — OMEPRAZOLE 10 MG/1
1 CAPSULE, DELAYED RELEASE ORAL
Qty: 0 | Refills: 0 | DISCHARGE

## 2019-06-24 RX ORDER — TACROLIMUS 5 MG/1
2 CAPSULE ORAL
Qty: 0 | Refills: 0 | DISCHARGE
Start: 2019-06-24

## 2019-06-24 RX ORDER — POTASSIUM CHLORIDE 20 MEQ
10 PACKET (EA) ORAL
Refills: 0 | Status: DISCONTINUED | OUTPATIENT
Start: 2019-06-24 | End: 2019-06-24

## 2019-06-24 RX ORDER — PANTOPRAZOLE SODIUM 20 MG/1
40 TABLET, DELAYED RELEASE ORAL
Refills: 0 | Status: DISCONTINUED | OUTPATIENT
Start: 2019-06-24 | End: 2019-06-24

## 2019-06-24 RX ORDER — INSULIN GLARGINE 100 [IU]/ML
60 INJECTION, SOLUTION SUBCUTANEOUS AT BEDTIME
Refills: 0 | Status: DISCONTINUED | OUTPATIENT
Start: 2019-06-24 | End: 2019-06-24

## 2019-06-24 RX ORDER — MYCOPHENOLATE MOFETIL 250 MG/1
1000 CAPSULE ORAL
Refills: 0 | Status: DISCONTINUED | OUTPATIENT
Start: 2019-06-24 | End: 2019-06-24

## 2019-06-24 RX ORDER — MAGNESIUM OXIDE 400 MG ORAL TABLET 241.3 MG
400 TABLET ORAL
Refills: 0 | Status: DISCONTINUED | OUTPATIENT
Start: 2019-06-24 | End: 2019-06-24

## 2019-06-24 RX ORDER — QUININE SULFATE 324 MG/1
648 CAPSULE ORAL EVERY 8 HOURS
Refills: 0 | Status: DISCONTINUED | OUTPATIENT
Start: 2019-06-24 | End: 2019-06-24

## 2019-06-24 RX ADMIN — MAGNESIUM OXIDE 400 MG ORAL TABLET 400 MILLIGRAM(S): 241.3 TABLET ORAL at 11:36

## 2019-06-24 RX ADMIN — Medication 10 MILLIEQUIVALENT(S): at 17:08

## 2019-06-24 RX ADMIN — MYCOPHENOLATE MOFETIL 1000 MILLIGRAM(S): 250 CAPSULE ORAL at 06:35

## 2019-06-24 RX ADMIN — Medication 81 MILLIGRAM(S): at 11:36

## 2019-06-24 RX ADMIN — PANTOPRAZOLE SODIUM 40 MILLIGRAM(S): 20 TABLET, DELAYED RELEASE ORAL at 06:36

## 2019-06-24 RX ADMIN — Medication 40 MILLIGRAM(S): at 03:07

## 2019-06-24 RX ADMIN — Medication 5 UNIT(S): at 08:24

## 2019-06-24 RX ADMIN — Medication 8 MILLIGRAM(S): at 06:36

## 2019-06-24 RX ADMIN — Medication 5 UNIT(S): at 11:36

## 2019-06-24 RX ADMIN — TACROLIMUS 2 MILLIGRAM(S): 5 CAPSULE ORAL at 17:04

## 2019-06-24 RX ADMIN — PREGABALIN 1000 MICROGRAM(S): 225 CAPSULE ORAL at 11:36

## 2019-06-24 RX ADMIN — AMLODIPINE BESYLATE 5 MILLIGRAM(S): 2.5 TABLET ORAL at 06:36

## 2019-06-24 RX ADMIN — Medication 2.5 MILLIGRAM(S): at 06:37

## 2019-06-24 RX ADMIN — INSULIN GLARGINE 60 UNIT(S): 100 INJECTION, SOLUTION SUBCUTANEOUS at 23:29

## 2019-06-24 RX ADMIN — TAMSULOSIN HYDROCHLORIDE 0.4 MILLIGRAM(S): 0.4 CAPSULE ORAL at 23:29

## 2019-06-24 RX ADMIN — MAGNESIUM OXIDE 400 MG ORAL TABLET 400 MILLIGRAM(S): 241.3 TABLET ORAL at 08:24

## 2019-06-24 RX ADMIN — Medication 5 UNIT(S): at 16:31

## 2019-06-24 RX ADMIN — Medication 100 MILLIGRAM(S): at 06:35

## 2019-06-24 RX ADMIN — Medication 100 MILLIGRAM(S): at 17:09

## 2019-06-24 RX ADMIN — Medication 10 MILLIEQUIVALENT(S): at 06:34

## 2019-06-24 RX ADMIN — Medication 40 MILLIGRAM(S): at 06:35

## 2019-06-24 RX ADMIN — MAGNESIUM OXIDE 400 MG ORAL TABLET 400 MILLIGRAM(S): 241.3 TABLET ORAL at 16:31

## 2019-06-24 RX ADMIN — Medication 1 TABLET(S): at 11:36

## 2019-06-24 RX ADMIN — MYCOPHENOLATE MOFETIL 1000 MILLIGRAM(S): 250 CAPSULE ORAL at 17:03

## 2019-06-24 NOTE — H&P ADULT - NEUROLOGICAL DETAILS
hearing impaired/responds to verbal commands/sensation intact/deep reflexes intact/alert and oriented x 3

## 2019-06-24 NOTE — H&P ADULT - NSHPLABSRESULTS_GEN_ALL_CORE
13.3   7.15  )-----------( 84       ( 23 Jun 2019 22:50 )             41.6     06-23    139  |  104  |  50<H>  ----------------------------<  234<H>  6.4<HH>   |  24  |  2.4<H>    Ca    9.4      23 Jun 2019 22:50    TPro  6.5  /  Alb  3.9  /  TBili  0.3  /  DBili  x   /  AST  28  /  ALT  12  /  AlkPhos  111  06-23            PT/INR - ( 23 Jun 2019 22:50 )   PT: 12.80 sec;   INR: 1.11 ratio         PTT - ( 23 Jun 2019 22:50 )  PTT:28.9 sec  Lactate Trend    CARDIAC MARKERS ( 23 Jun 2019 22:50 )  x     / 0.03 ng/mL / x     / x     / x          CAPILLARY BLOOD GLUCOSE

## 2019-06-24 NOTE — H&P ADULT - NSHPREVIEWOFSYSTEMS_GEN_ALL_CORE
· Review of Systems: Review of Systems  	Constitutional:  No fever, chills.  	Eyes:  No visual changes, eye pain, or discharge.  	ENMT:  No hearing changes, pain, or discharge. No nasal congestion, discharge, or bleeding. No throat pain, swelling, or difficulty swallowing.  	Cardiac:  No palpitations, syncope. (+) edema, chest pain  	Respiratory:  No cough. No hemoptysis. (+) ROSADO  	GI:  No nausea, vomiting, diarrhea, or abdominal pain.   	:  No dysuria, hematuria, frequency, or burning.   	MS:  No back pain.  	Skin:  No skin rash, pruritis, jaundice, or lesions.  	Neuro:  No headache, dizziness, loss of sensation, or focal weakness.  No change in mental status.   Endocrine: (+) DM

## 2019-06-24 NOTE — DISCHARGE NOTE PROVIDER - NSDCCPCAREPLAN_GEN_ALL_CORE_FT
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

## 2019-06-24 NOTE — DISCHARGE NOTE PROVIDER - HOSPITAL COURSE
HPI:     68 yo M with PMHx of HTN, HLD, DM, CKD, heart transplant 11 years ago on Cellcept/Tacrolimus/Prednisone (follows with Dr. Fitzpatrick--[facility]), and BPH presents to the ED c/o diffuse body pains, left sided chest pain, worsening lower extremity edema and ROSADO. Pt states he has felt symptoms for about 1 month and today they worsened. Pt states he has no energy to walk and when he does he feels SOB. Pt states he has been complaint with medications. Pt was seen here recently and was thought to be in acute rejection--was transferred to [facility] and had cardiac biopsy which did not show rejection. At that time pt was also poorly compliant with meds. Pt denies other complaints. Pt denies fever, chills, nausea, vomiting, abdominal pain, diarrhea, headache, dizziness, back pain, LOC, trauma, urinary symptoms, cough.            Hospital Course    on admission, patient and wife requested transfer to [facility] to continue care with his cardiologist due to his history of cardiac transplant. All his home medications were resumed on admission. HPI:     68 yo M with PMHx of HTN, HLD, DM, CKD, heart transplant 11 years ago on Cellcept/Tacrolimus/Prednisone (follows with Dr. Fitzpatrick--[facility]), and BPH presents to the ED c/o diffuse body pains, left sided chest pain, worsening lower extremity edema and ROSADO. Pt states he has felt symptoms for about 1 month and today they worsened. Pt states he has no energy to walk and when he does he feels SOB. Pt states he has been complaint with medications. Pt was seen here recently and was thought to be in acute rejection--was transferred to [facility] and had cardiac biopsy which did not show rejection. At that time pt was also poorly compliant with meds. Pt denies other complaints. Pt denies fever, chills, nausea, vomiting, abdominal pain, diarrhea, headache, dizziness, back pain, LOC, trauma, urinary symptoms, cough.            Hospital Course    on admission, patient and wife requested transfer to [facility] to continue care with his cardiologist due to his history of cardiac transplant. All his home medications were resumed on admission.             ER attending on call last night discussed case with [facility] transfer center and Dr. Gutierrez/Dr. Anand covering for Dr. Fitzpatrick (pt's cardiologist)--accept transfer to [facility].  Currently there are no beds available-transfer center will call back in AM with availability and transfer pt then.

## 2019-06-24 NOTE — DISCHARGE NOTE PROVIDER - PROVIDER TOKENS
FREE:[LAST:[[facility]],PHONE:[(   )    -],FAX:[(   )    -],ADDRESS:[Transfer to [facility]]]

## 2019-06-24 NOTE — DISCHARGE NOTE NURSING/CASE MANAGEMENT/SOCIAL WORK - NSDCDPATPORTLINK_GEN_ALL_CORE
You can access the [facility], offered by [facility], by registering with the following website: [email or web address]

## 2019-06-24 NOTE — H&P ADULT - ASSESSMENT
Patient is a 69y old  Male who presents with a chief complaint of general weakness, sob on exertion/high blood sugar level/  chest paiin (24 Jun 2019 05:19)                                                                                                                                                                                                                                                                                       HEALTH ISSUES - PROBLEM Dx:chest pain/ sob/high chol/htn/dm Patient is a 69y old  Male who presents with a chief complaint of general weakness, sob on exertion/high blood sugar level/  chest paiin (24 Jun 2019 05:19)                                                                                                                                                                                                                                                                                       HEALTH ISSUES - PROBLEM Dx:chest pain/ sob/high chol/htn/dm/  heart transplant status

## 2019-06-24 NOTE — H&P ADULT - NSICDXPASTMEDICALHX_GEN_ALL_CORE_FT
PAST MEDICAL HISTORY:  Benign prostatic hyperplasia with urinary frequency     Bilateral hearing loss, unspecified hearing loss type     Chronic kidney disease, unspecified CKD stage     Diabetes     Heart transplant recipient     High cholesterol     HTN (hypertension)     Urinary tract infection without hematuria, site unspecified

## 2019-06-24 NOTE — H&P ADULT - HISTORY OF PRESENT ILLNESS
· HPI Objective Statement: 70 yo M with PMHx of HTN, HLD, DM, CKD, heart transplant 11 years ago on Cellcept/Tacrolimus/Prednisone (follows with Dr. Fitzpatrick--[facility]), and BPH presents to the ED c/o diffuse body pains, left sided chest pain, worsening lower extremity edema and ROSADO. Pt states he has felt symptoms for about 1 month and today they worsened. Pt states he has no energy to walk and when he does he feels SOB. Pt states he has been complaint with medications. Pt was seen here recently and was thought to be in acute rejection--was transferred to [facility] and had cardiac biopsy which did not show rejection. At that time pt was also poorly compliant with meds. Pt denies other complaints. Pt denies fever, chills, nausea, vomiting, abdominal pain, diarrhea, headache, dizziness, back pain, LOC, trauma, urinary symptoms, cough.

## 2019-06-24 NOTE — DISCHARGE NOTE PROVIDER - CARE PROVIDER_API CALL
[facility],   Transfer to [facility]  Phone: (   )    -  Fax: (   )    -  Follow Up Time:

## 2019-06-24 NOTE — ED ADULT NURSE REASSESSMENT NOTE - NS ED NURSE REASSESS COMMENT FT1
report received from previous RN, safety and comfort maintained, maintained on cardiac monitor, will continue to monitor

## 2019-06-24 NOTE — PHARMACOTHERAPY INTERVENTION NOTE - COMMENTS
Quinine 648mg po q8h ordered, dosage for malaria.  Dr. Chavez states patient is using quinine for legs cramps post heart transplant and patient cannot remember the dose of quinine he takes at home.  Dr. Chavez will discontinue quinine until a more definitive dose prior to admission can be found.

## 2019-06-25 LAB — GLUCOSE BLDC GLUCOMTR-MCNC: 103 MG/DL — HIGH (ref 70–99)

## 2019-07-01 DIAGNOSIS — Z79.4 LONG TERM (CURRENT) USE OF INSULIN: ICD-10-CM

## 2019-07-01 DIAGNOSIS — I12.9 HYPERTENSIVE CHRONIC KIDNEY DISEASE WITH STAGE 1 THROUGH STAGE 4 CHRONIC KIDNEY DISEASE, OR UNSPECIFIED CHRONIC KIDNEY DISEASE: ICD-10-CM

## 2019-07-01 DIAGNOSIS — Z87.891 PERSONAL HISTORY OF NICOTINE DEPENDENCE: ICD-10-CM

## 2019-07-01 DIAGNOSIS — E11.22 TYPE 2 DIABETES MELLITUS WITH DIABETIC CHRONIC KIDNEY DISEASE: ICD-10-CM

## 2019-07-01 DIAGNOSIS — T86.22 HEART TRANSPLANT FAILURE: ICD-10-CM

## 2019-07-01 DIAGNOSIS — N40.1 BENIGN PROSTATIC HYPERPLASIA WITH LOWER URINARY TRACT SYMPTOMS: ICD-10-CM

## 2019-07-01 DIAGNOSIS — N18.9 CHRONIC KIDNEY DISEASE, UNSPECIFIED: ICD-10-CM

## 2019-07-01 DIAGNOSIS — I13.0 HYPERTENSIVE HEART AND CHRONIC KIDNEY DISEASE WITH HEART FAILURE AND STAGE 1 THROUGH STAGE 4 CHRONIC KIDNEY DISEASE, OR UNSPECIFIED CHRONIC KIDNEY DISEASE: ICD-10-CM

## 2019-07-01 DIAGNOSIS — E78.5 HYPERLIPIDEMIA, UNSPECIFIED: ICD-10-CM

## 2019-07-01 DIAGNOSIS — R07.9 CHEST PAIN, UNSPECIFIED: ICD-10-CM

## 2019-07-01 DIAGNOSIS — R35.0 FREQUENCY OF MICTURITION: ICD-10-CM

## 2019-07-01 DIAGNOSIS — I50.43 ACUTE ON CHRONIC COMBINED SYSTOLIC (CONGESTIVE) AND DIASTOLIC (CONGESTIVE) HEART FAILURE: ICD-10-CM

## 2019-11-13 NOTE — PATIENT PROFILE ADULT - BRADEN FRICTION AND SHEAR
----- Message from Nela Arreola sent at 11/13/2019  8:48 AM CST -----  Contact: patient  Patient called to find out if he can still get the ointment that the doctor offered him at the last visit for his eye.    Please call [phone] to discuss today.  
Called and spoke w/ patient, eye ointment sent to [facility]'s pharmacy.  
Eye ointment was ordered .  
(3) no apparent problem

## 2019-11-19 ENCOUNTER — APPOINTMENT (OUTPATIENT)
Dept: NEUROLOGY | Facility: CLINIC | Age: 69
End: 2019-11-19
Payer: COMMERCIAL

## 2019-11-19 VITALS
HEIGHT: 68 IN | SYSTOLIC BLOOD PRESSURE: 145 MMHG | BODY MASS INDEX: 31.83 KG/M2 | DIASTOLIC BLOOD PRESSURE: 90 MMHG | WEIGHT: 210 LBS | HEART RATE: 117 BPM

## 2019-11-19 DIAGNOSIS — Z87.891 PERSONAL HISTORY OF NICOTINE DEPENDENCE: ICD-10-CM

## 2019-11-19 PROCEDURE — 99203 OFFICE O/P NEW LOW 30 MIN: CPT

## 2019-11-19 RX ORDER — ASPIRIN 81 MG
81 TABLET, DELAYED RELEASE (ENTERIC COATED) ORAL DAILY
Refills: 0 | Status: ACTIVE | COMMUNITY

## 2019-11-19 RX ORDER — DOXAZOSIN 8 MG/1
8 TABLET ORAL DAILY
Refills: 0 | Status: ACTIVE | COMMUNITY

## 2019-11-19 RX ORDER — PNV NO.95/FERROUS FUM/FOLIC AC 28MG-0.8MG
TABLET ORAL DAILY
Refills: 0 | Status: ACTIVE | COMMUNITY

## 2019-11-19 RX ORDER — INSULIN GLARGINE 100 [IU]/ML
100 INJECTION, SOLUTION SUBCUTANEOUS AT BEDTIME
Refills: 0 | Status: ACTIVE | COMMUNITY

## 2019-11-19 RX ORDER — PRAVASTATIN SODIUM 40 MG/1
40 TABLET ORAL
Refills: 0 | Status: ACTIVE | COMMUNITY

## 2019-11-19 RX ORDER — CALCIUM CARBONATE/VITAMIN D3 600 MG-10
TABLET ORAL DAILY
Refills: 0 | Status: ACTIVE | COMMUNITY

## 2019-11-19 RX ORDER — POTASSIUM CHLORIDE 1500 MG/1
20 TABLET, EXTENDED RELEASE ORAL DAILY
Refills: 0 | Status: ACTIVE | COMMUNITY

## 2019-11-19 RX ORDER — FUROSEMIDE 40 MG/1
40 TABLET ORAL 3 TIMES DAILY
Refills: 0 | Status: ACTIVE | COMMUNITY

## 2019-11-19 RX ORDER — PANTOPRAZOLE 40 MG/1
40 TABLET, DELAYED RELEASE ORAL
Qty: 30 | Refills: 2 | Status: ACTIVE | COMMUNITY

## 2019-11-19 RX ORDER — MYCOPHENOLATE MOFETIL 500 MG/1
500 TABLET ORAL TWICE DAILY
Refills: 0 | Status: ACTIVE | COMMUNITY

## 2019-11-19 RX ORDER — MULTIVIT-MIN/IRON/FOLIC ACID/K 18-600-40
400 CAPSULE ORAL DAILY
Refills: 0 | Status: ACTIVE | COMMUNITY

## 2019-11-19 RX ORDER — INSULIN LISPRO 100 [IU]/ML
100 INJECTION, SOLUTION INTRAVENOUS; SUBCUTANEOUS 3 TIMES DAILY
Refills: 0 | Status: ACTIVE | COMMUNITY

## 2019-11-19 RX ORDER — PREGABALIN 100 MG/1
100 CAPSULE ORAL
Refills: 0 | Status: ACTIVE | COMMUNITY

## 2019-11-19 RX ORDER — TAMSULOSIN HYDROCHLORIDE 0.4 MG/1
0.4 CAPSULE ORAL DAILY
Refills: 0 | Status: ACTIVE | COMMUNITY

## 2019-11-19 RX ORDER — TACROLIMUS 1 MG/1
1 CAPSULE ORAL
Refills: 0 | Status: ACTIVE | COMMUNITY

## 2019-11-19 NOTE — HISTORY OF PRESENT ILLNESS
[FreeTextEntry1] : Mr. Vera is a 68yo man with PMHx below here for evaluation of cognitive and memory issues since his ICH last year at at [facility].It was not clear the cause but he was poorly compliant with some of his medications and might have been related to hypertension.  He is also on immunosupression for the last 10 years for his heart transplant.  He had MRI brain done in May 2018 which showed chronic microvascular disease, susceptibility artifact in b/l cerebellum and chronic lacunar infarct right centrum semioval.  He had numerous cortical microhemorrhages on MRI brain from 9/2018.  Wife says the cognitive issues have become common since then.  He forgets what he is doing and admits to forgetting a lot of things.  He also has some hearing issues for which he is supposed to wear a hearing aide but does not.

## 2019-11-19 NOTE — DISCUSSION/SUMMARY
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  He is on aspirin 81mg QD.\par 1. Need to maintain blood pressure control\par 2. High risk for anticoagulation\par 3. MRI brain w/o NITESH MRA head\par 4. Carotid dopplers\par 5. Neuropsych testing\par

## 2019-11-19 NOTE — PHYSICAL EXAM
[FreeTextEntry1] : Alert and oriented to person place and time knows the president\par Left facial, no field cut, V1-V3 symmetric, tongue midline, EOMI\par Power 5/5 except left shoulder abduction 4/5 and Left hip flexion 4+/5\par Sensory decreased on left side to Temp, Vib\par Ftn normal on right and clumsy on left\par Hearing loss b/l\par DTR absent throughout\par Gait slow stooped posture slightly unsteady\par \par NIHSS 2\par mrankin 2

## 2019-11-19 NOTE — REVIEW OF SYSTEMS
[Decr. Concentrating Ability] : decreased concentrating ability [Memory Lapses or Loss] : memory loss [Arm Weakness] : arm weakness [Facial Weakness] : facial weakness [Tingling] : tingling [Difficulty Walking] : difficulty walking [Dizziness] : dizziness [Lightheadedness] : lightheadedness [Arthralgias] : arthralgias [Loss Of Hearing] : hearing loss [Joint Pain] : joint pain [Negative] : Heme/Lymph

## 2019-12-29 ENCOUNTER — FORM ENCOUNTER (OUTPATIENT)
Age: 69
End: 2019-12-29

## 2019-12-30 ENCOUNTER — OUTPATIENT (OUTPATIENT)
Dept: OUTPATIENT SERVICES | Facility: HOSPITAL | Age: 69
LOS: 1 days | Discharge: HOME | End: 2019-12-30

## 2019-12-30 DIAGNOSIS — G31.84 MILD COGNITIVE IMPAIRMENT OF UNCERTAIN OR UNKNOWN ETIOLOGY: ICD-10-CM

## 2019-12-30 DIAGNOSIS — Z94.1 HEART TRANSPLANT STATUS: Chronic | ICD-10-CM

## 2020-01-02 ENCOUNTER — FORM ENCOUNTER (OUTPATIENT)
Age: 70
End: 2020-01-02

## 2020-03-06 ENCOUNTER — OUTPATIENT (OUTPATIENT)
Dept: OUTPATIENT SERVICES | Facility: HOSPITAL | Age: 70
LOS: 1 days | Discharge: HOME | End: 2020-03-06

## 2020-03-06 DIAGNOSIS — Z94.1 HEART TRANSPLANT STATUS: Chronic | ICD-10-CM

## 2020-03-06 DIAGNOSIS — G31.84 MILD COGNITIVE IMPAIRMENT OF UNCERTAIN OR UNKNOWN ETIOLOGY: ICD-10-CM

## 2020-05-20 ENCOUNTER — APPOINTMENT (OUTPATIENT)
Dept: NEUROLOGY | Facility: CLINIC | Age: 70
End: 2020-05-20
Payer: COMMERCIAL

## 2020-05-20 DIAGNOSIS — I63.9 CEREBRAL INFARCTION, UNSPECIFIED: ICD-10-CM

## 2020-05-20 DIAGNOSIS — F01.50 VASCULAR DEMENTIA W/OUT BEHAVIORAL DISTURBANCE: ICD-10-CM

## 2020-05-20 PROCEDURE — 99213 OFFICE O/P EST LOW 20 MIN: CPT | Mod: 95

## 2020-05-20 RX ORDER — FLUOXETINE HYDROCHLORIDE 10 MG/1
10 TABLET ORAL
Qty: 30 | Refills: 5 | Status: ACTIVE | COMMUNITY
Start: 2020-05-20 | End: 1900-01-01

## 2020-05-20 NOTE — PHYSICAL EXAM
[FreeTextEntry1] : a+Ox2 doesn’t know date\par Doesnt know what he had for dinner last night (pork chops)\par Knows partly about whats going on in the [place] (i.e. covid19 virus)\par No facial\par No drift\par Gait unsteady

## 2020-05-20 NOTE — DISCUSSION/SUMMARY
[FreeTextEntry1] : Mr. Vera is a 71yo man with vascular dementia and poor balance.  He needs to continue to optimize his glucose and maintain good blood pressure control\par 1. PT for balance\par 2. Stop lyrica and start fluoxetine 10mg QHS\par 3. Better glucose control\par 4. Monitor blood pressure\par 5. Follow up in 6 months

## 2020-05-20 NOTE — HISTORY OF PRESENT ILLNESS
[FreeTextEntry1] : Patients visit was initiated using [employer] Platform and consent for the visit and technology was obtained for patient and his wife.\par \par Mr. Vera is a 69yo man last seen by me on 11/19/2019 for follow up of his cogntive issues and prior strokes which have affected his balance.  He was sent for neuropsych testing which showed findings consistent with a traumatic brain injury pattern likely form prior strokes and ICH.  He continues to have poor memory and difficulty with ambulation.  According to his wife he has good BP control but his glucose control is poor.\par He stopped going to PT because he thought it wasn’t benefiting him.\par

## 2020-12-02 ENCOUNTER — EMERGENCY (EMERGENCY)
Facility: HOSPITAL | Age: 70
LOS: 0 days | Discharge: HOME | End: 2020-12-02
Attending: EMERGENCY MEDICINE | Admitting: EMERGENCY MEDICINE
Payer: COMMERCIAL

## 2020-12-02 VITALS
HEIGHT: 68 IN | SYSTOLIC BLOOD PRESSURE: 161 MMHG | RESPIRATION RATE: 18 BRPM | TEMPERATURE: 98 F | DIASTOLIC BLOOD PRESSURE: 100 MMHG | HEART RATE: 103 BPM | OXYGEN SATURATION: 99 %

## 2020-12-02 DIAGNOSIS — E11.9 TYPE 2 DIABETES MELLITUS WITHOUT COMPLICATIONS: ICD-10-CM

## 2020-12-02 DIAGNOSIS — E78.5 HYPERLIPIDEMIA, UNSPECIFIED: ICD-10-CM

## 2020-12-02 DIAGNOSIS — Z98.82 BREAST IMPLANT STATUS: ICD-10-CM

## 2020-12-02 DIAGNOSIS — R51.9 HEADACHE, UNSPECIFIED: ICD-10-CM

## 2020-12-02 DIAGNOSIS — I12.9 HYPERTENSIVE CHRONIC KIDNEY DISEASE WITH STAGE 1 THROUGH STAGE 4 CHRONIC KIDNEY DISEASE, OR UNSPECIFIED CHRONIC KIDNEY DISEASE: ICD-10-CM

## 2020-12-02 DIAGNOSIS — N18.9 CHRONIC KIDNEY DISEASE, UNSPECIFIED: ICD-10-CM

## 2020-12-02 DIAGNOSIS — Z88.8 ALLERGY STATUS TO OTHER DRUGS, MEDICAMENTS AND BIOLOGICAL SUBSTANCES: ICD-10-CM

## 2020-12-02 DIAGNOSIS — Z94.1 HEART TRANSPLANT STATUS: Chronic | ICD-10-CM

## 2020-12-02 DIAGNOSIS — Z79.899 OTHER LONG TERM (CURRENT) DRUG THERAPY: ICD-10-CM

## 2020-12-02 DIAGNOSIS — Z79.4 LONG TERM (CURRENT) USE OF INSULIN: ICD-10-CM

## 2020-12-02 LAB
ALBUMIN SERPL ELPH-MCNC: 4.2 G/DL — SIGNIFICANT CHANGE UP (ref 3.5–5.2)
ALP SERPL-CCNC: 103 U/L — SIGNIFICANT CHANGE UP (ref 30–115)
ALT FLD-CCNC: 6 U/L — SIGNIFICANT CHANGE UP (ref 0–41)
ANION GAP SERPL CALC-SCNC: 16 MMOL/L — HIGH (ref 7–14)
APPEARANCE UR: CLEAR — SIGNIFICANT CHANGE UP
AST SERPL-CCNC: 12 U/L — SIGNIFICANT CHANGE UP (ref 0–41)
BACTERIA # UR AUTO: NEGATIVE — SIGNIFICANT CHANGE UP
BASOPHILS # BLD AUTO: 0.02 K/UL — SIGNIFICANT CHANGE UP (ref 0–0.2)
BASOPHILS NFR BLD AUTO: 0.4 % — SIGNIFICANT CHANGE UP (ref 0–1)
BILIRUB SERPL-MCNC: 0.4 MG/DL — SIGNIFICANT CHANGE UP (ref 0.2–1.2)
BILIRUB UR-MCNC: NEGATIVE — SIGNIFICANT CHANGE UP
BUN SERPL-MCNC: 47 MG/DL — HIGH (ref 10–20)
CALCIUM SERPL-MCNC: 9.3 MG/DL — SIGNIFICANT CHANGE UP (ref 8.5–10.1)
CHLORIDE SERPL-SCNC: 96 MMOL/L — LOW (ref 98–110)
CO2 SERPL-SCNC: 22 MMOL/L — SIGNIFICANT CHANGE UP (ref 17–32)
COLOR SPEC: SIGNIFICANT CHANGE UP
CREAT SERPL-MCNC: 2.1 MG/DL — HIGH (ref 0.7–1.5)
DIFF PNL FLD: NEGATIVE — SIGNIFICANT CHANGE UP
EOSINOPHIL # BLD AUTO: 0.04 K/UL — SIGNIFICANT CHANGE UP (ref 0–0.7)
EOSINOPHIL NFR BLD AUTO: 0.8 % — SIGNIFICANT CHANGE UP (ref 0–8)
EPI CELLS # UR: 0 /HPF — SIGNIFICANT CHANGE UP (ref 0–5)
GLUCOSE SERPL-MCNC: 225 MG/DL — HIGH (ref 70–99)
GLUCOSE UR QL: ABNORMAL
HCT VFR BLD CALC: 42.2 % — SIGNIFICANT CHANGE UP (ref 42–52)
HGB BLD-MCNC: 13.5 G/DL — LOW (ref 14–18)
HYALINE CASTS # UR AUTO: 0 /LPF — SIGNIFICANT CHANGE UP (ref 0–7)
IMM GRANULOCYTES NFR BLD AUTO: 0.6 % — HIGH (ref 0.1–0.3)
KETONES UR-MCNC: SIGNIFICANT CHANGE UP
LEUKOCYTE ESTERASE UR-ACNC: NEGATIVE — SIGNIFICANT CHANGE UP
LYMPHOCYTES # BLD AUTO: 0.8 K/UL — LOW (ref 1.2–3.4)
LYMPHOCYTES # BLD AUTO: 15.5 % — LOW (ref 20.5–51.1)
MCHC RBC-ENTMCNC: 25.9 PG — LOW (ref 27–31)
MCHC RBC-ENTMCNC: 32 G/DL — SIGNIFICANT CHANGE UP (ref 32–37)
MCV RBC AUTO: 81 FL — SIGNIFICANT CHANGE UP (ref 80–94)
MONOCYTES # BLD AUTO: 0.48 K/UL — SIGNIFICANT CHANGE UP (ref 0.1–0.6)
MONOCYTES NFR BLD AUTO: 9.3 % — SIGNIFICANT CHANGE UP (ref 1.7–9.3)
NEUTROPHILS # BLD AUTO: 3.8 K/UL — SIGNIFICANT CHANGE UP (ref 1.4–6.5)
NEUTROPHILS NFR BLD AUTO: 73.4 % — SIGNIFICANT CHANGE UP (ref 42.2–75.2)
NITRITE UR-MCNC: NEGATIVE — SIGNIFICANT CHANGE UP
NRBC # BLD: 0 /100 WBCS — SIGNIFICANT CHANGE UP (ref 0–0)
PH UR: 6.5 — SIGNIFICANT CHANGE UP (ref 5–8)
PLATELET # BLD AUTO: 105 K/UL — LOW (ref 130–400)
POTASSIUM SERPL-MCNC: 3.9 MMOL/L — SIGNIFICANT CHANGE UP (ref 3.5–5)
POTASSIUM SERPL-SCNC: 3.9 MMOL/L — SIGNIFICANT CHANGE UP (ref 3.5–5)
PROT SERPL-MCNC: 6.8 G/DL — SIGNIFICANT CHANGE UP (ref 6–8)
PROT UR-MCNC: ABNORMAL
RBC # BLD: 5.21 M/UL — SIGNIFICANT CHANGE UP (ref 4.7–6.1)
RBC # FLD: 15.2 % — HIGH (ref 11.5–14.5)
RBC CASTS # UR COMP ASSIST: 1 /HPF — SIGNIFICANT CHANGE UP (ref 0–4)
SODIUM SERPL-SCNC: 134 MMOL/L — LOW (ref 135–146)
SP GR SPEC: 1.01 — SIGNIFICANT CHANGE UP (ref 1.01–1.03)
UROBILINOGEN FLD QL: SIGNIFICANT CHANGE UP
WBC # BLD: 5.17 K/UL — SIGNIFICANT CHANGE UP (ref 4.8–10.8)
WBC # FLD AUTO: 5.17 K/UL — SIGNIFICANT CHANGE UP (ref 4.8–10.8)
WBC UR QL: 0 /HPF — SIGNIFICANT CHANGE UP (ref 0–5)

## 2020-12-02 PROCEDURE — 71045 X-RAY EXAM CHEST 1 VIEW: CPT | Mod: 26

## 2020-12-02 PROCEDURE — 70450 CT HEAD/BRAIN W/O DYE: CPT | Mod: 26

## 2020-12-02 PROCEDURE — 99285 EMERGENCY DEPT VISIT HI MDM: CPT

## 2020-12-02 NOTE — ED ADULT NURSE NOTE - CHIEF COMPLAINT QUOTE
Pt c/o Headache on Sunday, denies any HA at present time. Has hx of TIA. No change in mental status, no new weakness/numbness. Daniella ( wife) [phone]

## 2020-12-02 NOTE — ED PROVIDER NOTE - PATIENT PORTAL LINK FT
You can access the FollowMyHealth Patient Portal offered by [facility] by registering at the following website: [email or web address]. By joining [facility]’s FollowMyHealth portal, you will also be able to view your health information using other applications (apps) compatible with our system.

## 2020-12-02 NOTE — ED PROVIDER NOTE - CARE PROVIDER_API CALL
Eddie Ramirez)  EEGEpilepsy; Neurology  [address] [place] [address], [address]  [place] [address]  Phone: [phone]  Fax: [phone]  Follow Up Time: 1-3 Days

## 2020-12-02 NOTE — ED PROVIDER NOTE - CLINICAL SUMMARY MEDICAL DECISION MAKING FREE TEXT BOX
pt with history as above presenting for eval. per pt/wife, he has been having intermittent HAs since Sun. had telehealth eval with PMD on Mon, today was recalled by pmd, told if non improved to come in for eval. wife admits pt has had general slow mental decline for the past few months but nothing more acutely. no falls/trauma. no other acute complaints. Well appearing, NAD, non toxic. NCAT PERRLA EOMI neck supple non tender normal wob cta bl rrr abdomen s nt nd no rebound no guarding WWPx4 neuro non focal. labs imaging reviewed. neuro non focal. pt and family comfortable with discharge and follow-up outpatient. Aware of all results, given a copy of all available results, comfortable with discharge and follow-up outpatient, strict return precautions given. Endorses understanding of all of this and aware that they can return at any time for new or concerning symptoms. No further questions or concerns at this time

## 2020-12-02 NOTE — ED ADULT NURSE NOTE - OBJECTIVE STATEMENT
patient alert and oriented x4, complaining of headache. pt extremely hard of hearing. patient denies dizziness, nausea, vomiting, diarrhea. ambulates with cane.

## 2020-12-02 NOTE — ED ADULT NURSE REASSESSMENT NOTE - NS ED NURSE REASSESS COMMENT FT1
patient arrived with hearing aide in left ear. hearing aide removed for CT. patient hearing aide labeled and placed in ziplock bag at bedside at this time.

## 2020-12-02 NOTE — ED ADULT TRIAGE NOTE - CHIEF COMPLAINT QUOTE
Pt c/o Headache on Sunday, denies any HA at present time. Has hx of TIA. No change in mental status, no new weakness/numbness. Daniella ( wife) [phone]

## 2020-12-02 NOTE — ED PROVIDER NOTE - PHYSICAL EXAMINATION
VITAL SIGNS: I have reviewed nursing notes and confirm.  CONSTITUTIONAL: Well-developed; well-nourished; in no acute distress.  SKIN: Skin exam is warm and dry, no acute rash.  HEAD: Normocephalic; atraumatic.  EYES: PERRL, EOM intact; conjunctiva and sclera clear.  ENT: No nasal discharge; airway clear.   NECK: Supple; non tender.  CARD: S1, S2 normal; no murmurs, gallops, or rubs. Regular rate and rhythm.  RESP: Clear to auscultation bilaterally. No wheezes, rales or rhonchi.  ABD: Normal bowel sounds; soft; non-distended; non-tender.   EXT: Normal ROM. No edema.  LYMPH: No acute cervical adenopathy.  NEURO: Alert, oriented. Grossly unremarkable. No focal deficits.

## 2020-12-02 NOTE — ED PROVIDER NOTE - NS ED ROS FT
Review of Systems:  	•	CONSTITUTIONAL - no fever, no diaphoresis, no chills  	•	SKIN - no rash  	•	HEMATOLOGIC - no bleeding, no bruising  	•	EYES - no eye pain, no blurry vision  	•	ENT - no congestion  	•	RESPIRATORY - no shortness of breath, no cough  	•	CARDIAC - no chest pain, no palpitations  	•	GI - no abd pain, no nausea, no vomiting, no diarrhea, no constipation  	•	GENITO-URINARY - no dysuria; no hematuria, no increased urinary frequency  	•	MUSCULOSKELETAL - no joint paint, no swelling, no redness  	•	NEUROLOGIC - no weakness, + headache, no paresthesias, no LOC  	•	PSYCH - no anxiety, no depression  	All other ROS are negative except as documented in HPI.

## 2020-12-03 LAB
CULTURE RESULTS: SIGNIFICANT CHANGE UP
SPECIMEN SOURCE: SIGNIFICANT CHANGE UP
TACROLIMUS SERPL-MCNC: 6.5 NG/ML — SIGNIFICANT CHANGE UP

## 2020-12-27 ENCOUNTER — EMERGENCY (EMERGENCY)
Facility: HOSPITAL | Age: 70
LOS: 0 days | Discharge: HOME | End: 2020-12-27
Attending: EMERGENCY MEDICINE | Admitting: EMERGENCY MEDICINE
Payer: COMMERCIAL

## 2020-12-27 VITALS
HEIGHT: 68 IN | TEMPERATURE: 97 F | SYSTOLIC BLOOD PRESSURE: 160 MMHG | HEART RATE: 100 BPM | DIASTOLIC BLOOD PRESSURE: 91 MMHG | WEIGHT: 199.96 LBS | RESPIRATION RATE: 18 BRPM | OXYGEN SATURATION: 99 %

## 2020-12-27 VITALS
DIASTOLIC BLOOD PRESSURE: 105 MMHG | TEMPERATURE: 98 F | HEART RATE: 111 BPM | RESPIRATION RATE: 20 BRPM | OXYGEN SATURATION: 99 % | SYSTOLIC BLOOD PRESSURE: 177 MMHG | HEIGHT: 68 IN | WEIGHT: 199.96 LBS

## 2020-12-27 DIAGNOSIS — S91.212A LACERATION WITHOUT FOREIGN BODY OF LEFT GREAT TOE WITH DAMAGE TO NAIL, INITIAL ENCOUNTER: ICD-10-CM
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
## 2020-12-27 DIAGNOSIS — Z94.1 HEART TRANSPLANT STATUS: Chronic | ICD-10-CM
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
## 2020-12-27 DIAGNOSIS — S92.422A DISPLACED FRACTURE OF DISTAL PHALANX OF LEFT GREAT TOE, INITIAL ENCOUNTER FOR CLOSED FRACTURE: ICD-10-CM

## 2020-12-27 DIAGNOSIS — M79.675 PAIN IN LEFT TOE(S): ICD-10-CM
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
## 2020-12-27 DIAGNOSIS — M79.672 PAIN IN LEFT FOOT: ICD-10-CM

## 2020-12-27 DIAGNOSIS — S99.922A UNSPECIFIED INJURY OF LEFT FOOT, INITIAL ENCOUNTER: ICD-10-CM
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
## 2020-12-27 PROCEDURE — 99283 EMERGENCY DEPT VISIT LOW MDM: CPT

## 2020-12-27 PROCEDURE — 99284 EMERGENCY DEPT VISIT MOD MDM: CPT | Mod: 25,57

## 2020-12-27 PROCEDURE — 73630 X-RAY EXAM OF FOOT: CPT | Mod: 26,LT

## 2020-12-27 PROCEDURE — 28490 TREAT BIG TOE FRACTURE: CPT | Mod: 54

## 2020-12-27 PROCEDURE — 11730 AVULSION NAIL PLATE SIMPLE 1: CPT

## 2020-12-27 NOTE — ED PROVIDER NOTE - NSFOLLOWUPINSTRUCTIONS_ED_ALL_ED_FT
Wound Check  ImageIf you have a wound, it may take some time to heal. Eventually, a scar will form. The scar will also fade with time. It is important to take care of your wound while it is healing. This helps to protect your wound from infection.    How should I take care of my wound at home?  Some wounds are allowed to close on their own or are repaired at a later date. There are many different ways to close and cover a wound, including stitches (sutures), skin glue, and adhesive strips. Follow your health care provider's instructions about:    Wound care.  Bandage (dressing) changes and removal.  Wound closure removal.    Take medicines only as directed by your health care provider.  Keep all follow-up visits as directed by your health care provider. This is important.  Do not take baths, swim, or use a hot tub until your health care provider approves. You may shower as directed by your health care provider.  Keep your wound clean and dry.  What affects scar formation?  Scars affect each person differently. How your body scars depends on:    The location and size of your wound.  Traits that you inherited from your parents (genetic predisposition).  How you take care of your wound. Irritation and inflammation increase the amount of scar formation.  Sun exposure. This can darken a scar.    When should I call or see my health care provider?  Call or see your health care provider if:    You have redness, swelling, or pain at your wound site.  You have fluid, blood, or pus coming from your wound.  You have muscle aches, chills, or a general ill feeling.  You notice a bad smell coming from the wound.  Your wound separates after the sutures, staples, or skin adhesive strips have been removed.  You have persistent nausea or vomiting.  You have a fever.  You are dizzy.    When should I call 911 or go to the emergency room?  Call 911 or go to the emergency room if:    You faint.  You have difficulty breathing.    This information is not intended to replace advice given to you by your health care provider. Make sure you discuss any questions you have with your health care provider.    Fracture    A fracture is a break in one of your bones. This can occur from a variety of injuries, especially traumatic ones. Symptoms include pain, bruising, or swelling. Do not use the injured limb. If a fracture is in one of the bones below your waist, do not put weight on that limb unless instructed to do so by your healthcare provider. Crutches or a cane may have been provided. A splint or cast may have been applied by your health care provider. Make sure to keep it dry and follow up with an orthopedist as instructed.    SEEK IMMEDIATE MEDICAL CARE IF YOU HAVE ANY OF THE FOLLOWING SYMPTOMS: numbness, tingling, increasing pain, or weakness in any part of the injured limb.    FOLLOW UP WITH PODIATRY

## 2020-12-27 NOTE — ED PROVIDER NOTE - ATTENDING CONTRIBUTION TO CARE
I was present for and supervised the key and critical aspects of the procedures performed during the care of the patient. Patient presents for evaluation of left toe injury with nail injury that was removed in secondary to fall with scraping mechanism against furniture, this was secondary to hypoglycemia secondary to his wife giving him a larger dose than usual.  we obtained xray of his foot which reveals a tuft fracture yeyo tape and splint

## 2020-12-27 NOTE — ED PROVIDER NOTE - CARE PLAN
Principal Discharge DX:	Nail plate separation  Secondary Diagnosis:	Toe injury, left, initial encounter  Secondary Diagnosis:	Toe fracture, left

## 2020-12-27 NOTE — ED PROVIDER NOTE - ATTENDING CONTRIBUTION TO CARE
69 yo M presents with c/o bleeding from left great toe.  Pt was seen earlier with toe injury.  Had x ray that revealed fracture and would repaired.  Pt c/o bleeding and pain.  On exam pt in NAD AAO x 3, +nail avulsion left great toe, no active bleeding, + tender, no swelling,

## 2020-12-27 NOTE — ED PROVIDER NOTE - MUSCULOSKELETAL, MLM
Spine appears normal, neck supple, +pelvis stable, range of motion is not limited, no muscle or joint tenderness. Left foot +great toe decreased rom with partial avulsaion of nail plate. +surrounding abrasion , no active bleeding

## 2020-12-27 NOTE — ED PROVIDER NOTE - CLINICAL SUMMARY MEDICAL DECISION MAKING FREE TEXT BOX
Patient presents for evaluation of left toe injury with nail injury that was removed in secondary to fall with scraping mechanism against furniture, this was secondary to hypoglycemia secondary to his wife giving him a larger dose than usual.  we obtained xray of his foot which reveals a tuft fracture yeyo tape and splint

## 2020-12-27 NOTE — ED PROVIDER NOTE - OBJECTIVE STATEMENT
71 y/o male with hx of dementia , diabetes on insulin presents to the ED for evaluation of left great toe injury. Patient poor historian, as per patient's wife alex , she gave higher dosage of insulin last night before bedtime due to elevated glucose. patient had not eaten this am and fell in hallway , striking his toe on bedstand. patient with low glucose at that time 60 . patient was given coffee , eggs and juice. patient at baseline as per family. patient with partial avulsion of great toe nail plate. patient denies any headache, neck pain, cough, abdominal pain, chest pain. patient able to ambulate in the Ed without difficulty

## 2020-12-27 NOTE — ED PROVIDER NOTE - CLINICAL SUMMARY MEDICAL DECISION MAKING FREE TEXT BOX
Pt dressing changed.  Rec ice, elevate.  x ray from earlier reviewed.  Pt with positive fracture.  Will start abx.  f/u with Podiatry.

## 2020-12-27 NOTE — ED ADULT TRIAGE NOTE - NS ED NURSE BANDS TYPE
Subjective   Ye Mathias is a 62 y.o. male.     No chief complaint on file.      History of Present Illness     since changing his bp meds--his cough has resolved--he is feeling better      Current Outpatient Prescriptions:   •  albuterol (PROVENTIL HFA;VENTOLIN HFA) 108 (90 Base) MCG/ACT inhaler, Inhale 2 puffs 4 (Four) Times a Day., Disp: 1 inhaler, Rfl: 0  •  azithromycin (ZITHROMAX Z-BESSY) 250 MG tablet, Take 2 tablets the first day, then 1 tablet daily for 4 days., Disp: 6 tablet, Rfl: 0  •  citalopram (CeleXA) 20 MG tablet, Take 1 tablet by mouth Daily., Disp: 90 tablet, Rfl: 3  •  guaifenesin-codeine (GUAIFENESIN AC) 100-10 MG/5ML liquid, Take 5 mL by mouth Every 4 (Four) Hours As Needed for Cough., Disp: 150 mL, Rfl: 0  •  HYDROcod Polst-CPM Polst ER (TUSSIONEX PENNKINETIC ER) 10-8 MG/5ML ER suspension, Take 5 mL by mouth Every 12 (Twelve) Hours As Needed (cough)., Disp: 125 mL, Rfl: 0  •  losartan-hydrochlorothiazide (HYZAAR) 100-25 MG per tablet, Take 1 tablet by mouth Daily., Disp: 30 tablet, Rfl: 1  •  metoprolol succinate XL (TOPROL XL) 100 MG 24 hr tablet, Take 1 tablet by mouth Daily., Disp: 90 tablet, Rfl: 3  •  pramipexole (MIRAPEX) 0.5 MG tablet, Take 1 tablet by mouth Every Night., Disp: 90 tablet, Rfl: 0  •  predniSONE (DELTASONE) 10 MG tablet, Take 3 tablets daily for 2 days, then 2 tablets daily for 2 days, then 1 tablet daily for 2 days, Disp: 12 tablet, Rfl: 0  No Known Allergies    Past Medical History:   Diagnosis Date   • Hypertension    • Kidney stone      Past Surgical History:   Procedure Laterality Date   • ANAL FISSURECTOMY     • APPENDECTOMY     • GALLBLADDER SURGERY     • KNEE SURGERY Right        Review of Systems   Constitutional: Negative.    HENT: Negative.    Eyes: Negative.    Respiratory: Positive for cough (resolved).    Cardiovascular: Negative.    Gastrointestinal: Negative.    Endocrine: Negative.    Genitourinary: Negative.    Musculoskeletal: Negative.    Skin:  Negative.    Allergic/Immunologic: Negative.    Neurological: Negative.    Hematological: Negative.    Psychiatric/Behavioral: Negative.        Objective  There were no vitals taken for this visit.  Physical Exam   Constitutional: He is oriented to person, place, and time. He appears well-developed and well-nourished.   HENT:   Head: Normocephalic and atraumatic.   Right Ear: External ear normal.   Left Ear: External ear normal.   Nose: Nose normal.   Mouth/Throat: Oropharynx is clear and moist.   Eyes: Conjunctivae and EOM are normal. Pupils are equal, round, and reactive to light.   Neck: Normal range of motion. Neck supple.   Cardiovascular: Normal rate, regular rhythm and normal heart sounds.    Pulmonary/Chest: Effort normal and breath sounds normal.   Abdominal: Soft. Bowel sounds are normal.   Musculoskeletal: Normal range of motion.   Neurological: He is alert and oriented to person, place, and time. He has normal reflexes.   Skin: Skin is warm and dry.   Psychiatric: He has a normal mood and affect. His behavior is normal. Judgment and thought content normal.   Nursing note and vitals reviewed.      Assessment/Plan   Diagnoses and all orders for this visit:    Cough due to ACE inhibitor      im glad he is better           No orders of the defined types were placed in this encounter.      Follow up: 6 month(s)   Name band;

## 2020-12-27 NOTE — ED PROVIDER NOTE - PROGRESS NOTE DETAILS
FF: discussed taking pain medication at home. discussed foot fracture. wound was cleansed and redressed. advised of return precautions discussed at bedside. rx abx. f/u with podiatry. agreeable to dc.

## 2020-12-27 NOTE — ED PROVIDER NOTE - PHYSICAL EXAMINATION
Physical Exam    Vital Signs: I have reviewed the initial vital signs.  Constitutional: well-nourished, appears stated age, no acute distress  Cardiovascular: S1 and S2, regular rate, regular rhythm, well-perfused extremities, radial pulses equal and 2+ b/l.   Respiratory: unlabored respiratory effort, clear to auscultation bilaterally no wheezing, rales and rhonchi. pt is speaking full sentences. no accessory muscle use.   Musculoskeletal: FROM of b/l lower digits.   Integumentary: warm, dry, no rash. (+) left great toe nail avulsion, not actively bleeding.   Neurologic: awake, alert, extremities’ motor and sensory functions grossly intact.   Psychiatric: appropriate mood, appropriate affect

## 2020-12-27 NOTE — ED ADULT TRIAGE NOTE - CHIEF COMPLAINT QUOTE
pt wife states," his sugar was low this morning and he fell and hurt his toe" abrasion on left foot, denies LOC, denies fever

## 2020-12-27 NOTE — ED PROVIDER NOTE - NS ED ROS FT
CONST: No fever, chills or bodyaches  EYES: No pain, redness, drainage or visual changes.  ENT: No ear pain or discharge, nasal discharge or congestion. No sore throat  CARD: No chest pain, palpitations  RESP: No SOB, cough, hemoptysis. No hx of asthma or COPD  GI: No abdominal pain, N/V/D  : No urinary symptoms  MS: No joint pain, back pain or extremity pain/injury  SKIN: (+) toe laceration and nail avulsion. No rashes  NEURO: No headache, dizziness, paresthesias or LOC

## 2020-12-27 NOTE — ED PROVIDER NOTE - OBJECTIVE STATEMENT
71 y/o male with a PMH of dementia , diabetes on insulin presents to the ED for evaluation of wound check from left great toe injury earlier today. pt reports he has pain in the left toe and believes the wound is still bleeding. pt denies additional trrauma, numbness, tingling, weakness, or fever.

## 2020-12-27 NOTE — ED PROVIDER NOTE - CARE PROVIDER_API CALL
Sathya Lemus (DPM)  [facility]  [address], [address]  [place] [address]  Phone: [phone]  Fax: [phone]  Follow Up Time: 1-3 Days

## 2020-12-27 NOTE — ED PROVIDER NOTE - NSFOLLOWUPINSTRUCTIONS_ED_ALL_ED_FT
Fracture    A fracture is a break in one of your bones. This can occur from a variety of injuries, especially traumatic ones. Symptoms include pain, bruising, or swelling. Do not use the injured limb. If a fracture is in one of the bones below your waist, do not put weight on that limb unless instructed to do so by your healthcare provider. Crutches or a cane may have been provided. A splint or cast may have been applied by your health care provider. Make sure to keep it dry and follow up with an orthopedist as instructed.    SEEK IMMEDIATE MEDICAL CARE IF YOU HAVE ANY OF THE FOLLOWING SYMPTOMS: numbness, tingling, increasing pain, or weakness in any part of the injured limb.      Laceration    A laceration is a cut that goes through all of the layers of the skin and into the tissue that is right under the skin. Some lacerations heal on their own. Others need to be closed with stitches (sutures), staples, skin adhesive strips, or skin glue. Proper laceration care minimizes the risk of infection and helps the laceration to heal better.     SEEK IMMEDIATE MEDICAL CARE IF YOU HAVE THE FOLLOWING SYMPTOMS: swelling around the wound, worsening pain, drainage from the wound, red streaking going away from your wound, inability to move finger or toe near the laceration, or discoloration of skin near the laceration.

## 2020-12-27 NOTE — ED PROVIDER NOTE - PATIENT PORTAL LINK FT
You can access the FollowMyHealth Patient Portal offered by [facility] by registering at the following website: [email or web address]. By joining [employer]’s FollowMyHealth portal, you will also be able to view your health information using other applications (apps) compatible with our system.

## 2020-12-27 NOTE — ED PROVIDER NOTE - PATIENT PORTAL LINK FT
You can access the FollowMyHealth Patient Portal offered by [facility] [place] by registering at the following website: [email or web address]. By joining [employer]’s FollowMyHealth portal, you will also be able to view your health information using other applications (apps) compatible with our system.

## 2021-03-09 ENCOUNTER — INPATIENT (INPATIENT)
Facility: HOSPITAL | Age: 71
LOS: 1 days | Discharge: HOME | End: 2021-03-11
Attending: INTERNAL MEDICINE | Admitting: INTERNAL MEDICINE
Payer: COMMERCIAL

## 2021-03-09 VITALS
OXYGEN SATURATION: 94 % | DIASTOLIC BLOOD PRESSURE: 92 MMHG | WEIGHT: 220.02 LBS | RESPIRATION RATE: 18 BRPM | HEIGHT: 68 IN | SYSTOLIC BLOOD PRESSURE: 156 MMHG | TEMPERATURE: 96 F | HEART RATE: 110 BPM

## 2021-03-09 DIAGNOSIS — Z94.1 HEART TRANSPLANT STATUS: Chronic | ICD-10-CM

## 2021-03-09 LAB
ALBUMIN SERPL ELPH-MCNC: 4.6 G/DL — SIGNIFICANT CHANGE UP (ref 3.5–5.2)
ALP SERPL-CCNC: 160 U/L — HIGH (ref 30–115)
ALT FLD-CCNC: 7 U/L — SIGNIFICANT CHANGE UP (ref 0–41)
ANION GAP SERPL CALC-SCNC: 17 MMOL/L — HIGH (ref 7–14)
AST SERPL-CCNC: 15 U/L — SIGNIFICANT CHANGE UP (ref 0–41)
BASOPHILS # BLD AUTO: 0.01 K/UL — SIGNIFICANT CHANGE UP (ref 0–0.2)
BASOPHILS NFR BLD AUTO: 0.1 % — SIGNIFICANT CHANGE UP (ref 0–1)
BILIRUB SERPL-MCNC: 1.1 MG/DL — SIGNIFICANT CHANGE UP (ref 0.2–1.2)
BUN SERPL-MCNC: 49 MG/DL — HIGH (ref 10–20)
CALCIUM SERPL-MCNC: 10.1 MG/DL — SIGNIFICANT CHANGE UP (ref 8.5–10.1)
CHLORIDE SERPL-SCNC: 100 MMOL/L — SIGNIFICANT CHANGE UP (ref 98–110)
CO2 SERPL-SCNC: 22 MMOL/L — SIGNIFICANT CHANGE UP (ref 17–32)
CREAT SERPL-MCNC: 2.1 MG/DL — HIGH (ref 0.7–1.5)
EOSINOPHIL # BLD AUTO: 0.02 K/UL — SIGNIFICANT CHANGE UP (ref 0–0.7)
EOSINOPHIL NFR BLD AUTO: 0.2 % — SIGNIFICANT CHANGE UP (ref 0–8)
GLUCOSE SERPL-MCNC: 266 MG/DL — HIGH (ref 70–99)
HCT VFR BLD CALC: 43.1 % — SIGNIFICANT CHANGE UP (ref 42–52)
HGB BLD-MCNC: 13.7 G/DL — LOW (ref 14–18)
IMM GRANULOCYTES NFR BLD AUTO: 0.5 % — HIGH (ref 0.1–0.3)
LYMPHOCYTES # BLD AUTO: 0.34 K/UL — LOW (ref 1.2–3.4)
LYMPHOCYTES # BLD AUTO: 2.8 % — LOW (ref 20.5–51.1)
MAGNESIUM SERPL-MCNC: 1.8 MG/DL — SIGNIFICANT CHANGE UP (ref 1.8–2.4)
MCHC RBC-ENTMCNC: 26.2 PG — LOW (ref 27–31)
MCHC RBC-ENTMCNC: 31.8 G/DL — LOW (ref 32–37)
MCV RBC AUTO: 82.4 FL — SIGNIFICANT CHANGE UP (ref 80–94)
MONOCYTES # BLD AUTO: 0.84 K/UL — HIGH (ref 0.1–0.6)
MONOCYTES NFR BLD AUTO: 6.9 % — SIGNIFICANT CHANGE UP (ref 1.7–9.3)
NEUTROPHILS # BLD AUTO: 10.85 K/UL — HIGH (ref 1.4–6.5)
NEUTROPHILS NFR BLD AUTO: 89.5 % — HIGH (ref 42.2–75.2)
NRBC # BLD: 0 /100 WBCS — SIGNIFICANT CHANGE UP (ref 0–0)
NT-PROBNP SERPL-SCNC: HIGH PG/ML (ref 0–300)
PLATELET # BLD AUTO: 96 K/UL — LOW (ref 130–400)
POTASSIUM SERPL-MCNC: 4.9 MMOL/L — SIGNIFICANT CHANGE UP (ref 3.5–5)
POTASSIUM SERPL-SCNC: 4.9 MMOL/L — SIGNIFICANT CHANGE UP (ref 3.5–5)
PROT SERPL-MCNC: 7.2 G/DL — SIGNIFICANT CHANGE UP (ref 6–8)
RBC # BLD: 5.23 M/UL — SIGNIFICANT CHANGE UP (ref 4.7–6.1)
RBC # FLD: 15.1 % — HIGH (ref 11.5–14.5)
SODIUM SERPL-SCNC: 139 MMOL/L — SIGNIFICANT CHANGE UP (ref 135–146)
TROPONIN T SERPL-MCNC: 0.04 NG/ML — CRITICAL HIGH
WBC # BLD: 12.12 K/UL — HIGH (ref 4.8–10.8)
WBC # FLD AUTO: 12.12 K/UL — HIGH (ref 4.8–10.8)

## 2021-03-09 PROCEDURE — 71045 X-RAY EXAM CHEST 1 VIEW: CPT | Mod: 26

## 2021-03-09 PROCEDURE — 99285 EMERGENCY DEPT VISIT HI MDM: CPT

## 2021-03-09 PROCEDURE — 72170 X-RAY EXAM OF PELVIS: CPT | Mod: 26

## 2021-03-09 RX ORDER — MIDAZOLAM HYDROCHLORIDE 1 MG/ML
2 INJECTION, SOLUTION INTRAMUSCULAR; INTRAVENOUS ONCE
Refills: 0 | Status: DISCONTINUED | OUTPATIENT
Start: 2021-03-09 | End: 2021-03-09

## 2021-03-09 RX ORDER — CEFTRIAXONE 500 MG/1
1000 INJECTION, POWDER, FOR SOLUTION INTRAMUSCULAR; INTRAVENOUS ONCE
Refills: 0 | Status: COMPLETED | OUTPATIENT
Start: 2021-03-09 | End: 2021-03-09

## 2021-03-09 RX ORDER — FUROSEMIDE 40 MG
60 TABLET ORAL ONCE
Refills: 0 | Status: COMPLETED | OUTPATIENT
Start: 2021-03-09 | End: 2021-03-09

## 2021-03-09 RX ORDER — AZITHROMYCIN 500 MG/1
500 TABLET, FILM COATED ORAL ONCE
Refills: 0 | Status: COMPLETED | OUTPATIENT
Start: 2021-03-09 | End: 2021-03-09

## 2021-03-09 RX ADMIN — CEFTRIAXONE 100 MILLIGRAM(S): 500 INJECTION, POWDER, FOR SOLUTION INTRAMUSCULAR; INTRAVENOUS at 22:00

## 2021-03-09 RX ADMIN — AZITHROMYCIN 255 MILLIGRAM(S): 500 TABLET, FILM COATED ORAL at 22:43

## 2021-03-09 RX ADMIN — Medication 60 MILLIGRAM(S): at 22:58

## 2021-03-09 NOTE — ED PROVIDER NOTE - CLINICAL SUMMARY MEDICAL DECISION MAKING FREE TEXT BOX
70yM pmhx dementia IDDM HTN HLD heart transplant about 30 years ago 2/2 three MI's, on tacrolimus, mycophenolate, prednisone,  takes lasix 40 , spironolactone Presents for sob wheezing since the weekend ( about  3-4 days), increased generalizes weakness progressively worsening  tonight when wife came home from work -  pt  too weak to get out of bed , couldn't walk -  in ED rectal temp 99.7, baseline renal function since Dec 2020,  BNP 10,000 (double since 2019) 70yM pmhx dementia IDDM HTN HLD heart transplant about 30 years ago 2/2 three MI's (per EMR 2019: EF 17%) , on tacrolimus, mycophenolate, prednisone,  takes lasix 40 , spironolactone Presents for sob wheezing since the weekend ( about  3-4 days), increased generalized weakness progressively worsening  tonight when wife came home from work -  pt  too weak to get out of bed , couldn't walk -  in ED rectal temp 99.7, baseline renal function since Dec 2020,  BNP 10,000 (double since 2019),trop .04, EKG unchanged from previous  no acute ischemia   lasix  60 Iv given,    pt with chronic kidney  disease  renal function unchanged from dec 2020 -  immunosuppressant levels sent -  no result in ED  - During Ed stay pt  became agitated  yelling, and  getting out of his stretcher  -  Ed staff helping patient back into bed -  versed  given for CT necessity  -  ecchymosis  seen on exam to left low back -  Pt became drowsy and episode soft apnea 3-5 seconds -  placed on bipap,  flumazenil given -   no response   CT;s no traumatic injury  ? cystitis  wise placed,  + UTI   - pt already received ceftriaxone and azithro for 99.5 temp and opacities on  cxr  -  which was read  on CT has pulm edema   Pt admitted for further management.

## 2021-03-09 NOTE — ED PROVIDER NOTE - OBJECTIVE STATEMENT
70 year old male past medical history of cardiac transplant 30 years ago, dementia, Hypertension, HLD comes to emergency room for increasing confusion since this weekend. patient at bedside is confused and patient spouse gave history over the phone. Spouse explains that since th weekend he has had increasing generalized weakness, and confusion. tonight when she got home from work the patient could not get out of bed and he was having trouble speaking because he was shortness of breath.  Spouse than called 911.

## 2021-03-09 NOTE — ED PROVIDER NOTE - CARE PLAN
Principal Discharge DX:	Pulmonary edema  Secondary Diagnosis:	UTI (urinary tract infection)  Secondary Diagnosis:	Weakness  Secondary Diagnosis:	Dementia   Principal Discharge DX:	Pulmonary edema  Secondary Diagnosis:	UTI (urinary tract infection)  Secondary Diagnosis:	Weakness  Secondary Diagnosis:	Dementia  Secondary Diagnosis:	Transplant recipient

## 2021-03-09 NOTE — ED PROVIDER NOTE - PHYSICAL EXAMINATION
I am unable to obtain a comprehensive history, review of systems, past medical history, and/or physical exam due to constraints imposed by the urgency of the patient's clinical condition and/or mental status.     Physical Exam    Vital Signs: I have reviewed the initial vital signs.  Constitutional: well-nourished, appears stated age, patient sleeping on stretcher easily arousable, following basic commands.  Eyes: Conjunctiva pink, Sclera clear, PERRLA, EOMI.  Cardiovascular: S1 and S2, regular rate, regular rhythm, well-perfused extremities, radial pulses equal and 2+  Respiratory: unlabored respiratory effort, + b/l crackles at bases  Gastrointestinal: soft, non-tender abdomen, no pulsatile mass, normal bowl sounds  Musculoskeletal: supple neck  Integumentary: warm, dry, + b/l lower leg swelling DP pulses equal.   Neurologic: patient moving all extremities, no facial droop noted, patient able to smile symmetrically.

## 2021-03-09 NOTE — ED PROVIDER NOTE - PROGRESS NOTE DETAILS
versed given  for patient agitation - need for CT versed given  for patient agitation - stood up yelling, staff had to escort back to bed- refusing to get back into bed- versed given to obtain necessary CT;'s Pt noted to be drowsy with apparent periods of apnea -  about 3-5 seconds  O2 sat decreased to 92 -  Pt  placed on Bipap for added ventilatory support - rousable to physical stimuli - pt becomes more awake   Pt continued to have periods of apnea - switched ot AVAP -  concern for possible bzd overdose given  renal function -  Flumazenil 0.2mg given - no  change in MS or apneic periods. ? cheynes broussard  Staff at bedside to bring patient to CT on monitor r./o intracranial  cause Pt more awake now.    CT head no acute pathology.

## 2021-03-10 ENCOUNTER — TRANSCRIPTION ENCOUNTER (OUTPATIENT)
Age: 71
End: 2021-03-10

## 2021-03-10 DIAGNOSIS — N40.1 BENIGN PROSTATIC HYPERPLASIA WITH LOWER URINARY TRACT SYMPTOMS: ICD-10-CM

## 2021-03-10 DIAGNOSIS — F03.90 UNSPECIFIED DEMENTIA WITHOUT BEHAVIORAL DISTURBANCE: ICD-10-CM

## 2021-03-10 DIAGNOSIS — E11.9 TYPE 2 DIABETES MELLITUS WITHOUT COMPLICATIONS: ICD-10-CM

## 2021-03-10 DIAGNOSIS — Z94.89 OTHER TRANSPLANTED ORGAN AND TISSUE STATUS: ICD-10-CM

## 2021-03-10 DIAGNOSIS — E61.2 MAGNESIUM DEFICIENCY: ICD-10-CM

## 2021-03-10 DIAGNOSIS — I50.9 HEART FAILURE, UNSPECIFIED: ICD-10-CM

## 2021-03-10 DIAGNOSIS — R77.8 OTHER SPECIFIED ABNORMALITIES OF PLASMA PROTEINS: ICD-10-CM

## 2021-03-10 DIAGNOSIS — N18.9 CHRONIC KIDNEY DISEASE, UNSPECIFIED: ICD-10-CM

## 2021-03-10 DIAGNOSIS — N39.0 URINARY TRACT INFECTION, SITE NOT SPECIFIED: ICD-10-CM

## 2021-03-10 LAB
ALBUMIN SERPL ELPH-MCNC: 4.6 G/DL — SIGNIFICANT CHANGE UP (ref 3.5–5.2)
ALP SERPL-CCNC: 160 U/L — HIGH (ref 30–115)
ALT FLD-CCNC: 7 U/L — SIGNIFICANT CHANGE UP (ref 0–41)
ANION GAP SERPL CALC-SCNC: 21 MMOL/L — HIGH (ref 7–14)
APPEARANCE UR: ABNORMAL
AST SERPL-CCNC: 13 U/L — SIGNIFICANT CHANGE UP (ref 0–41)
BACTERIA # UR AUTO: ABNORMAL /HPF
BASE EXCESS BLDA CALC-SCNC: -3.2 MMOL/L — LOW (ref -2–2)
BILIRUB SERPL-MCNC: 1.2 MG/DL — SIGNIFICANT CHANGE UP (ref 0.2–1.2)
BILIRUB UR-MCNC: NEGATIVE — SIGNIFICANT CHANGE UP
BUN SERPL-MCNC: 51 MG/DL — HIGH (ref 10–20)
CALCIUM SERPL-MCNC: 10.1 MG/DL — SIGNIFICANT CHANGE UP (ref 8.5–10.1)
CHLORIDE SERPL-SCNC: 100 MMOL/L — SIGNIFICANT CHANGE UP (ref 98–110)
CK MB CFR SERPL CALC: 3.1 NG/ML — SIGNIFICANT CHANGE UP (ref 0.6–6.3)
CK SERPL-CCNC: 86 U/L — SIGNIFICANT CHANGE UP (ref 0–225)
CO2 SERPL-SCNC: 20 MMOL/L — SIGNIFICANT CHANGE UP (ref 17–32)
COLOR SPEC: YELLOW — SIGNIFICANT CHANGE UP
CREAT SERPL-MCNC: 2.2 MG/DL — HIGH (ref 0.7–1.5)
DIFF PNL FLD: ABNORMAL
EPI CELLS # UR: ABNORMAL /HPF
GAS PNL BLDA: SIGNIFICANT CHANGE UP
GLUCOSE BLDC GLUCOMTR-MCNC: 305 MG/DL — HIGH (ref 70–99)
GLUCOSE BLDC GLUCOMTR-MCNC: 343 MG/DL — HIGH (ref 70–99)
GLUCOSE BLDC GLUCOMTR-MCNC: 344 MG/DL — HIGH (ref 70–99)
GLUCOSE BLDC GLUCOMTR-MCNC: 471 MG/DL — CRITICAL HIGH (ref 70–99)
GLUCOSE SERPL-MCNC: 308 MG/DL — HIGH (ref 70–99)
GLUCOSE UR QL: NEGATIVE MG/DL — SIGNIFICANT CHANGE UP
HCO3 BLDA-SCNC: 20 MMOL/L — LOW (ref 23–27)
HCT VFR BLD CALC: 44 % — SIGNIFICANT CHANGE UP (ref 42–52)
HGB BLD-MCNC: 13.8 G/DL — LOW (ref 14–18)
HOROWITZ INDEX BLDA+IHG-RTO: 28 — SIGNIFICANT CHANGE UP
KETONES UR-MCNC: NEGATIVE — SIGNIFICANT CHANGE UP
LEUKOCYTE ESTERASE UR-ACNC: ABNORMAL
MAGNESIUM SERPL-MCNC: 1.9 MG/DL — SIGNIFICANT CHANGE UP (ref 1.8–2.4)
MCHC RBC-ENTMCNC: 26.3 PG — LOW (ref 27–31)
MCHC RBC-ENTMCNC: 31.4 G/DL — LOW (ref 32–37)
MCV RBC AUTO: 83.8 FL — SIGNIFICANT CHANGE UP (ref 80–94)
NITRITE UR-MCNC: NEGATIVE — SIGNIFICANT CHANGE UP
NRBC # BLD: 0 /100 WBCS — SIGNIFICANT CHANGE UP (ref 0–0)
PCO2 BLDA: 30 MMHG — LOW (ref 38–42)
PH BLDA: 7.43 — HIGH (ref 7.38–7.42)
PH UR: 6 — SIGNIFICANT CHANGE UP (ref 5–8)
PHOSPHATE SERPL-MCNC: 3.7 MG/DL — SIGNIFICANT CHANGE UP (ref 2.1–4.9)
PLATELET # BLD AUTO: 87 K/UL — LOW (ref 130–400)
PO2 BLDA: 69 MMHG — LOW (ref 78–95)
POTASSIUM SERPL-MCNC: 5.5 MMOL/L — HIGH (ref 3.5–5)
POTASSIUM SERPL-SCNC: 5.5 MMOL/L — HIGH (ref 3.5–5)
PROT SERPL-MCNC: 7.2 G/DL — SIGNIFICANT CHANGE UP (ref 6–8)
PROT UR-MCNC: >=300 MG/DL
RBC # BLD: 5.25 M/UL — SIGNIFICANT CHANGE UP (ref 4.7–6.1)
RBC # FLD: 15.6 % — HIGH (ref 11.5–14.5)
RBC CASTS # UR COMP ASSIST: >50 /HPF
SAO2 % BLDA: 95 % — SIGNIFICANT CHANGE UP (ref 94–98)
SODIUM SERPL-SCNC: 141 MMOL/L — SIGNIFICANT CHANGE UP (ref 135–146)
SP GR SPEC: 1.02 — SIGNIFICANT CHANGE UP (ref 1.01–1.03)
TACROLIMUS SERPL-MCNC: 9.7 NG/ML — SIGNIFICANT CHANGE UP
TROPONIN T SERPL-MCNC: 0.04 NG/ML — CRITICAL HIGH
UROBILINOGEN FLD QL: 0.2 MG/DL — SIGNIFICANT CHANGE UP (ref 0.2–0.2)
WBC # BLD: 13.85 K/UL — HIGH (ref 4.8–10.8)
WBC # FLD AUTO: 13.85 K/UL — HIGH (ref 4.8–10.8)
WBC UR QL: ABNORMAL /HPF

## 2021-03-10 PROCEDURE — 99223 1ST HOSP IP/OBS HIGH 75: CPT

## 2021-03-10 PROCEDURE — 71250 CT THORAX DX C-: CPT | Mod: 26

## 2021-03-10 PROCEDURE — 99222 1ST HOSP IP/OBS MODERATE 55: CPT

## 2021-03-10 PROCEDURE — 70450 CT HEAD/BRAIN W/O DYE: CPT | Mod: 26

## 2021-03-10 PROCEDURE — 71045 X-RAY EXAM CHEST 1 VIEW: CPT | Mod: 26

## 2021-03-10 PROCEDURE — 74176 CT ABD & PELVIS W/O CONTRAST: CPT | Mod: 26

## 2021-03-10 RX ORDER — MAGNESIUM OXIDE 400 MG ORAL TABLET 241.3 MG
400 TABLET ORAL DAILY
Refills: 0 | Status: DISCONTINUED | OUTPATIENT
Start: 2021-03-10 | End: 2021-03-11

## 2021-03-10 RX ORDER — CEFEPIME 1 G/1
1000 INJECTION, POWDER, FOR SOLUTION INTRAMUSCULAR; INTRAVENOUS EVERY 12 HOURS
Refills: 0 | Status: DISCONTINUED | OUTPATIENT
Start: 2021-03-11 | End: 2021-03-11

## 2021-03-10 RX ORDER — AMLODIPINE BESYLATE 2.5 MG/1
1 TABLET ORAL
Qty: 0 | Refills: 0 | DISCHARGE

## 2021-03-10 RX ORDER — METOPROLOL TARTRATE 50 MG
25 TABLET ORAL EVERY 12 HOURS
Refills: 0 | Status: DISCONTINUED | OUTPATIENT
Start: 2021-03-10 | End: 2021-03-11

## 2021-03-10 RX ORDER — POTASSIUM CHLORIDE 20 MEQ
10 PACKET (EA) ORAL
Refills: 0 | Status: DISCONTINUED | OUTPATIENT
Start: 2021-03-10 | End: 2021-03-11

## 2021-03-10 RX ORDER — HYDRALAZINE HCL 50 MG
25 TABLET ORAL EVERY 6 HOURS
Refills: 0 | Status: DISCONTINUED | OUTPATIENT
Start: 2021-03-10 | End: 2021-03-11

## 2021-03-10 RX ORDER — FUROSEMIDE 40 MG
40 TABLET ORAL ONCE
Refills: 0 | Status: COMPLETED | OUTPATIENT
Start: 2021-03-10 | End: 2021-03-10

## 2021-03-10 RX ORDER — METOPROLOL TARTRATE 50 MG
1 TABLET ORAL
Qty: 0 | Refills: 0 | DISCHARGE
Start: 2021-03-10

## 2021-03-10 RX ORDER — HALOPERIDOL DECANOATE 100 MG/ML
1 INJECTION INTRAMUSCULAR ONCE
Refills: 0 | Status: COMPLETED | OUTPATIENT
Start: 2021-03-10 | End: 2021-03-10

## 2021-03-10 RX ORDER — INSULIN LISPRO 100/ML
4 VIAL (ML) SUBCUTANEOUS ONCE
Refills: 0 | Status: COMPLETED | OUTPATIENT
Start: 2021-03-10 | End: 2021-03-10

## 2021-03-10 RX ORDER — SODIUM CHLORIDE 9 MG/ML
1000 INJECTION, SOLUTION INTRAVENOUS
Refills: 0 | Status: DISCONTINUED | OUTPATIENT
Start: 2021-03-10 | End: 2021-03-11

## 2021-03-10 RX ORDER — ACETAMINOPHEN 500 MG
650 TABLET ORAL ONCE
Refills: 0 | Status: COMPLETED | OUTPATIENT
Start: 2021-03-10 | End: 2021-03-10

## 2021-03-10 RX ORDER — NIACIN 50 MG
0 TABLET ORAL
Qty: 0 | Refills: 0 | DISCHARGE

## 2021-03-10 RX ORDER — HEPARIN SODIUM 5000 [USP'U]/ML
5000 INJECTION INTRAVENOUS; SUBCUTANEOUS THREE TIMES A DAY
Refills: 0 | Status: DISCONTINUED | OUTPATIENT
Start: 2021-03-10 | End: 2021-03-11

## 2021-03-10 RX ORDER — HYDRALAZINE HCL 50 MG
1 TABLET ORAL
Qty: 0 | Refills: 0 | DISCHARGE
Start: 2021-03-10

## 2021-03-10 RX ORDER — FLUMAZENIL 0.1 MG/ML
0.1 VIAL (ML) INTRAVENOUS ONCE
Refills: 0 | Status: COMPLETED | OUTPATIENT
Start: 2021-03-10 | End: 2021-03-10

## 2021-03-10 RX ORDER — AZITHROMYCIN 500 MG/1
500 TABLET, FILM COATED ORAL
Qty: 0 | Refills: 0 | DISCHARGE
Start: 2021-03-10

## 2021-03-10 RX ORDER — PANTOPRAZOLE SODIUM 20 MG/1
40 TABLET, DELAYED RELEASE ORAL
Refills: 0 | Status: DISCONTINUED | OUTPATIENT
Start: 2021-03-10 | End: 2021-03-11

## 2021-03-10 RX ORDER — ASPIRIN/CALCIUM CARB/MAGNESIUM 324 MG
81 TABLET ORAL DAILY
Refills: 0 | Status: DISCONTINUED | OUTPATIENT
Start: 2021-03-10 | End: 2021-03-11

## 2021-03-10 RX ORDER — MYCOPHENOLATE MOFETIL 250 MG/1
1 CAPSULE ORAL
Qty: 0 | Refills: 0 | DISCHARGE

## 2021-03-10 RX ORDER — CEFTRIAXONE 500 MG/1
1000 INJECTION, POWDER, FOR SOLUTION INTRAMUSCULAR; INTRAVENOUS EVERY 24 HOURS
Refills: 0 | Status: DISCONTINUED | OUTPATIENT
Start: 2021-03-10 | End: 2021-03-10

## 2021-03-10 RX ORDER — TACROLIMUS 5 MG/1
0 CAPSULE ORAL
Qty: 0 | Refills: 0 | DISCHARGE

## 2021-03-10 RX ORDER — DEXTROSE 50 % IN WATER 50 %
25 SYRINGE (ML) INTRAVENOUS ONCE
Refills: 0 | Status: DISCONTINUED | OUTPATIENT
Start: 2021-03-10 | End: 2021-03-11

## 2021-03-10 RX ORDER — DOXAZOSIN MESYLATE 4 MG
8 TABLET ORAL AT BEDTIME
Refills: 0 | Status: DISCONTINUED | OUTPATIENT
Start: 2021-03-10 | End: 2021-03-11

## 2021-03-10 RX ORDER — ASPIRIN/CALCIUM CARB/MAGNESIUM 324 MG
1 TABLET ORAL
Qty: 0 | Refills: 0 | DISCHARGE

## 2021-03-10 RX ORDER — AZITHROMYCIN 500 MG/1
500 TABLET, FILM COATED ORAL EVERY 24 HOURS
Refills: 0 | Status: DISCONTINUED | OUTPATIENT
Start: 2021-03-11 | End: 2021-03-11

## 2021-03-10 RX ORDER — TACROLIMUS 5 MG/1
0.5 CAPSULE ORAL DAILY
Refills: 0 | Status: DISCONTINUED | OUTPATIENT
Start: 2021-03-10 | End: 2021-03-11

## 2021-03-10 RX ORDER — GLUCAGON INJECTION, SOLUTION 0.5 MG/.1ML
1 INJECTION, SOLUTION SUBCUTANEOUS ONCE
Refills: 0 | Status: DISCONTINUED | OUTPATIENT
Start: 2021-03-10 | End: 2021-03-11

## 2021-03-10 RX ORDER — DOXAZOSIN MESYLATE 4 MG
1 TABLET ORAL
Qty: 0 | Refills: 0 | DISCHARGE

## 2021-03-10 RX ORDER — NIACIN 50 MG
1 TABLET ORAL
Qty: 0 | Refills: 0 | DISCHARGE

## 2021-03-10 RX ORDER — MYCOPHENOLATE MOFETIL 250 MG/1
2 CAPSULE ORAL
Qty: 0 | Refills: 0 | DISCHARGE

## 2021-03-10 RX ORDER — OMEGA-3 ACID ETHYL ESTERS 1 G
1 CAPSULE ORAL
Qty: 0 | Refills: 0 | DISCHARGE

## 2021-03-10 RX ORDER — FUROSEMIDE 40 MG
2 TABLET ORAL
Qty: 0 | Refills: 0 | DISCHARGE

## 2021-03-10 RX ORDER — TACROLIMUS 5 MG/1
1 CAPSULE ORAL AT BEDTIME
Refills: 0 | Status: DISCONTINUED | OUTPATIENT
Start: 2021-03-10 | End: 2021-03-11

## 2021-03-10 RX ORDER — OMEGA-3 ACID ETHYL ESTERS 1 G
1 CAPSULE ORAL DAILY
Refills: 0 | Status: DISCONTINUED | OUTPATIENT
Start: 2021-03-10 | End: 2021-03-11

## 2021-03-10 RX ORDER — INSULIN LISPRO 100/ML
5 VIAL (ML) SUBCUTANEOUS
Refills: 0 | Status: DISCONTINUED | OUTPATIENT
Start: 2021-03-10 | End: 2021-03-10

## 2021-03-10 RX ORDER — DEXTROSE 50 % IN WATER 50 %
15 SYRINGE (ML) INTRAVENOUS ONCE
Refills: 0 | Status: DISCONTINUED | OUTPATIENT
Start: 2021-03-10 | End: 2021-03-11

## 2021-03-10 RX ORDER — MYCOPHENOLATE MOFETIL 250 MG/1
500 CAPSULE ORAL
Refills: 0 | Status: DISCONTINUED | OUTPATIENT
Start: 2021-03-10 | End: 2021-03-11

## 2021-03-10 RX ORDER — FUROSEMIDE 40 MG
1 TABLET ORAL
Qty: 0 | Refills: 0 | DISCHARGE

## 2021-03-10 RX ORDER — CEFEPIME 1 G/1
1 INJECTION, POWDER, FOR SOLUTION INTRAMUSCULAR; INTRAVENOUS
Qty: 0 | Refills: 0 | DISCHARGE
Start: 2021-03-10

## 2021-03-10 RX ORDER — PANTOPRAZOLE SODIUM 20 MG/1
1 TABLET, DELAYED RELEASE ORAL
Qty: 0 | Refills: 0 | DISCHARGE

## 2021-03-10 RX ORDER — FUROSEMIDE 40 MG
0 TABLET ORAL
Qty: 0 | Refills: 0 | DISCHARGE

## 2021-03-10 RX ORDER — INSULIN GLARGINE 100 [IU]/ML
15 INJECTION, SOLUTION SUBCUTANEOUS
Qty: 0 | Refills: 0 | DISCHARGE
Start: 2021-03-10

## 2021-03-10 RX ORDER — CEFEPIME 1 G/1
1000 INJECTION, POWDER, FOR SOLUTION INTRAMUSCULAR; INTRAVENOUS ONCE
Refills: 0 | Status: COMPLETED | OUTPATIENT
Start: 2021-03-10 | End: 2021-03-10

## 2021-03-10 RX ORDER — HYDRALAZINE HCL 50 MG
5 TABLET ORAL ONCE
Refills: 0 | Status: COMPLETED | OUTPATIENT
Start: 2021-03-10 | End: 2021-03-10

## 2021-03-10 RX ORDER — PREGABALIN 225 MG/1
1 CAPSULE ORAL
Qty: 0 | Refills: 0 | DISCHARGE

## 2021-03-10 RX ORDER — TAMSULOSIN HYDROCHLORIDE 0.4 MG/1
1 CAPSULE ORAL
Qty: 0 | Refills: 0 | DISCHARGE

## 2021-03-10 RX ORDER — QUININE SULFATE 324 MG/1
2 CAPSULE ORAL
Qty: 0 | Refills: 0 | DISCHARGE

## 2021-03-10 RX ORDER — POTASSIUM CHLORIDE 20 MEQ
1 PACKET (EA) ORAL
Qty: 0 | Refills: 0 | DISCHARGE

## 2021-03-10 RX ORDER — CEFEPIME 1 G/1
INJECTION, POWDER, FOR SOLUTION INTRAMUSCULAR; INTRAVENOUS
Refills: 0 | Status: DISCONTINUED | OUTPATIENT
Start: 2021-03-10 | End: 2021-03-11

## 2021-03-10 RX ORDER — INSULIN GLARGINE 100 [IU]/ML
15 INJECTION, SOLUTION SUBCUTANEOUS AT BEDTIME
Refills: 0 | Status: DISCONTINUED | OUTPATIENT
Start: 2021-03-10 | End: 2021-03-11

## 2021-03-10 RX ORDER — INSULIN LISPRO 100/ML
0 VIAL (ML) SUBCUTANEOUS
Qty: 0 | Refills: 0 | DISCHARGE
Start: 2021-03-10

## 2021-03-10 RX ORDER — HEPARIN SODIUM 5000 [USP'U]/ML
5000 INJECTION INTRAVENOUS; SUBCUTANEOUS
Qty: 0 | Refills: 0 | DISCHARGE
Start: 2021-03-10

## 2021-03-10 RX ORDER — FUROSEMIDE 40 MG
40 TABLET ORAL
Qty: 0 | Refills: 0 | DISCHARGE
Start: 2021-03-10

## 2021-03-10 RX ORDER — DEXTROSE 50 % IN WATER 50 %
12.5 SYRINGE (ML) INTRAVENOUS ONCE
Refills: 0 | Status: DISCONTINUED | OUTPATIENT
Start: 2021-03-10 | End: 2021-03-11

## 2021-03-10 RX ORDER — TAMSULOSIN HYDROCHLORIDE 0.4 MG/1
0.4 CAPSULE ORAL AT BEDTIME
Refills: 0 | Status: DISCONTINUED | OUTPATIENT
Start: 2021-03-10 | End: 2021-03-11

## 2021-03-10 RX ORDER — FUROSEMIDE 40 MG
40 TABLET ORAL
Refills: 0 | Status: DISCONTINUED | OUTPATIENT
Start: 2021-03-10 | End: 2021-03-11

## 2021-03-10 RX ORDER — INSULIN LISPRO 100/ML
VIAL (ML) SUBCUTANEOUS
Refills: 0 | Status: DISCONTINUED | OUTPATIENT
Start: 2021-03-10 | End: 2021-03-11

## 2021-03-10 RX ADMIN — HALOPERIDOL DECANOATE 1 MILLIGRAM(S): 100 INJECTION INTRAMUSCULAR at 14:30

## 2021-03-10 RX ADMIN — Medication 4: at 17:37

## 2021-03-10 RX ADMIN — Medication 40 MILLIGRAM(S): at 21:41

## 2021-03-10 RX ADMIN — Medication 0.1 MILLIGRAM(S): at 01:18

## 2021-03-10 RX ADMIN — HEPARIN SODIUM 5000 UNIT(S): 5000 INJECTION INTRAVENOUS; SUBCUTANEOUS at 14:30

## 2021-03-10 RX ADMIN — MIDAZOLAM HYDROCHLORIDE 2 MILLIGRAM(S): 1 INJECTION, SOLUTION INTRAMUSCULAR; INTRAVENOUS at 00:03

## 2021-03-10 RX ADMIN — Medication 8 MILLIGRAM(S): at 22:25

## 2021-03-10 RX ADMIN — Medication 1 GRAM(S): at 12:30

## 2021-03-10 RX ADMIN — Medication 81 MILLIGRAM(S): at 13:04

## 2021-03-10 RX ADMIN — CEFEPIME 100 MILLIGRAM(S): 1 INJECTION, POWDER, FOR SOLUTION INTRAMUSCULAR; INTRAVENOUS at 16:35

## 2021-03-10 RX ADMIN — MYCOPHENOLATE MOFETIL 500 MILLIGRAM(S): 250 CAPSULE ORAL at 17:39

## 2021-03-10 RX ADMIN — Medication 1 TABLET(S): at 12:29

## 2021-03-10 RX ADMIN — Medication 10 MILLIEQUIVALENT(S): at 06:19

## 2021-03-10 RX ADMIN — TACROLIMUS 1 MILLIGRAM(S): 5 CAPSULE ORAL at 22:25

## 2021-03-10 RX ADMIN — TAMSULOSIN HYDROCHLORIDE 0.4 MILLIGRAM(S): 0.4 CAPSULE ORAL at 22:25

## 2021-03-10 RX ADMIN — HEPARIN SODIUM 5000 UNIT(S): 5000 INJECTION INTRAVENOUS; SUBCUTANEOUS at 22:25

## 2021-03-10 RX ADMIN — Medication 40 MILLIGRAM(S): at 17:39

## 2021-03-10 RX ADMIN — Medication 25 MILLIGRAM(S): at 14:32

## 2021-03-10 RX ADMIN — Medication 10 MILLIEQUIVALENT(S): at 17:39

## 2021-03-10 RX ADMIN — Medication 40 MILLIGRAM(S): at 09:41

## 2021-03-10 RX ADMIN — CEFTRIAXONE 100 MILLIGRAM(S): 500 INJECTION, POWDER, FOR SOLUTION INTRAMUSCULAR; INTRAVENOUS at 12:27

## 2021-03-10 RX ADMIN — Medication 650 MILLIGRAM(S): at 06:00

## 2021-03-10 RX ADMIN — AZITHROMYCIN 255 MILLIGRAM(S): 500 TABLET, FILM COATED ORAL at 23:00

## 2021-03-10 RX ADMIN — MYCOPHENOLATE MOFETIL 500 MILLIGRAM(S): 250 CAPSULE ORAL at 06:19

## 2021-03-10 RX ADMIN — Medication 25 MILLIGRAM(S): at 13:04

## 2021-03-10 RX ADMIN — Medication 4 UNIT(S): at 21:53

## 2021-03-10 RX ADMIN — HEPARIN SODIUM 5000 UNIT(S): 5000 INJECTION INTRAVENOUS; SUBCUTANEOUS at 06:19

## 2021-03-10 RX ADMIN — Medication 0.1 MILLIGRAM(S): at 02:48

## 2021-03-10 RX ADMIN — Medication 4: at 12:48

## 2021-03-10 RX ADMIN — INSULIN GLARGINE 15 UNIT(S): 100 INJECTION, SOLUTION SUBCUTANEOUS at 21:38

## 2021-03-10 RX ADMIN — PANTOPRAZOLE SODIUM 40 MILLIGRAM(S): 20 TABLET, DELAYED RELEASE ORAL at 06:19

## 2021-03-10 RX ADMIN — MAGNESIUM OXIDE 400 MG ORAL TABLET 400 MILLIGRAM(S): 241.3 TABLET ORAL at 12:29

## 2021-03-10 RX ADMIN — TACROLIMUS 0.5 MILLIGRAM(S): 5 CAPSULE ORAL at 12:29

## 2021-03-10 RX ADMIN — Medication 40 MILLIGRAM(S): at 06:20

## 2021-03-10 NOTE — H&P ADULT - PROBLEM SELECTOR PLAN 2
Continue Rocephin started in ER with ID consult (Also continue Zithromax to cover possible lung component)

## 2021-03-10 NOTE — DISCHARGE NOTE PROVIDER - NSDCMRMEDTOKEN_GEN_ALL_CORE_FT
aspirin 81 mg oral tablet: 1 tab(s) orally once a day  azithromycin 500 mg intravenous injection: 500 milligram(s) intravenous every 24 hours  cefepime 1 g/50 mL-iso-osmotic dextrose intravenous solution: 1 gram(s) intravenous every 12 hours  CellCept 500 mg oral tablet: 1 tab(s) orally 2 times a day  doxazosin 8 mg oral tablet: 1 tab(s) orally once a day  furosemide 100 mg/100 mL-0.9% intravenous solution: 40 milliliter(s) intravenous 2 times a day  heparin: 5000 unit(s) subcutaneous 2 times a day  hydrALAZINE 25 mg oral tablet: 1 tab(s) orally every 6 hours, As needed, Systolic blood pressure &gt;160  insulin glargine: 15 unit(s) subcutaneous once a day (at bedtime)  insulin lispro 100 units/mL injectable solution: insulin sliding scale  metoprolol tartrate 25 mg oral tablet: 1 tab(s) orally every 12 hours  Omega-3 1000 mg oral capsule: 1 cap(s) orally once a day  Oysco 500 with D: orally once a day  pantoprazole 40 mg oral delayed release tablet: 1 tab(s) orally once a day  pravastatin 40 mg oral tablet: 1 tab(s) orally once a day  tacrolimus 0.5 mg oral capsule: orally once a day  tacrolimus 1 mg oral capsule: 1 cap(s) orally once a day (at bedtime)  tamsulosin 0.4 mg oral capsule: 1 cap(s) orally once a day

## 2021-03-10 NOTE — H&P ADULT - HISTORY OF PRESENT ILLNESS
[age]yo male whose PMH includes  (Patient confused cannot provide details, used ER notes (they obtained information from his wife)) 69yo male whose PMH includes heart transplant recipient status, diabetes on insulin and "slight" dementia is brought to the ER due to increasing confusion since the weekend. Cannot determine if patient had chest pain, breathing problems or trouble urinating though ER staff did place Rouse during his visit. In addition ER notes that they had to give versed due to his agitation followed by applying a NIV device due to apneic periods.  (Patient confused cannot provide details, used ER notes (they obtained information from his wife)) 71yo male whose PMH includes heart transplant recipient status, diabetes on insulin and "slight" dementia is brought to the ER due to increasing confusion since the weekend. Cannot determine if patient had chest pain, breathing problems or trouble urinating though ER staff did place Rouse during his visit. In addition ER notes that they had to give versed due to his agitation followed by applying a NIV device due to apneic periods. Of note patient admitted to our ICU in 2018 for similar problem (altered mental status) but at that time found to have DKA (with UTI?)- was seen by neurology, renal, ID, cardiology (along with pulmonary and endocrinology) specialists at that time

## 2021-03-10 NOTE — CONSULT NOTE ADULT - ASSESSMENT
Patient s/p heart transplant 10 years ago. He has dementia. Now confused. Volume overloaded. But appears better. Check labs. Lasix. define goals care. Prognosis guarded

## 2021-03-10 NOTE — CONSULT NOTE ADULT - SUBJECTIVE AND OBJECTIVE BOX
JULISA CONKLIN  70y, Male  Allergy: codeine (Unknown)  morphine (Unknown)      CHIEF COMPLAINT: altered mental status, uti, pulmonary edema (10 Mar 2021 12:36)      LOS      HPI:  (Patient confused cannot provide details, used ER notes (they obtained information from his wife)) 69yo male whose PMH includes heart transplant recipient status, diabetes on insulin and "slight" dementia is brought to the ER due to increasing confusion since the weekend. Cannot determine if patient had chest pain, breathing problems or trouble urinating though ER staff did place Rouse during his visit. In addition ER notes that they had to give versed due to his agitation followed by applying a NIV device due to apneic periods. Of note patient admitted to our ICU in 2018 for similar problem (altered mental status) but at that time found to have DKA (with UTI?)- was seen by neurology, renal, ID, cardiology (along with pulmonary and endocrinology) specialists at that time (10 Mar 2021 04:55)      INFECTIOUS DISEASE HISTORY:  Wife reports that he recently had cystoscopy by Dr. Argueta, 2 days prior to presentation.  Unclear indication for cystoscopy.   Was given antibiotics prior and after procedure.   Was at his baseline health prior to cystoscopy and only started to have worsening confusion afterwards, although she also reports he did not appear well during the weekend.   On exam, patient is lethargic appearing, but arrousable.   Not oriented to location or time.   Main complaint is lower extremity weakness.   Denies any abdominal pain, nausea, vomiting.   Had Heart Tx 10 years ago at [facility]. No recent changes to immunosuppression.         PAST MEDICAL & SURGICAL HISTORY:  Urinary tract infection without hematuria, site unspecified    Chronic kidney disease, unspecified CKD stage    Bilateral hearing loss, unspecified hearing loss type    Benign prostatic hyperplasia with urinary frequency    Diabetes    High cholesterol    HTN (hypertension)    Heart transplant recipient    H/O heart transplant        FAMILY HISTORY  No pertinent family history in first degree relatives        SOCIAL HISTORY  Social History:   (10 Mar 2021 04:55)        ROS  General: Denies rigors, nightsweats  HEENT: Denies headache, rhinorrhea, sore throat, eye pain  CV: Denies CP, palpitations  PULM: Denies wheezing, hemoptysis  GI: Denies hematemesis, hematochezia, melena  : Denies discharge, hematuria  MSK: Denies arthralgias, myalgias  SKIN: Denies rash, lesions  NEURO: Denies paresthesias, weakness  PSYCH: Denies depression, anxiety    VITALS:  T(F): 96.9, Max: 99.7 (21 @ 21:23)  HR: 111  BP: 156/93  RR: 22Vital Signs Last 24 Hrs  T(C): 36.1 (10 Mar 2021 11:40), Max: 37.6 (09 Mar 2021 21:23)  T(F): 96.9 (10 Mar 2021 11:40), Max: 99.7 (09 Mar 2021 21:23)  HR: 111 (10 Mar 2021 11:40) (106 - 115)  BP: 156/93 (10 Mar 2021 11:40) (97/63 - 164/98)  BP(mean): --  RR: 22 (10 Mar 2021 11:29) (18 - 22)  SpO2: 94% (10 Mar 2021 11:29) (94% - 100%)    PHYSICAL EXAM:  Gen: NAD, resting in bed  HEENT: Normocephalic, atraumatic  Neck: supple, no lymphadenopathy  CV: Regular rate & regular rhythm  Lungs: decreased BS at bases, no fremitus  Abdomen: Soft, BS present  Ext: Warm, well perfused  Neuro: non focal, awake  Skin: no rash, no erythema  Lines: no phlebitis    TESTS & MEASUREMENTS:                        13.8   13.85 )-----------( 87       ( 10 Mar 2021 05:45 )             44.0     03-10    141  |  100  |  51<H>  ----------------------------<  308<H>  5.5<H>   |  20  |  2.2<H>    Ca    10.1      10 Mar 2021 05:45  Phos  3.7     03-10  Mg     1.9     03-10    TPro  7.2  /  Alb  4.6  /  TBili  1.2  /  DBili  x   /  AST  13  /  ALT  7   /  AlkPhos  160<H>  0310    eGFR if Non African American: 29 mL/min/1.73M2 (03-10-21 @ 05:45)  eGFR if African American: 34 mL/min/1.73M2 (03-10-21 @ 05:45)  eGFR if Non African American: 31 mL/min/1.73M2 (21 @ 20:57)  eGFR if : 36 mL/min/1.73M2 (21 @ 20:57)    LIVER FUNCTIONS - ( 10 Mar 2021 05:45 )  Alb: 4.6 g/dL / Pro: 7.2 g/dL / ALK PHOS: 160 U/L / ALT: 7 U/L / AST: 13 U/L / GGT: x           Urinalysis Basic - ( 10 Mar 2021 03:30 )    Color: Yellow / Appearance: Cloudy / S.025 / pH: x  Gluc: x / Ketone: Negative  / Bili: Negative / Urobili: 0.2 mg/dL   Blood: x / Protein: >=300 mg/dL / Nitrite: Negative   Leuk Esterase: Small / RBC: >50 /HPF / WBC 26-50 /HPF   Sq Epi: x / Non Sq Epi: Few /HPF / Bacteria: TNTC /HPF        Culture - Urine (collected 20 @ 17:55)  Source: .Urine Clean Catch (Midstream)  Final Report (20 @ 20:47):    <10,000 CFU/mL Normal Urogenital Tonya            INFECTIOUS DISEASES TESTING      RADIOLOGY & ADDITIONAL TESTS:  I have personally reviewed the last Chest xray  CXR      CT  CT Chest No Cont:   EXAM:  CT ABDOMEN AND PELVIS        EXAM:  CT CHEST          *** ADDENDUM 03/10/2021  ***    Finding of emphysematous cystitis discussed with Dr. Barnard at 5:45 AM    *** END OF ADDENDUM 03/10/2021  ***        PROCEDURE DATE:  03/10/2021            INTERPRETATION:  CLINICAL HISTORY/REASON FOR EXAM: Trauma.    TECHNIQUE: Contiguous axial CT images were obtained from the thoracic inlet to the pubic symphysis without intravenous contrast. Oral contrast was not administered. Reformatted images in thecoronal and sagittal planes were acquired. 3D (MIP) images obtained.    COMPARISON: CT abdomen and pelvis dated 2018.      FINDINGS:    CHEST:    LUNGS, PLEURA, AIRWAYS: Moderate bilateral pleural effusions. Bilateral groundglass opacities and interlobular septal thickening. Bilateral subsegmental atelectasis. No lobar consolidation or pneumothorax. No evidence of central endobronchial obstruction.    THORACIC NODES: No mediastinal, hilar, supraclavicular, or axillary lymphadenopathy.    MEDIASTINUM/GREAT VESSELS: No pericardial effusion. Cardiomegaly. The aorta is of normal caliber. The main pulmonary artery measures 3.8 cm in diameter, consistent with pulmonary arterial hypertension. Sternotomy and CABG.    ABDOMEN/PELVIS:    HEPATOBILIARY: Post cholecystectomy. Liver demonstrates morphologic changes which may reflect underlying cirrhosis.    SPLEEN: Unremarkable.    PANCREAS: Unremarkable.    ADRENAL GLANDS: Unremarkable.    KIDNEYS: No hydronephrosis. Right renal cyst. Left renalperipheral calcifications with lower pole atrophy, unchanged. Nonspecific bilateral perinephric fat stranding.    ABDOMINOPELVIC NODES: Unremarkable.    PELVIC ORGANS: Enlarged prostate gland. There is urinary bladder wall thickening with intraluminal and intramural air extending into the perivascular spaces consistent with emphysematous cystitis.    PERITONEUM/MESENTERY/BOWEL: No bowel obstruction or pneumoperitoneum. Trace ascites inferior to the liver.    BONES/SOFT TISSUES: No acute osseous abnormality. Degenerative changes of the spine. Post median sternotomy.    OTHER: Atherosclerotic vascular calcifications.      IMPRESSION:    No CT evidence of acute traumatic injury in the chest, abdomen or pelvis.    Moderate bilateral pleural effusions, bilateral groundglass opacities, interlobular septal thickening and cardiomegaly. Findings are consistent with CHF.    Emphysematous cystitis.    Question underlying cirrhosis.    Dilated main pulmonary artery compatible with pulmonary hypertension.      ***Please see the addendum at the top of this report. It may contain additional important information or changes.****      DERICK FRASER M.D., RESIDENT RADIOLOGIST  This document has been electronically signed.  RILEY RUELAS MD; Attending Radiologist  This document has been electronically signed. Mar 10 2021  5:43AM  Addend:RILEY RUELAS MD; Attending Radiologist  This addendum was electronically signed on: Mar 10 2021  5:51AM. (03-10-21 @ 02:06)      CARDIOLOGY TESTING  12 Lead ECG:   Ventricular Rate 112 BPM    Atrial Rate 112 BPM    P-R Interval 144 ms    QRS Duration 98 ms    Q-T Interval 342 ms    QTC Calculation(Bazett) 466 ms    P Axis 39 degrees    R Axis 86 degrees    T Axis 144 degrees    Diagnosis Line Sinus tachycardia  Low voltage QRS  Confirmed by DORI NEVAREZ MD ([id number]) on 3/10/2021 12:27:29 PM (21 @ 21:11)      MEDICATIONS  aspirin  chewable 81 Oral daily  calcium carbonate 1250 mG  + Vitamin D (OsCal 500 + D) 1 Oral daily  cefTRIAXone   IVPB 1000 IV Intermittent every 24 hours  dextrose 40% Gel 15 Oral once  dextrose 5%. 1000 IV Continuous <Continuous>  dextrose 5%. 1000 IV Continuous <Continuous>  dextrose 50% Injectable 25 IV Push once  dextrose 50% Injectable 12.5 IV Push once  dextrose 50% Injectable 25 IV Push once  doxazosin 8 Oral at bedtime  furosemide   Injectable 40 IV Push two times a day  glucagon  Injectable 1 IntraMuscular once  heparin   Injectable 5000 SubCutaneous three times a day  insulin glargine Injectable (LANTUS) 15 SubCutaneous at bedtime  insulin lispro (ADMELOG) corrective regimen sliding scale  SubCutaneous three times a day before meals  magnesium oxide 400 Oral daily  metoprolol tartrate 25 Oral every 12 hours  mycophenolate mofetil 500 Oral two times a day  omega-3-Acid Ethyl Esters 1 Oral daily  pantoprazole    Tablet 40 Oral before breakfast  potassium chloride    Tablet ER 10 Oral two times a day  tacrolimus 1 Oral at bedtime  tacrolimus 0.5 Oral daily  tamsulosin 0.4 Oral at bedtime      Weight  Weight (kg): 92 (03-10-21 @ 11:40)    ANTIBIOTICS:  cefTRIAXone   IVPB 1000 milliGRAM(s) IV Intermittent every 24 hours      ALLERGIES:  codeine (Unknown)  morphine (Unknown)

## 2021-03-10 NOTE — DISCHARGE NOTE PROVIDER - NSDCQMPCI_CARD_ALL_CORE
06/26/18 1159   Interdisciplinary Plan of Care-Goals (Indications)   Hyperinflation Protocol Indications Chest Trauma (Blunt, Penetrative, or Surgical)   Interdisciplinary Plan of Care-Outcomes    Hyperinflation Protocol Goals/Outcome Greater Than 60% of Predicted I.S. Volume x 24 hrs   Education   Education Yes - Pt. / Family has been Instructed in use of Respiratory Equipment   Incentive Spirometry Group   Incentive Spirometry Instruction Yes   Breathing Exercises Yes   Incentive Spirometer Volume 2000 mL   Incentive Spirometer Date Last Changed 06/26/18   Oximetry   #Pulse Oximetry (Single Determination) Yes   Continuous Oximetry Yes   Oxygen   Home O2 Use Prior To Admission? No   Pulse Oximetry 95 %   O2 (LPM) 1   O2 Daily Delivery Respiratory  Silicone Nasal Cannula     
No

## 2021-03-10 NOTE — DISCHARGE NOTE PROVIDER - HOSPITAL COURSE
Patient is 69 yo male with hx of Benign prostatic hyperplasia with urinary frequency, Bilateral hearing loss, Chronic kidney disease stage 3, DM2, Heart transplant recipient, High cholesterol, HTN (hypertension) presenting with         1.  Acute hypoxic respiratory failure  -Seems to be secondary to possible CHF, and PNA.  TTE pending  -Continue with IVF lasix, and IV antibiotic, oxygen, and neb tx  -Pulmonary recommended diuresis     2. AMS  -Seems to be secondary to metabolic encephalopathy in the setting of infection  -Head CT scan shows no acute process  -Treat underline infection  -Neurology recommended EEG      3. CKD stage 3  -Nephrology consult      4. DM2  -ISS  -Monitor POC    5.  UTI  -Continue with current antibiotic as per ID pending culture results    As per Dr. Miller's NP (gilda), patient needs to be transfers to [facility] the heart transplant unit.  discussed case with the transfer center/ who accepted the patient     discussed the transfer process with wife, and in agreement.  all risks, benefits, and alternative of transfer discussed

## 2021-03-10 NOTE — PROVIDER CONTACT NOTE (OTHER) - ASSESSMENT
pt is alert but disoriented x4. pt continues to pull out o2 at times, 1:1 places back in. pt unable to keep eyes open while communicating.

## 2021-03-10 NOTE — PROVIDER CONTACT NOTE (OTHER) - SITUATION
pt new admission from ED, came onto floor pt using accessory muscles to breathe, pt on 1:1 for alerted mental status, pt cannot keep eyes open,

## 2021-03-10 NOTE — CONSULT NOTE ADULT - ASSESSMENT
ASSESSMENT  69yo male whose PMH includes heart transplant recipient status, diabetes on insulin and "slight" dementia is brought to the ER due to increasing confusion since the weekend.         IMPRESSION  #Sepsis on admission (WBC>12, HR>90) due to   - CT Abdomen and Pelvis No Cont (03.10.21 @ 02:06): No CT evidence of acute traumatic injury in the chest, abdomen or pelvis. Moderate bilateral pleural effusions, bilateral groundglass opacities, interlobular septal thickening and cardiomegaly. Findings are consistent with CHF. Emphysematous cystitis.  Question underlying cirrhosis. Dilated main pulmonary artery compatible with pulmonary hypertension.    #s/p Heart Transplant about 10 years ago  #DM II  #Dementia  #Obesity BMI (kg/m2): 30.8  #Abx allergy: codeine (Unknown)  morphine (Unknown)    Creatinine, Serum: 2.2 mg/dL (03.10.21 @ 05:45)  Weight (kg): 92 (10 Mar 2021 11:40)  CrCl 40      RECOMMENDATIONS  - given recent procedure, would broaden to cefepime 1g q 12 hours  - follow-up urine cultures  - follow-up blood cultures  - diuresis per primary/renal/cardio  - try to obtain collateral information regarding recent cystoscopy     Please call or message on [employer] Teams if with any questions.  Spectra [phone]

## 2021-03-10 NOTE — H&P ADULT - PROBLEM SELECTOR PLAN 4
Suspect this is present (need to verify) and if so suspect stage 3- Renal consult called also to assistance fluid management

## 2021-03-10 NOTE — CONSULT NOTE ADULT - SUBJECTIVE AND OBJECTIVE BOX
[facility]  INITIAL CONSULT NOTE  --------------------------------------------------------------------------------  HPI:  71 yo male w/ extensive PMHx as below, including known CKD from Tacrolimus, DM Nephropathy w/ proteinuria in past, creat 1.79 10/2018.  Came to ER w/ altered mental status and SOB.  Found to be volume overloaded and w/ UTI.  Hemodynamically stable.  At home, taking Lasix 40mg QD, now on Lasix 40mg IV q12hr.  Padma placed this AM.  Found on CT scans to have decompensated CHF, pulmonary HTN, ? cirrhosis, emphysematous cystitis.  On Ceftiaxone.        PAST HISTORY  --------------------------------------------------------------------------------  PAST MEDICAL & SURGICAL HISTORY:  Urinary tract infection without hematuria, site unspecified    Chronic kidney disease, unspecified CKD stage    Bilateral hearing loss, unspecified hearing loss type    Benign prostatic hyperplasia with urinary frequency    Diabetes    High cholesterol    HTN (hypertension)    Heart transplant recipient    H/O heart transplant      FAMILY HISTORY:  No pertinent family history in first degree relatives      PAST SOCIAL HISTORY:    ALLERGIES & MEDICATIONS  --------------------------------------------------------------------------------  Allergies    codeine (Unknown)  morphine (Unknown)    Intolerances      Standing Inpatient Medications  aspirin  chewable 81 milliGRAM(s) Oral daily  calcium carbonate 1250 mG  + Vitamin D (OsCal 500 + D) 1 Tablet(s) Oral daily  cefTRIAXone   IVPB 1000 milliGRAM(s) IV Intermittent every 24 hours  dextrose 40% Gel 15 Gram(s) Oral once  dextrose 5%. 1000 milliLiter(s) IV Continuous <Continuous>  dextrose 5%. 1000 milliLiter(s) IV Continuous <Continuous>  dextrose 50% Injectable 25 Gram(s) IV Push once  dextrose 50% Injectable 12.5 Gram(s) IV Push once  dextrose 50% Injectable 25 Gram(s) IV Push once  doxazosin 8 milliGRAM(s) Oral at bedtime  furosemide   Injectable 40 milliGRAM(s) IV Push two times a day  glucagon  Injectable 1 milliGRAM(s) IntraMuscular once  haloperidol    Injectable 1 milliGRAM(s) IntraMuscular once  heparin   Injectable 5000 Unit(s) SubCutaneous three times a day  insulin glargine Injectable (LANTUS) 15 Unit(s) SubCutaneous at bedtime  insulin lispro (ADMELOG) corrective regimen sliding scale   SubCutaneous three times a day before meals  magnesium oxide 400 milliGRAM(s) Oral daily  mycophenolate mofetil 500 milliGRAM(s) Oral two times a day  omega-3-Acid Ethyl Esters 1 Gram(s) Oral daily  pantoprazole    Tablet 40 milliGRAM(s) Oral before breakfast  potassium chloride    Tablet ER 10 milliEquivalent(s) Oral two times a day  tacrolimus 1 milliGRAM(s) Oral at bedtime  tacrolimus 0.5 milliGRAM(s) Oral daily  tamsulosin 0.4 milliGRAM(s) Oral at bedtime    PRN Inpatient Medications  hydrALAZINE 25 milliGRAM(s) Oral every 6 hours PRN      REVIEW OF SYSTEMS  --------------------------------------------------------------------------------  Gen: No weight changes, fatigue, fevers/chills, weakness  Skin: No rashes  Head/Eyes/Ears/Mouth: No headache; Normal hearing; Normal vision w/o blurriness; No sinus pain/discomfort, sore throat  Respiratory: No dyspnea, cough, wheezing, hemoptysis  CV: No chest pain, PND, orthopnea  GI: No abdominal pain, diarrhea, constipation, nausea, vomiting, melena, hematochezia  : No increased frequency, dysuria, hematuria, nocturia  MSK: No joint pain/swelling; no back pain; no edema  Neuro: No dizziness/lightheadedness, weakness, seizures, numbness, tingling  Heme: No easy bruising or bleeding  Endo: No heat/cold intolerance  Psych: No significant nervousness, anxiety, stress, depression    All other systems were reviewed and are negative, except as noted.    VITALS/PHYSICAL EXAM  --------------------------------------------------------------------------------  T(C): 36.1 (03-10-21 @ 11:40), Max: 37.6 (03-09-21 @ 21:23)  HR: 111 (03-10-21 @ 11:40) (106 - 115)  BP: 156/93 (03-10-21 @ 11:40) (97/63 - 164/98)  RR: 22 (03-10-21 @ 11:29) (18 - 22)  SpO2: 94% (03-10-21 @ 11:29) (94% - 100%)  Wt(kg): --  Height (cm): 172.7 (03-09-21 @ 20:18)  Weight (kg): 92 (03-10-21 @ 11:40)  BMI (kg/m2): 30.8 (03-10-21 @ 11:40)  BSA (m2): 2.06 (03-10-21 @ 11:40)      Physical Exam:  	Gen: appears SOB, can't speak full sentences  	HEENT: neck veins not visible  	Pulm: diffuse coarse breath sounds, mildly prolonged expiratory phase  	CV: RRR, S1S2; no rub  	Back: No spinal or CVA tenderness; no sacral edema  	Abd: +BS, soft, RUQ tenderness  	: No suprapubic tenderness  	UE: Warm, FROM, no clubbing, no edema  	LE: Warm; no edema  	Neuro: No focal deficits, intact gait  	Psych: Normal affect and mood  	Skin: Warm, without rashes  	Vascular access:    LABS/STUDIES  --------------------------------------------------------------------------------              13.8   13.85 >-----------<  87       [03-10-21 @ 05:45]              44.0     141  |  100  |  51  ----------------------------<  308      [03-10-21 @ 05:45]  5.5   |  20  |  2.2        Ca     10.1     [03-10-21 @ 05:45]      Mg     1.9     [03-10-21 @ 05:45]      Phos  3.7     [03-10-21 @ 05:45]    TPro  7.2  /  Alb  4.6  /  TBili  1.2  /  DBili  x   /  AST  13  /  ALT  7   /  AlkPhos  160  [03-10-21 @ 05:45]        Troponin 0.04      [03-10-21 @ 05:45]  CK 86      [03-10-21 @ 05:45]    Creatinine Trend:  SCr 2.2 [03-10 @ 05:45]  SCr 2.1 [03-09 @ 20:57]    Urinalysis - [03-10-21 @ 03:30]      Color Yellow / Appearance Cloudy / SG 1.025 / pH 6.0      Gluc Negative / Ketone Negative  / Bili Negative / Urobili 0.2       Blood Large / Protein >=300 / Leuk Est Small / Nitrite Negative      RBC >50 / WBC 26-50 / Hyaline  / Gran  / Sq Epi  / Non Sq Epi Few / Bacteria TNTC      HbA1c 13.3      [11-19-18 @ 05:42]      MYKE: titer Negative, pattern --      [09-19-18 @ 05:25]  ANCA: cANCA Negative, pANCA Negative, atypical ANCA Indeterminate Method interference due to MYKE fluorescence      [09-19-18 @ 05:25]

## 2021-03-10 NOTE — CONSULT NOTE ADULT - ASSESSMENT
ASSESSMENT: Patient is a 70M admitted for AMS/UTI - neuro consulted for AMS. Pt also with known history of dementia. Pt with worsening confusion over the past few days.      PLAN: Case d/w Dr. Wolf  - CT head negative for acute processes  - +UTI  - AMS likely 2/2 metabolic encephalopathy   - Would recommended REEG   - If EEG negative, no further neurological intervention

## 2021-03-10 NOTE — DISCHARGE NOTE NURSING/CASE MANAGEMENT/SOCIAL WORK - PATIENT PORTAL LINK FT
You can access the FollowMyHealth Patient Portal offered by [facility] by registering at the following website: [email or web address]. By joining [employer]’s FollowMyHealth portal, you will also be able to view your health information using other applications (apps) compatible with our system.

## 2021-03-10 NOTE — DISCHARGE NOTE PROVIDER - NSDCCPCAREPLAN_GEN_ALL_CORE_FT
PRINCIPAL DISCHARGE DIAGNOSIS  Diagnosis: Pulmonary edema  Assessment and Plan of Treatment: continue with IV lasix,  .  further care as per cardiology upon transfer      SECONDARY DISCHARGE DIAGNOSES  Diagnosis: Transplant recipient  Assessment and Plan of Treatment: transfer to heart transplant service    Diagnosis: Weakness  Assessment and Plan of Treatment:     Diagnosis: UTI (urinary tract infection)  Assessment and Plan of Treatment:

## 2021-03-10 NOTE — CONSULT NOTE ADULT - ATTENDING COMMENTS
Patient seen and examined with LUIGI Schwab and agree with above except as noted.  Patients history, notes, labs, imaging, vitals and meds reviewed personally.    Plan as above
patient seen and examined   pox 98% on 2 liters awake follow command not on distress   continue abx follow cx   id consult   keep is < os gentle diuresis

## 2021-03-10 NOTE — CONSULT NOTE ADULT - ASSESSMENT
IMPRESSION:    Altered mental status in the setting of UTI  Decompensated CHF not in respiratory failure  Hx of Heart transplant and HFrEF  CKD  Thrombocytopenia chronic    PLAN:    CNS: CT head without acute changes. Neuro eval    HEENT:  Oral care    PULMONARY:  HOB @ 45 degrees. aspiration precaution. Maintain pox 94-98%. titrate off oxygen as tolerated    CARDIOVASCULAR: Goal MAP >65. optimize cardiac therapy. diuresis as tolerated. check prograf level    GI: GI prophylaxis. PO diet    RENAL:  F/u  lytes.  Correct as needed.  Accurate I/O. Place wise    INFECTIOUS DISEASE: Abx for UTI    HEMATOLOGICAL:  DVT prophylaxis.     ENDOCRINE:  Follow up FS.  Insulin protocol if needed.    MUSCULOSKELETAL: Bedrest    LINES/WISE: wise    CODE STATUS: FULL CODE    DISPOSITION: Pt does not require ICU at this time     IMPRESSION:    Altered mental status in the setting of UTI  Decompensated CHF not in respiratory failure  Hx of Heart transplant and HFrEF  CKD  Thrombocytopenia chronic    PLAN:    CNS: CT head without acute changes. Neuro eval    HEENT:  Oral care    PULMONARY:  HOB @ 45 degrees. aspiration precaution. Maintain pox 94-98%. titrate off oxygen as tolerated    CARDIOVASCULAR: Goal MAP >65. optimize cardiac therapy. diuresis as tolerated. check prograf level    GI: GI prophylaxis. PO diet    RENAL:  F/u  lytes.  Correct as needed.  Accurate I/O. Place wise for possibility urine retention causing agitation    INFECTIOUS DISEASE: Abx for UTI    HEMATOLOGICAL:  DVT prophylaxis.     ENDOCRINE:  Follow up FS.  Insulin protocol if needed.    MUSCULOSKELETAL: Bedrest    LINES/WISE: wise    CODE STATUS: FULL CODE    DISPOSITION: Pt does not require ICU at this time     IMPRESSION:    Altered mental status in the setting of UTI  Decompensated CHF not in respiratory failure  Hx of Heart transplant and HFrEF  CKD  Thrombocytopenia chronic    PLAN:    CNS: CT head without acute changes. Neuro eval    HEENT:  Oral care    PULMONARY:  HOB @ 45 degrees. aspiration precaution. Maintain pox 94-98%. titrate off oxygen as tolerated    CARDIOVASCULAR: Goal MAP >65. optimize cardiac therapy. diuresis as tolerated. check prograf level    GI: GI prophylaxis. PO diet    RENAL:  F/u  lytes.  Correct as needed.  Accurate I/O.     INFECTIOUS DISEASE: Abx for UTI    HEMATOLOGICAL:  DVT prophylaxis.     ENDOCRINE:  Follow up FS.  Insulin protocol if needed.    MUSCULOSKELETAL: Bedrest    LINES/WISE: wise    CODE STATUS: FULL CODE    DISPOSITION: Pt does not require ICU at this time     IMPRESSION:    Altered mental status in the setting of UTI now awake follow command   Decompensated CHF not in respiratory failure  Hx of Heart transplant and HFrEF  CKD  Thrombocytopenia chronic    PLAN:    CNS: CT head without acute changes. Neuro eval    HEENT:  Oral care    PULMONARY:  HOB @ 45 degrees. aspiration precaution. Maintain pox 94-98%. titrate off oxygen as tolerated    CARDIOVASCULAR: Goal MAP >65. optimize cardiac therapy. diuresis as tolerated. check prograf level    GI: GI prophylaxis. PO diet    RENAL:  F/u  lytes.  Correct as needed.  Accurate I/O.     INFECTIOUS DISEASE: Abx for UTI    HEMATOLOGICAL:  DVT prophylaxis.     ENDOCRINE:  Follow up FS.  Insulin protocol if needed.    MUSCULOSKELETAL: Bedrest    LINES/WISE: wise    CODE STATUS: FULL CODE    DISPOSITION: Pt does not require ICU at this time

## 2021-03-10 NOTE — CONSULT NOTE ADULT - SUBJECTIVE AND OBJECTIVE BOX
Patient is a 70y old  Male who presents with a chief complaint of altered mental status, uti, pulmonary edema (10 Mar 2021 04:55)      HPI:  (Patient confused cannot provide details, used ER notes (they obtained information from his wife)) 71yo male whose PMH includes heart transplant recipient status, diabetes on insulin and "slight" dementia is brought to the ER due to increasing confusion since the weekend. Cannot determine if patient had chest pain, breathing problems or trouble urinating though ER staff did place Rouse during his visit. In addition ER notes that they had to give versed due to his agitation followed by applying a NIV device due to apneic periods. Of note patient admitted to our ICU in 2018 for similar problem (altered mental status) but at that time found to have DKA (with UTI?)- was seen by neurology, renal, ID, cardiology (along with pulmonary and endocrinology) specialists at that time (10 Mar 2021 04:55)    Hx obtained from chart as patient us confused. Per spouse patient had shortness of breath and confusion the past few days. Vitals stable in ED, adnmitted for CHF. This morning morning agitated, recieved haldol. ICU called to evaluate as patient was on NIV. Now he is not in respiratory distress, on 3L NC.   Allergies    codeine (Unknown)  morphine (Unknown)    Intolerances        aspirin 81 mg oral tablet: 1 tab(s) orally once a day (10 Mar 2021 04:49)  CellCept 500 mg oral tablet: 1 tab(s) orally 2 times a day (10 Mar 2021 04:49)  doxazosin 8 mg oral tablet: 1 tab(s) orally once a day (10 Mar 2021 04:49)  furosemide 40 mg oral tablet: 2 tab(s) orally once a day (10 Mar 2021 04:49)  furosemide 40 mg oral tablet: orally once a day (at bedtime) (10 Mar 2021 04:49)  Lantus 100 units/mL subcutaneous solution: unit(s) subcutaneous once a day (at bedtime) (10 Mar 2021 04:49)  magnesium oxide 400 mg (241.3 mg elemental magnesium) oral tablet: 1 tab(s) orally once a day (10 Mar 2021 04:49)  Omega-3 1000 mg oral capsule: 1 cap(s) orally once a day (10 Mar 2021 04:49)  Oysco 500 with D: orally once a day (10 Mar 2021 04:49)  pantoprazole 40 mg oral delayed release tablet: 1 tab(s) orally once a day (10 Mar 2021 04:49)  potassium chloride 10 mEq oral capsule, extended release: 1 cap(s) orally 2 times a day (10 Mar 2021 04:49)  pravastatin 40 mg oral tablet: 1 tab(s) orally once a day (10 Mar 2021 04:49)  tacrolimus 0.5 mg oral capsule: orally once a day (10 Mar 2021 04:49)  tacrolimus 1 mg oral capsule: 1 cap(s) orally once a day (at bedtime) (10 Mar 2021 04:49)  tamsulosin 0.4 mg oral capsule: 1 cap(s) orally once a day (10 Mar 2021 04:49)      PAST MEDICAL & SURGICAL HISTORY:  Urinary tract infection without hematuria, site unspecified    Chronic kidney disease, unspecified CKD stage    Bilateral hearing loss, unspecified hearing loss type    Benign prostatic hyperplasia with urinary frequency    Diabetes    High cholesterol    HTN (hypertension)    Heart transplant recipient    H/O heart transplant        SOCIAL HX:     Smoking       UTO                   ETOH  Occupation                             FAMILY HISTORY:  No pertinent family history in first degree relatives    :  No known cardiovacular/pulmonary family hisotry     All ROS are negative except per HPI     PHYSICAL EXAM    ICU Vital Signs Last 24 Hrs  T(C): 37.5 (10 Mar 2021 02:24), Max: 37.6 (09 Mar 2021 21:23)  T(F): 99.5 (10 Mar 2021 02:24), Max: 99.7 (09 Mar 2021 21:23)  HR: 115 (10 Mar 2021 07:05) (106 - 115)  BP: 143/87 (10 Mar 2021 07:05) (97/63 - 164/98)  BP(mean): --  ABP: --  ABP(mean): --  RR: 22 (10 Mar 2021 07:05) (18 - 22)  SpO2: 98% (10 Mar 2021 07:05) (94% - 100%)      CONSTITUTIONAL:  Well nourished.  NAD    ENT:   Airway patent,   Mouth with normal mucosa.   No thrush    EYES:   pupils equal,   round and reactive to light.    CARDIAC:   crackles bilateral lung  Regular rhythm.    Heart sounds S1, S2.   No edema    Vascular:   normal systolic impulse  no bruits    RESPIRATORY:   No wheezing   Normal chest expansion  No use of accessory muscles    GASTROINTESTINAL:  Abdomen soft   Non-tender,   No guarding,   + BS    GENITOURINARY  normal genitalia for sex  no edema    MUSCULOSKELETAL:   Range of motion is not limited,  Nno clubbing, cyanosis    NEUROLOGICAL:   nonfocal  a/o x1  follows simple commands    SKIN:   Skin normal color for race,   Warm and dry  No evidence of rash.        HEME LYMPH:   No cervical  lymphadenopathy.  No inguinal lymphadenopathy        LABS:                          13.8   13.85 )-----------( 87       ( 10 Mar 2021 05:45 )             44.0                                               03-10    141  |  100  |  51<H>  ----------------------------<  308<H>  5.5<H>   |  20  |  2.2<H>    Ca    10.1      10 Mar 2021 05:45  Phos  3.7     0310  Mg     1.9     10    TPro  7.2  /  Alb  4.6  /  TBili  1.2  /  DBili  x   /  AST  13  /  ALT  7   /  AlkPhos  160<H>  0310                                             Urinalysis Basic - ( 10 Mar 2021 03:30 )    Color: Yellow / Appearance: Cloudy / S.025 / pH: x  Gluc: x / Ketone: Negative  / Bili: Negative / Urobili: 0.2 mg/dL   Blood: x / Protein: >=300 mg/dL / Nitrite: Negative   Leuk Esterase: Small / RBC: >50 /HPF / WBC 26-50 /HPF   Sq Epi: x / Non Sq Epi: Few /HPF / Bacteria: TNTC /HPF        CARDIAC MARKERS ( 10 Mar 2021 05:45 )  x     / 0.04 ng/mL / 86 U/L / x     / 3.1 ng/mL  CARDIAC MARKERS ( 09 Mar 2021 20:57 )  x     / 0.04 ng/mL / x     / x     / x                                                LIVER FUNCTIONS - ( 10 Mar 2021 05:45 )  Alb: 4.6 g/dL / Pro: 7.2 g/dL / ALK PHOS: 160 U/L / ALT: 7 U/L / AST: 13 U/L / GGT: x                                                        Serum Pro-Brain Natriuretic Peptide: 07291 pg/mL (21 @ 20:57)                                                                                      ABG - ( 10 Mar 2021 09:29 )  pH, Arterial: 7.43  pH, Blood: x     /  pCO2: 30    /  pO2: 69    / HCO3: 20    / Base Excess: -3.2  /  SaO2: 95                  CXR interpreted by me: none today    MEDICATIONS  (STANDING):  aspirin  chewable 81 milliGRAM(s) Oral daily  calcium carbonate 1250 mG  + Vitamin D (OsCal 500 + D) 1 Tablet(s) Oral daily  cefTRIAXone   IVPB 1000 milliGRAM(s) IV Intermittent every 24 hours  dextrose 40% Gel 15 Gram(s) Oral once  dextrose 5%. 1000 milliLiter(s) (50 mL/Hr) IV Continuous <Continuous>  dextrose 5%. 1000 milliLiter(s) (100 mL/Hr) IV Continuous <Continuous>  dextrose 50% Injectable 25 Gram(s) IV Push once  dextrose 50% Injectable 12.5 Gram(s) IV Push once  dextrose 50% Injectable 25 Gram(s) IV Push once  doxazosin 8 milliGRAM(s) Oral at bedtime  furosemide   Injectable 40 milliGRAM(s) IV Push two times a day  glucagon  Injectable 1 milliGRAM(s) IntraMuscular once  haloperidol    Injectable 1 milliGRAM(s) IntraMuscular once  heparin   Injectable 5000 Unit(s) SubCutaneous three times a day  insulin glargine Injectable (LANTUS) 15 Unit(s) SubCutaneous at bedtime  insulin lispro (ADMELOG) corrective regimen sliding scale   SubCutaneous three times a day before meals  insulin lispro Injectable (ADMELOG) 5 Unit(s) SubCutaneous three times a day before meals  magnesium oxide 400 milliGRAM(s) Oral daily  mycophenolate mofetil 500 milliGRAM(s) Oral two times a day  omega-3-Acid Ethyl Esters 1 Gram(s) Oral daily  pantoprazole    Tablet 40 milliGRAM(s) Oral before breakfast  potassium chloride    Tablet ER 10 milliEquivalent(s) Oral two times a day  tacrolimus 1 milliGRAM(s) Oral at bedtime  tacrolimus 0.5 milliGRAM(s) Oral daily  tamsulosin 0.4 milliGRAM(s) Oral at bedtime    MEDICATIONS  (PRN):         Patient is a 70y old  Male who presents with a chief complaint of altered mental status, uti, pulmonary edema (10 Mar 2021 04:55)      HPI:  (Patient confused cannot provide details, used ER notes (they obtained information from his wife)) 69yo male whose PMH includes heart transplant recipient status, diabetes on insulin and "slight" dementia is brought to the ER due to increasing confusion since the weekend. Cannot determine if patient had chest pain, breathing problems or trouble urinating though ER staff did place Rouse during his visit. In addition ER notes that they had to give versed due to his agitation followed by applying a NIV device due to apneic periods. Of note patient admitted to our ICU in 2018 for similar problem (altered mental status) but at that time found to have DKA (with UTI?)- was seen by neurology, renal, ID, cardiology (along with pulmonary and endocrinology) specialists at that time (10 Mar 2021 04:55)    Hx obtained from chart as patient us confused. Per spouse patient had shortness of breath and confusion the past few days. Vitals stable in ED, adnmitted for CHF. This morning morning agitated, recieved haldol. ICU called to evaluate as patient was on NIV. Now he is not in respiratory distress, on 3L NC.   Allergies    codeine (Unknown)  morphine (Unknown)    Intolerances        aspirin 81 mg oral tablet: 1 tab(s) orally once a day (10 Mar 2021 04:49)  CellCept 500 mg oral tablet: 1 tab(s) orally 2 times a day (10 Mar 2021 04:49)  doxazosin 8 mg oral tablet: 1 tab(s) orally once a day (10 Mar 2021 04:49)  furosemide 40 mg oral tablet: 2 tab(s) orally once a day (10 Mar 2021 04:49)  furosemide 40 mg oral tablet: orally once a day (at bedtime) (10 Mar 2021 04:49)  Lantus 100 units/mL subcutaneous solution: unit(s) subcutaneous once a day (at bedtime) (10 Mar 2021 04:49)  magnesium oxide 400 mg (241.3 mg elemental magnesium) oral tablet: 1 tab(s) orally once a day (10 Mar 2021 04:49)  Omega-3 1000 mg oral capsule: 1 cap(s) orally once a day (10 Mar 2021 04:49)  Oysco 500 with D: orally once a day (10 Mar 2021 04:49)  pantoprazole 40 mg oral delayed release tablet: 1 tab(s) orally once a day (10 Mar 2021 04:49)  potassium chloride 10 mEq oral capsule, extended release: 1 cap(s) orally 2 times a day (10 Mar 2021 04:49)  pravastatin 40 mg oral tablet: 1 tab(s) orally once a day (10 Mar 2021 04:49)  tacrolimus 0.5 mg oral capsule: orally once a day (10 Mar 2021 04:49)  tacrolimus 1 mg oral capsule: 1 cap(s) orally once a day (at bedtime) (10 Mar 2021 04:49)  tamsulosin 0.4 mg oral capsule: 1 cap(s) orally once a day (10 Mar 2021 04:49)      PAST MEDICAL & SURGICAL HISTORY:  Urinary tract infection without hematuria, site unspecified    Chronic kidney disease, unspecified CKD stage    Bilateral hearing loss, unspecified hearing loss type    Benign prostatic hyperplasia with urinary frequency    Diabetes    High cholesterol    HTN (hypertension)    Heart transplant recipient    H/O heart transplant        SOCIAL HX:     Smoking       UTO                   ETOH  Occupation                             FAMILY HISTORY:  No pertinent family history in first degree relatives    :  No known cardiovacular/pulmonary family hisotry     All ROS are negative except per HPI     PHYSICAL EXAM    ICU Vital Signs Last 24 Hrs  T(C): 37.5 (10 Mar 2021 02:24), Max: 37.6 (09 Mar 2021 21:23)  T(F): 99.5 (10 Mar 2021 02:24), Max: 99.7 (09 Mar 2021 21:23)  HR: 115 (10 Mar 2021 07:05) (106 - 115)  BP: 143/87 (10 Mar 2021 07:05) (97/63 - 164/98)  BP(mean): --  ABP: --  ABP(mean): --  RR: 22 (10 Mar 2021 07:05) (18 - 22)  SpO2: 98% (10 Mar 2021 07:05) (94% - 100%)      CONSTITUTIONAL:  Well nourished.  NAD    ENT:   Airway patent,   Mouth with normal mucosa.   No thrush    EYES:   pupils equal,   round and reactive to light.    CARDIAC:   crackles bilateral lung  Regular rhythm.    Heart sounds S1, S2.   No edema    Vascular:   normal systolic impulse  no bruits    RESPIRATORY:   No wheezing   Normal chest expansion  No use of accessory muscles    GASTROINTESTINAL:  Abdomen soft   Non-tender,   No guarding,   + BS    GENITOURINARY  normal genitalia for sex  no edema    MUSCULOSKELETAL:   Range of motion is not limited,  Nno clubbing, cyanosis    NEUROLOGICAL:   nonfocal  a/o x1  follows simple commands    SKIN:   Skin normal color for race,   Warm and dry  No evidence of rash.        HEME LYMPH:   No cervical  lymphadenopathy.  No inguinal lymphadenopathy        LABS:                          13.8   13.85 )-----------( 87       ( 10 Mar 2021 05:45 )             44.0                                               03-10    141  |  100  |  51<H>  ----------------------------<  308<H>  5.5<H>   |  20  |  2.2<H>    Ca    10.1      10 Mar 2021 05:45  Phos  3.7     0310  Mg     1.9     10    TPro  7.2  /  Alb  4.6  /  TBili  1.2  /  DBili  x   /  AST  13  /  ALT  7   /  AlkPhos  160<H>  0310                                             Urinalysis Basic - ( 10 Mar 2021 03:30 )    Color: Yellow / Appearance: Cloudy / S.025 / pH: x  Gluc: x / Ketone: Negative  / Bili: Negative / Urobili: 0.2 mg/dL   Blood: x / Protein: >=300 mg/dL / Nitrite: Negative   Leuk Esterase: Small / RBC: >50 /HPF / WBC 26-50 /HPF   Sq Epi: x / Non Sq Epi: Few /HPF / Bacteria: TNTC /HPF        CARDIAC MARKERS ( 10 Mar 2021 05:45 )  x     / 0.04 ng/mL / 86 U/L / x     / 3.1 ng/mL  CARDIAC MARKERS ( 09 Mar 2021 20:57 )  x     / 0.04 ng/mL / x     / x     / x                                                LIVER FUNCTIONS - ( 10 Mar 2021 05:45 )  Alb: 4.6 g/dL / Pro: 7.2 g/dL / ALK PHOS: 160 U/L / ALT: 7 U/L / AST: 13 U/L / GGT: x                                                        Serum Pro-Brain Natriuretic Peptide: 69356 pg/mL (21 @ 20:57)                                                                                      ABG - ( 10 Mar 2021 09:29 )  pH, Arterial: 7.43  pH, Blood: x     /  pCO2: 30    /  pO2: 69    / HCO3: 20    / Base Excess: -3.2  /  SaO2: 95                  CXR interpreted by me: none today  ct scan b/l effusion   < from: CT Chest No Cont (03.10.21 @ 02:06) >  No CT evidence of acute traumatic injury in the chest, abdomen or pelvis.    Moderate bilateral pleural effusions, bilateral groundglass opacities, interlobular septal thickening and cardiomegaly. Findings are consistent with CHF.    Emphysematous cystitis.    Question underlying cirrhosis.    Dilated main pulmonary artery compatible with pulmonary hypertension.    < end of copied text >    MEDICATIONS  (STANDING):  aspirin  chewable 81 milliGRAM(s) Oral daily  calcium carbonate 1250 mG  + Vitamin D (OsCal 500 + D) 1 Tablet(s) Oral daily  cefTRIAXone   IVPB 1000 milliGRAM(s) IV Intermittent every 24 hours  dextrose 40% Gel 15 Gram(s) Oral once  dextrose 5%. 1000 milliLiter(s) (50 mL/Hr) IV Continuous <Continuous>  dextrose 5%. 1000 milliLiter(s) (100 mL/Hr) IV Continuous <Continuous>  dextrose 50% Injectable 25 Gram(s) IV Push once  dextrose 50% Injectable 12.5 Gram(s) IV Push once  dextrose 50% Injectable 25 Gram(s) IV Push once  doxazosin 8 milliGRAM(s) Oral at bedtime  furosemide   Injectable 40 milliGRAM(s) IV Push two times a day  glucagon  Injectable 1 milliGRAM(s) IntraMuscular once  haloperidol    Injectable 1 milliGRAM(s) IntraMuscular once  heparin   Injectable 5000 Unit(s) SubCutaneous three times a day  insulin glargine Injectable (LANTUS) 15 Unit(s) SubCutaneous at bedtime  insulin lispro (ADMELOG) corrective regimen sliding scale   SubCutaneous three times a day before meals  insulin lispro Injectable (ADMELOG) 5 Unit(s) SubCutaneous three times a day before meals  magnesium oxide 400 milliGRAM(s) Oral daily  mycophenolate mofetil 500 milliGRAM(s) Oral two times a day  omega-3-Acid Ethyl Esters 1 Gram(s) Oral daily  pantoprazole    Tablet 40 milliGRAM(s) Oral before breakfast  potassium chloride    Tablet ER 10 milliEquivalent(s) Oral two times a day  tacrolimus 1 milliGRAM(s) Oral at bedtime  tacrolimus 0.5 milliGRAM(s) Oral daily  tamsulosin 0.4 milliGRAM(s) Oral at bedtime    MEDICATIONS  (PRN):         Patient is a 70y old  Male who presents with a chief complaint of altered mental status, uti, pulmonary edema (10 Mar 2021 04:55)      HPI:  (Patient confused cannot provide details, used ER notes (they obtained information from his wife)) 71yo male whose PMH includes heart transplant recipient status, diabetes on insulin and "slight" dementia is brought to the ER due to increasing confusion since the weekend. Cannot determine if patient had chest pain, breathing problems or trouble urinating though ER staff did place Rouse during his visit. In addition ER notes that they had to give versed due to his agitation followed by applying a NIV device due to apneic periods. Of note patient admitted to our ICU in 2018 for similar problem (altered mental status) but at that time found to have DKA (with UTI?)- was seen by neurology, renal, ID, cardiology (along with pulmonary and endocrinology) specialists at that time (10 Mar 2021 04:55)    Hx obtained from chart as patient us confused. Per spouse patient had shortness of breath and confusion the past few days. Vitals stable in ED, adnmitted for CHF. This morning morning agitated, recieved haldol. ICU called to evaluate as patient was on NIV. Now he is not in respiratory distress, on 3L NC.   Allergies    codeine (Unknown)  morphine (Unknown)    Intolerances        aspirin 81 mg oral tablet: 1 tab(s) orally once a day (10 Mar 2021 04:49)  CellCept 500 mg oral tablet: 1 tab(s) orally 2 times a day (10 Mar 2021 04:49)  doxazosin 8 mg oral tablet: 1 tab(s) orally once a day (10 Mar 2021 04:49)  furosemide 40 mg oral tablet: 2 tab(s) orally once a day (10 Mar 2021 04:49)  furosemide 40 mg oral tablet: orally once a day (at bedtime) (10 Mar 2021 04:49)  Lantus 100 units/mL subcutaneous solution: unit(s) subcutaneous once a day (at bedtime) (10 Mar 2021 04:49)  magnesium oxide 400 mg (241.3 mg elemental magnesium) oral tablet: 1 tab(s) orally once a day (10 Mar 2021 04:49)  Omega-3 1000 mg oral capsule: 1 cap(s) orally once a day (10 Mar 2021 04:49)  Oysco 500 with D: orally once a day (10 Mar 2021 04:49)  pantoprazole 40 mg oral delayed release tablet: 1 tab(s) orally once a day (10 Mar 2021 04:49)  potassium chloride 10 mEq oral capsule, extended release: 1 cap(s) orally 2 times a day (10 Mar 2021 04:49)  pravastatin 40 mg oral tablet: 1 tab(s) orally once a day (10 Mar 2021 04:49)  tacrolimus 0.5 mg oral capsule: orally once a day (10 Mar 2021 04:49)  tacrolimus 1 mg oral capsule: 1 cap(s) orally once a day (at bedtime) (10 Mar 2021 04:49)  tamsulosin 0.4 mg oral capsule: 1 cap(s) orally once a day (10 Mar 2021 04:49)      PAST MEDICAL & SURGICAL HISTORY:  Urinary tract infection without hematuria, site unspecified    Chronic kidney disease, unspecified CKD stage    Bilateral hearing loss, unspecified hearing loss type    Benign prostatic hyperplasia with urinary frequency    Diabetes    High cholesterol    HTN (hypertension)    Heart transplant recipient    H/O heart transplant        SOCIAL HX:     Smoking       UTO                   ETOH  Occupation                             FAMILY HISTORY:  No pertinent family history in first degree relatives    :  No known cardiovacular/pulmonary family hisotry     All ROS are negative except per HPI     PHYSICAL EXAM    ICU Vital Signs Last 24 Hrs  T(C): 37.5 (10 Mar 2021 02:24), Max: 37.6 (09 Mar 2021 21:23)  T(F): 99.5 (10 Mar 2021 02:24), Max: 99.7 (09 Mar 2021 21:23)  HR: 115 (10 Mar 2021 07:05) (106 - 115)  BP: 143/87 (10 Mar 2021 07:05) (97/63 - 164/98)  BP(mean): --  ABP: --  ABP(mean): --  RR: 22 (10 Mar 2021 07:05) (18 - 22)  SpO2: 98% (10 Mar 2021 07:05) (94% - 100%)      CONSTITUTIONAL:  Well nourished.  NAD    ENT:   Airway patent,   Mouth with normal mucosa.   No thrush    EYES:   pupils equal,   round and reactive to light.    CARDIAC:   crackles bilateral lung  Regular rhythm.    Heart sounds S1, S2.   No edema    Vascular:   normal systolic impulse  no bruits    RESPIRATORY:   No wheezing   Normal chest expansion  No use of accessory muscles    GASTROINTESTINAL:  Abdomen soft   Non-tender,   No guarding,   + BS    GENITOURINARY  normal genitalia for sex  no edema    MUSCULOSKELETAL:   Range of motion is not limited,  Nno clubbing, cyanosis    NEUROLOGICAL:   nonfocal  a/o x1  follows simple commands    SKIN:   Skin normal color for race,   Warm and dry  No evidence of rash.        HEME LYMPH:   No cervical  lymphadenopathy.  No inguinal lymphadenopathy        LABS:                          13.8   13.85 )-----------( 87       ( 10 Mar 2021 05:45 )             44.0                                               03-10    141  |  100  |  51<H>  ----------------------------<  308<H>  5.5<H>   |  20  |  2.2<H>    Ca    10.1      10 Mar 2021 05:45  Phos  3.7     0310  Mg     1.9     10    TPro  7.2  /  Alb  4.6  /  TBili  1.2  /  DBili  x   /  AST  13  /  ALT  7   /  AlkPhos  160<H>  0310                                             Urinalysis Basic - ( 10 Mar 2021 03:30 )    Color: Yellow / Appearance: Cloudy / S.025 / pH: x  Gluc: x / Ketone: Negative  / Bili: Negative / Urobili: 0.2 mg/dL   Blood: x / Protein: >=300 mg/dL / Nitrite: Negative   Leuk Esterase: Small / RBC: >50 /HPF / WBC 26-50 /HPF   Sq Epi: x / Non Sq Epi: Few /HPF / Bacteria: TNTC /HPF        CARDIAC MARKERS ( 10 Mar 2021 05:45 )  x     / 0.04 ng/mL / 86 U/L / x     / 3.1 ng/mL  CARDIAC MARKERS ( 09 Mar 2021 20:57 )  x     / 0.04 ng/mL / x     / x     / x                                                LIVER FUNCTIONS - ( 10 Mar 2021 05:45 )  Alb: 4.6 g/dL / Pro: 7.2 g/dL / ALK PHOS: 160 U/L / ALT: 7 U/L / AST: 13 U/L / GGT: x                                                        Serum Pro-Brain Natriuretic Peptide: 46205 pg/mL (21 @ 20:57)                                                                                      ABG - ( 10 Mar 2021 09:29 )  pH, Arterial: 7.43  pH, Blood: x     /  pCO2: 30    /  pO2: 69    / HCO3: 20    / Base Excess: -3.2  /  SaO2: 95              < from: CT Head No Cont (03.10.21 @ 02:08) >  Limited evaluation due to motion artifacts.    No evidence of acute intracranial pathology.    Moderate chronic microvascular ischemic changes.    < end of copied text >      CXR interpreted by me: none today  ct scan b/l effusion   < from: CT Chest No Cont (03.10.21 @ 02:06) >  No CT evidence of acute traumatic injury in the chest, abdomen or pelvis.    Moderate bilateral pleural effusions, bilateral groundglass opacities, interlobular septal thickening and cardiomegaly. Findings are consistent with CHF.    Emphysematous cystitis.    Question underlying cirrhosis.    Dilated main pulmonary artery compatible with pulmonary hypertension.    < end of copied text >    MEDICATIONS  (STANDING):  aspirin  chewable 81 milliGRAM(s) Oral daily  calcium carbonate 1250 mG  + Vitamin D (OsCal 500 + D) 1 Tablet(s) Oral daily  cefTRIAXone   IVPB 1000 milliGRAM(s) IV Intermittent every 24 hours  dextrose 40% Gel 15 Gram(s) Oral once  dextrose 5%. 1000 milliLiter(s) (50 mL/Hr) IV Continuous <Continuous>  dextrose 5%. 1000 milliLiter(s) (100 mL/Hr) IV Continuous <Continuous>  dextrose 50% Injectable 25 Gram(s) IV Push once  dextrose 50% Injectable 12.5 Gram(s) IV Push once  dextrose 50% Injectable 25 Gram(s) IV Push once  doxazosin 8 milliGRAM(s) Oral at bedtime  furosemide   Injectable 40 milliGRAM(s) IV Push two times a day  glucagon  Injectable 1 milliGRAM(s) IntraMuscular once  haloperidol    Injectable 1 milliGRAM(s) IntraMuscular once  heparin   Injectable 5000 Unit(s) SubCutaneous three times a day  insulin glargine Injectable (LANTUS) 15 Unit(s) SubCutaneous at bedtime  insulin lispro (ADMELOG) corrective regimen sliding scale   SubCutaneous three times a day before meals  insulin lispro Injectable (ADMELOG) 5 Unit(s) SubCutaneous three times a day before meals  magnesium oxide 400 milliGRAM(s) Oral daily  mycophenolate mofetil 500 milliGRAM(s) Oral two times a day  omega-3-Acid Ethyl Esters 1 Gram(s) Oral daily  pantoprazole    Tablet 40 milliGRAM(s) Oral before breakfast  potassium chloride    Tablet ER 10 milliEquivalent(s) Oral two times a day  tacrolimus 1 milliGRAM(s) Oral at bedtime  tacrolimus 0.5 milliGRAM(s) Oral daily  tamsulosin 0.4 milliGRAM(s) Oral at bedtime    MEDICATIONS  (PRN):

## 2021-03-10 NOTE — PHARMACOTHERAPY INTERVENTION NOTE - COMMENTS
Spoke with Dr. Gold [phone]. Patient takes both doxazosin (for hypertension) and tamsulosin (for bph?) as home medications. Both medications belong to the same drug class (alpha 1 antagonists). 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 Will monitor blood pressure and heart rate for patient.

## 2021-03-10 NOTE — H&P ADULT - PROBLEM SELECTOR PLAN 7
As checked off on pre determined H+P. Actually now with pulmonary edema, based on medications suspect CHF is HFrEF now acute on chronic. Cardiology consult called

## 2021-03-10 NOTE — PROVIDER CONTACT NOTE (OTHER) - BACKGROUND
pt is a heart transplant recipient as per report from ED, admitting DX is UTI, pulmonary edema, weakness, dementia.

## 2021-03-10 NOTE — PROVIDER CONTACT NOTE (OTHER) - SITUATION
pt continues to use accessory muscles while breathing. Stomach is protruding outwards while inhaling. pulse ox is 95% on 2LNC. pt also wheezing, pt continues to use accessory muscles while breathing. Stomach is protruding outwards while inhaling. pulse ox is 95% on 2LNC. pt also wheezing. /109, ,

## 2021-03-10 NOTE — CONSULT NOTE ADULT - SUBJECTIVE AND OBJECTIVE BOX
CARDIOLOGY CONSULT NOTE     CHIEF COMPLAINT/REASON FOR CONSULT:    HPI:  (Patient confused cannot provide details, used ER notes (they obtained information from his wife)) 71yo male whose PMH includes heart transplant recipient status, diabetes on insulin and "slight" dementia is brought to the ER due to increasing confusion since the weekend. Cannot determine if patient had chest pain, breathing problems or trouble urinating though ER staff did place Rouse during his visit. In addition ER notes that they had to give versed due to his agitation followed by applying a NIV device due to apneic periods. Of note patient admitted to our ICU in 2018 for similar problem (altered mental status) but at that time found to have DKA (with UTI?)- was seen by neurology, renal, ID, cardiology (along with pulmonary and endocrinology) specialists at that time (10 Mar 2021 04:55)      PAST MEDICAL & SURGICAL HISTORY:  Urinary tract infection without hematuria, site unspecified    Chronic kidney disease, unspecified CKD stage    Bilateral hearing loss, unspecified hearing loss type    Benign prostatic hyperplasia with urinary frequency    Diabetes    High cholesterol    HTN (hypertension)    Heart transplant recipient    H/O heart transplant        Cardiac Risks:   [x ]HTN, [x ] DM, [ ] Smoking, [ ] FH,  [ ] Lipids        MEDICATIONS:  MEDICATIONS  (STANDING):  aspirin  chewable 81 milliGRAM(s) Oral daily  calcium carbonate 1250 mG  + Vitamin D (OsCal 500 + D) 1 Tablet(s) Oral daily  cefTRIAXone   IVPB 1000 milliGRAM(s) IV Intermittent every 24 hours  dextrose 40% Gel 15 Gram(s) Oral once  dextrose 5%. 1000 milliLiter(s) (50 mL/Hr) IV Continuous <Continuous>  dextrose 5%. 1000 milliLiter(s) (100 mL/Hr) IV Continuous <Continuous>  dextrose 50% Injectable 25 Gram(s) IV Push once  dextrose 50% Injectable 12.5 Gram(s) IV Push once  dextrose 50% Injectable 25 Gram(s) IV Push once  doxazosin 8 milliGRAM(s) Oral at bedtime  furosemide   Injectable 40 milliGRAM(s) IV Push two times a day  glucagon  Injectable 1 milliGRAM(s) IntraMuscular once  haloperidol    Injectable 1 milliGRAM(s) IntraMuscular once  heparin   Injectable 5000 Unit(s) SubCutaneous three times a day  insulin glargine Injectable (LANTUS) 15 Unit(s) SubCutaneous at bedtime  insulin lispro (ADMELOG) corrective regimen sliding scale   SubCutaneous three times a day before meals  insulin lispro Injectable (ADMELOG) 5 Unit(s) SubCutaneous three times a day before meals  magnesium oxide 400 milliGRAM(s) Oral daily  mycophenolate mofetil 500 milliGRAM(s) Oral two times a day  omega-3-Acid Ethyl Esters 1 Gram(s) Oral daily  pantoprazole    Tablet 40 milliGRAM(s) Oral before breakfast  potassium chloride    Tablet ER 10 milliEquivalent(s) Oral two times a day  tacrolimus 1 milliGRAM(s) Oral at bedtime  tacrolimus 0.5 milliGRAM(s) Oral daily  tamsulosin 0.4 milliGRAM(s) Oral at bedtime      FAMILY HISTORY:  No pertinent family history in first degree relatives        SOCIAL HISTORY:      [ ] Marital status     Allergies    codeine (Unknown)  morphine (Unknown)        	    REVIEW OF SYSTEMS:  confused        PHYSICAL EXAM:  T(C): 37.5 (03-10-21 @ 02:24), Max: 37.6 (03-09-21 @ 21:23)  HR: 115 (03-10-21 @ 07:05) (106 - 115)  BP: 143/87 (03-10-21 @ 07:05) (97/63 - 164/98)  RR: 22 (03-10-21 @ 07:05) (18 - 22)  SpO2: 98% (03-10-21 @ 07:05) (94% - 100%)  Wt(kg): --  I&O's Summary      Appearance: Normal	  Psychiatry: A & O x 3, Mood & affect appropriate  HEENT:   Normal oral mucosa, PERRL, EOMI	  Lymphatic: No lymphadenopathy  Cardiovascular: Normal S1 S2,RRR, No JVD, No murmurs  Respiratory: Lungs clear to auscultation	  Gastrointestinal:  Soft, Non-tender, + BS	  Skin: No rashes, No ecchymoses, No cyanosis	  Neurologic: Non-focal  Extremities: Normal range of motion, No clubbing, cyanosis or edema  Vascular: Peripheral pulses palpable 2+ bilaterally      ECG:  st prwp	    	  LABS:	 	    CARDIAC MARKERS:          Serum Pro-Brain Natriuretic Peptide: 65730 pg/mL (03-09 @ 20:57)                            13.8   13.85 )-----------( 87       ( 10 Mar 2021 05:45 )             44.0     03-10    141  |  100  |  51<H>  ----------------------------<  308<H>  5.5<H>   |  20  |  2.2<H>    Ca    10.1      10 Mar 2021 05:45  Phos  3.7     03-10  Mg     1.9     03-10    TPro  7.2  /  Alb  4.6  /  TBili  1.2  /  DBili  x   /  AST  13  /  ALT  7   /  AlkPhos  160<H>  03-10      proBNP: Serum Pro-Brain Natriuretic Peptide: 27944 pg/mL (03-09 @ 20:57)

## 2021-03-10 NOTE — CONSULT NOTE ADULT - REASON FOR ADMISSION
altered mental status, uti, pulmonary edema

## 2021-03-10 NOTE — CONSULT NOTE ADULT - SUBJECTIVE AND OBJECTIVE BOX
Neurology Consult    Patient is a 70M admitted for AMS/UTI - neuro consulted for AMS. Pt also with known history of dementia. Pt with worsening confusion over the past few days. Unable to obtain information from the patient 2/2 AMS - pt arousible to loud noise, but unable to answer questions or follow commands. Pt found to have elevated WBC and +UTI.       HPI:  (Patient confused cannot provide details, used ER notes (they obtained information from his wife)) 71yo male whose PMH includes heart transplant recipient status, diabetes on insulin and "slight" dementia is brought to the ER due to increasing confusion since the weekend. Cannot determine if patient had chest pain, breathing problems or trouble urinating though ER staff did place Rouse during his visit. In addition ER notes that they had to give versed due to his agitation followed by applying a NIV device due to apneic periods. Of note patient admitted to our ICU in 2018 for similar problem (altered mental status) but at that time found to have DKA (with UTI?)- was seen by neurology, renal, ID, cardiology (along with pulmonary and endocrinology) specialists at that time (10 Mar 2021 04:55)      PAST MEDICAL & SURGICAL HISTORY:  Urinary tract infection without hematuria, site unspecified  Chronic kidney disease, unspecified CKD stage  Bilateral hearing loss, unspecified hearing loss type  Benign prostatic hyperplasia with urinary frequency  Diabetes  High cholesterol  HTN (hypertension)  H/O heart transplant        FAMILY HISTORY:  No pertinent family history in first degree relatives      Social History: (-) x 3    Allergies  codeine (Unknown)  morphine (Unknown)            MEDICATIONS  (STANDING):  aspirin  chewable 81 milliGRAM(s) Oral daily  calcium carbonate 1250 mG  + Vitamin D (OsCal 500 + D) 1 Tablet(s) Oral daily  cefTRIAXone   IVPB 1000 milliGRAM(s) IV Intermittent every 24 hours  dextrose 40% Gel 15 Gram(s) Oral once  dextrose 5%. 1000 milliLiter(s) (50 mL/Hr) IV Continuous <Continuous>  dextrose 5%. 1000 milliLiter(s) (100 mL/Hr) IV Continuous <Continuous>  dextrose 50% Injectable 25 Gram(s) IV Push once  dextrose 50% Injectable 12.5 Gram(s) IV Push once  dextrose 50% Injectable 25 Gram(s) IV Push once  doxazosin 8 milliGRAM(s) Oral at bedtime  furosemide   Injectable 40 milliGRAM(s) IV Push two times a day  glucagon  Injectable 1 milliGRAM(s) IntraMuscular once  haloperidol    Injectable 1 milliGRAM(s) IntraMuscular once  heparin   Injectable 5000 Unit(s) SubCutaneous three times a day  insulin glargine Injectable (LANTUS) 15 Unit(s) SubCutaneous at bedtime  insulin lispro (ADMELOG) corrective regimen sliding scale   SubCutaneous three times a day before meals  insulin lispro Injectable (ADMELOG) 5 Unit(s) SubCutaneous three times a day before meals  magnesium oxide 400 milliGRAM(s) Oral daily  mycophenolate mofetil 500 milliGRAM(s) Oral two times a day  omega-3-Acid Ethyl Esters 1 Gram(s) Oral daily  pantoprazole    Tablet 40 milliGRAM(s) Oral before breakfast  potassium chloride    Tablet ER 10 milliEquivalent(s) Oral two times a day  tacrolimus 1 milliGRAM(s) Oral at bedtime  tacrolimus 0.5 milliGRAM(s) Oral daily  tamsulosin 0.4 milliGRAM(s) Oral at bedtime    MEDICATIONS  (PRN):        Vital Signs Last 24 Hrs  T(C): 37.5 (10 Mar 2021 02:24), Max: 37.6 (09 Mar 2021 21:23)  T(F): 99.5 (10 Mar 2021 02:24), Max: 99.7 (09 Mar 2021 21:23)  HR: 115 (10 Mar 2021 07:05) (106 - 115)  BP: 143/87 (10 Mar 2021 07:05) (97/63 - 164/98)  BP(mean): --  RR: 22 (10 Mar 2021 11:29) (18 - 22)  SpO2: 94% (10 Mar 2021 11:29) (94% - 100%)    Examination:  General:  Appearance is consistent with chronologic age.  No abnormal facies.  Gross skin survey within normal limits.    Cognitive/Language:  AOX0  Unable to assess memory; arousible to loud noise  Reflexes:  +Babinski  Unable to complete exam 2/2 mental status         Labs:   CBC Full  -  ( 10 Mar 2021 05:45 )  WBC Count : 13.85 K/uL  RBC Count : 5.25 M/uL  Hemoglobin : 13.8 g/dL  Hematocrit : 44.0 %  Platelet Count - Automated : 87 K/uL  Mean Cell Volume : 83.8 fL  Mean Cell Hemoglobin : 26.3 pg  Mean Cell Hemoglobin Concentration : 31.4 g/dL  Auto Neutrophil # : x  Auto Lymphocyte # : x  Auto Monocyte # : x  Auto Eosinophil # : x  Auto Basophil # : x  Auto Neutrophil % : x  Auto Lymphocyte % : x  Auto Monocyte % : x  Auto Eosinophil % : x  Auto Basophil % : x    03-10    141  |  100  |  51<H>  ----------------------------<  308<H>  5.5<H>   |  20  |  2.2<H>    Ca    10.1      10 Mar 2021 05:45  Phos  3.7     03-10  Mg     1.9     03-10    TPro  7.2  /  Alb  4.6  /  TBili  1.2  /  DBili  x   /  AST  13  /  ALT  7   /  AlkPhos  160<H>  10    LIVER FUNCTIONS - ( 10 Mar 2021 05:45 )  Alb: 4.6 g/dL / Pro: 7.2 g/dL / ALK PHOS: 160 U/L / ALT: 7 U/L / AST: 13 U/L / GGT: x             Urinalysis Basic - ( 10 Mar 2021 03:30 )    Color: Yellow / Appearance: Cloudy / S.025 / pH: x  Gluc: x / Ketone: Negative  / Bili: Negative / Urobili: 0.2 mg/dL   Blood: x / Protein: >=300 mg/dL / Nitrite: Negative   Leuk Esterase: Small / RBC: >50 /HPF / WBC 26-50 /HPF   Sq Epi: x / Non Sq Epi: Few /HPF / Bacteria: TNTC /HPF          Neuroimaging:  NCHCT: CT Head No Cont:   EXAM:  CT BRAIN            PROCEDURE DATE:  03/10/2021      IMPRESSION:    Limited evaluation due to motion artifacts.    No evidence of acute intracranial pathology.    Moderate chronic microvascular ischemic changes.      DERICK FRASER M.D., RESIDENT RADIOLOGIST  This document has been electronically signed.  RILEY RUELAS MD; Attending Radiologist  This document has been electronically signed. Mar 10 2021  5:08AM (03-10-21 @ 02:08)          03-10-21 @ 11:37

## 2021-03-10 NOTE — CONSULT NOTE ADULT - ASSESSMENT
1)  Renal insufficiency w/ known baseline late Stage 3 CKD secondary to Tacolimus, DM Nephropathy.  Screat in hospital a bit higher than last known creat from my office 10/2018, unclear if simply natural irreversible progression for which he has been at risk w/ ongoing CNI use and significant proteinuria from DM Nephropathy, vs mild WANDY due to decompensated CHF.  Of note, pt w/ known h/o large intrarenal stones requiring ESWL in past.  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

## 2021-03-10 NOTE — H&P ADULT - NSHPLABSRESULTS_GEN_ALL_CORE
BNP- 03698                          13.7   12.12 )-----------( 96       ( 09 Mar 2021 20:57 )             43.1         139  |  100  |  49<H>  ----------------------------<  266<H>  4.9   |  22  |  2.1<H>    Ca    10.1      09 Mar 2021 20:57  Mg     1.8         TPro  7.2  /  Alb  4.6  /  TBili  1.1  /  DBili  x   /  AST  15  /  ALT  7   /  AlkPhos  160<H>          ABG - ( 10 Mar 2021 02:11 )  pH, Arterial: 7.44  pH, Blood: x     /  pCO2: 31    /  pO2: 202   / HCO3: 21    / Base Excess: -2.4  /  SaO2: 100               Urinalysis Basic - ( 10 Mar 2021 03:30 )    Color: Yellow / Appearance: Cloudy / S.025 / pH: x  Gluc: x / Ketone: Negative  / Bili: Negative / Urobili: 0.2 mg/dL   Blood: x / Protein: >=300 mg/dL / Nitrite: Negative   Leuk Esterase: Small / RBC: >50 /HPF / WBC 26-50 /HPF   Sq Epi: x / Non Sq Epi: Few /HPF / Bacteria: TNTC /HPF        Lactate Trend    CARDIAC MARKERS ( 09 Mar 2021 20:57 )  x     / 0.04 ng/mL / x     / x     / x          CAPILLARY BLOOD GLUCOSE      POCT Blood Glucose.: 249 mg/dL (09 Mar 2021 20:23)    < from: CT Abdomen and Pelvis No Cont (03.10.21 @ 02:06) >      ******PRELIMINARY REPORT******    ******PRELIMINARY REPORT******          EXAM:  CT ABDOMEN AND PELVIS        EXAM:  CT CHEST          PROCEDURE DATE:  03/10/2021      ******PRELIMINARY REPORT******    ******PRELIMINARY REPORT******            < from: CT Abdomen and Pelvis No Cont (03.10.21 @ 02:06) >    IMPRESSION:    No CT evidence of acute traumatic injury in the chest, abdomen or pelvis.    Moderate bilateral pleural effusions, bilateral lower lobe predominant groundglass opacities, interlobular septal thickening, cardiomegaly. Findings are consistent with CHF.    Circumferential urinary bladder wall thickening with foci of air outlining the mucosal surface, may represent cystitis. Correlate with urinalysis.    ******PRELIMINARY REPORT******    ******PRELIMINARY REPORT******          DERICK FRASER M.D., RESIDENT RADIOLOGIST    < end of copied text > BNP- 08125                          13.7   12.12 )-----------( 96       ( 09 Mar 2021 20:57 )             43.1         139  |  100  |  49<H>  ----------------------------<  266<H>  4.9   |  22  |  2.1<H>    Ca    10.1      09 Mar 2021 20:57  Mg     1.8         TPro  7.2  /  Alb  4.6  /  TBili  1.1  /  DBili  x   /  AST  15  /  ALT  7   /  AlkPhos  160<H>          ABG - ( 10 Mar 2021 02:11 )  pH, Arterial: 7.44  pH, Blood: x     /  pCO2: 31    /  pO2: 202   / HCO3: 21    / Base Excess: -2.4  /  SaO2: 100               Urinalysis Basic - ( 10 Mar 2021 03:30 )    Color: Yellow / Appearance: Cloudy / S.025 / pH: x  Gluc: x / Ketone: Negative  / Bili: Negative / Urobili: 0.2 mg/dL   Blood: x / Protein: >=300 mg/dL / Nitrite: Negative   Leuk Esterase: Small / RBC: >50 /HPF / WBC 26-50 /HPF   Sq Epi: x / Non Sq Epi: Few /HPF / Bacteria: TNTC /HPF        Lactate Trend    CARDIAC MARKERS ( 09 Mar 2021 20:57 )  x     / 0.04 ng/mL / x     / x     / x          CAPILLARY BLOOD GLUCOSE      POCT Blood Glucose.: 249 mg/dL (09 Mar 2021 20:23)    < from: CT Abdomen and Pelvis No Cont (03.10.21 @ 02:06) >      ******PRELIMINARY REPORT******    ******PRELIMINARY REPORT******          EXAM:  CT ABDOMEN AND PELVIS        EXAM:  CT CHEST          PROCEDURE DATE:  03/10/2021      ******PRELIMINARY REPORT******    ******PRELIMINARY REPORT******            < from: CT Abdomen and Pelvis No Cont (03.10.21 @ 02:06) >    IMPRESSION:    No CT evidence of acute traumatic injury in the chest, abdomen or pelvis.    Moderate bilateral pleural effusions, bilateral lower lobe predominant groundglass opacities, interlobular septal thickening, cardiomegaly. Findings are consistent with CHF.    Circumferential urinary bladder wall thickening with foci of air outlining the mucosal surface, may represent cystitis. Correlate with urinalysis.    ******PRELIMINARY REPORT******    ******PRELIMINARY REPORT******          DERICK FRASER M.D., RESIDENT RADIOLOGIST    < end of copied text >    I reviewed CAT of head report

## 2021-03-10 NOTE — H&P ADULT - PROBLEM SELECTOR PLAN 1
Now appears to have behavioral disturbances (consider metabolic encephalopathy due to infection, UTI) Versed given in ER seems to have adversely affected breathing. Will try haldol if needed and ask neurology to see

## 2021-03-11 VITALS — OXYGEN SATURATION: 98 % | SYSTOLIC BLOOD PRESSURE: 146 MMHG | DIASTOLIC BLOOD PRESSURE: 98 MMHG | HEART RATE: 101 BPM

## 2021-03-11 LAB
ANION GAP SERPL CALC-SCNC: 12 MMOL/L — SIGNIFICANT CHANGE UP (ref 7–14)
BUN SERPL-MCNC: 61 MG/DL — CRITICAL HIGH (ref 10–20)
CALCIUM SERPL-MCNC: 9.5 MG/DL — SIGNIFICANT CHANGE UP (ref 8.5–10.1)
CHLORIDE SERPL-SCNC: 102 MMOL/L — SIGNIFICANT CHANGE UP (ref 98–110)
CO2 SERPL-SCNC: 24 MMOL/L — SIGNIFICANT CHANGE UP (ref 17–32)
CREAT SERPL-MCNC: 2.8 MG/DL — HIGH (ref 0.7–1.5)
CULTURE RESULTS: NO GROWTH — SIGNIFICANT CHANGE UP
GLUCOSE BLDC GLUCOMTR-MCNC: 313 MG/DL — HIGH (ref 70–99)
GLUCOSE SERPL-MCNC: 307 MG/DL — HIGH (ref 70–99)
POTASSIUM SERPL-MCNC: 4.7 MMOL/L — SIGNIFICANT CHANGE UP (ref 3.5–5)
POTASSIUM SERPL-SCNC: 4.7 MMOL/L — SIGNIFICANT CHANGE UP (ref 3.5–5)
SARS-COV-2 IGG SERPL QL IA: NEGATIVE — SIGNIFICANT CHANGE UP
SARS-COV-2 IGM SERPL IA-ACNC: 0.07 INDEX — SIGNIFICANT CHANGE UP
SODIUM SERPL-SCNC: 138 MMOL/L — SIGNIFICANT CHANGE UP (ref 135–146)
SPECIMEN SOURCE: SIGNIFICANT CHANGE UP

## 2021-03-11 RX ORDER — INSULIN LISPRO 100/ML
3 VIAL (ML) SUBCUTANEOUS ONCE
Refills: 0 | Status: COMPLETED | OUTPATIENT
Start: 2021-03-11 | End: 2021-03-11

## 2021-03-11 RX ADMIN — Medication 3 UNIT(S): at 01:25

## 2021-03-11 NOTE — CHART NOTE - NSCHARTNOTEFT_GEN_A_CORE
Patient has again become very agitated, potential harm to himself and staff. Will try dose of haldol and apply 4 point restraints temporarily
Per Dr. Balderas request will order Hydralazine 25 mg PO q6h PRN for systolic BP >160
same day admission    Patient is 71 yo male with hx of Benign prostatic hyperplasia with urinary frequency, Bilateral hearing loss, Chronic kidney disease stage 3, DM2, Heart transplant recipient, High cholesterol, HTN (hypertension) presenting with         1.  Acute hypoxic respiratory failure  -Seems to be secondary to possible CHF, and PNA.  TTE pending  -Continue with IVF lasix, and IV antibiotic, oxygen, and neb tx  -Pulmonary consult     2. AMS  -Seems to be secondary to metabolic encephalopathy in the setting of infection  -Head CT scan shows no acute process  -Treat underline infection  -Neurology consult       3. CKD stage 3  -Nephrology consult      4. DM2  -ISS  -Monitor POC    #Progress Note Handoff  Pending (specify):  still acute   Family discussion:  plan of care was discussed with patient and family in details.  all questions were answered.  seems to understand, and in agreement  overall prognosis guarded to poor
Pt seen at bedside with EMS transport. Pt is stable, saturating at 93% on 3L NC with mild labored breathing at time, pt is somnolent although awakens with verbal stimuli / touch. vitals are currently stable at this time. Pt is stable for transport to [facility] with Dr. Moise as accepting physician.

## 2021-03-12 LAB — MYCOPHENOLATE SERPL-MCNC: ABNORMAL

## 2021-03-17 DIAGNOSIS — N40.1 BENIGN PROSTATIC HYPERPLASIA WITH LOWER URINARY TRACT SYMPTOMS: ICD-10-CM

## 2021-03-17 DIAGNOSIS — A41.9 SEPSIS, UNSPECIFIED ORGANISM: ICD-10-CM

## 2021-03-17 DIAGNOSIS — Z87.891 PERSONAL HISTORY OF NICOTINE DEPENDENCE: ICD-10-CM

## 2021-03-17 DIAGNOSIS — G93.41 METABOLIC ENCEPHALOPATHY: ICD-10-CM

## 2021-03-17 DIAGNOSIS — Z88.5 ALLERGY STATUS TO NARCOTIC AGENT: ICD-10-CM

## 2021-03-17 DIAGNOSIS — Z79.4 LONG TERM (CURRENT) USE OF INSULIN: ICD-10-CM

## 2021-03-17 DIAGNOSIS — R35.0 FREQUENCY OF MICTURITION: ICD-10-CM

## 2021-03-17 DIAGNOSIS — I25.2 OLD MYOCARDIAL INFARCTION: ICD-10-CM

## 2021-03-17 DIAGNOSIS — Z94.1 HEART TRANSPLANT STATUS: ICD-10-CM

## 2021-03-17 DIAGNOSIS — J96.01 ACUTE RESPIRATORY FAILURE WITH HYPOXIA: ICD-10-CM

## 2021-03-17 DIAGNOSIS — N39.0 URINARY TRACT INFECTION, SITE NOT SPECIFIED: ICD-10-CM

## 2021-03-17 DIAGNOSIS — Z79.899 OTHER LONG TERM (CURRENT) DRUG THERAPY: ICD-10-CM

## 2021-03-17 DIAGNOSIS — E61.2 MAGNESIUM DEFICIENCY: ICD-10-CM

## 2021-03-17 DIAGNOSIS — R06.81 APNEA, NOT ELSEWHERE CLASSIFIED: ICD-10-CM

## 2021-03-17 DIAGNOSIS — R45.1 RESTLESSNESS AND AGITATION: ICD-10-CM

## 2021-03-17 DIAGNOSIS — E11.22 TYPE 2 DIABETES MELLITUS WITH DIABETIC CHRONIC KIDNEY DISEASE: ICD-10-CM

## 2021-03-17 DIAGNOSIS — F03.91 UNSPECIFIED DEMENTIA WITH BEHAVIORAL DISTURBANCE: ICD-10-CM

## 2021-03-17 DIAGNOSIS — I13.0 HYPERTENSIVE HEART AND CHRONIC KIDNEY DISEASE WITH HEART FAILURE AND STAGE 1 THROUGH STAGE 4 CHRONIC KIDNEY DISEASE, OR UNSPECIFIED CHRONIC KIDNEY DISEASE: ICD-10-CM

## 2021-03-17 DIAGNOSIS — Z79.52 LONG TERM (CURRENT) USE OF SYSTEMIC STEROIDS: ICD-10-CM

## 2021-03-17 DIAGNOSIS — E66.9 OBESITY, UNSPECIFIED: ICD-10-CM

## 2021-03-17 DIAGNOSIS — I50.23 ACUTE ON CHRONIC SYSTOLIC (CONGESTIVE) HEART FAILURE: ICD-10-CM

## 2021-03-17 DIAGNOSIS — I27.20 PULMONARY HYPERTENSION, UNSPECIFIED: ICD-10-CM

## 2021-03-17 DIAGNOSIS — R41.82 ALTERED MENTAL STATUS, UNSPECIFIED: ICD-10-CM

## 2021-03-17 DIAGNOSIS — N18.30 CHRONIC KIDNEY DISEASE, STAGE 3 UNSPECIFIED: ICD-10-CM

## 2021-04-16 ENCOUNTER — APPOINTMENT (OUTPATIENT)
Dept: UROLOGY | Facility: CLINIC | Age: 71
End: 2021-04-16

## 2021-04-20 NOTE — ED PROVIDER NOTE - NSCAREINITIATED _GEN_ER
[FreeTextEntry1] : Mr. LUGO is doing well from his anal fissure. He has no further pain or discomfort. He denies any bleeding. I've advised that he see me back in 4 weeks at which time I will schedule him for a colonoscopy. He agrees. Reny Ledesma(PA)

## 2021-05-12 NOTE — ED ADULT NURSE NOTE - NSHISCREENINGQ1_ED_A_ED
Electrophysiology Progress Note  Cardiac Arrhythmia Services      Patient Name: Jese Ye  MRN: [id number]      Subjective:  Remains short of breath  He is in atrial fibrillation but his heart rates are controlled    Allergies:   ALLERGIES:  Heparin and Skin adhesives    Medications:  Scheduled Meds:   • sodium chloride (PF)  2 mL Intracatheter 2 times per day   • dilTIAZem  120 mg Oral Daily   • insulin lispro  7 Units Subcutaneous TID AC   • insulin glargine  16 Units Subcutaneous BID   • furosemide  80 mg Intravenous Daily   • furosemide  40 mg Intravenous Q Evening   • metoPROLOL succinate  75 mg Oral BID   • AMIODarone  200 mg Oral Daily   • dilTIAZem  120 mg Oral Nightly   • predniSONE  20 mg Oral Daily with breakfast    Followed by   • [START ON 5/14/2021] predniSONE  10 mg Oral Daily with breakfast   • [START ON 5/17/2021] predniSONE  5 mg Oral Daily with breakfast   • [Held by provider] fish oil-omega-3 fatty acids  1,000 mg Oral Daily   • metFORMIN  500 mg Oral TID   • calcium carbonate-vitamin D  1 tablet Oral BID WC   • pantoprazole  40 mg Oral QAM AC   • digoxin  125 mcg Oral Daily   • roflumilast  250 mcg Oral Daily   • ipratropium-albuterol  3 mL Nebulization Q4H Resp   • polyethylene glycol  17 g Oral Daily   • allopurinol  200 mg Oral Q Evening   • apixaBAN  5 mg Oral BID   • aspirin  81 mg Oral Daily   • atorvastatin  20 mg Oral Daily   • budesonide-formoterol  2 puff Inhalation BID Resp   • ferrous sulfate  325 mg Oral Daily   • guaiFENesin  600 mg Oral BID   • hydrALAZINE  50 mg Oral 3 times per day   • isosorbide mononitrate  30 mg Oral Daily   • lisinopril  40 mg Oral Daily   • magnesium oxide  400 mg Oral Daily   • doxazosin  4 mg Oral Nightly   • [Held by provider] umeclidinium bromide  1 puff Inhalation Daily Resp     Continuous Infusions:   • dextrose 5 % / sodium chloride 0.45% infusion Stopped (05/11/21 1100)     PRN Meds:.sodium chloride, midodrine, meclizine, sodium chloride, sodium  chloride, fentaNYL, HYDROmorphone, ondansetron, prochlorperazine, labetalol, diphenhydrAMINE, acetaminophen, dextrose, dextrose, glucagon, dextrose, dextrose, albuterol    Objective:    Vital Last Value 24 Hour Range   Temperature 97.9 °F (36.6 °C) (05/12/21 1706) Temp  Min: 97.5 °F (36.4 °C)  Max: 97.9 °F (36.6 °C)   Pulse 70 (05/12/21 1706) Pulse  Min: 70  Max: 104   Respiratory 17 (05/12/21 1706) Resp  Min: 17  Max: 24   Non-Invasive  Blood Pressure 97/60 (05/12/21 1706) BP  Min: 97/60  Max: 117/65   Pulse Oximetry 96 % (05/12/21 1706) SpO2  Min: 93 %  Max: 96 %   Arterial   Blood Pressure   No data recorded     Intake/Output:     Intake/Output Summary (Last 24 hours) at 5/12/2021 1746  Last data filed at 5/12/2021 1307  Gross per 24 hour   Intake 320 ml   Output 800 ml   Net -480 ml       Weight    05/08/21 0600 05/09/21 0500 05/11/21 0700 05/12/21 0600   Weight: 98.3 kg (216 lb 11.4 oz) 98.3 kg (216 lb 11.4 oz) 96.8 kg (213 lb 6.5 oz) 96.9 kg (213 lb 10 oz)        Tele: NSR    Physical Exam: Unchanged    General: Alert and oriented x 3. No apparent distress. No respiratory or constitutional distress.  HEENT: Normocephalic, anicteric sclera, neck supple, no thyromegaly or adenopathy.  Neck: No JVD, carotids 2+, no bruits.  Cardiac: Regular rate and rhythm. S1, S2 normal. No murmur, pericardial rub, S3, thrill, heave or extra cardiac sounds.  Lungs: Clear without wheezes, rales, rhonchi or dullness.  Normal excursions and effort.  Abdomen: Soft, non-tender. No organosplenomegally, mass or rebound, BS-present.  Extremities: Without clubbing, cyanosis or edema.  Peripheral pulses are 2+.  Neurologic: Alert and oriented, normal affect. No motor or coordinational deficit.  Skin: Warm and dry.     Laboratory  CBC  Recent Labs   Lab 05/11/21  0759 05/06/21  0725   WBC 8.7 9.0   HCT 29.4* 31.0*   HGB 9.3* 10.0*   * 121*       CMP  Recent Labs   Lab 05/12/21  0848 05/11/21  0752 05/10/21  2105 05/10/21  1236  05/06/21  0725   SODIUM 142 145  146* 142 142 141   POTASSIUM 5.1 4.8  4.8 5.9* 6.3* 4.8   CHLORIDE 105 107  109* 107 108* 106   CO2 33* 31  31 31 32 31   GLUCOSE 132* 89  88 167* 100* 97   BUN 81* 77*  65* 72* 78* 48*   CREATININE 1.96* 1.60*  1.56* 1.79* 1.75* 1.19*   CALCIUM 9.6 9.5  9.1 8.9 9.9 8.9   TOTPROTEIN  --  5.2*  --  5.4* 5.4*   ALBUMIN  --  2.6*  --  2.7* 2.8*   BILIRUBIN  --  0.4  --  0.5 0.5   AST  --  14  --  10 17   GPT  --  29  --  35 42   ALKPT  --  66  --  64 57       Cardiac Labs  No results found    Lipid Panel  No results found    Coags  No results found    ABG  Recent Labs   Lab 05/10/21  1442   RAPH 7.42   RAPCO2 46   RAPO2 109*   RAHCO3 30*   RASAT 99       Imaging  XR CHEST AP OR PA 1 VIEW   Final Result   RESULTS/IMPRESSION: Patient is rotated to the right.  Again noted are   post-CABG changes, mildly large cardiac size and ectatic aorta.  Mild   parahilar interstitial opacities are suspected which may represent mild   pulmonary congestion?.  There is also small linear opacities in the lung   bases, most likely atelectasis.  No acute airspace consolidations are seen.    There is no pneumothorax or pleural effusion.  Follow-up is recommended.      Electronically Signed by: INDIA BYERS MD    Signed on: 5/12/2021 10:13 AM          Bronchoscopy   Final Result      US KIDNEY BILATERAL   Final Result      Unremarkable ultrasound of the kidneys. Small right renal cysts.      Electronically Signed by: NATI SUTHERLAND MD    Signed on: 5/10/2021 9:56 PM          XR CHEST AP OR PA 1 VIEW   Final Result   No acute pulmonary abnormality.  No significant interval change   from the prior study.      Electronically Signed by: ASHER BENTON MD    Signed on: 5/10/2021 3:03 PM          XR CHEST AP OR PA 1 VIEW   Final Result   RESULTS/IMPRESSION: Again noted are post-CABG changes, borderline cardiac   size moderately ectatic aorta.  Lungs are hyper aerated.  Small linear   opacities in lung bases  are nonspecific and unchanged.  There is no new   airspace consolidations.  No pneumothorax or pleural effusion are evident.       Electronically Signed by: INDIA BYERS MD    Signed on: 5/4/2021 8:35 AM          XR CHEST AP OR PA 1 VIEW   Final Result   Impression: There is no evidence of a focal infiltrate or an acute   pneumonia. Minimal, ill-defined density in the right lateral costophrenic   angle is unchanged from the previous study, question minimal atelectasis or   a small scar. There is no pleural effusion. There is no evidence of   pulmonary edema. The patient is status post sternotomy and CABG.      Electronically Signed by: JOSE DE JESUS CLINE MD    Signed on: 4/26/2021 11:47 AM          XR CHEST PA OR AP 1 VIEW   Final Result       No acute radiographic abnormality. Cardiomegaly.      Electronically Signed by: NATI SUTHERLAND MD    Signed on: 4/24/2021 3:02 PM                LAST EKG:  Encounter Date: 04/24/21   Electrocardiogram 12-Lead   Result Value    Ventricular Rate EKG/Min (BPM) 82    WV-Interval (MSEC) 265    QRS-Interval (MSEC) 58    QT-Interval (MSEC) 275    QTc 314    P Axis (Degrees) -34    R Axis (Degrees) -87    T Axis (Degrees) -127    REPORT TEXT      atrial fib  RIGHT ATRIAL ENLARGEMENT  LOW QRS VOLTAGE  POSSIBLE RIGHT VENTRICULAR CONDUCTION DELAY  UNCONFIRMED REPORT  Confirmed by MARINE GUPTA, LAKSHMI ([id number]) on 5/12/2021 12:53:56 PM               Assessment and Plan:   Patient with atrial fibrillation heart rates are presently controlled on amiodarone digoxin metoprolol and Cardizem  Shortness of breath is multifactorial and at a lot of mucous plugs removed on [facility].  He will need bronchodilators and pulmonary toileting and probably pulmonary rehab.  He will be transferred for pulmonary rehab once ready  Regarding atrial fibrillation we will continue on present medication    Benoit Vargas MD  5/12/2021  5:46 PM     No

## 2021-07-27 NOTE — PATIENT PROFILE ADULT - CAREGIVER ADDRESS
same
You can access the FollowMyHealth Patient Portal offered by [facility] by registering at the following website: [email or web address]. By joining [employer]’s FollowMyHealth portal, you will also be able to view your health information using other applications (apps) compatible with our system.

## 2021-10-29 NOTE — PROGRESS NOTE ADULT - SUBJECTIVE AND OBJECTIVE BOX
09-17-18 @ 22:07    JULISA CONKLIN  68y  Male  Seen at Neuro ICU.   Sitting comfortably to OOB chair.     INTERVAL EVENTS: None    MEDICATIONS  (STANDING):  allopurinol 100 milliGRAM(s) Oral two times a day  amLODIPine   Tablet 10 milliGRAM(s) Oral daily  aspirin  chewable 81 milliGRAM(s) Oral daily  atorvastatin 10 milliGRAM(s) Oral at bedtime  cefepime   IVPB 1000 milliGRAM(s) IV Intermittent every 12 hours  dextrose 5%. 1000 milliLiter(s) (50 mL/Hr) IV Continuous <Continuous>  dextrose 50% Injectable 12.5 Gram(s) IV Push once  dextrose 50% Injectable 25 Gram(s) IV Push once  dextrose 50% Injectable 25 Gram(s) IV Push once  doxazosin 8 milliGRAM(s) Oral at bedtime  insulin glargine Injectable (LANTUS) 55 Unit(s) SubCutaneous at bedtime  insulin Infusion 3 Unit(s)/Hr (3 mL/Hr) IV Continuous <Continuous>  insulin lispro (HumaLOG) corrective regimen sliding scale   SubCutaneous three times a day before meals  insulin lispro Injectable (HumaLOG) 10 Unit(s) SubCutaneous three times a day before meals  labetalol 100 milliGRAM(s) Oral two times a day  mycophenolate mofetil 1500 milliGRAM(s) Oral two times a day  pregabalin 100 milliGRAM(s) Oral two times a day  tacrolimus 1 milliGRAM(s) Oral every 12 hours    MEDICATIONS  (PRN):  acetaminophen   Tablet .. 650 milliGRAM(s) Oral every 6 hours PRN Temp greater or equal to 38C (100.4F), Moderate Pain (4 - 6)  aluminum hydroxide/magnesium hydroxide/simethicone Suspension 30 milliLiter(s) Oral every 4 hours PRN Dyspepsia  dextrose 40% Gel 15 Gram(s) Oral once PRN Blood Glucose LESS THAN 70 milliGRAM(s)/deciliter  glucagon  Injectable 1 milliGRAM(s) IntraMuscular once PRN Glucose LESS THAN 70 milligrams/deciliter      T(C): 36.3 (09-17-18 @ 18:45), Max: 37.6 (09-17-18 @ 15:47)  HR: 92 (09-17-18 @ 18:45) (91 - 101)  BP: 123/66 (09-17-18 @ 18:45) (123/66 - 155/86)  RR: 18 (09-17-18 @ 18:45) (16 - 18)  SpO2: 97% (09-17-18 @ 18:45) (97% - 98%)  Wt(kg): --Vital Signs Last 24 Hrs  T(C): 36.3 (17 Sep 2018 18:45), Max: 37.6 (17 Sep 2018 15:47)  T(F): 97.4 (17 Sep 2018 18:45), Max: 99.6 (17 Sep 2018 15:47)  HR: 92 (17 Sep 2018 18:45) (91 - 101)  BP: 123/66 (17 Sep 2018 18:45) (123/66 - 155/86)  BP(mean): --  RR: 18 (17 Sep 2018 18:45) (16 - 18)  SpO2: 97% (17 Sep 2018 18:45) (97% - 98%)    PHYSICAL EXAM:  GENERAL: NAD, obese.   NECK: supple, w/o bruit  CHEST/LUNG:s/p OHS. Clear sounds other wise.   HEART: S1S2 regular,   ABDOMEN: obese, benign. no HJR  EXTREMITIES: stasis pigmented skin. Mild edema.                          12.4   7.41  )-----------( 113      ( 17 Sep 2018 05:50 )             38.4     09-17    138  |  100  |  23<H>  ----------------------------<  172<H>  4.1   |  19  |  1.3    Ca    8.7      17 Sep 2018 18:28  Mg     1.6     09-17              RADIOLOGY & ADDITIONAL TESTS:      ASSESSMENT / PLAN  :    Left cerebellar bleed : Has hemorrhogic changes. No new lesion. Clinically better. Neuro f/u, neuro sg eval. all apprecited. To Cont as adv by neuro. REhab as needed. Will avoid any contrast study. ? of anticoagulation. would wait for echo reports.  DKA resolved, insulin as per endocrin.,   CKD : known. Cr improving, suggesting prerenal. Cont to observe.   s/p heart transplant : has been stable. He is known to me prior to transplant. Does f/u w/ Dr. Finn.   Hypokalemia : improved now.   HTN : STable, cont rx.   DLD ; Cont statin.   BPH : Sono proved also. Will have voiding trial, since had Rouse.   Ins requiring DM : Cont. ins. as per Endo. DISPLAY PLAN FREE TEXT

## 2021-12-02 NOTE — ED ADULT TRIAGE NOTE - MODE OF ARRIVAL
Okay to refer to urology although it seems unlikely that he will be able to have the Ramos catheter removed. Walk in

## 2022-04-03 NOTE — H&P ADULT - HISTORY OF PRESENT ILLNESS
This is a 69 YO M with a PMHx of heart transplant, DM, HTN, DLD, who presented from home  for AMS. His wife checked his FS and it was high bermudez brought him to the ED. In the ED he was found to be septic and in DKA, was started on a insulin drip and IVF. Otherwise his wife denies fevers, chills, headaches, cough, urinary complaints, abdominal pain, nausea, vomiting, or any other complaints.    ICU Vital Signs Last 24 Hrs  T(C): 37.6 (16 Nov 2018 11:00), Max: 38.5 (16 Nov 2018 07:22)  T(F): 99.6 (16 Nov 2018 11:00), Max: 101.3 (16 Nov 2018 07:22)  HR: 94 (16 Nov 2018 13:00) (94 - 125)  BP: 111/69 (16 Nov 2018 13:00) (108/95 - 182/90)  BP(mean): 84 (16 Nov 2018 13:00) (84 - 125)  ABP: --  ABP(mean): --  RR: 24 (16 Nov 2018 13:00) (18 - 24)  SpO2: 93% (16 Nov 2018 13:00) (93% - 98%) Alert and oriented to person, place and time

## 2022-12-14 NOTE — H&P ADULT - NSHPPOAPULMEMBOLUS_GEN_A_CORE
[facility]  [address] [place] [address]  Phone: [phone]  Fax: [phone]    Isabel Avila MD         December 14, 2022     Patient: Victor Hugo Parisi II   YOB: 1950   Date of Visit: 12/14/2022       To Whom It May Concern: It is my medical opinion that Brutus Scheuermann requires a disability parking placard for the following reasons:  He has limited walking ability due to an orthopedic condition. Duration of need: 1 year    If you have any questions or concerns, please don't hesitate to call.     Sincerely,        Isabel Avila MD no

## 2023-01-06 NOTE — SPEECH LANGUAGE PATHOLOGY EVALUATION - SLP SEQUENCING
[Clear Rhinorrhea] : clear rhinorrhea [NL] : warm, clear could only provide two unsequenced steps to make a cup of tea/impaired

## 2023-03-02 NOTE — H&P ADULT - PROBLEM SELECTOR PLAN 2
PT had PO visit on 02/28  Pt wants to know if he can take milk of mag & metamucil together. Pt is also experiencing  Pain between belly  Button and genitals . pain gets worse when having a bowel movement  #[phone] diuretics/  anti hypertensives/ fluid restrictions

## 2023-03-11 NOTE — PROVIDER CONTACT NOTE (OTHER) - ASSESSMENT
Pt appears to be sleeping. S/o is at the bedside.    patient placed on tele monitor, HR elevated at 188, pulse ox is 93% on 2LNC, pt is confused, unable to keep eyes open, pt tries to get out of bed, is on a 1:1 for confusion. patient placed on tele monitor, HR elevated at 118, pulse ox is 93% on 2LNC, pt is confused, unable to keep eyes open, pt tries to get out of bed, is on a 1:1 for confusion.

## 2023-06-08 NOTE — PROGRESS NOTE ADULT - ASSESSMENT
69 y/o male with a hx of Cardiac Transplant x 10 years ago due to MI x 2 and low EF on immunosuppressant , DM dx in his 60's,  HTN, DLD p/w acute confusion x 1 day    #Left cerebellar hemorrhage  - measuring 0.9 x 0.4 cm and rpt CTH shows  0.9 x 0.5 cm hyperdensity in the left cerebellum which may represent a cavernoma with recent bleed.   - repeat CT head shows decrease in size of bleed  -MRI w/wo C - f/u  - can obtain CTA when kidney function improves  - f/u with neuro  - Maintain SBP <160  -NSGY does not want to intervene  - Neuro checks Q4h  -admit to stroke unit.  -PT eval    # DKA - resolving  - AG at 17 this morning  -subq insulin lantus 21 units    #UTI  -+ u/a  -f/u urine cx sensitivities   -c/w cefepime 1gBID    # WANDY on CKD likely prerenal   -resolved    # HTN  - maintain SBP<160  -c/w labetalol 100 BID      # hx of cardiac transplant  - c/w immunosuppressants  - Prograf level - f/u    DVT ppx: SCD    Diet: mechanical soft dysphagia 2     Full code   Activity: OOB to chair    DOWNGRADE TO MEDICAL FLOOR Valtrex Counseling: I discussed with the patient the risks of valacyclovir including but not limited to kidney damage, nausea, vomiting and severe allergy.  The patient understands that if the infection seems to be worsening or is not improving, they are to call.

## 2023-09-22 NOTE — PROVIDER CONTACT NOTE (OTHER) - ACTION/TREATMENT ORDERED:
Call placed to Attending regarding patient's lethargy and episodic agitation. Orders to decrease Robaxin to 500mg 4x daily. STAT ABG, Lasix 40mg IV push

## 2023-12-14 NOTE — ED PROVIDER NOTE - EKG #1 DATE/TIME
23-Jun-2019 23:26 Continue Regimen: SPF qd of at least SPF 30 Detail Level: Detailed Plan: Make sure retracting the penis and cleanser daily.\\n\\nPt has already had 2 previous circumcisions

## 2024-03-06 NOTE — ED ADULT NURSE NOTE - NS ED NOTE  TALK SOMEONE YN
Anesthesia Post-op Note    Patient: Joi Gonzalez  Procedure(s) Performed: RIGHT ACHILLES TENDON REPAIR (Right: Ankle)  Anesthesia type: General    Vitals Value Taken Time   Temp 36.4 °C (97.5 °F) 03/06/24 1603   Pulse 76 03/06/24 1603   Resp 18 03/06/24 1603   SpO2 98 % 03/06/24 1603   /84 03/06/24 1603         Patient Location: Phase II  Post-op Vital Signs:stable  Level of Consciousness: awake, alert, follows commands, oriented and return to baseline  Respiratory Status: spontaneous ventilation, unassisted and face mask  Cardiovascular stable and blood pressure returned to baseline  Hydration: euvolemic  Pain Management: adequately controlled  Handoff: Handoff to receiving nurse was performed and questions were answered  Vomiting: none  Nausea: None  Airway Patency:patent  Post-op Assessment: awake, alert, appropriately conversant, or baseline, no complications, patient tolerated procedure well, no evidence of recall, dentition within defined limits, moving all extremities and No Corneal Abrasion      No notable events documented.                      
No

## 2024-09-20 NOTE — ED PROVIDER NOTE - PHYSICAL EXAMINATION
Physical Exam    Vital Signs: I have reviewed the initial vital signs.  Constitutional: well-nourished, appears stated age, mild acute distress  Eyes: Conjunctiva pink, Sclera clear, PERRLA, EOMI.  Cardiovascular: S1 and S2, tachy regular rate, regular rhythm, well-perfused extremities, radial pulses equal and 2+  Respiratory: unlabored respiratory effort, clear to auscultation bilaterally no wheezing, rales and rhonchi  Gastrointestinal: soft, non-tender abdomen, no pulsatile mass, normal bowl sounds  Musculoskeletal: supple neck, no lower extremity edema, no midline tenderness  Integumentary: warm, dry, no rash  Neurologic: awake, alert, cranial nerves II-XII grossly intact, extremities’ motor and sensory functions grossly intact  Psychiatric: appropriate mood, appropriate affect change klonopin to ativan 2mg BID, titrate as tolerated, hold for sedation. continue effexor 300 and lamictal 100. trazodone 50 PRN insomnia. PRN Zyprexa 5mg PO/IM agitation. Ativan 1mg PRN anxiety Recommend Ativan 2 mg po TID; can also restart Caplyta at 21 mg po QHS for mood/psychosis; recommend Haldol 5 mg, Ativan 2 mg PO/IM q6 prn agitation Recommend Ativan 2 mg PO/IV TID for catatonia; can restart Caplyta at 21 mg po QHS for mood/psychosis; recommend Haldol 5 mg, Ativan 2 mg PO/IM q6 prn agitation

## 2024-10-09 NOTE — PATIENT PROFILE ADULT - HAS THE PATIENT RECEIVED THE INFLUENZA VACCINE THIS SEASON?
Reviewed with Dr. Pierre.  No need for treatment at this time.     unable to assess immunization status...